# Patient Record
Sex: FEMALE | Race: BLACK OR AFRICAN AMERICAN | NOT HISPANIC OR LATINO | Employment: UNEMPLOYED | RURAL
[De-identification: names, ages, dates, MRNs, and addresses within clinical notes are randomized per-mention and may not be internally consistent; named-entity substitution may affect disease eponyms.]

---

## 2017-02-24 ENCOUNTER — OFFICE VISIT (OUTPATIENT)
Dept: FAMILY MEDICINE CLINIC | Facility: CLINIC | Age: 52
End: 2017-02-24

## 2017-02-24 VITALS
DIASTOLIC BLOOD PRESSURE: 88 MMHG | HEART RATE: 72 BPM | BODY MASS INDEX: 39.23 KG/M2 | TEMPERATURE: 98.4 F | RESPIRATION RATE: 16 BRPM | WEIGHT: 199.8 LBS | SYSTOLIC BLOOD PRESSURE: 148 MMHG | HEIGHT: 60 IN

## 2017-02-24 DIAGNOSIS — E89.0 S/P THYROIDECTOMY: ICD-10-CM

## 2017-02-24 DIAGNOSIS — E03.9 HYPOTHYROIDISM, UNSPECIFIED TYPE: ICD-10-CM

## 2017-02-24 DIAGNOSIS — E21.4 OTHER SPECIFIED DISORDERS OF PARATHYROID GLAND (HCC): Primary | ICD-10-CM

## 2017-02-24 DIAGNOSIS — Z13.6 ENCOUNTER FOR SCREENING FOR CARDIOVASCULAR DISORDERS: ICD-10-CM

## 2017-02-24 DIAGNOSIS — Z13.1 ENCOUNTER FOR SCREENING FOR DIABETES MELLITUS: ICD-10-CM

## 2017-02-24 PROCEDURE — 99203 OFFICE O/P NEW LOW 30 MIN: CPT | Performed by: FAMILY MEDICINE

## 2017-02-24 RX ORDER — ONDANSETRON HYDROCHLORIDE 8 MG/1
8 TABLET, FILM COATED ORAL EVERY 8 HOURS PRN
Qty: 90 TABLET | Refills: 3 | Status: SHIPPED | OUTPATIENT
Start: 2017-02-24 | End: 2017-02-24 | Stop reason: SDUPTHER

## 2017-02-24 RX ORDER — CALCIUM ACETATE 667 MG/1
2668 CAPSULE ORAL 3 TIMES DAILY
Qty: 180 CAPSULE | Refills: 11 | Status: SHIPPED | OUTPATIENT
Start: 2017-02-24 | End: 2017-02-24 | Stop reason: SDUPTHER

## 2017-02-24 RX ORDER — CALCIUM ACETATE 667 MG/1
2668 CAPSULE ORAL 3 TIMES DAILY
Qty: 180 CAPSULE | Refills: 11 | Status: SHIPPED | OUTPATIENT
Start: 2017-02-24 | End: 2018-03-29 | Stop reason: SDUPTHER

## 2017-02-24 RX ORDER — PNV NO.95/FERROUS FUM/FOLIC AC 28MG-0.8MG
1 TABLET ORAL 3 TIMES DAILY
Qty: 90 EACH | Refills: 11 | Status: SHIPPED | OUTPATIENT
Start: 2017-02-24 | End: 2017-06-22 | Stop reason: SDUPTHER

## 2017-02-24 RX ORDER — METOPROLOL SUCCINATE 50 MG/1
50 TABLET, EXTENDED RELEASE ORAL DAILY
COMMUNITY
End: 2017-02-24 | Stop reason: SDUPTHER

## 2017-02-24 RX ORDER — PNV NO.95/FERROUS FUM/FOLIC AC 28MG-0.8MG
1 TABLET ORAL 3 TIMES DAILY
Qty: 90 EACH | Refills: 11 | Status: SHIPPED | OUTPATIENT
Start: 2017-02-24 | End: 2017-02-24 | Stop reason: SDUPTHER

## 2017-02-24 RX ORDER — LISINOPRIL 40 MG/1
40 TABLET ORAL DAILY
Qty: 30 TABLET | Refills: 11 | Status: SHIPPED | OUTPATIENT
Start: 2017-02-24 | End: 2018-02-23 | Stop reason: SDUPTHER

## 2017-02-24 RX ORDER — CLONIDINE HYDROCHLORIDE 0.2 MG/1
0.2 TABLET ORAL 2 TIMES DAILY
Qty: 60 TABLET | Refills: 5 | Status: SHIPPED | OUTPATIENT
Start: 2017-02-24 | End: 2017-11-30 | Stop reason: SDUPTHER

## 2017-02-24 RX ORDER — POTASSIUM CHLORIDE 1500 MG/1
20 TABLET, FILM COATED, EXTENDED RELEASE ORAL DAILY
Qty: 30 TABLET | Refills: 11 | Status: SHIPPED | OUTPATIENT
Start: 2017-02-24 | End: 2019-05-03 | Stop reason: SDUPTHER

## 2017-02-24 RX ORDER — CLONIDINE HYDROCHLORIDE 0.2 MG/1
0.2 TABLET ORAL 2 TIMES DAILY
Qty: 60 TABLET | Refills: 5 | Status: SHIPPED | OUTPATIENT
Start: 2017-02-24 | End: 2017-02-24 | Stop reason: SDUPTHER

## 2017-02-24 RX ORDER — LEVOTHYROXINE SODIUM 0.12 MG/1
125 TABLET ORAL DAILY
COMMUNITY
End: 2017-02-24

## 2017-02-24 RX ORDER — LISINOPRIL 40 MG/1
40 TABLET ORAL DAILY
Qty: 30 TABLET | Refills: 11 | Status: SHIPPED | OUTPATIENT
Start: 2017-02-24 | End: 2017-02-24 | Stop reason: SDUPTHER

## 2017-02-24 RX ORDER — ONDANSETRON HYDROCHLORIDE 8 MG/1
8 TABLET, FILM COATED ORAL EVERY 8 HOURS PRN
Qty: 90 TABLET | Refills: 3 | Status: SHIPPED | OUTPATIENT
Start: 2017-02-24 | End: 2017-05-05 | Stop reason: SDUPTHER

## 2017-02-24 RX ORDER — METOPROLOL SUCCINATE 50 MG/1
50 TABLET, EXTENDED RELEASE ORAL DAILY
Qty: 30 TABLET | Refills: 11 | Status: SHIPPED | OUTPATIENT
Start: 2017-02-24 | End: 2017-02-24 | Stop reason: SDUPTHER

## 2017-02-24 RX ORDER — METOPROLOL SUCCINATE 50 MG/1
50 TABLET, EXTENDED RELEASE ORAL DAILY
Qty: 30 TABLET | Refills: 11 | Status: SHIPPED | OUTPATIENT
Start: 2017-02-24 | End: 2018-03-08 | Stop reason: SDUPTHER

## 2017-02-24 RX ORDER — POTASSIUM CHLORIDE 1500 MG/1
20 TABLET, FILM COATED, EXTENDED RELEASE ORAL DAILY
Qty: 30 TABLET | Refills: 11 | Status: SHIPPED | OUTPATIENT
Start: 2017-02-24 | End: 2017-02-24 | Stop reason: SDUPTHER

## 2017-02-24 NOTE — PROGRESS NOTES
Subjective   Joan Blackwell is a 51 y.o. female.     History of Present Illness     Problem List  1. Essential Hypertension -   2. Asthma -    3. Hypocalcemia  4. Hypothyroidism sp thyroidectomy.   5. 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 H/O Hysterectomy     Patient is 52 yo AAF who I am seeing for first time. Is here to establish.  Pt has not seen PCP in years. Has been going to various health clinics for medication refills.      Pt has history of hypertension and is currently taking clonidine 0.1 mg PO BID along with lisinopril 40 mg PO q daily.  Also is taking Metoprolol XL 50 mg PO q daily. Does not check blood pressure often but usually it runs  >140/90. Denies any chest pain, headaches or dizziness    Pt states she had a thyroidectomy about 10 years ago performed by Dr. Arredondo.  Pt does not know why thyroid was removed.  As a complication pt had some of parathyroid hormones damaged and resected.  Pt has had chronic hypocalcemia and is currently taking calcium acetate 667 mg capsule TID along with potassium chloride ER 20 MEQ PO q daily also on Magnesium supplement in addition to iron supplement.    Pt is needing refills on all her medications today    Pt also has history of asthma and is seeing Dr. Hloguin (Pulmonologist) for this.  Is currently on Advair inhaler.     Was also on thyroid supplement Synthroid before 125 mcg PO q daily but pt states that dosage was too much for her. Pt has not had thyroid checked recently    Pt states she had mammogram done last year.      Patient also has been complaining of some nausea and vomiting as of late. Is requesting today to be on some medication to help with this        The following portions of the patient's history were reviewed and updated as appropriate: allergies, current medications, past family history, past medical history, past social history, past surgical history and problem list.    Review of Systems   Constitutional: Negative.    HENT:  "Negative.    Eyes: Negative.    Respiratory: Negative.    Cardiovascular: Negative.    Gastrointestinal: Positive for nausea and vomiting.   Endocrine: Negative.    Genitourinary: Negative.    Musculoskeletal: Negative.    Skin: Negative.    Allergic/Immunologic: Negative.    Neurological: Negative.    Hematological: Negative.    Psychiatric/Behavioral: Negative.        Objective    Visit Vitals   • /88 (BP Location: Right arm, Patient Position: Sitting, Cuff Size: Adult)   • Pulse 72   • Temp 98.4 °F (36.9 °C) (Oral)   • Resp 16   • Ht 60\" (152.4 cm)   • Wt 199 lb 12.8 oz (90.6 kg)   • BMI 39.02 kg/m2     Abstract on 09/19/2016   Component Date Value Ref Range Status   •  Mammogram 11/18/2015 COMPLETE   Final   •  Pap smear 11/23/2009 COMPLETE   Final       Physical Exam   Constitutional: She is oriented to person, place, and time. She appears well-developed and well-nourished. No distress.   HENT:   Head: Normocephalic and atraumatic.   Right Ear: External ear normal.   Left Ear: External ear normal.   Eyes: Conjunctivae and EOM are normal. Pupils are equal, round, and reactive to light. Right eye exhibits no discharge. Left eye exhibits no discharge. No scleral icterus.   Neck: Normal range of motion. Neck supple. No JVD present. No tracheal deviation present. No thyromegaly present.   Incision and scar on neck from prior thyroidectomy   Cardiovascular: Normal rate, regular rhythm and normal heart sounds.    Pulmonary/Chest: Effort normal and breath sounds normal. No stridor.   Abdominal: Soft. Bowel sounds are normal. She exhibits no distension and no mass. There is no tenderness. There is no rebound and no guarding. No hernia.   Musculoskeletal: She exhibits no edema, tenderness or deformity.   Lymphadenopathy:     She has no cervical adenopathy.   Neurological: She is alert and oriented to person, place, and time. She has normal reflexes. No cranial nerve deficit.   Skin: Skin is warm and dry. No " rash noted. She is not diaphoretic. No erythema. No pallor.   Psychiatric: She has a normal mood and affect. Her behavior is normal. Judgment and thought content normal.   Nursing note and vitals reviewed.      Assessment/Plan   Problems Addressed this Visit     None      Visit Diagnoses     Other specified disorders of parathyroid gland    -  Primary    Relevant Orders    PTH, Intact & Calcium    Encounter for screening for diabetes mellitus        Relevant Orders    CBC Auto Differential    Comprehensive Metabolic Panel    Hemoglobin A1c    Hepatitis Panel, Acute    Lipid Panel    TSH    T4, Free    T3, Free    Vitamin D 25 Hydroxy    Iron Profile    Ferritin    Vitamin B12    Magnesium    Encounter for screening for cardiovascular disorders        Hypothyroidism, unspecified type        S/P thyroidectomy            -will get basic labwork today, checking calcium levels, thyroid levels and parathyroid levels  - Based on thyroid levels will need to start on synthroid medication but on lower dosage.    - will also get hga1c for diabetes screenign and lipid panel for cardiovascular screening  - Also check iron, b12, magnesium level.    - recheck in 1 month  -  Discuss colonoscopy and vaccination on next visit

## 2017-02-27 ENCOUNTER — TELEPHONE (OUTPATIENT)
Dept: FAMILY MEDICINE CLINIC | Facility: CLINIC | Age: 52
End: 2017-02-27

## 2017-02-27 NOTE — TELEPHONE ENCOUNTER
Pharmacy called to verified Rx for magnesium.  Verified with Dr. Arnold, magnesium 133mg TID.  Notified pharmacy by phone.

## 2017-04-24 LAB
25(OH)D3 SERPL-MCNC: 15.6 NG/ML (ref 30–100)
ALBUMIN SERPL-MCNC: 4.2 G/DL (ref 3.4–4.8)
ALBUMIN/GLOB SERPL: 1.4 G/DL (ref 1.1–1.8)
ALP SERPL-CCNC: 72 U/L (ref 38–126)
ALT SERPL W P-5'-P-CCNC: 37 U/L (ref 9–52)
ANION GAP SERPL CALCULATED.3IONS-SCNC: 13 MMOL/L (ref 5–15)
ARTICHOKE IGE QN: 84 MG/DL (ref 1–129)
AST SERPL-CCNC: 26 U/L (ref 14–36)
BASOPHILS # BLD AUTO: 0.02 10*3/MM3 (ref 0–0.2)
BASOPHILS NFR BLD AUTO: 0.4 % (ref 0–2)
BILIRUB SERPL-MCNC: 0.4 MG/DL (ref 0.2–1.3)
BUN BLD-MCNC: 11 MG/DL (ref 7–21)
BUN/CREAT SERPL: 17.7 (ref 7–25)
CALCIUM SPEC-SCNC: 7.7 MG/DL (ref 8.4–10.2)
CHLORIDE SERPL-SCNC: 99 MMOL/L (ref 95–110)
CHOLEST SERPL-MCNC: 159 MG/DL (ref 0–199)
CO2 SERPL-SCNC: 26 MMOL/L (ref 22–31)
CREAT BLD-MCNC: 0.62 MG/DL (ref 0.5–1)
DEPRECATED RDW RBC AUTO: 47.2 FL (ref 36.4–46.3)
EOSINOPHIL # BLD AUTO: 0.13 10*3/MM3 (ref 0–0.7)
EOSINOPHIL NFR BLD AUTO: 2.7 % (ref 0–7)
ERYTHROCYTE [DISTWIDTH] IN BLOOD BY AUTOMATED COUNT: 15.5 % (ref 11.5–14.5)
FERRITIN SERPL-MCNC: 33.2 NG/ML (ref 11.1–264)
GFR SERPL CREATININE-BSD FRML MDRD: 123 ML/MIN/1.73 (ref 51–120)
GLOBULIN UR ELPH-MCNC: 3.1 GM/DL (ref 2.3–3.5)
GLUCOSE BLD-MCNC: 142 MG/DL (ref 60–100)
HBA1C MFR BLD: 8 % (ref 4–5.6)
HCT VFR BLD AUTO: 33.9 % (ref 35–45)
HDLC SERPL-MCNC: 48 MG/DL (ref 60–200)
HGB BLD-MCNC: 11.6 G/DL (ref 12–15.5)
IMM GRANULOCYTES # BLD: 0.01 10*3/MM3 (ref 0–0.02)
IMM GRANULOCYTES NFR BLD: 0.2 % (ref 0–0.5)
IRON 24H UR-MRATE: 34 MCG/DL (ref 37–170)
IRON SATN MFR SERPL: 11 % (ref 15–50)
LDLC/HDLC SERPL: 1.94 {RATIO} (ref 0–3.22)
LYMPHOCYTES # BLD AUTO: 2 10*3/MM3 (ref 0.6–4.2)
LYMPHOCYTES NFR BLD AUTO: 42 % (ref 10–50)
MAGNESIUM SERPL-MCNC: 1.6 MG/DL (ref 1.6–2.3)
MCH RBC QN AUTO: 28.2 PG (ref 26.5–34)
MCHC RBC AUTO-ENTMCNC: 34.2 G/DL (ref 31.4–36)
MCV RBC AUTO: 82.5 FL (ref 80–98)
MONOCYTES # BLD AUTO: 0.33 10*3/MM3 (ref 0–0.9)
MONOCYTES NFR BLD AUTO: 6.9 % (ref 0–12)
NEUTROPHILS # BLD AUTO: 2.27 10*3/MM3 (ref 2–8.6)
NEUTROPHILS NFR BLD AUTO: 47.8 % (ref 37–80)
PLATELET # BLD AUTO: 297 10*3/MM3 (ref 150–450)
PMV BLD AUTO: 9.4 FL (ref 8–12)
POTASSIUM BLD-SCNC: 4 MMOL/L (ref 3.5–5.1)
PROT SERPL-MCNC: 7.3 G/DL (ref 6.3–8.6)
RBC # BLD AUTO: 4.11 10*6/MM3 (ref 3.77–5.16)
SODIUM BLD-SCNC: 138 MMOL/L (ref 137–145)
T4 FREE SERPL-MCNC: 0.69 NG/DL (ref 0.78–2.19)
TIBC SERPL-MCNC: 310 MCG/DL (ref 265–497)
TRIGL SERPL-MCNC: 89 MG/DL (ref 20–199)
TSH SERPL DL<=0.05 MIU/L-ACNC: 9.03 MIU/ML (ref 0.46–4.68)
VIT B12 BLD-MCNC: 325 PG/ML (ref 239–931)
WBC NRBC COR # BLD: 4.76 10*3/MM3 (ref 3.2–9.8)

## 2017-04-24 PROCEDURE — 84481 FREE ASSAY (FT-3): CPT | Performed by: FAMILY MEDICINE

## 2017-04-24 PROCEDURE — 80061 LIPID PANEL: CPT | Performed by: FAMILY MEDICINE

## 2017-04-24 PROCEDURE — 84439 ASSAY OF FREE THYROXINE: CPT | Performed by: FAMILY MEDICINE

## 2017-04-24 PROCEDURE — 84443 ASSAY THYROID STIM HORMONE: CPT | Performed by: FAMILY MEDICINE

## 2017-04-24 PROCEDURE — 83540 ASSAY OF IRON: CPT | Performed by: FAMILY MEDICINE

## 2017-04-24 PROCEDURE — 83970 ASSAY OF PARATHORMONE: CPT | Performed by: FAMILY MEDICINE

## 2017-04-24 PROCEDURE — 83550 IRON BINDING TEST: CPT | Performed by: FAMILY MEDICINE

## 2017-04-24 PROCEDURE — 80053 COMPREHEN METABOLIC PANEL: CPT | Performed by: FAMILY MEDICINE

## 2017-04-24 PROCEDURE — 82607 VITAMIN B-12: CPT | Performed by: FAMILY MEDICINE

## 2017-04-24 PROCEDURE — 82728 ASSAY OF FERRITIN: CPT | Performed by: FAMILY MEDICINE

## 2017-04-24 PROCEDURE — 83735 ASSAY OF MAGNESIUM: CPT | Performed by: FAMILY MEDICINE

## 2017-04-24 PROCEDURE — 85025 COMPLETE CBC W/AUTO DIFF WBC: CPT | Performed by: FAMILY MEDICINE

## 2017-04-24 PROCEDURE — 82306 VITAMIN D 25 HYDROXY: CPT | Performed by: FAMILY MEDICINE

## 2017-04-24 PROCEDURE — 83036 HEMOGLOBIN GLYCOSYLATED A1C: CPT | Performed by: FAMILY MEDICINE

## 2017-04-24 PROCEDURE — 80074 ACUTE HEPATITIS PANEL: CPT | Performed by: FAMILY MEDICINE

## 2017-04-25 LAB
CALCIUM SPEC-SCNC: 7.8 MG/DL (ref 8.4–10.2)
HAV IGM SERPL QL IA: NEGATIVE
HBV CORE IGM SERPL QL IA: NEGATIVE
HBV SURFACE AG SERPL QL IA: NEGATIVE
HCV AB SER DONR QL: NEGATIVE
PTH-INTACT SERPL-MCNC: 10.8 PG/ML (ref 10–65)
T3FREE SERPL-MCNC: 2.6 PG/ML (ref 2–4.4)

## 2017-05-03 ENCOUNTER — OFFICE VISIT (OUTPATIENT)
Dept: FAMILY MEDICINE CLINIC | Facility: CLINIC | Age: 52
End: 2017-05-03

## 2017-05-03 VITALS
TEMPERATURE: 98.6 F | DIASTOLIC BLOOD PRESSURE: 90 MMHG | RESPIRATION RATE: 16 BRPM | HEART RATE: 60 BPM | BODY MASS INDEX: 41.11 KG/M2 | WEIGHT: 209.4 LBS | SYSTOLIC BLOOD PRESSURE: 142 MMHG | HEIGHT: 60 IN

## 2017-05-03 DIAGNOSIS — E89.0 S/P THYROIDECTOMY: ICD-10-CM

## 2017-05-03 DIAGNOSIS — E83.51 HYPOCALCEMIA: ICD-10-CM

## 2017-05-03 DIAGNOSIS — E78.5 HYPERLIPIDEMIA, UNSPECIFIED HYPERLIPIDEMIA TYPE: ICD-10-CM

## 2017-05-03 DIAGNOSIS — E03.9 HYPOTHYROIDISM, UNSPECIFIED TYPE: ICD-10-CM

## 2017-05-03 DIAGNOSIS — D50.9 IRON DEFICIENCY ANEMIA, UNSPECIFIED IRON DEFICIENCY ANEMIA TYPE: ICD-10-CM

## 2017-05-03 DIAGNOSIS — IMO0002 UNCONTROLLED TYPE 2 DIABETES MELLITUS WITH COMPLICATION, WITHOUT LONG-TERM CURRENT USE OF INSULIN: Primary | ICD-10-CM

## 2017-05-03 PROCEDURE — 99214 OFFICE O/P EST MOD 30 MIN: CPT | Performed by: FAMILY MEDICINE

## 2017-05-03 RX ORDER — SIMVASTATIN 20 MG
20 TABLET ORAL NIGHTLY
Qty: 30 TABLET | Refills: 11 | Status: SHIPPED | OUTPATIENT
Start: 2017-05-03 | End: 2018-06-01 | Stop reason: SDUPTHER

## 2017-05-03 RX ORDER — LEVOTHYROXINE SODIUM 137 UG/1
137 TABLET ORAL DAILY
Qty: 30 TABLET | Refills: 11 | Status: SHIPPED | OUTPATIENT
Start: 2017-05-03 | End: 2018-03-13

## 2017-05-03 RX ORDER — LANOLIN ALCOHOL/MO/W.PET/CERES
325 CREAM (GRAM) TOPICAL
Qty: 90 TABLET | Refills: 11 | Status: SHIPPED | OUTPATIENT
Start: 2017-05-03 | End: 2018-06-01 | Stop reason: SDUPTHER

## 2017-05-05 RX ORDER — OMEPRAZOLE 20 MG/1
20 CAPSULE, DELAYED RELEASE ORAL DAILY
Qty: 30 CAPSULE | Refills: 11 | Status: SHIPPED | OUTPATIENT
Start: 2017-05-05 | End: 2018-03-08

## 2017-05-05 RX ORDER — ONDANSETRON HYDROCHLORIDE 8 MG/1
8 TABLET, FILM COATED ORAL EVERY 8 HOURS PRN
Qty: 90 TABLET | Refills: 3 | Status: SHIPPED | OUTPATIENT
Start: 2017-05-05 | End: 2019-09-18 | Stop reason: SDUPTHER

## 2017-06-22 ENCOUNTER — OFFICE VISIT (OUTPATIENT)
Dept: FAMILY MEDICINE CLINIC | Facility: CLINIC | Age: 52
End: 2017-06-22

## 2017-06-22 VITALS
HEIGHT: 60 IN | WEIGHT: 196.8 LBS | SYSTOLIC BLOOD PRESSURE: 151 MMHG | HEART RATE: 70 BPM | TEMPERATURE: 97 F | OXYGEN SATURATION: 97 % | BODY MASS INDEX: 38.64 KG/M2 | DIASTOLIC BLOOD PRESSURE: 84 MMHG

## 2017-06-22 DIAGNOSIS — N76.0 VAGINOSIS: Primary | ICD-10-CM

## 2017-06-22 LAB
CANDIDA ALBICANS: POSITIVE
GARDNERELLA VAGINALIS: NEGATIVE
TRICHOMONAS VAGINALIS PCR: NEGATIVE

## 2017-06-22 PROCEDURE — 87510 GARDNER VAG DNA DIR PROBE: CPT | Performed by: NURSE PRACTITIONER

## 2017-06-22 PROCEDURE — 99213 OFFICE O/P EST LOW 20 MIN: CPT | Performed by: NURSE PRACTITIONER

## 2017-06-22 PROCEDURE — 87660 TRICHOMONAS VAGIN DIR PROBE: CPT | Performed by: NURSE PRACTITIONER

## 2017-06-22 PROCEDURE — 87480 CANDIDA DNA DIR PROBE: CPT | Performed by: NURSE PRACTITIONER

## 2017-06-22 RX ORDER — FLUCONAZOLE 150 MG/1
TABLET ORAL
Qty: 2 TABLET | Refills: 5 | Status: SHIPPED | OUTPATIENT
Start: 2017-06-22 | End: 2018-02-19

## 2017-06-22 RX ORDER — CLOTRIMAZOLE 1 %
CREAM WITH APPLICATOR VAGINAL NIGHTLY
Qty: 45 G | Refills: 0 | Status: SHIPPED | OUTPATIENT
Start: 2017-06-22 | End: 2018-02-19

## 2017-06-22 NOTE — PROGRESS NOTES
Subjective   Joan Blackwell is a 51 y.o. female.     HPI Comments: Here with itchy vaginal discharge, long hx of vag yeast and usually responds well to diflucan.  Not concerned with STD    Vaginal Discharge   The patient's primary symptoms include genital itching, a genital rash and vaginal discharge. The patient's pertinent negatives include no genital lesions, genital odor, pelvic pain or vaginal bleeding. This is a new problem. The current episode started in the past 7 days. The problem occurs constantly. The problem has been unchanged. The patient is experiencing no pain. Pertinent negatives include no abdominal pain, dysuria, nausea, rash or urgency. The vaginal discharge was white. There has been no bleeding. The symptoms are aggravated by tactile pressure and activity. She has tried antifungals for the symptoms. Her sexual activity is non-contributory to the current illness. She is postmenopausal (Post hysterectomy). Her past medical history is significant for vaginosis.        The following portions of the patient's history were reviewed and updated as appropriate: allergies, current medications, past family history, past medical history, past social history, past surgical history and problem list.    Review of Systems   Constitutional: Positive for activity change.   Gastrointestinal: Negative for abdominal pain and nausea.   Genitourinary: Positive for vaginal discharge. Negative for difficulty urinating, dysuria, pelvic pain, urgency, vaginal bleeding and vaginal pain.   Musculoskeletal: Negative for gait problem.   Skin: Negative for rash.       Objective   Physical Exam   Constitutional: She is oriented to person, place, and time. She appears well-developed and well-nourished.   HENT:   Head: Normocephalic and atraumatic.   Eyes: Conjunctivae are normal.   Neck: Normal range of motion. Neck supple.   Cardiovascular: Normal rate.    Pulmonary/Chest: Effort normal and breath sounds normal.   Abdominal:  Soft. She exhibits no distension. There is no tenderness.   Genitourinary: Vaginal discharge found.   Genitourinary Comments: Stated vaginal itching a wk ago not concerned re: STD.  Post hysterectomy   Musculoskeletal: Normal range of motion.   Neurological: She is alert and oriented to person, place, and time.   Skin: Skin is warm and dry.   Psychiatric: She has a normal mood and affect.   Nursing note and vitals reviewed.      Assessment/Plan   Joan was seen today for vaginal discharge.    Diagnoses and all orders for this visit:    Vaginosis  -     fluconazole (DIFLUCAN) 150 MG tablet; Take one tab now repeat in one week  -     Gardnerella vaginalis, Trichomonas vaginalis, Candida albicans, PCR  -     clotrimazole (GYNE-LOTRIMIN) 1 % vaginal cream; Insert  into the vagina Every Night.

## 2017-06-26 DIAGNOSIS — R23.8 RASH, VESICULAR: Primary | ICD-10-CM

## 2017-06-26 RX ORDER — ACYCLOVIR 50 MG/G
OINTMENT TOPICAL 3 TIMES DAILY PRN
Qty: 30 G | Refills: 0 | Status: SHIPPED | OUTPATIENT
Start: 2017-06-26 | End: 2018-03-08

## 2017-11-30 RX ORDER — CLONIDINE HYDROCHLORIDE 0.2 MG/1
TABLET ORAL
Qty: 60 TABLET | Refills: 0 | Status: SHIPPED | OUTPATIENT
Start: 2017-11-30 | End: 2018-03-08

## 2018-02-19 ENCOUNTER — OFFICE VISIT (OUTPATIENT)
Dept: FAMILY MEDICINE CLINIC | Facility: CLINIC | Age: 53
End: 2018-02-19

## 2018-02-19 VITALS
HEART RATE: 118 BPM | HEIGHT: 60 IN | BODY MASS INDEX: 37.28 KG/M2 | WEIGHT: 189.9 LBS | RESPIRATION RATE: 16 BRPM | DIASTOLIC BLOOD PRESSURE: 100 MMHG | TEMPERATURE: 99.5 F | SYSTOLIC BLOOD PRESSURE: 210 MMHG

## 2018-02-19 DIAGNOSIS — I10 UNCONTROLLED HYPERTENSION: ICD-10-CM

## 2018-02-19 DIAGNOSIS — I16.0 HYPERTENSIVE URGENCY: ICD-10-CM

## 2018-02-19 DIAGNOSIS — IMO0002 UNCONTROLLED TYPE 2 DIABETES MELLITUS WITH COMPLICATION, WITHOUT LONG-TERM CURRENT USE OF INSULIN: Primary | ICD-10-CM

## 2018-02-19 DIAGNOSIS — R51.9 NONINTRACTABLE HEADACHE, UNSPECIFIED CHRONICITY PATTERN, UNSPECIFIED HEADACHE TYPE: ICD-10-CM

## 2018-02-19 PROBLEM — E66.9 OBESITY: Status: ACTIVE | Noted: 2018-02-19

## 2018-02-19 PROCEDURE — 99213 OFFICE O/P EST LOW 20 MIN: CPT | Performed by: FAMILY MEDICINE

## 2018-02-19 NOTE — PROGRESS NOTES
Subjective   Joan Blackwell is a 52 y.o. female.       Problem List  1. Essential Hypertension -   2. Asthma   3. Hypocalcemia  4. Hypothyroidism sp thyroidectomy.   5. 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 H/O Hysterectomy   7. DM type 2  8. Hyperlipidemia ASCVD risk high   9. Iron deficiency anemia  10. Constipation  11. Obesity BMI >30     5/3/17 Pt is 52 yo AAF with the above medical issues. Is here to go over labwork. TSH still elevated. Is taking Synthroid 125 mcg daily.  Pt has hypothyroidism sp thyroidectomy.   Pt also has hypocalcemia and is taking calcium supplements. PTH was normal.  Regarding hga1c it was 8.0 and pt is newly diagnosed diabetic. She is tearful today when hearing this. Currently not on statin medication as well.  Has not had diabetic eye examination.  Is willing to go to diabetic education classes  Regarding rest of labwork. Pt has iron deficiency anemia and is not on iron pill currently.      2/19/18 - pt is here for followup. She is due for followup on DM type 2 iron deficiency anemia and hypothyroidism sp thyroidectomy. For hyperlipidemia she is on zucor 20 mg PO qhs. Also takes synthroid 137 mcg pO q daily for hypothyroidism. She is alos on metformin 500 mg PO BID for DM type 2.  Her last hga1c was 8.0. She missed her last appt on 6/2/17.  For iron deficiency anemia she is on ferrous sulfate piill TID. She is also due for mammogram, colonoscopy screening and diabetic eye examination. PT today mainly has a headache that started yesterday. Today her BP is elevated today and >180/100. She has been to ER twice at [facility] already and was discharged home. Do not have ER records here today . She was given BP medication and her BP went down. PT states she was not admitted and was discharged from [facility] the same day Pt also has been experiencing dry heaving and wanting to vomit.  She states she also has spasms at the back of her neck. She is also experiencing wheezing . She states  lorena is compliant in taking her BP medication including clonidine 0.2 mg PO BID, lisinopril 40 mg PO q daily and toprol XL 50 mg PO q daily     Headache    This is a new problem. The current episode started yesterday. The problem has been rapidly worsening. The pain is located in the occipital region. The pain quality is not similar to prior headaches. The quality of the pain is described as aching, sharp and pulsating. Associated symptoms include dizziness, eye redness, eye watering, nausea, neck pain, phonophobia, photophobia, rhinorrhea and vomiting. Pertinent negatives include no abnormal behavior, back pain, blurred vision, drainage, ear pain, eye pain, facial sweating, hearing loss, insomnia, loss of balance, muscle aches, scalp tenderness, seizures, tingling or weight loss. Her past medical history is significant for hypertension and obesity. There is no history of cancer, immunosuppression, migraine headaches, migraines in the family, pseudotumor cerebri, recent head traumas, sinus disease or TMJ.   Diabetes   She presents for her initial diabetic visit. She has type 2 diabetes mellitus. Her disease course has been stable. Hypoglycemia symptoms include dizziness and headaches. Pertinent negatives for hypoglycemia include no confusion, hunger, mood changes, nervousness/anxiousness, pallor, seizures, sleepiness, speech difficulty, sweats or tremors. Pertinent negatives for diabetes include no blurred vision, no chest pain, no foot paresthesias, no foot ulcerations, no polydipsia, no polyphagia, no polyuria and no weight loss. Pertinent negatives for hypoglycemia complications include no blackouts, no hospitalization, no nocturnal hypoglycemia, no required assistance and no required glucagon injection. Pertinent negatives for diabetic complications include no autonomic neuropathy, CVA, heart disease, impotence, nephropathy, peripheral neuropathy, PVD or retinopathy. Risk factors for coronary artery disease  include diabetes mellitus, sedentary lifestyle and obesity. When asked about current treatments, none were reported. She has not had a previous visit with a dietitian. She never participates in exercise. An ACE inhibitor/angiotensin II receptor blocker is being taken. She does not see a podiatrist.Eye exam is not current.   Hypertension   The problem has been gradually worsening since onset. Associated symptoms include headaches, neck pain and shortness of breath. Pertinent negatives include no anxiety, blurred vision, chest pain, malaise/fatigue, orthopnea, palpitations, peripheral edema, PND or sweats. There are no associated agents to hypertension. Risk factors for coronary artery disease include diabetes mellitus and dyslipidemia. Past treatments include beta blockers. The current treatment provides no improvement. There are no compliance problems.  There is no history of angina, kidney disease, CAD/MI, CVA, heart failure, left ventricular hypertrophy, PVD, retinopathy or a thyroid problem. There is no history of chronic renal disease, coarctation of the aorta, hyperaldosteronism, hypercortisolism, hyperparathyroidism, a hypertension causing med, pheochromocytoma or sleep apnea.             The following portions of the patient's history were reviewed and updated as appropriate: allergies, current medications, past family history, past medical history, past social history, past surgical history and problem list.    Review of Systems   Constitutional: Positive for activity change. Negative for malaise/fatigue and weight loss.   HENT: Positive for rhinorrhea. Negative for ear pain and hearing loss.    Eyes: Positive for photophobia and redness. Negative for blurred vision and pain.   Respiratory: Positive for shortness of breath.    Cardiovascular: Negative for chest pain, palpitations, orthopnea and PND.   Gastrointestinal: Positive for constipation, nausea and vomiting.   Endocrine: Negative.  Negative for  "polydipsia, polyphagia and polyuria.   Genitourinary: Negative.  Negative for impotence.   Musculoskeletal: Positive for neck pain and neck stiffness. Negative for back pain.   Skin: Negative.  Negative for pallor.   Allergic/Immunologic: Negative.    Neurological: Positive for dizziness, light-headedness and headaches. Negative for tingling, tremors, seizures, facial asymmetry, speech difficulty and loss of balance.   Hematological: Negative.    Psychiatric/Behavioral: Negative.  Negative for confusion. The patient is not nervous/anxious and does not have insomnia.        Objective    BP (!) 210/100  Pulse 118  Temp 99.5 °F (37.5 °C)  Resp 16  Ht 152.4 cm (60\")  Wt 86.1 kg (189 lb 14.4 oz)  BMI 37.09 kg/m2        Chemistry        Component Value Date/Time     04/24/2017 0828    K 4.0 04/24/2017 0828    CL 99 04/24/2017 0828    CO2 26.0 04/24/2017 0828    BUN 11 04/24/2017 0828    CREATININE 0.62 04/24/2017 0828        Component Value Date/Time    CALCIUM 7.7 (L) 04/24/2017 0828    CALCIUM 7.8 (L) 04/24/2017 0828    ALKPHOS 72 04/24/2017 0828    AST 26 04/24/2017 0828    ALT 37 04/24/2017 0828    BILITOT 0.4 04/24/2017 0828        Lab Results   Component Value Date    WBC 4.76 04/24/2017    HGB 11.6 (L) 04/24/2017    HCT 33.9 (L) 04/24/2017    MCV 82.5 04/24/2017     04/24/2017     Lab Results   Component Value Date    CHOL 159 04/24/2017     Lab Results   Component Value Date    TRIG 89 04/24/2017    TRIG 98 10/12/2015     Lab Results   Component Value Date    HDL 48 (L) 04/24/2017    HDL 51 (L) 10/12/2015     No components found for: LDLCALC  Lab Results   Component Value Date    LDL 84 04/24/2017    LDL 74 10/12/2015     No results found for: HDLLDLRATIO  No components found for: CHOLHDL  Lab Results   Component Value Date    HGBA1C 8.00 (H) 04/24/2017     Lab Results   Component Value Date    TSH 9.030 (H) 04/24/2017     Physical Exam   Constitutional: She is oriented to person, place, and " time. She appears well-developed and well-nourished. She appears distressed.   Pt appears to be  Dry heaving    HENT:   Head: Normocephalic and atraumatic.   Right Ear: External ear normal.   Left Ear: External ear normal.   Eyes: Conjunctivae and EOM are normal. Right eye exhibits no discharge. Left eye exhibits no discharge. No scleral icterus.   Dilated pupils on reaction to light bilaterally    Neck: Normal range of motion. Neck supple. No JVD present. No tracheal deviation present. No thyromegaly present.   Cardiovascular: Normal rate, regular rhythm and normal heart sounds.    Pulmonary/Chest: No stridor. She is in respiratory distress. She has wheezes.   Abdominal: Soft. Bowel sounds are normal. She exhibits no distension and no mass. There is no tenderness. There is no rebound and no guarding. No hernia.   Musculoskeletal: Normal range of motion. She exhibits no edema, tenderness or deformity.   Lymphadenopathy:     She has no cervical adenopathy.   Neurological: She is alert and oriented to person, place, and time. She has normal reflexes. No cranial nerve deficit. Coordination normal.   Skin: Skin is warm. No rash noted. She is diaphoretic. No erythema. No pallor.   Psychiatric: She has a normal mood and affect. Her behavior is normal. Judgment and thought content normal.   Nursing note and vitals reviewed.      Assessment/Plan   Problems Addressed this Visit        Cardiovascular and Mediastinum    Uncontrolled hypertension    Hypertensive urgency       Endocrine    Uncontrolled type 2 diabetes mellitus with complication, without long-term current use of insulin - Primary       Nervous and Auditory    Nonintractable headache      - had discussion with pt about the need to go to ER at [facility] since pt has uncontrolled hypertension and red flags with neck pain, shortness of breath, dizziness, param heaving.  Highly suspicious for hypertensive urgency/hypertensive emergency  BP today >180/100.  She will likely  need close careful  inpatient monitoring of Blood pressure.  Pt advised to go to ER and she chose to go back to [facility]  - Spoke and called [facility] ER and spoke to Dr. Aragon (ER PHysician) about pt's condition he agreed to see patient. Pt and  advised to go to [facility] ER for further evaluation and likely admission to hospital. Appreciate Dr. Aragon for accepting pt   - Pt signed Against Medical Advice form and declined ambulance.  aware of pt's condition and will drive pt to ER  - recheck again in office once pt imore stabilized.   - will hold getting labwork for now

## 2018-02-22 ENCOUNTER — HOSPITAL ENCOUNTER (EMERGENCY)
Facility: HOSPITAL | Age: 53
Discharge: HOME | End: 2018-02-22
Attending: EMERGENCY MEDICINE | Admitting: EMERGENCY MEDICINE

## 2018-02-22 VITALS
RESPIRATION RATE: 18 BRPM | WEIGHT: 190 LBS | DIASTOLIC BLOOD PRESSURE: 75 MMHG | HEIGHT: 60 IN | BODY MASS INDEX: 37.3 KG/M2 | SYSTOLIC BLOOD PRESSURE: 122 MMHG | OXYGEN SATURATION: 97 % | HEART RATE: 74 BPM | TEMPERATURE: 98.4 F

## 2018-02-22 DIAGNOSIS — M62.838 NECK MUSCLE SPASM: Primary | ICD-10-CM

## 2018-02-22 PROCEDURE — 99283 EMERGENCY DEPT VISIT LOW MDM: CPT

## 2018-02-22 PROCEDURE — 96372 THER/PROPH/DIAG INJ SC/IM: CPT

## 2018-02-22 PROCEDURE — 25010000002 MORPHINE PER 10 MG: Performed by: EMERGENCY MEDICINE

## 2018-02-22 PROCEDURE — 25010000002 ORPHENADRINE CITRATE PER 60 MG: Performed by: EMERGENCY MEDICINE

## 2018-02-22 RX ORDER — HYDROCODONE BITARTRATE AND ACETAMINOPHEN 5; 325 MG/1; MG/1
1 TABLET ORAL EVERY 6 HOURS PRN
Qty: 10 TABLET | Refills: 0 | Status: SHIPPED | OUTPATIENT
Start: 2018-02-22 | End: 2018-03-08

## 2018-02-22 RX ORDER — ORPHENADRINE CITRATE 30 MG/ML
60 INJECTION INTRAMUSCULAR; INTRAVENOUS ONCE
Status: COMPLETED | OUTPATIENT
Start: 2018-02-22 | End: 2018-02-22

## 2018-02-22 RX ORDER — CYCLOBENZAPRINE HCL 10 MG
10 TABLET ORAL 3 TIMES DAILY PRN
Qty: 30 TABLET | Refills: 0 | Status: SHIPPED | OUTPATIENT
Start: 2018-02-22 | End: 2018-03-08

## 2018-02-22 RX ADMIN — MORPHINE SULFATE 8 MG: 4 INJECTION, SOLUTION INTRAMUSCULAR; INTRAVENOUS at 10:00

## 2018-02-22 RX ADMIN — ORPHENADRINE CITRATE 60 MG: 30 INJECTION INTRAMUSCULAR; INTRAVENOUS at 10:00

## 2018-02-22 NOTE — ED TRIAGE NOTES
Patient states she fell 11 days ago and hit her head on concrete.  Patient presents to ED with c/o neck spasms.

## 2018-02-22 NOTE — DISCHARGE INSTRUCTIONS
Follow-up with your primary care physician for reevaluation.  Take pain medication and muscle relaxants as needed.  Return to ER for worsening.      Muscle Cramps and Spasms  Muscle cramps and spasms occur when a muscle or muscles tighten and you have no control over this tightening (involuntary muscle contraction). They are a common problem and can develop in any muscle. The most common place is in the calf muscles of the leg. Muscle cramps and muscle spasms are both involuntary muscle contractions, but there are some differences between the two:  · Muscle cramps are painful. They come and go and may last a few seconds to 15 minutes. Muscle cramps are often more forceful and last longer than muscle spasms.  · Muscle spasms may or may not be painful. They may also last just a few seconds or much longer.  Certain medical conditions, such as diabetes or Parkinson disease, can make it more likely to develop cramps or spasms. However, cramps or spasms are usually not caused by a serious underlying problem. Common causes include:  · Overexertion.  · Overuse from repetitive motions, or doing the same thing over and over.  · Remaining in a certain position for a long period of time.  · Improper preparation, form, or technique while playing a sport or doing an activity.  · Dehydration.  · Injury.  · Side effects of some medicines.  · Abnormally low levels of the salts and ions in your blood (electrolytes), especially potassium and calcium. This could happen if you are taking water pills (diuretics) or if you are pregnant.  In many cases, the cause of muscle cramps or spasms is unknown.  Follow these instructions at home:  · Stay well hydrated. Drink enough fluid to keep your urine clear or pale yellow.  · Try massaging, stretching, and relaxing the affected muscle.  · If directed, apply heat to tight or tense muscles as often as told by your health care provider. Use the heat source that your health care provider  recommends, such as a moist heat pack or a heating pad.  ¨ Place a towel between your skin and the heat source.  ¨ Leave the heat on for 20-30 minutes.  ¨ Remove the heat if your skin turns bright red. This is especially important if you are unable to feel pain, heat, or cold. You may have a greater risk of getting burned.  · If directed, put ice on the affected area. This may help if you are sore or have pain after a cramp or spasm.  ¨ Put ice in a plastic bag.  ¨ Place a towel between your skin and the bag.  ¨ Leave the ice on for 20 minutes, 2-3 times a day.  · Take over-the-counter and prescription medicines only as told by your health care provider.  · Pay attention to any changes in your symptoms.  Contact a health care provider if:  · Your cramps or spasms get more severe or happen more often.  · Your cramps or spasms do not improve over time.  This information is not intended to replace advice given to you by your health care provider. Make sure you discuss any questions you have with your health care provider.  Document Released: 06/09/2003 Document Revised: 01/18/2017 Document Reviewed: 09/20/2016  [employer] Interactive Patient Education © 2017 [employer]

## 2018-02-22 NOTE — ED PROVIDER NOTES
Subjective   HPI Comments: 52 years old female with history of hypertension, diabetes mellitus, GERD, carpal tunnel right presented in the ER for evaluation of neck pain and headache.  Patient reports she slipped on ice almost 10 days ago and fell on concrete.  She did hit her head.  She was evaluated at [facility] with negative CT head and neck for any acute finding.  CT neck showed muscle spasms.  Patient reports moderate to severe intensity sharp pain with movements of neck.  Pain is more on the right neck muscles with some palpable knots.  It is radiating in the right upper extremity.  She has been taking Tylenol with no significant relief.  She denies any new numbness and tingling in the right upper extremity except for what she has from carpal tunnel for which she would be going for surgery at [facility] as per patient.  She denies any chest pain palpitations or shortness of breath.  No visual symptoms.  No numbness tingling weakness anywhere else.      History provided by:  Patient      Review of Systems   Constitutional: Negative for chills and fever.   HENT: Negative for congestion, postnasal drip, sinus pain and trouble swallowing.    Eyes: Negative for photophobia, redness and visual disturbance.   Respiratory: Negative for chest tightness and shortness of breath.    Gastrointestinal: Negative for abdominal distention, abdominal pain, nausea and vomiting.   Genitourinary: Negative for dysuria.   Musculoskeletal: Positive for neck pain. Negative for back pain.   Skin: Negative for pallor.   Neurological: Positive for headaches. Negative for dizziness, tremors, weakness and numbness.       Past Medical History:   Diagnosis Date   • Abdominal pain     suspect Kidney Stone      • Acquired hypothyroidism    • Acute bronchitis    • Acute maxillary sinusitis    • Acute pharyngitis    • Asthma    • Axillary lymphadenopathy    • Blood in urine    • Bronchitis     with an asthmatic reaction      •  Cough    • Cyst of Bartholin's gland duct     History of    • Degenerative joint disease involving multiple joints    • Encounter for gynecological examination with abnormal finding    • Essential (primary) hypertension    • Essential hypertension    • GERD (gastroesophageal reflux disease)    • Goiter    • Hemorrhoids    • Hordeolum     right lower eyelid      • Low back pain    • Lung nodule, solitary    • Polyp of vagina     possible      • Postsurgical hypoparathyroidism    • Shortness of breath    • Tobacco dependence syndrome     Past history   • Urinary tract infectious disease    • Vulvovaginitis        Allergies   Allergen Reactions   • Naproxen Nausea And Vomiting and Swelling   • Other      Blueberries       Past Surgical History:   Procedure Laterality Date   •  SECTION  01/14/1993    x 3, 84, 82,   • EXPLORATORY LAPAROTOMY Left 1998    Left cystectomy. Partial resection of vthe left ovary. Left ovary- oversewn   • INJECTION OF MEDICATION  2014    Depo Medrol (Methylprednisone) (1)    • INJECTION OF MEDICATION  2015    Kenalog (1)     • LAPAROSCOPIC HYSTERECTOMY  1995    Surgical, lysis of adhesions.Laparoscopic Assisted vaginal Hysterectomy (Tyler/ Doderlein Technique) marsupialization of right Bartholin's Gland Cyst   • LASER ABLATION OF THE CERVIX  1985    Laser vaporization, cervical cone   • OVARIAN CYST REMOVAL     • PAP SMEAR     • THYROIDECTOMY  2006    with partial parathyroidectomy       Family History   Problem Relation Age of Onset   • Diabetes Mother    • Hypertension Mother    • Diabetes Father    • Arthritis Father        Social History     Social History   • Marital status:      Spouse name: N/A   • Number of children: N/A   • Years of education: N/A     Social History Main Topics   • Smoking status: Former Smoker   • Smokeless tobacco: Never Used   • Alcohol use No   • Drug use: Defer   • Sexual activity: Defer      Other Topics Concern   • None     Social History Narrative           Objective   Physical Exam   Constitutional: She is oriented to person, place, and time. She appears well-developed and well-nourished.   HENT:   Head: Normocephalic and atraumatic.   Nose: Nose normal.   Mouth/Throat: Oropharynx is clear and moist.   Eyes: Conjunctivae and EOM are normal. Pupils are equal, round, and reactive to light.   Neck: Neck supple. Muscular tenderness present. No spinous process tenderness present. No tracheal deviation present. No thyromegaly present.       Right trapezius muscle spasm and tenderness.  Midline tenderness.   Cardiovascular: Normal rate, regular rhythm and normal heart sounds.    Pulmonary/Chest: Effort normal and breath sounds normal.   Musculoskeletal: She exhibits tenderness.   Neurological: She is alert and oriented to person, place, and time. She has normal reflexes. She displays no atrophy, no tremor and normal reflexes. No cranial nerve deficit or sensory deficit. She exhibits normal muscle tone. Coordination normal.   Skin: Skin is warm and dry.   Psychiatric: She has a normal mood and affect.   Nursing note and vitals reviewed.      Procedures         ED Course  ED Course   Comment By Time   Ekasper Request # : [id number] Bill Anderson MD 02/22 6208        I have reviewed CAT scan results of her head and neck done at [facility] on February 10 which did not show any acute finding .  Her symptoms/findings are more of muscle spasm.  She is given pain medication and muscle relaxants.  On reevaluation she is feeling much better.  I will place her on pain medication and muscle relaxant and have her follow-up with her primary care physician.  I do not think she needs repeat  images again.  She has no focal neuro findings at all suggesting need for images.  At this point I do not think she needs any other workup and is being discharged.          MDM    Final diagnoses:   Neck muscle spasm             Bill Anderson MD  02/22/18 1024       Bill Anderson MD  02/22/18 102

## 2018-02-23 RX ORDER — LISINOPRIL 40 MG/1
TABLET ORAL
Qty: 30 TABLET | Refills: 10 | Status: SHIPPED | OUTPATIENT
Start: 2018-02-23 | End: 2018-03-08 | Stop reason: SDUPTHER

## 2018-03-08 ENCOUNTER — OFFICE VISIT (OUTPATIENT)
Dept: FAMILY MEDICINE CLINIC | Facility: CLINIC | Age: 53
End: 2018-03-08

## 2018-03-08 VITALS
WEIGHT: 191 LBS | RESPIRATION RATE: 16 BRPM | TEMPERATURE: 98.6 F | SYSTOLIC BLOOD PRESSURE: 148 MMHG | BODY MASS INDEX: 37.5 KG/M2 | DIASTOLIC BLOOD PRESSURE: 96 MMHG | HEIGHT: 60 IN | HEART RATE: 100 BPM

## 2018-03-08 DIAGNOSIS — E89.0 S/P THYROIDECTOMY: ICD-10-CM

## 2018-03-08 DIAGNOSIS — Z12.39 ENCOUNTER FOR SCREENING FOR MALIGNANT NEOPLASM OF BREAST: ICD-10-CM

## 2018-03-08 DIAGNOSIS — I10 UNCONTROLLED HYPERTENSION: ICD-10-CM

## 2018-03-08 DIAGNOSIS — D50.9 IRON DEFICIENCY ANEMIA, UNSPECIFIED IRON DEFICIENCY ANEMIA TYPE: ICD-10-CM

## 2018-03-08 DIAGNOSIS — E78.5 HYPERLIPIDEMIA, UNSPECIFIED HYPERLIPIDEMIA TYPE: ICD-10-CM

## 2018-03-08 DIAGNOSIS — E66.9 OBESITY, UNSPECIFIED CLASSIFICATION, UNSPECIFIED OBESITY TYPE, UNSPECIFIED WHETHER SERIOUS COMORBIDITY PRESENT: ICD-10-CM

## 2018-03-08 DIAGNOSIS — I10 ESSENTIAL HYPERTENSION: ICD-10-CM

## 2018-03-08 DIAGNOSIS — E83.51 HYPOCALCEMIA: ICD-10-CM

## 2018-03-08 DIAGNOSIS — E87.6 HYPOKALEMIA: ICD-10-CM

## 2018-03-08 DIAGNOSIS — G56.01 CARPAL TUNNEL SYNDROME OF RIGHT WRIST: ICD-10-CM

## 2018-03-08 DIAGNOSIS — Z00.00 GENERAL MEDICAL EXAMINATION: ICD-10-CM

## 2018-03-08 DIAGNOSIS — E11.8 CONTROLLED TYPE 2 DIABETES MELLITUS WITH COMPLICATION, WITHOUT LONG-TERM CURRENT USE OF INSULIN (HCC): Primary | ICD-10-CM

## 2018-03-08 DIAGNOSIS — M25.531 RIGHT WRIST PAIN: ICD-10-CM

## 2018-03-08 DIAGNOSIS — Z12.11 ENCOUNTER FOR SCREENING FOR MALIGNANT NEOPLASM OF COLON: ICD-10-CM

## 2018-03-08 DIAGNOSIS — E03.9 HYPOTHYROIDISM, UNSPECIFIED TYPE: ICD-10-CM

## 2018-03-08 DIAGNOSIS — E55.9 VITAMIN D DEFICIENCY: ICD-10-CM

## 2018-03-08 PROCEDURE — 99214 OFFICE O/P EST MOD 30 MIN: CPT | Performed by: FAMILY MEDICINE

## 2018-03-08 RX ORDER — LISINOPRIL 40 MG/1
40 TABLET ORAL DAILY
Qty: 30 TABLET | Refills: 3 | Status: SHIPPED | OUTPATIENT
Start: 2018-03-08 | End: 2018-06-22 | Stop reason: SDUPTHER

## 2018-03-08 RX ORDER — CLONIDINE HYDROCHLORIDE 0.3 MG/1
0.3 TABLET ORAL EVERY 12 HOURS SCHEDULED
Qty: 60 TABLET | Refills: 3 | Status: SHIPPED | OUTPATIENT
Start: 2018-03-08 | End: 2018-03-29 | Stop reason: SDUPTHER

## 2018-03-08 RX ORDER — METOPROLOL SUCCINATE 50 MG/1
50 TABLET, EXTENDED RELEASE ORAL DAILY
Qty: 30 TABLET | Refills: 3 | Status: SHIPPED | OUTPATIENT
Start: 2018-03-08 | End: 2018-09-05 | Stop reason: SDUPTHER

## 2018-03-08 NOTE — PATIENT INSTRUCTIONS
"STart taking clonidine 0.3 mg by mouth twice a day    Continue on Toprol XL 50 mg daily    Continue on lisionpril  40 mg daily    Please check blood pressures and bring on next visit    Office will call for appt with Orthopedic Surgery and colonscopy screening.      Hospital will call for mammogram screening    Recheck in 2 weeks   DASH Eating Plan  DASH stands for \"Dietary Approaches to Stop Hypertension.\" The DASH eating plan is a healthy eating plan that has been shown to reduce high blood pressure (hypertension). It may also reduce your risk for type 2 diabetes, heart disease, and stroke. The DASH eating plan may also help with weight loss.  What are tips for following this plan?  General guidelines   · Avoid eating more than 2,300 mg (milligrams) of salt (sodium) a day. If you have hypertension, you may need to reduce your sodium intake to 1,500 mg a day.  · Limit alcohol intake to no more than 1 drink a day for nonpregnant women and 2 drinks a day for men. One drink equals 12 oz of beer, 5 oz of wine, or 1½ oz of hard liquor.  · Work with your health care provider to maintain a healthy body weight or to lose weight. Ask what an ideal weight is for you.  · Get at least 30 minutes of exercise that causes your heart to beat faster (aerobic exercise) most days of the week. Activities may include walking, swimming, or biking.  · Work with your health care provider or diet and nutrition specialist (dietitian) to adjust your eating plan to your individual calorie needs.  Reading food labels   · Check food labels for the amount of sodium per serving. Choose foods with less than 5 percent of the Daily Value of sodium. Generally, foods with less than 300 mg of sodium per serving fit into this eating plan.  · To find whole grains, look for the word \"whole\" as the first word in the ingredient list.  Shopping   · Buy products labeled as \"low-sodium\" or \"no salt added.\"  · Buy fresh foods. Avoid canned foods and premade or " frozen meals.  Cooking   · Avoid adding salt when cooking. Use salt-free seasonings or herbs instead of table salt or sea salt. Check with your health care provider or pharmacist before using salt substitutes.  · Do not ayala foods. Cook foods using healthy methods such as baking, boiling, grilling, and broiling instead.  · Cook with heart-healthy oils, such as olive, canola, soybean, or sunflower oil.  Meal planning     · Eat a balanced diet that includes:  ¨ 5 or more servings of fruits and vegetables each day. At each meal, try to fill half of your plate with fruits and vegetables.  ¨ Up to 6-8 servings of whole grains each day.  ¨ Less than 6 oz of lean meat, poultry, or fish each day. A 3-oz serving of meat is about the same size as a deck of cards. One egg equals 1 oz.  ¨ 2 servings of low-fat dairy each day.  ¨ A serving of nuts, seeds, or beans 5 times each week.  ¨ Heart-healthy fats. Healthy fats called Omega-3 fatty acids are found in foods such as flaxseeds and coldwater fish, like sardines, salmon, and mackerel.  · Limit how much you eat of the following:  ¨ Canned or prepackaged foods.  ¨ Food that is high in trans fat, such as fried foods.  ¨ Food that is high in saturated fat, such as fatty meat.  ¨ Sweets, desserts, sugary drinks, and other foods with added sugar.  ¨ Full-fat dairy products.  · Do not salt foods before eating.  · Try to eat at least 2 vegetarian meals each week.  · Eat more home-cooked food and less restaurant, buffet, and fast food.  · When eating at a restaurant, ask that your food be prepared with less salt or no salt, if possible.  What foods are recommended?  The items listed may not be a complete list. Talk with your dietitian about what dietary choices are best for you.  Grains   Whole-grain or whole-wheat bread. Whole-grain or whole-wheat pasta. Brown rice. Oatmeal. Quinoa. Bulgur. Whole-grain and low-sodium cereals. Dina bread. Low-fat, low-sodium crackers. Whole-wheat flour  tortillas.  Vegetables   Fresh or frozen vegetables (raw, steamed, roasted, or grilled). Low-sodium or reduced-sodium tomato and vegetable juice. Low-sodium or reduced-sodium tomato sauce and tomato paste. Low-sodium or reduced-sodium canned vegetables.  Fruits   All fresh, dried, or frozen fruit. Canned fruit in natural juice (without added sugar).  Meat and other protein foods   Skinless chicken or turkey. Ground chicken or turkey. Pork with fat trimmed off. Fish and seafood. Egg whites. Dried beans, peas, or lentils. Unsalted nuts, nut butters, and seeds. Unsalted canned beans. Lean cuts of beef with fat trimmed off. Low-sodium, lean deli meat.  Dairy   Low-fat (1%) or fat-free (skim) milk. Fat-free, low-fat, or reduced-fat cheeses. Nonfat, low-sodium ricotta or cottage cheese. Low-fat or nonfat yogurt. Low-fat, low-sodium cheese.  Fats and oils   Soft margarine without trans fats. Vegetable oil. Low-fat, reduced-fat, or light mayonnaise and salad dressings (reduced-sodium). Canola, safflower, olive, soybean, and sunflower oils. Avocado.  Seasoning and other foods   Herbs. Spices. Seasoning mixes without salt. Unsalted popcorn and pretzels. Fat-free sweets.  What foods are not recommended?  The items listed may not be a complete list. Talk with your dietitian about what dietary choices are best for you.  Grains   Baked goods made with fat, such as croissants, muffins, or some breads. Dry pasta or rice meal packs.  Vegetables   Creamed or fried vegetables. Vegetables in a cheese sauce. Regular canned vegetables (not low-sodium or reduced-sodium). Regular canned tomato sauce and paste (not low-sodium or reduced-sodium). Regular tomato and vegetable juice (not low-sodium or reduced-sodium). Pickles. Olives.  Fruits   Canned fruit in a light or heavy syrup. Fried fruit. Fruit in cream or butter sauce.  Meat and other protein foods   Fatty cuts of meat. Ribs. Fried meat. Hughes. Sausage. Bologna and other processed  lunch meats. Salami. Fatback. Hotdogs. Bratwurst. Salted nuts and seeds. Canned beans with added salt. Canned or smoked fish. Whole eggs or egg yolks. Chicken or turkey with skin.  Dairy   Whole or 2% milk, cream, and half-and-half. Whole or full-fat cream cheese. Whole-fat or sweetened yogurt. Full-fat cheese. Nondairy creamers. Whipped toppings. Processed cheese and cheese spreads.  Fats and oils   Butter. Stick margarine. Lard. Shortening. Ghee. Hughes fat. Tropical oils, such as coconut, palm kernel, or palm oil.  Seasoning and other foods   Salted popcorn and pretzels. Onion salt, garlic salt, seasoned salt, table salt, and sea salt. Worcestershire sauce. Tartar sauce. Barbecue sauce. Teriyaki sauce. Soy sauce, including reduced-sodium. Steak sauce. Canned and packaged gravies. Fish sauce. Oyster sauce. Cocktail sauce. Horseradish that you find on the shelf. Ketchup. Mustard. Meat flavorings and tenderizers. Bouillon cubes. Hot sauce and Tabasco sauce. Premade or packaged marinades. Premade or packaged taco seasonings. Relishes. Regular salad dressings.  Where to find more information:  · National Heart, Lung, and Blood [place]: www.nhlbi.nih.gov  · American Heart Association: www.heart.org  Summary  · The DASH eating plan is a healthy eating plan that has been shown to reduce high blood pressure (hypertension). It may also reduce your risk for type 2 diabetes, heart disease, and stroke.  · With the DASH eating plan, you should limit salt (sodium) intake to 2,300 mg a day. If you have hypertension, you may need to reduce your sodium intake to 1,500 mg a day.  · When on the DASH eating plan, aim to eat more fresh fruits and vegetables, whole grains, lean proteins, low-fat dairy, and heart-healthy fats.  · Work with your health care provider or diet and nutrition specialist (dietitian) to adjust your eating plan to your individual calorie needs.  This information is not intended to replace advice given to you by  your health care provider. Make sure you discuss any questions you have with your health care provider.  Document Released: 12/06/2012 Document Revised: 12/11/2017 Document Reviewed: 12/11/2017  [employer] Interactive Patient Education © 2017 [employer]    Calorie Counting for Weight Loss  Calories are units of energy. Your body needs a certain amount of calories from food to keep you going throughout the day. When you eat more calories than your body needs, your body stores the extra calories as fat. When you eat fewer calories than your body needs, your body burns fat to get the energy it needs.  Calorie counting means keeping track of how many calories you eat and drink each day. Calorie counting can be helpful if you need to lose weight. If you make sure to eat fewer calories than your body needs, you should lose weight. Ask your health care provider what a healthy weight is for you.  For calorie counting to work, you will need to eat the right number of calories in a day in order to lose a healthy amount of weight per week. A dietitian can help you determine how many calories you need in a day and will give you suggestions on how to reach your calorie goal.  · A healthy amount of weight to lose per week is usually 1-2 lb (0.5-0.9 kg). This usually means that your daily calorie intake should be reduced by 500-750 calories.  · Eating 1,200 - 1,500 calories per day can help most women lose weight.  · Eating 1,500 - 1,800 calories per day can help most men lose weight.  What is my plan?  My goal is to have __________ calories per day.  If I have this many calories per day, I should lose around __________ pounds per week.  What do I need to know about calorie counting?  In order to meet your daily calorie goal, you will need to:  · Find out how many calories are in each food you would like to eat. Try to do this before you eat.  · Decide how much of the food you plan to eat.  · Write down what you ate and how many  calories it had. Doing this is called keeping a food log.  To successfully lose weight, it is important to balance calorie counting with a healthy lifestyle that includes regular activity. Aim for 150 minutes of moderate exercise (such as walking) or 75 minutes of vigorous exercise (such as running) each week.  Where do I find calorie information?     The number of calories in a food can be found on a Nutrition Facts label. If a food does not have a Nutrition Facts label, try to look up the calories online or ask your dietitian for help.  Remember that calories are listed per serving. If you choose to have more than one serving of a food, you will have to multiply the calories per serving by the amount of servings you plan to eat. For example, the label on a package of bread might say that a serving size is 1 slice and that there are 90 calories in a serving. If you eat 1 slice, you will have eaten 90 calories. If you eat 2 slices, you will have eaten 180 calories.  How do I keep a food log?  Immediately after each meal, record the following information in your food log:  · What you ate. Don't forget to include toppings, sauces, and other extras on the food.  · How much you ate. This can be measured in cups, ounces, or number of items.  · How many calories each food and drink had.  · The total number of calories in the meal.  Keep your food log near you, such as in a small notebook in your pocket, or use a mobile ted or website. Some programs will calculate calories for you and show you how many calories you have left for the day to meet your goal.  What are some calorie counting tips?  · Use your calories on foods and drinks that will fill you up and not leave you hungry:  ¨ Some examples of foods that fill you up are nuts and nut butters, vegetables, lean proteins, and high-fiber foods like whole grains. High-fiber foods are foods with more than 5 g fiber per serving.  ¨ Drinks such as sodas, specialty coffee  "drinks, alcohol, and juices have a lot of calories, yet do not fill you up.  · Eat nutritious foods and avoid empty calories. Empty calories are calories you get from foods or beverages that do not have many vitamins or protein, such as candy, sweets, and soda. It is better to have a nutritious high-calorie food (such as an avocado) than a food with few nutrients (such as a bag of chips).  · Know how many calories are in the foods you eat most often. This will help you calculate calorie counts faster.  · Pay attention to calories in drinks. Low-calorie drinks include water and unsweetened drinks.  · Pay attention to nutrition labels for \"low fat\" or \"fat free\" foods. These foods sometimes have the same amount of calories or more calories than the full fat versions. They also often have added sugar, starch, or salt, to make up for flavor that was removed with the fat.  · Find a way of tracking calories that works for you. Get creative. Try different apps or programs if writing down calories does not work for you.  What are some portion control tips?  · Know how many calories are in a serving. This will help you know how many servings of a certain food you can have.  · Use a measuring cup to measure serving sizes. You could also try weighing out portions on a kitchen scale. With time, you will be able to estimate serving sizes for some foods.  · Take some time to put servings of different foods on your favorite plates, bowls, and cups so you know what a serving looks like.  · Try not to eat straight from a bag or box. Doing this can lead to overeating. Put the amount you would like to eat in a cup or on a plate to make sure you are eating the right portion.  · Use smaller plates, glasses, and bowls to prevent overeating.  · Try not to multitask (for example, watch TV or use your computer) while eating. If it is time to eat, sit down at a table and enjoy your food. This will help you to know when you are full. It will " also help you to be aware of what you are eating and how much you are eating.  What are tips for following this plan?  Reading food labels   · Check the calorie count compared to the serving size. The serving size may be smaller than what you are used to eating.  · Check the source of the calories. Make sure the food you are eating is high in vitamins and protein and low in saturated and trans fats.  Shopping   · Read nutrition labels while you shop. This will help you make healthy decisions before you decide to purchase your food.  · Make a grocery list and stick to it.  Cooking   · Try to cook your favorite foods in a healthier way. For example, try baking instead of frying.  · Use low-fat dairy products.  Meal planning   · Use more fruits and vegetables. Half of your plate should be fruits and vegetables.  · Include lean proteins like poultry and fish.  How do I count calories when eating out?  · Ask for smaller portion sizes.  · Consider sharing an entree and sides instead of getting your own entree.  · If you get your own entree, eat only half. Ask for a box at the beginning of your meal and put the rest of your entree in it so you are not tempted to eat it.  · If calories are listed on the menu, choose the lower calorie options.  · Choose dishes that include vegetables, fruits, whole grains, low-fat dairy products, and lean protein.  · Choose items that are boiled, broiled, grilled, or steamed. Stay away from items that are buttered, battered, fried, or served with cream sauce. Items labeled “crispy” are usually fried, unless stated otherwise.  · Choose water, low-fat milk, unsweetened iced tea, or other drinks without added sugar. If you want an alcoholic beverage, choose a lower calorie option such as a glass of wine or light beer.  · Ask for dressings, sauces, and syrups on the side. These are usually high in calories, so you should limit the amount you eat.  · If you want a salad, choose a garden salad and  ask for grilled meats. Avoid extra toppings like salguero, cheese, or fried items. Ask for the dressing on the side, or ask for olive oil and vinegar or lemon to use as dressing.  · Estimate how many servings of a food you are given. For example, a serving of cooked rice is ½ cup or about the size of half a baseball. Knowing serving sizes will help you be aware of how much food you are eating at restaurants. The list below tells you how big or small some common portion sizes are based on everyday objects:  ¨ 1 oz--4 stacked dice.  ¨ 3 oz--1 deck of cards.  ¨ 1 tsp--1 die.  ¨ 1 Tbsp--½ a ping-pong ball.  ¨ 2 Tbsp--1 ping-pong ball.  ¨ ½ cup--½ baseball.  ¨ 1 cup--1 baseball.  Summary  · Calorie counting means keeping track of how many calories you eat and drink each day. If you eat fewer calories than your body needs, you should lose weight.  · A healthy amount of weight to lose per week is usually 1-2 lb (0.5-0.9 kg). This usually means reducing your daily calorie intake by 500-750 calories.  · The number of calories in a food can be found on a Nutrition Facts label. If a food does not have a Nutrition Facts label, try to look up the calories online or ask your dietitian for help.  · Use your calories on foods and drinks that will fill you up, and not on foods and drinks that will leave you hungry.  · Use smaller plates, glasses, and bowls to prevent overeating.  This information is not intended to replace advice given to you by your health care provider. Make sure you discuss any questions you have with your health care provider.  Document Released: 12/18/2006 Document Revised: 11/17/2017 Document Reviewed: 11/17/2017  [employer] Interactive Patient Education © 2017 [employer]    Exercising to Lose Weight  Exercising can help you to lose weight. In order to lose weight through exercise, you need to do vigorous-intensity exercise. You can tell that you are exercising with vigorous intensity if you are breathing very  hard and fast and cannot hold a conversation while exercising.  Moderate-intensity exercise helps to maintain your current weight. You can tell that you are exercising at a moderate level if you have a higher heart rate and faster breathing, but you are still able to hold a conversation.  How often should I exercise?  Choose an activity that you enjoy and set realistic goals. Your health care provider can help you to make an activity plan that works for you. Exercise regularly as directed by your health care provider. This may include:  · Doing resistance training twice each week, such as:  ¨ Push-ups.  ¨ Sit-ups.  ¨ Lifting weights.  ¨ Using resistance bands.  · Doing a given intensity of exercise for a given amount of time. Choose from these options:  ¨ 150 minutes of moderate-intensity exercise every week.  ¨ 75 minutes of vigorous-intensity exercise every week.  ¨ A mix of moderate-intensity and vigorous-intensity exercise every week.  Children, pregnant women, people who are out of shape, people who are overweight, and older adults may need to consult a health care provider for individual recommendations. If you have any sort of medical condition, be sure to consult your health care provider before starting a new exercise program.  What are some activities that can help me to lose weight?  · Walking at a rate of at least 4.5 miles an hour.  · Jogging or running at a rate of 5 miles per hour.  · Biking at a rate of at least 10 miles per hour.  · Lap swimming.  · Roller-skating or in-line skating.  · Cross-country skiing.  · Vigorous competitive sports, such as football, basketball, and soccer.  · Jumping rope.  · Aerobic dancing.  How can I be more active in my day-to-day activities?  · Use the stairs instead of the elevator.  · Take a walk during your lunch break.  · If you drive, park your car farther away from work or school.  · If you take public transportation, get off one stop early and walk the rest of  the way.  · Make all of your phone calls while standing up and walking around.  · Get up, stretch, and walk around every 30 minutes throughout the day.  What guidelines should I follow while exercising?  · Do not exercise so much that you hurt yourself, feel dizzy, or get very short of breath.  · Consult your health care provider prior to starting a new exercise program.  · Wear comfortable clothes and shoes with good support.  · Drink plenty of water while you exercise to prevent dehydration or heat stroke. Body water is lost during exercise and must be replaced.  · Work out until you breathe faster and your heart beats faster.  This information is not intended to replace advice given to you by your health care provider. Make sure you discuss any questions you have with your health care provider.  Document Released: 01/20/2012 Document Revised: 05/25/2017 Document Reviewed: 05/21/2015  [employer] Interactive Patient Education © 2017 [employer]

## 2018-03-08 NOTE — PROGRESS NOTES
Subjective   Joan Blackwell is a 52 y.o. female.       Problem List  1. Essential Hypertension   2. Asthma   3. Hypocalcemia  4. Hypothyroidism sp thyroidectomy.   5. 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 H/O Hysterectomy   7. DM type 2  8. Hyperlipidemia ASCVD risk high   9. Iron deficiency anemia  10. Constipation  11. Obesity BMI >30   12 . Right carpal tunnel syndrome   13  Depression   14. Former Tobacco user       5/3/17 Pt is 50 yo AAF with the above medical issues. Is here to go over labwork. TSH still elevated. Is taking Synthroid 125 mcg daily.  Pt has hypothyroidism sp thyroidectomy.   Pt also has hypocalcemia and is taking calcium supplements. PTH was normal.  Regarding hga1c it was 8.0 and pt is newly diagnosed diabetic. She is tearful today when hearing this. Currently not on statin medication as well.  Has not had diabetic eye examination.  Is willing to go to diabetic education classes  Regarding rest of labwork. Pt has iron deficiency anemia and is not on iron pill currently.      2/19/18 - pt is here for followup. She is due for followup on DM type 2 iron deficiency anemia and hypothyroidism sp thyroidectomy. For hyperlipidemia she is on zucor 20 mg PO qhs. Also takes synthroid 137 mcg pO q daily for hypothyroidism. She is alos on metformin 500 mg PO BID for DM type 2.  Her last hga1c was 8.0. She missed her last appt on 6/2/17.  For iron deficiency anemia she is on ferrous sulfate piill TID. She is also due for mammogram, colonoscopy screening and diabetic eye examination. PT today mainly has a headache that started yesterday. Today her BP is elevated today and >180/100. She has been to ER twice at [facility] already and was discharged home. Do not have ER records here today . She was given BP medication and her BP went down. PT states she was not admitted and was discharged from [facility] the same day Pt also has been experiencing dry heaving and wanting to vomit.  She states she  also has spasms at the back of her neck. She is also experiencing wheezing . She states [facility] is compliant in taking her BP medication including clonidine 0.2 mg PO BID, lisinopril 40 mg PO q daily and toprol XL 50 mg PO q daily     3/8/18 - pt is here for recheck. On last visit she was advised to go to [facility]  On 2/19/18 due to her uncontrolled hypertension and the concern of possible hypertensive urgency/emergency.  She was discharged in stable condition She then presented to convenient care at [facility] on 2/21/18 for neck spasms and pt was offered flexeril but declined.  She left AMA.  She presented  to [facility] ER on 2/22/18 for neck pain and spasms.  She had a CAT scan done at [facility] on February 10th and there were no acute findings. She was given pain medication (Norco 5/325 mg PO PRN)  and muscle relaxants.  Se is due for new labwork today. Pt states she is doing better today .BP is a little elevated but improved from last visit.    Her main issues today is her right arm/wrist. She has pain in right wrist.  She was a former  and she overused her hand. Her last EMG was done in [place] with Neurologist in January 2016 that was positive for carpal tunnel syndrome. She has tried PT/OT with no relief.  She also tried steroids and pain medication. She states pain is getting worse and would like to proceed with surgery. She would like to see Orthopedic Surgeon in [place]. She is also due for mammogram screening, labwork and also colonoscopy screening. Her last colonoscopy was >10 years ago in [place].  She did have history of GERD. She has stopped taking omeprazole. She continues to take metformi mg pO BID for her diabetes and simvastatin 20 mg at bedtime    For BP pt currently takes clonidine 0.2 mg PO BID along with toprol XL 50 mg daily and lisionpril 40 mg PO q daily.   Denies any chest pain headaches or dizziness louenlty     Pt also has depression and is going through a lot of stress  right now. She has a grandson in [place] who was diagnosed with cancer.  She has been stressed out. She sees counseling next month.  She denies any suicidal ideations.      Headache    This is a new problem. The current episode started yesterday. The problem has been rapidly worsening. The pain is located in the occipital region. The pain quality is not similar to prior headaches. The quality of the pain is described as aching, sharp and pulsating. Associated symptoms include dizziness, eye redness, eye watering, nausea, neck pain, numbness, phonophobia, photophobia, rhinorrhea, tingling and vomiting. Pertinent negatives include no abnormal behavior, back pain, blurred vision, drainage, ear pain, eye pain, facial sweating, fever, hearing loss, insomnia, loss of balance, muscle aches, scalp tenderness, seizures, visual change, weakness or weight loss. Her past medical history is significant for hypertension and obesity. There is no history of cancer, immunosuppression, migraine headaches, migraines in the family, pseudotumor cerebri, recent head traumas, sinus disease or TMJ.   Diabetes   She presents for her initial diabetic visit. She has type 2 diabetes mellitus. Her disease course has been stable. Hypoglycemia symptoms include dizziness and headaches. Pertinent negatives for hypoglycemia include no confusion, hunger, mood changes, nervousness/anxiousness, pallor, seizures, sleepiness, speech difficulty, sweats or tremors. Pertinent negatives for diabetes include no blurred vision, no chest pain, no foot paresthesias, no foot ulcerations, no polydipsia, no polyphagia, no polyuria, no visual change, no weakness and no weight loss. Pertinent negatives for hypoglycemia complications include no blackouts, no hospitalization, no nocturnal hypoglycemia, no required assistance and no required glucagon injection. Pertinent negatives for diabetic complications include no autonomic neuropathy, CVA, heart disease,  impotence, nephropathy, peripheral neuropathy, PVD or retinopathy. Risk factors for coronary artery disease include diabetes mellitus, sedentary lifestyle and obesity. When asked about current treatments, none were reported. She has not had a previous visit with a dietitian. She never participates in exercise. An ACE inhibitor/angiotensin II receptor blocker is being taken. She does not see a podiatrist.Eye exam is not current.   Hypertension   This is a chronic problem. The current episode started more than 1 year ago. The problem has been gradually improving since onset. The problem is uncontrolled. Associated symptoms include headaches, neck pain and shortness of breath. Pertinent negatives include no anxiety, blurred vision, chest pain, malaise/fatigue, orthopnea, palpitations, peripheral edema, PND or sweats. There are no associated agents to hypertension. Risk factors for coronary artery disease include diabetes mellitus and dyslipidemia. Past treatments include beta blockers. The current treatment provides no improvement. There are no compliance problems.  There is no history of angina, kidney disease, CAD/MI, CVA, heart failure, left ventricular hypertrophy, PVD, retinopathy or a thyroid problem. There is no history of chronic renal disease, coarctation of the aorta, hyperaldosteronism, hypercortisolism, hyperparathyroidism, a hypertension causing med, pheochromocytoma or sleep apnea.   Neck Pain    This is a chronic problem. The current episode started more than 1 month ago. The problem occurs daily. The problem has been resolved. The pain is associated with nothing. The pain is present in the left side and midline. The quality of the pain is described as aching. The pain is at a severity of 3/10. The pain is mild. The symptoms are aggravated by bending. Associated symptoms include headaches, numbness, photophobia and tingling. Pertinent negatives include no chest pain, fever, leg pain, pain with  swallowing, paresis, syncope, trouble swallowing, visual change, weakness or weight loss. She has tried muscle relaxants and oral narcotics for the symptoms.   Upper Extremity Issue   Associated symptoms include arthralgias, headaches, myalgias, nausea, neck pain, numbness and vomiting. Pertinent negatives include no chest pain, fever, visual change or weakness.   Arm Pain    The incident occurred more than 1 week ago. The incident occurred at work. The injury mechanism is unknown. The pain is present in the right elbow, right fingers, right forearm, right hand and right wrist. The quality of the pain is described as cramping and aching. The pain radiates to the right arm. The pain is at a severity of 8/10. The pain is severe. The pain has been fluctuating since the incident. Associated symptoms include numbness and tingling. Pertinent negatives include no chest pain. The symptoms are aggravated by movement, a foreign body and palpation. She has tried NSAIDs and rest (oral narcotics) for the symptoms. The treatment provided no relief.   Wrist Pain    The pain is present in the right arm, right elbow, right wrist, right hand and right fingers. There has been no history of extremity trauma. The problem occurs constantly. The problem has been gradually worsening. The quality of the pain is described as aching. The pain is severe. Associated symptoms include an inability to bear weight, joint swelling, a limited range of motion, numbness, stiffness and tingling. Pertinent negatives include no fever. The symptoms are aggravated by activity and lying down. The treatment provided no relief. Family history does not include gout or rheumatoid arthritis. Her past medical history is significant for diabetes. There is no history of gout, osteoarthritis or rheumatoid arthritis.             The following portions of the patient's history were reviewed and updated as appropriate: allergies, current medications, past family  "history, past medical history, past social history, past surgical history and problem list.    Review of Systems   Constitutional: Positive for activity change. Negative for fever, malaise/fatigue and weight loss.   HENT: Positive for rhinorrhea. Negative for ear pain, hearing loss and trouble swallowing.    Eyes: Positive for photophobia and redness. Negative for blurred vision and pain.   Respiratory: Positive for shortness of breath.    Cardiovascular: Negative for chest pain, palpitations, orthopnea, syncope and PND.   Gastrointestinal: Positive for constipation, nausea and vomiting.   Endocrine: Negative.  Negative for polydipsia, polyphagia and polyuria.   Genitourinary: Negative.  Negative for impotence.   Musculoskeletal: Positive for arthralgias, myalgias, neck pain, neck stiffness and stiffness. Negative for back pain and gout.   Skin: Negative.  Negative for pallor.   Allergic/Immunologic: Negative.    Neurological: Positive for dizziness, tingling, light-headedness, numbness and headaches. Negative for tremors, seizures, facial asymmetry, speech difficulty, weakness and loss of balance.   Hematological: Negative.    Psychiatric/Behavioral: Negative.  Negative for confusion. The patient is not nervous/anxious and does not have insomnia.        Objective    /96  Pulse 100  Temp 98.6 °F (37 °C)  Resp 16  Ht 152.4 cm (60\")  Wt 86.6 kg (191 lb)  BMI 37.3 kg/m2          Chemistry        Component Value Date/Time     04/24/2017 0828    K 4.0 04/24/2017 0828    CL 99 04/24/2017 0828    CO2 26.0 04/24/2017 0828    BUN 11 04/24/2017 0828    CREATININE 0.62 04/24/2017 0828        Component Value Date/Time    CALCIUM 7.7 (L) 04/24/2017 0828    CALCIUM 7.8 (L) 04/24/2017 0828    ALKPHOS 72 04/24/2017 0828    AST 26 04/24/2017 0828    ALT 37 04/24/2017 0828    BILITOT 0.4 04/24/2017 0828        Lab Results   Component Value Date    WBC 4.76 04/24/2017    HGB 11.6 (L) 04/24/2017    HCT 33.9 (L) " 04/24/2017    MCV 82.5 04/24/2017     04/24/2017     Lab Results   Component Value Date    CHOL 159 04/24/2017     Lab Results   Component Value Date    TRIG 89 04/24/2017    TRIG 98 10/12/2015     Lab Results   Component Value Date    HDL 48 (L) 04/24/2017    HDL 51 (L) 10/12/2015     No components found for: LDLCALC  Lab Results   Component Value Date    LDL 84 04/24/2017    LDL 74 10/12/2015     No results found for: HDLLDLRATIO  No components found for: CHOLHDL  Lab Results   Component Value Date    HGBA1C 8.00 (H) 04/24/2017     Lab Results   Component Value Date    TSH 9.030 (H) 04/24/2017     Physical Exam   Constitutional: She is oriented to person, place, and time. She appears well-developed and well-nourished. She appears distressed.   Pt appears to be  Dry heaving    HENT:   Head: Normocephalic and atraumatic.   Right Ear: External ear normal.   Left Ear: External ear normal.   Eyes: Conjunctivae and EOM are normal. Right eye exhibits no discharge. Left eye exhibits no discharge. No scleral icterus.   Dilated pupils on reaction to light bilaterally    Neck: Normal range of motion. Neck supple. No JVD present. No tracheal deviation present. No thyromegaly present.   Cardiovascular: Normal rate, regular rhythm and normal heart sounds.    Pulmonary/Chest: No stridor. She is in respiratory distress. She has wheezes.   Abdominal: Soft. Bowel sounds are normal. She exhibits no distension and no mass. There is no tenderness. There is no rebound and no guarding. No hernia.   Musculoskeletal: She exhibits no edema or deformity.        Cervical back: She exhibits decreased range of motion, tenderness, bony tenderness, pain and spasm.        Hands:  Lymphadenopathy:     She has no cervical adenopathy.   Neurological: She is alert and oriented to person, place, and time. She has normal reflexes. No cranial nerve deficit. Coordination normal.   Skin: Skin is warm. No rash noted. She is diaphoretic. No erythema.  No pallor.   Psychiatric: She has a normal mood and affect. Her behavior is normal. Judgment and thought content normal.   Nursing note and vitals reviewed.      Assessment/Plan   Problems Addressed this Visit        Cardiovascular and Mediastinum    Hyperlipidemia    Uncontrolled hypertension    Relevant Medications    CloNIDine (CATAPRES) 0.3 MG tablet    lisinopril (PRINIVIL,ZESTRIL) 40 MG tablet    metoprolol succinate XL (TOPROL-XL) 50 MG 24 hr tablet    Essential hypertension    Relevant Medications    CloNIDine (CATAPRES) 0.3 MG tablet    lisinopril (PRINIVIL,ZESTRIL) 40 MG tablet    metoprolol succinate XL (TOPROL-XL) 50 MG 24 hr tablet       Digestive    Obesity       Endocrine    Hypothyroidism    Relevant Medications    metoprolol succinate XL (TOPROL-XL) 50 MG 24 hr tablet    Controlled type 2 diabetes mellitus with complication, without long-term current use of insulin    Relevant Medications    metFORMIN (GLUCOPHAGE) 500 MG tablet    Other Relevant Orders    CBC Auto Differential    Comprehensive Metabolic Panel    Hemoglobin A1c    Lipid Panel    TSH    T4, Free    T3, Free    Vitamin D 25 Hydroxy    Microalbumin / Creatinine Urine Ratio - Urine, Clean Catch    Ambulatory Referral to Gastroenterology    Mammo Screening Bilateral With CAD       Nervous and Auditory    Carpal tunnel syndrome of right wrist    Relevant Orders    Ambulatory Referral to Orthopedic Surgery    Ambulatory Referral to Gastroenterology    Mammo Screening Bilateral With CAD       Hematopoietic and Hemostatic    Iron deficiency anemia       Other    Hypocalcemia    S/P thyroidectomy    Right wrist pain    Encounter for screening for malignant neoplasm of breast    Relevant Orders    Mammo Screening Bilateral With CAD    Encounter for screening for malignant neoplasm of colon    Relevant Orders    Ambulatory Referral to Gastroenterology    Mammo Screening Bilateral With CAD    Hypokalemia    General medical examination - Primary       - will get labwork today including cbc, cmp.    - DM type 2-  Will continue on metformin 500 mg pO BID.  Will check vitamin B12, along with microalbumin/creatinine ratio and hga1c  - for hypertension - currently uncontrolled. Will go up on clonidine from 0.2 to 0.3 mg PO BID.  Continue on Toprol XL 50 mg daily and lisionpril 40 mg daily . Advised pt to bring BP log on next visit. Refilled BP medication today   - Obesity BMI >30 . - recommended weight loss information and DASH diet  - hyperlipidemia - recheck lipid panel. Continue on simvastatin.   - hypothyroidism/spthyroidectomy - recheck thyroid studies - will continue with synthroid and adjust medication if needed  - Vitamin D deficiency -recheck Vitamin D levels  -hypokalemia - recheck potassium  - depression - pt on wellbutrin. She is seeing counseling with Nathaly Penaloza.   - iron deficiency anemia - recheck iron level and ferritin. Continue with iron pill  - [facility]  - will refer to Dr. Mckinney (Orthopedic Surgery) for right carpal tunnel syndrome. Consultation appreciated  - Will refer to CAMILA Hedrick/DR. Abbott for colonoscopy screening - consultation appreciated  - will refer to DR. Vega for diabetic eye examination consultation appreciated  - pt declined influenza vaccination   - discuss other immunizations on next visit  - recheck in 2 weeks

## 2018-03-12 LAB
25(OH)D3 SERPL-MCNC: <12.8 NG/ML (ref 30–100)
ALBUMIN SERPL-MCNC: 3.5 G/DL (ref 3.4–4.8)
ALBUMIN UR-MCNC: 2.4 MG/L
ALBUMIN/GLOB SERPL: 1.3 G/DL (ref 1.1–1.8)
ALP SERPL-CCNC: 67 U/L (ref 38–126)
ALT SERPL W P-5'-P-CCNC: 22 U/L (ref 9–52)
ANION GAP SERPL CALCULATED.3IONS-SCNC: 15 MMOL/L (ref 5–15)
ARTICHOKE IGE QN: 46 MG/DL (ref 1–129)
AST SERPL-CCNC: 17 U/L (ref 14–36)
BASOPHILS # BLD AUTO: 0.04 10*3/MM3 (ref 0–0.2)
BASOPHILS NFR BLD AUTO: 0.7 % (ref 0–2)
BILIRUB SERPL-MCNC: <0.1 MG/DL (ref 0.2–1.3)
BUN BLD-MCNC: 12 MG/DL (ref 7–21)
BUN/CREAT SERPL: 18.8 (ref 7–25)
CALCIUM SPEC-SCNC: 7.7 MG/DL (ref 8.4–10.2)
CHLORIDE SERPL-SCNC: 104 MMOL/L (ref 95–110)
CHOLEST SERPL-MCNC: 109 MG/DL (ref 0–199)
CO2 SERPL-SCNC: 19 MMOL/L (ref 22–31)
CREAT BLD-MCNC: 0.64 MG/DL (ref 0.5–1)
CREAT UR-MCNC: 104.5 MG/DL
DEPRECATED RDW RBC AUTO: 44.2 FL (ref 36.4–46.3)
EOSINOPHIL # BLD AUTO: 0.18 10*3/MM3 (ref 0–0.7)
EOSINOPHIL NFR BLD AUTO: 3 % (ref 0–7)
ERYTHROCYTE [DISTWIDTH] IN BLOOD BY AUTOMATED COUNT: 14.1 % (ref 11.5–14.5)
FERRITIN SERPL-MCNC: 24.8 NG/ML (ref 11.1–264)
GFR SERPL CREATININE-BSD FRML MDRD: 118 ML/MIN/1.73 (ref 51–120)
GLOBULIN UR ELPH-MCNC: 2.8 GM/DL (ref 2.3–3.5)
GLUCOSE BLD-MCNC: 127 MG/DL (ref 60–100)
HBA1C MFR BLD: 7 % (ref 4–5.6)
HCT VFR BLD AUTO: 34.3 % (ref 35–45)
HDLC SERPL-MCNC: 40 MG/DL (ref 60–200)
HGB BLD-MCNC: 11.3 G/DL (ref 12–15.5)
IMM GRANULOCYTES # BLD: 0.01 10*3/MM3 (ref 0–0.02)
IMM GRANULOCYTES NFR BLD: 0.2 % (ref 0–0.5)
IRON 24H UR-MRATE: 37 MCG/DL (ref 37–170)
IRON SATN MFR SERPL: 12 % (ref 15–50)
LDLC/HDLC SERPL: 1.07 {RATIO} (ref 0–3.22)
LYMPHOCYTES # BLD AUTO: 2.15 10*3/MM3 (ref 0.6–4.2)
LYMPHOCYTES NFR BLD AUTO: 36.3 % (ref 10–50)
MCH RBC QN AUTO: 28.1 PG (ref 26.5–34)
MCHC RBC AUTO-ENTMCNC: 32.9 G/DL (ref 31.4–36)
MCV RBC AUTO: 85.3 FL (ref 80–98)
MICROALBUMIN/CREAT UR: 23 MG/G (ref 0–30)
MONOCYTES # BLD AUTO: 0.27 10*3/MM3 (ref 0–0.9)
MONOCYTES NFR BLD AUTO: 4.6 % (ref 0–12)
NEUTROPHILS # BLD AUTO: 3.28 10*3/MM3 (ref 2–8.6)
NEUTROPHILS NFR BLD AUTO: 55.2 % (ref 37–80)
PLATELET # BLD AUTO: 316 10*3/MM3 (ref 150–450)
PMV BLD AUTO: 9.6 FL (ref 8–12)
POTASSIUM BLD-SCNC: 4 MMOL/L (ref 3.5–5.1)
PROT SERPL-MCNC: 6.3 G/DL (ref 6.3–8.6)
RBC # BLD AUTO: 4.02 10*6/MM3 (ref 3.77–5.16)
SODIUM BLD-SCNC: 138 MMOL/L (ref 137–145)
T4 FREE SERPL-MCNC: 0.94 NG/DL (ref 0.78–2.19)
TIBC SERPL-MCNC: 311 MCG/DL (ref 265–497)
TRIGL SERPL-MCNC: 132 MG/DL (ref 20–199)
TSH SERPL DL<=0.05 MIU/L-ACNC: 9.15 MIU/ML (ref 0.46–4.68)
VIT B12 BLD-MCNC: 269 PG/ML (ref 239–931)
WBC NRBC COR # BLD: 5.93 10*3/MM3 (ref 3.2–9.8)

## 2018-03-12 PROCEDURE — 80061 LIPID PANEL: CPT | Performed by: FAMILY MEDICINE

## 2018-03-12 PROCEDURE — 82728 ASSAY OF FERRITIN: CPT | Performed by: FAMILY MEDICINE

## 2018-03-12 PROCEDURE — 85025 COMPLETE CBC W/AUTO DIFF WBC: CPT | Performed by: FAMILY MEDICINE

## 2018-03-12 PROCEDURE — 83540 ASSAY OF IRON: CPT | Performed by: FAMILY MEDICINE

## 2018-03-12 PROCEDURE — 80053 COMPREHEN METABOLIC PANEL: CPT | Performed by: FAMILY MEDICINE

## 2018-03-12 PROCEDURE — 36415 COLL VENOUS BLD VENIPUNCTURE: CPT | Performed by: FAMILY MEDICINE

## 2018-03-12 PROCEDURE — 82043 UR ALBUMIN QUANTITATIVE: CPT | Performed by: FAMILY MEDICINE

## 2018-03-12 PROCEDURE — 83036 HEMOGLOBIN GLYCOSYLATED A1C: CPT | Performed by: FAMILY MEDICINE

## 2018-03-12 PROCEDURE — 82306 VITAMIN D 25 HYDROXY: CPT | Performed by: FAMILY MEDICINE

## 2018-03-12 PROCEDURE — 84481 FREE ASSAY (FT-3): CPT | Performed by: FAMILY MEDICINE

## 2018-03-12 PROCEDURE — 82570 ASSAY OF URINE CREATININE: CPT | Performed by: FAMILY MEDICINE

## 2018-03-12 PROCEDURE — 83550 IRON BINDING TEST: CPT | Performed by: FAMILY MEDICINE

## 2018-03-12 PROCEDURE — 84443 ASSAY THYROID STIM HORMONE: CPT | Performed by: FAMILY MEDICINE

## 2018-03-12 PROCEDURE — 84439 ASSAY OF FREE THYROXINE: CPT | Performed by: FAMILY MEDICINE

## 2018-03-12 PROCEDURE — 82607 VITAMIN B-12: CPT | Performed by: FAMILY MEDICINE

## 2018-03-13 ENCOUNTER — TELEPHONE (OUTPATIENT)
Dept: FAMILY MEDICINE CLINIC | Facility: CLINIC | Age: 53
End: 2018-03-13

## 2018-03-13 DIAGNOSIS — Z02.83 ENCOUNTER FOR DRUG SCREENING: Primary | ICD-10-CM

## 2018-03-13 LAB — T3FREE SERPL-MCNC: 2.4 PG/ML (ref 2–4.4)

## 2018-03-13 RX ORDER — ERGOCALCIFEROL 1.25 MG/1
50000 CAPSULE ORAL
Qty: 4 CAPSULE | Refills: 3 | Status: SHIPPED | OUTPATIENT
Start: 2018-03-13 | End: 2019-09-18 | Stop reason: SDUPTHER

## 2018-03-13 RX ORDER — GABAPENTIN 800 MG/1
800 TABLET ORAL 3 TIMES DAILY
Qty: 90 TABLET | Refills: 2 | Status: SHIPPED | OUTPATIENT
Start: 2018-03-13 | End: 2019-09-18

## 2018-03-13 RX ORDER — LEVOTHYROXINE SODIUM 0.15 MG/1
150 TABLET ORAL DAILY
Qty: 30 TABLET | Refills: 3 | Status: SHIPPED | OUTPATIENT
Start: 2018-03-13 | End: 2018-07-31 | Stop reason: SDUPTHER

## 2018-03-13 NOTE — TELEPHONE ENCOUNTER
----- Message from Brad Arnold MD sent at 3/13/2018 10:12 AM CDT -----  Regarding: labs and medication  I called pt    I went over lab results and Vitamin D and calcium low. I stopped Vitamin D 1000 units daily and started on Vitamin D 50,000 units once a week. Also added Calcium citrate 950 mg daily for low calcium.  Pt also has elevated TSH and thyroid not at goal and increased synthroid from 137 to 150 mcg PO q daily. I sent in new prescription    Regarding pain in arm, I explained low calcium can cause muscle aches and recommend neurontin 800 mg PO TID instead of Norco for now.  I also sent in calcium supplement. Please print ADDIE.  Pt is advised to get urine drug screen and  neurontin in clinic along with BP machine prescription. Thanks.  ----- Message -----  From: Marily Coates MA  Sent: 3/13/2018   8:31 AM  To: Brad Arnold MD    PT CALLED STATEING  SHE DID HER URIN TEST YESTERDAY AND WOULD LIKE NORCO NOT GABAPENTIN FOR HER ARM PAIN

## 2018-03-16 DIAGNOSIS — M25.531 RIGHT WRIST PAIN: Primary | ICD-10-CM

## 2018-03-27 ENCOUNTER — OFFICE VISIT (OUTPATIENT)
Dept: FAMILY MEDICINE CLINIC | Facility: CLINIC | Age: 53
End: 2018-03-27

## 2018-03-27 VITALS
DIASTOLIC BLOOD PRESSURE: 74 MMHG | HEIGHT: 60 IN | BODY MASS INDEX: 38.83 KG/M2 | WEIGHT: 197.8 LBS | TEMPERATURE: 98.6 F | RESPIRATION RATE: 16 BRPM | HEART RATE: 75 BPM | SYSTOLIC BLOOD PRESSURE: 136 MMHG

## 2018-03-27 DIAGNOSIS — E03.9 HYPOTHYROIDISM, UNSPECIFIED TYPE: ICD-10-CM

## 2018-03-27 DIAGNOSIS — I10 UNCONTROLLED HYPERTENSION: ICD-10-CM

## 2018-03-27 DIAGNOSIS — M25.531 RIGHT WRIST PAIN: ICD-10-CM

## 2018-03-27 DIAGNOSIS — E83.51 HYPOCALCEMIA: ICD-10-CM

## 2018-03-27 DIAGNOSIS — E55.9 VITAMIN D DEFICIENCY: ICD-10-CM

## 2018-03-27 DIAGNOSIS — I10 ESSENTIAL HYPERTENSION: ICD-10-CM

## 2018-03-27 DIAGNOSIS — E89.0 S/P THYROIDECTOMY: ICD-10-CM

## 2018-03-27 DIAGNOSIS — E66.9 OBESITY, UNSPECIFIED CLASSIFICATION, UNSPECIFIED OBESITY TYPE, UNSPECIFIED WHETHER SERIOUS COMORBIDITY PRESENT: ICD-10-CM

## 2018-03-27 DIAGNOSIS — Z23 ENCOUNTER FOR IMMUNIZATION: ICD-10-CM

## 2018-03-27 DIAGNOSIS — E11.8 CONTROLLED TYPE 2 DIABETES MELLITUS WITH COMPLICATION, WITHOUT LONG-TERM CURRENT USE OF INSULIN (HCC): ICD-10-CM

## 2018-03-27 DIAGNOSIS — Z12.4 ENCOUNTER FOR PAPANICOLAOU SMEAR OF CERVIX: Primary | ICD-10-CM

## 2018-03-27 DIAGNOSIS — E78.5 HYPERLIPIDEMIA, UNSPECIFIED HYPERLIPIDEMIA TYPE: ICD-10-CM

## 2018-03-27 LAB
AMPHET+METHAMPHET UR QL: NEGATIVE
BARBITURATES UR QL SCN: NEGATIVE
BENZODIAZ UR QL SCN: NEGATIVE
CANNABINOIDS SERPL QL: NEGATIVE
COCAINE UR QL: NEGATIVE
METHADONE UR QL SCN: NEGATIVE
OPIATES UR QL: NEGATIVE
OXYCODONE UR QL SCN: NEGATIVE

## 2018-03-27 PROCEDURE — 80307 DRUG TEST PRSMV CHEM ANLYZR: CPT | Performed by: FAMILY MEDICINE

## 2018-03-27 PROCEDURE — 99214 OFFICE O/P EST MOD 30 MIN: CPT | Performed by: FAMILY MEDICINE

## 2018-03-27 PROCEDURE — 90471 IMMUNIZATION ADMIN: CPT | Performed by: FAMILY MEDICINE

## 2018-03-27 PROCEDURE — 90715 TDAP VACCINE 7 YRS/> IM: CPT | Performed by: FAMILY MEDICINE

## 2018-03-27 NOTE — PROGRESS NOTES
Subjective   Joan Blackwell is a 52 y.o. female.       Problem List  1. Essential Hypertension   2. Asthma   3. Hypocalcemia  4. Hypothyroidism sp thyroidectomy.   5. 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 H/O Hysterectomy   7. DM type 2  8. Hyperlipidemia ASCVD risk high   9. Iron deficiency anemia  10. Constipation  11. Obesity BMI >30   12 . Right carpal tunnel syndrome   13  Depression   14. Former Tobacco user   15. Hypocalemia/Vitamin D deficiency       5/3/17 Pt is 50 yo AAF with the above medical issues. Is here to go over labwork. TSH still elevated. Is taking Synthroid 125 mcg daily.  Pt has hypothyroidism sp thyroidectomy.   Pt also has hypocalcemia and is taking calcium supplements. PTH was normal.  Regarding hga1c it was 8.0 and pt is newly diagnosed diabetic. She is tearful today when hearing this. Currently not on statin medication as well.  Has not had diabetic eye examination.  Is willing to go to diabetic education classes  Regarding rest of labwork. Pt has iron deficiency anemia and is not on iron pill currently.      2/19/18 - pt is here for followup. She is due for followup on DM type 2 iron deficiency anemia and hypothyroidism sp thyroidectomy. For hyperlipidemia she is on zucor 20 mg PO qhs. Also takes synthroid 137 mcg pO q daily for hypothyroidism. She is alos on metformin 500 mg PO BID for DM type 2.  Her last hga1c was 8.0. She missed her last appt on 6/2/17.  For iron deficiency anemia she is on ferrous sulfate piill TID. She is also due for mammogram, colonoscopy screening and diabetic eye examination. PT today mainly has a headache that started yesterday. Today her BP is elevated today and >180/100. She has been to ER twice at [facility] already and was discharged home. Do not have ER records here today . She was given BP medication and her BP went down. PT states she was not admitted and was discharged from [facility] the same day Pt also has been experiencing dry heaving  and wanting to vomit.  She states she also has spasms at the back of her neck. She is also experiencing wheezing . She states [facility] is compliant in taking her BP medication including clonidine 0.2 mg PO BID, lisinopril 40 mg PO q daily and toprol XL 50 mg PO q daily     3/8/18 - pt is here for recheck. On last visit she was advised to go to [facility] ER  On 2/19/18 due to her uncontrolled hypertension and the concern of possible hypertensive urgency/emergency.  She was discharged in stable condition She then presented to convenient care at [facility] on 2/21/18 for neck spasms and pt was offered flexeril but declined.  She left AMA.  She presented  to [facility] ER on 2/22/18 for neck pain and spasms.  She had a CAT scan done at [facility] on February 10th and there were no acute findings. She was given pain medication (Norco 5/325 mg PO PRN)  and muscle relaxants.  Se is due for new labwork today. Pt states she is doing better today .BP is a little elevated but improved from last visit.    Her main issues today is her right arm/wrist. She has pain in right wrist.  She was a former  and she overused her hand. Her last EMG was done in [place] with Neurologist in January 2016 that was positive for carpal tunnel syndrome. She has tried PT/OT with no relief.  She also tried steroids and pain medication. She states pain is getting worse and would like to proceed with surgery. She would like to see Orthopedic Surgeon in [place]. She is also due for mammogram screening, labwork and also colonoscopy screening. Her last colonoscopy was >10 years ago in [place].  She did have history of GERD. She has stopped taking omeprazole. She continues to take metformi mg pO BID for her diabetes and simvastatin 20 mg at bedtime    For BP pt currently takes clonidine 0.2 mg PO BID along with toprol XL 50 mg daily and lisionpril 40 mg PO q daily.   Denies any chest pain headaches or dizziness louenlty     Pt also has depression and  is going through a lot of stress right now. She has a grandson in [place] who was diagnosed with cancer.  She has been stressed out. She sees counseling next month.  She denies any suicidal ideations.      3/2718 pt is here for recheck on blood pressure. On last visit pt's clonidine was increased from 0.2 to 0.3 mg PO BID. Pt also has been referred to Dr. Vega for diabetic eye examination along with Gastroenterology for colonoscopy screening and mammogram screening. Pt was has labwork that showedl ow Vitami nD and was started on Calcium supplement along with Vitamin D once a week instead of daily. Also thyroid medication was increased from 137 to 150 mcg PO q daily . Lipid panel showed low HDL and LDL was at goal. She continues taking simvastatin for cholesterol. Hga1c was at 7.0. BP at better goal today     Headache    This is a new problem. The current episode started yesterday. The problem has been rapidly worsening. The pain is located in the occipital region. The pain quality is not similar to prior headaches. The quality of the pain is described as aching, sharp and pulsating. Associated symptoms include dizziness, eye redness, eye watering, nausea, neck pain, numbness, phonophobia, photophobia, rhinorrhea, tingling and vomiting. Pertinent negatives include no abdominal pain, abnormal behavior, anorexia, back pain, blurred vision, coughing, drainage, ear pain, eye pain, facial sweating, fever, hearing loss, insomnia, loss of balance, muscle aches, scalp tenderness, seizures, sore throat, swollen glands, visual change, weakness or weight loss. Her past medical history is significant for hypertension and obesity. There is no history of cancer, immunosuppression, migraine headaches, migraines in the family, pseudotumor cerebri, recent head traumas, sinus disease or TMJ.   Diabetes   She presents for her initial diabetic visit. She has type 2 diabetes mellitus. Her disease course has been stable. Hypoglycemia  symptoms include dizziness and headaches. Pertinent negatives for hypoglycemia include no confusion, hunger, mood changes, nervousness/anxiousness, pallor, seizures, sleepiness, speech difficulty, sweats or tremors. Pertinent negatives for diabetes include no blurred vision, no chest pain, no fatigue, no foot paresthesias, no foot ulcerations, no polydipsia, no polyphagia, no polyuria, no visual change, no weakness and no weight loss. Pertinent negatives for hypoglycemia complications include no blackouts, no hospitalization, no nocturnal hypoglycemia, no required assistance and no required glucagon injection. Pertinent negatives for diabetic complications include no autonomic neuropathy, CVA, heart disease, impotence, nephropathy, peripheral neuropathy, PVD or retinopathy. Risk factors for coronary artery disease include diabetes mellitus, sedentary lifestyle and obesity. Current diabetic treatment includes oral agent (monotherapy). Her weight is stable. She is following a generally healthy diet. She has not had a previous visit with a dietitian. She never participates in exercise. An ACE inhibitor/angiotensin II receptor blocker is being taken. She does not see a podiatrist.Eye exam is not current.   Hypertension   This is a chronic problem. The current episode started more than 1 year ago. The problem has been gradually improving since onset. The problem is uncontrolled. Associated symptoms include headaches, neck pain and shortness of breath. Pertinent negatives include no anxiety, blurred vision, chest pain, malaise/fatigue, orthopnea, palpitations, peripheral edema, PND or sweats. There are no associated agents to hypertension. Risk factors for coronary artery disease include diabetes mellitus and dyslipidemia. Current antihypertension treatment includes beta blockers. The current treatment provides no improvement. There are no compliance problems.  There is no history of angina, kidney disease, CAD/MI, CVA,  heart failure, left ventricular hypertrophy, PVD, retinopathy or a thyroid problem. There is no history of chronic renal disease, coarctation of the aorta, hyperaldosteronism, hypercortisolism, hyperparathyroidism, a hypertension causing med, pheochromocytoma or sleep apnea.   Neck Pain    This is a chronic problem. The current episode started more than 1 month ago. The problem occurs daily. The problem has been resolved. The pain is associated with nothing. The pain is present in the left side and midline. The quality of the pain is described as aching. The pain is at a severity of 3/10. The pain is mild. The symptoms are aggravated by bending. Associated symptoms include headaches, numbness, photophobia and tingling. Pertinent negatives include no chest pain, fever, leg pain, pain with swallowing, paresis, syncope, trouble swallowing, visual change, weakness or weight loss. She has tried muscle relaxants and oral narcotics for the symptoms.   Upper Extremity Issue   Associated symptoms include arthralgias, headaches, myalgias, nausea, neck pain, numbness and vomiting. Pertinent negatives include no abdominal pain, anorexia, change in bowel habit, chest pain, chills, congestion, coughing, diaphoresis, fatigue, fever, joint swelling, rash, sore throat, swollen glands, urinary symptoms, vertigo, visual change or weakness.   Arm Pain    The incident occurred more than 1 week ago. The incident occurred at work. The injury mechanism is unknown. The pain is present in the right elbow, right fingers, right forearm, right hand and right wrist. The quality of the pain is described as cramping and aching. The pain radiates to the right arm. The pain is at a severity of 8/10. The pain is severe. The pain has been fluctuating since the incident. Associated symptoms include numbness and tingling. Pertinent negatives include no chest pain. The symptoms are aggravated by movement, a foreign body and palpation. She has tried  NSAIDs and rest (oral narcotics) for the symptoms. The treatment provided no relief.   Wrist Pain    The pain is present in the right arm, right elbow, right wrist, right hand and right fingers. There has been no history of extremity trauma. The problem occurs constantly. The problem has been gradually worsening. The quality of the pain is described as aching. The pain is severe. Associated symptoms include an inability to bear weight, joint swelling, a limited range of motion, numbness, stiffness and tingling. Pertinent negatives include no fever. The symptoms are aggravated by activity and lying down. The treatment provided no relief. Family history does not include gout or rheumatoid arthritis. Her past medical history is significant for diabetes. There is no history of gout, osteoarthritis or rheumatoid arthritis.   Hypothyroidism   This is a chronic problem. The current episode started more than 1 year ago. The problem occurs daily. The problem has been unchanged. Associated symptoms include arthralgias, headaches, myalgias, nausea, neck pain, numbness and vomiting. Pertinent negatives include no abdominal pain, anorexia, change in bowel habit, chest pain, chills, congestion, coughing, diaphoresis, fatigue, fever, joint swelling, rash, sore throat, swollen glands, urinary symptoms, vertigo, visual change or weakness. Nothing aggravates the symptoms. Treatments tried: synthroid. The treatment provided mild relief.             The following portions of the patient's history were reviewed and updated as appropriate: allergies, current medications, past family history, past medical history, past social history, past surgical history and problem list.    Review of Systems   Constitutional: Positive for activity change. Negative for chills, diaphoresis, fatigue, fever, malaise/fatigue and weight loss.   HENT: Positive for rhinorrhea. Negative for congestion, ear pain, hearing loss, sore throat and trouble swallowing.  "   Eyes: Positive for photophobia and redness. Negative for blurred vision and pain.   Respiratory: Positive for shortness of breath. Negative for cough.    Cardiovascular: Negative for chest pain, palpitations, orthopnea, syncope and PND.   Gastrointestinal: Positive for constipation, nausea and vomiting. Negative for abdominal pain, anorexia and change in bowel habit.   Endocrine: Negative.  Negative for polydipsia, polyphagia and polyuria.   Genitourinary: Negative.  Negative for impotence.   Musculoskeletal: Positive for arthralgias, myalgias, neck pain, neck stiffness and stiffness. Negative for back pain, gout and joint swelling.   Skin: Negative.  Negative for pallor and rash.   Allergic/Immunologic: Negative.    Neurological: Positive for dizziness, tingling, light-headedness, numbness and headaches. Negative for vertigo, tremors, seizures, facial asymmetry, speech difficulty, weakness and loss of balance.   Hematological: Negative.    Psychiatric/Behavioral: Negative.  Negative for confusion. The patient is not nervous/anxious and does not have insomnia.        Objective    /74   Pulse 75   Temp 98.6 °F (37 °C)   Resp 16   Ht 152.4 cm (60\")   Wt 89.7 kg (197 lb 12.8 oz)   BMI 38.63 kg/m²     There were no vitals taken for this visit.          Chemistry        Component Value Date/Time     03/12/2018 0940    K 4.0 03/12/2018 0940     03/12/2018 0940    CO2 19.0 (L) 03/12/2018 0940    BUN 12 03/12/2018 0940    CREATININE 0.64 03/12/2018 0940        Component Value Date/Time    CALCIUM 7.7 (L) 03/12/2018 0940    ALKPHOS 67 03/12/2018 0940    AST 17 03/12/2018 0940    ALT 22 03/12/2018 0940    BILITOT <0.1 (L) 03/12/2018 0940        Lab Results   Component Value Date    WBC 5.93 03/12/2018    HGB 11.3 (L) 03/12/2018    HCT 34.3 (L) 03/12/2018    MCV 85.3 03/12/2018     03/12/2018     Lab Results   Component Value Date    CHOL 109 03/12/2018    CHOL 159 04/24/2017     Lab Results "   Component Value Date    TRIG 132 03/12/2018    TRIG 89 04/24/2017    TRIG 98 10/12/2015     Lab Results   Component Value Date    HDL 40 (L) 03/12/2018    HDL 48 (L) 04/24/2017    HDL 51 (L) 10/12/2015     No components found for: LDLCALC  Lab Results   Component Value Date    LDL 46 03/12/2018    LDL 84 04/24/2017    LDL 74 10/12/2015     No results found for: HDLLDLRATIO  No components found for: CHOLHDL  Lab Results   Component Value Date    HGBA1C 7.0 (H) 03/12/2018     Lab Results   Component Value Date    TSH 9.150 (H) 03/12/2018     Physical Exam   Constitutional: She is oriented to person, place, and time. She appears well-developed and well-nourished. She appears distressed.   Pt appears to be  Dry heaving    HENT:   Head: Normocephalic and atraumatic.   Right Ear: External ear normal.   Left Ear: External ear normal.   Eyes: Conjunctivae and EOM are normal. Right eye exhibits no discharge. Left eye exhibits no discharge. No scleral icterus.   Dilated pupils on reaction to light bilaterally    Neck: Normal range of motion. Neck supple. No JVD present. No tracheal deviation present. No thyromegaly present.   Cardiovascular: Normal rate, regular rhythm and normal heart sounds.    Pulmonary/Chest: No stridor. She is in respiratory distress. She has wheezes.   Abdominal: Soft. Bowel sounds are normal. She exhibits no distension and no mass. There is no tenderness. There is no rebound and no guarding. No hernia.   Musculoskeletal: She exhibits no edema or deformity.        Cervical back: She exhibits decreased range of motion, tenderness, bony tenderness, pain and spasm.        Hands:  Lymphadenopathy:     She has no cervical adenopathy.   Neurological: She is alert and oriented to person, place, and time. She has normal reflexes. No cranial nerve deficit. Coordination normal.   Skin: Skin is warm. No rash noted. She is diaphoretic. No erythema. No pallor.   Psychiatric: She has a normal mood and affect. Her  behavior is normal. Judgment and thought content normal.   Nursing note and vitals reviewed.      Assessment/Plan   Problems Addressed this Visit        Cardiovascular and Mediastinum    Hyperlipidemia    Uncontrolled hypertension - Primary    Essential hypertension       Digestive    Obesity    Vitamin D deficiency       Endocrine    Hypothyroidism    Controlled type 2 diabetes mellitus with complication, without long-term current use of insulin       Other    S/P thyroidectomy    Right wrist pain      Other Visit Diagnoses    None.     - will get labwork today including cbc, cmp.    - DM type 2-  Will continue on metformin 500  But will increase to 1000 mg pO BID. Last hga1c at 7.0   - for hypertension - currently uncontrolled. On last visit clonidine increased  from 0.2 to 0.3 mg PO BID.  Continue on Toprol XL 50 mg daily and lisionpril 40 mg daily . Advised pt to bring BP log on next visit. Refilled BP medication today . BP at goal on visit.   - Obesity BMI >30 . - recommended weight loss information and DASH diet  - hyperlipidemia - HDL low. REcommended high healthy fat diet Continue on simvastatin.   - hypothyroidism/spthyroidectomy -- increased synthroid from 125 to 150 mcg PO q daily.  Recheck thyroid studies in 6 weeks  - Vitamin D deficiency -rincrease Vitamin D from 1000 once a day to 50,000 once a week   -hypocalcemia - calcium supplements OTC.    -hypokalemia - potassium levels at goal  - depression - pt on wellbutrin. She is seeing counseling with Nathaly Penaloza.   - iron deficiency anemia - recheck iron level and ferritin. Continue with iron pill. Last iron levels normal  - [facility] screening [facility]  - will refer to Dr. Mckinney (Orthopedic Surgery) for right carpal tunnel syndrome. Consultation appreciated. Pt missed last appt and will need x-ray before seeing Orthopedics. For right wrist pain pt will  neurontin 800 mg PO TID.  Will get UDS today. ADDIE printed and refer #[id number].    - PT  referred  to CAMILA Hedrick/DR. Abbott for colonoscopy screening - consultation appreciated  - will refer to DR. Vega for diabetic eye examination consultation appreciated  - pt declined influenza vaccination   -tdap and pneumonia 23 vaccination today.  - referral to [facility] Puri for pap smear. Consultation appreciated    - discuss other immunizations on next visit  - recheck in 2 weeks          Review of Systems   Constitutional: Positive for activity change. Negative for chills, diaphoresis, fatigue, fever, malaise/fatigue and unexpected weight loss.   HENT: Positive for rhinorrhea. Negative for congestion, ear pain, hearing loss, sore throat and trouble swallowing.    Eyes: Positive for photophobia and redness. Negative for blurred vision and pain.   Respiratory: Positive for shortness of breath. Negative for cough.    Cardiovascular: Negative for chest pain, palpitations, orthopnea, syncope and PND.   Gastrointestinal: Positive for constipation, nausea and vomiting. Negative for abdominal pain, anorexia and change in bowel habit.   Endocrine: Negative.  Negative for polydipsia, polyphagia and polyuria.   Genitourinary: Negative.  Negative for impotence.   Musculoskeletal: Positive for arthralgias, myalgias, neck pain, neck stiffness and stiffness. Negative for back pain, gout and joint swelling.   Skin: Negative.  Negative for pallor and rash.   Allergic/Immunologic: Negative.    Neurological: Positive for dizziness, tingling, light-headedness, numbness and headaches. Negative for vertigo, tremors, seizures, facial asymmetry, speech difficulty, weakness, loss of balance and confusion.   Hematological: Negative.    Psychiatric/Behavioral: Negative.  The patient is not nervous/anxious and does not have insomnia.        Physical Exam   Constitutional: She is oriented to person, place, and time. She appears well-developed and well-nourished. She appears distressed.   Pt appears to be  Dry heaving    HENT:    Head: Normocephalic and atraumatic.   Right Ear: External ear normal.   Left Ear: External ear normal.   Eyes: Conjunctivae and EOM are normal. Right eye exhibits no discharge. Left eye exhibits no discharge. No scleral icterus.   Dilated pupils on reaction to light bilaterally    Neck: Normal range of motion. Neck supple. No JVD present. No tracheal deviation present. No thyromegaly present.   Cardiovascular: Normal rate, regular rhythm and normal heart sounds.    Pulmonary/Chest: No stridor. She is in respiratory distress. She has wheezes.   Abdominal: Soft. Bowel sounds are normal. She exhibits no distension and no mass. There is no tenderness. There is no rebound and no guarding. No hernia.   Musculoskeletal: She exhibits no edema or deformity.        Cervical back: She exhibits decreased range of motion, tenderness, bony tenderness, pain and spasm.        Hands:  Lymphadenopathy:     She has no cervical adenopathy.   Neurological: She is alert and oriented to person, place, and time. She has normal reflexes. No cranial nerve deficit. Coordination normal.   Skin: Skin is warm. No rash noted. She is diaphoretic. No erythema. No pallor.   Psychiatric: She has a normal mood and affect. Her behavior is normal. Judgment and thought content normal.   Nursing note and vitals reviewed.

## 2018-03-27 NOTE — PATIENT INSTRUCTIONS
Call Dr. Vega for eye examination    Call Dr. Stacy office to reschedule regarding wrist    Get x-ray today of wrist and urine drug screen

## 2018-03-28 ENCOUNTER — TELEPHONE (OUTPATIENT)
Dept: FAMILY MEDICINE CLINIC | Facility: CLINIC | Age: 53
End: 2018-03-28

## 2018-03-28 RX ORDER — HYDROCHLOROTHIAZIDE 12.5 MG/1
12.5 TABLET ORAL DAILY
Qty: 30 TABLET | Refills: 3 | Status: SHIPPED | OUTPATIENT
Start: 2018-03-28 | End: 2018-07-31 | Stop reason: SDUPTHER

## 2018-03-28 NOTE — TELEPHONE ENCOUNTER
----- Message from Brad Arnold MD sent at 3/28/2018 12:00 PM CDT -----  Regarding: FW: HCTZ  Call pt. Sent medication thanks   ----- Message -----  From: Marily Coates MA  Sent: 3/28/2018   9:25 AM  To: Brad Arnold MD  Subject: FW: HCTZ                                         25mg from er visit  ----- Message -----  From: Brad Arnold MD  Sent: 3/28/2018   8:25 AM  To: Marily Coates MA  Subject: HCTZ                                             I did not see this on her med list. Is she still taking this and what dosage? Call pt. Thanks   ----- Message -----  From: Marily Coates MA  Sent: 3/28/2018   8:12 AM  To: Brad Arnold MD    PT NEEDS REFILLS ON HER HCTZ      CALLED  PT

## 2018-03-29 RX ORDER — CALCIUM ACETATE 667 MG/1
CAPSULE ORAL
Qty: 360 CAPSULE | Refills: 10 | OUTPATIENT
Start: 2018-03-29

## 2018-03-29 RX ORDER — CALCIUM ACETATE 667 MG/1
2668 CAPSULE ORAL 3 TIMES DAILY
Qty: 180 CAPSULE | Refills: 3 | Status: SHIPPED | OUTPATIENT
Start: 2018-03-29 | End: 2019-02-27 | Stop reason: SDUPTHER

## 2018-03-29 RX ORDER — CLONIDINE HYDROCHLORIDE 0.2 MG/1
TABLET ORAL
Qty: 60 TABLET | Refills: 4 | OUTPATIENT
Start: 2018-03-29

## 2018-03-29 RX ORDER — CLONIDINE HYDROCHLORIDE 0.3 MG/1
0.3 TABLET ORAL EVERY 12 HOURS SCHEDULED
Qty: 60 TABLET | Refills: 3 | Status: SHIPPED | OUTPATIENT
Start: 2018-03-29 | End: 2018-10-10 | Stop reason: SDUPTHER

## 2018-06-01 RX ORDER — FERROUS SULFATE 325(65) MG
TABLET ORAL
Qty: 90 TABLET | Refills: 10 | Status: SHIPPED | OUTPATIENT
Start: 2018-06-01 | End: 2019-09-18 | Stop reason: SDUPTHER

## 2018-06-01 RX ORDER — SIMVASTATIN 20 MG
20 TABLET ORAL NIGHTLY
Qty: 30 TABLET | Refills: 10 | Status: SHIPPED | OUTPATIENT
Start: 2018-06-01 | End: 2018-06-22 | Stop reason: SDUPTHER

## 2018-06-22 RX ORDER — SIMVASTATIN 20 MG
20 TABLET ORAL NIGHTLY
Qty: 90 TABLET | Refills: 3 | Status: SHIPPED | OUTPATIENT
Start: 2018-06-22 | End: 2019-07-02 | Stop reason: SDUPTHER

## 2018-06-22 RX ORDER — LISINOPRIL 40 MG/1
40 TABLET ORAL DAILY
Qty: 90 TABLET | Refills: 3 | Status: SHIPPED | OUTPATIENT
Start: 2018-06-22 | End: 2019-09-18 | Stop reason: SDUPTHER

## 2018-07-05 RX ORDER — CLONIDINE HYDROCHLORIDE 0.2 MG/1
TABLET ORAL
Qty: 60 TABLET | Refills: 0 | Status: SHIPPED | OUTPATIENT
Start: 2018-07-05 | End: 2018-09-05 | Stop reason: SDUPTHER

## 2018-07-31 RX ORDER — HYDROCHLOROTHIAZIDE 12.5 MG/1
12.5 TABLET ORAL DAILY
Qty: 30 TABLET | Refills: 3 | Status: SHIPPED | OUTPATIENT
Start: 2018-07-31 | End: 2019-09-18

## 2018-07-31 RX ORDER — LEVOTHYROXINE SODIUM 137 UG/1
TABLET ORAL
Qty: 30 TABLET | Refills: 10 | OUTPATIENT
Start: 2018-07-31

## 2018-07-31 RX ORDER — HYDROCHLOROTHIAZIDE 12.5 MG/1
TABLET ORAL
Qty: 30 TABLET | Refills: 2 | OUTPATIENT
Start: 2018-07-31

## 2018-07-31 RX ORDER — LEVOTHYROXINE SODIUM 0.15 MG/1
150 TABLET ORAL DAILY
Qty: 30 TABLET | Refills: 3 | Status: SHIPPED | OUTPATIENT
Start: 2018-07-31 | End: 2019-05-03 | Stop reason: SDUPTHER

## 2018-09-05 RX ORDER — CLONIDINE HYDROCHLORIDE 0.2 MG/1
TABLET ORAL
Qty: 60 TABLET | Refills: 0 | Status: SHIPPED | OUTPATIENT
Start: 2018-09-05 | End: 2018-10-10 | Stop reason: SDUPTHER

## 2018-09-05 RX ORDER — METOPROLOL SUCCINATE 50 MG/1
TABLET, EXTENDED RELEASE ORAL
Qty: 30 TABLET | Refills: 2 | Status: SHIPPED | OUTPATIENT
Start: 2018-09-05 | End: 2018-10-10 | Stop reason: SDUPTHER

## 2018-10-10 RX ORDER — METOPROLOL SUCCINATE 50 MG/1
50 TABLET, EXTENDED RELEASE ORAL DAILY
Qty: 30 TABLET | Refills: 3 | Status: SHIPPED | OUTPATIENT
Start: 2018-10-10 | End: 2019-05-03 | Stop reason: SDUPTHER

## 2018-10-10 RX ORDER — CLONIDINE HYDROCHLORIDE 0.3 MG/1
0.3 TABLET ORAL EVERY 12 HOURS SCHEDULED
Qty: 60 TABLET | Refills: 3 | Status: SHIPPED | OUTPATIENT
Start: 2018-10-10 | End: 2018-12-27 | Stop reason: ALTCHOICE

## 2018-12-03 RX ORDER — CLONIDINE HYDROCHLORIDE 0.2 MG/1
TABLET ORAL
Qty: 60 TABLET | Refills: 0 | Status: SHIPPED | OUTPATIENT
Start: 2018-12-03 | End: 2018-12-26 | Stop reason: SDUPTHER

## 2018-12-27 RX ORDER — CLONIDINE HYDROCHLORIDE 0.2 MG/1
TABLET ORAL
Qty: 60 TABLET | Refills: 3 | Status: SHIPPED | OUTPATIENT
Start: 2018-12-27 | End: 2019-07-02 | Stop reason: SDUPTHER

## 2019-02-27 RX ORDER — CALCIUM ACETATE 667 MG/1
CAPSULE ORAL
Qty: 180 CAPSULE | Refills: 2 | Status: SHIPPED | OUTPATIENT
Start: 2019-02-27 | End: 2019-09-18 | Stop reason: SDUPTHER

## 2019-04-01 RX ORDER — CLONIDINE HYDROCHLORIDE 0.3 MG/1
TABLET ORAL
Qty: 60 TABLET | Refills: 2 | OUTPATIENT
Start: 2019-04-01

## 2019-04-01 RX ORDER — METOPROLOL SUCCINATE 50 MG/1
TABLET, EXTENDED RELEASE ORAL
Qty: 30 TABLET | Refills: 2 | OUTPATIENT
Start: 2019-04-01

## 2019-04-03 ENCOUNTER — TELEPHONE (OUTPATIENT)
Dept: FAMILY MEDICINE CLINIC | Facility: CLINIC | Age: 54
End: 2019-04-03

## 2019-04-03 NOTE — TELEPHONE ENCOUNTER
Patient called stating that she needed a refill on her medication.  Per note in chart was denied because patient needs to be seen.  Patient insurance is no longer active and she states that once she gets her insurance reinstated she will call back and make an appointment.

## 2019-05-03 RX ORDER — METOPROLOL SUCCINATE 50 MG/1
TABLET, EXTENDED RELEASE ORAL
Qty: 30 TABLET | Refills: 2 | Status: SHIPPED | OUTPATIENT
Start: 2019-05-03 | End: 2019-09-18 | Stop reason: SDUPTHER

## 2019-05-03 RX ORDER — LEVOTHYROXINE SODIUM 0.15 MG/1
TABLET ORAL
Qty: 90 TABLET | Refills: 2 | Status: SHIPPED | OUTPATIENT
Start: 2019-05-03 | End: 2019-09-18 | Stop reason: SDUPTHER

## 2019-05-03 RX ORDER — POTASSIUM CHLORIDE 1500 MG/1
TABLET, FILM COATED, EXTENDED RELEASE ORAL
Qty: 30 TABLET | Refills: 10 | Status: SHIPPED | OUTPATIENT
Start: 2019-05-03 | End: 2019-09-18 | Stop reason: SDUPTHER

## 2019-05-03 RX ORDER — CLONIDINE HYDROCHLORIDE 0.3 MG/1
TABLET ORAL
Qty: 60 TABLET | Refills: 2 | Status: SHIPPED | OUTPATIENT
Start: 2019-05-03 | End: 2019-09-18 | Stop reason: SDUPTHER

## 2019-05-07 RX ORDER — GABAPENTIN 800 MG/1
TABLET ORAL
Qty: 90 TABLET | Refills: 1 | OUTPATIENT
Start: 2019-05-07

## 2019-07-02 RX ORDER — CLONIDINE HYDROCHLORIDE 0.2 MG/1
TABLET ORAL
Qty: 60 TABLET | Refills: 2 | Status: SHIPPED | OUTPATIENT
Start: 2019-07-02 | End: 2019-09-18

## 2019-07-02 RX ORDER — SIMVASTATIN 20 MG
TABLET ORAL
Qty: 90 TABLET | Refills: 2 | Status: SHIPPED | OUTPATIENT
Start: 2019-07-02 | End: 2019-09-18 | Stop reason: SDUPTHER

## 2019-07-30 RX ORDER — METOPROLOL SUCCINATE 50 MG/1
TABLET, EXTENDED RELEASE ORAL
Qty: 90 TABLET | Refills: 1 | OUTPATIENT
Start: 2019-07-30

## 2019-08-26 RX ORDER — CLONIDINE HYDROCHLORIDE 0.3 MG/1
TABLET ORAL
Qty: 180 TABLET | Refills: 1 | OUTPATIENT
Start: 2019-08-26

## 2019-09-16 NOTE — PROGRESS NOTES
Subjective:  Joan Blackwell is a 54 y.o. female who presents for       Patient Active Problem List   Diagnosis   • Hypocalcemia   • Hyperlipidemia   • Iron deficiency anemia   • Hypothyroidism   • S/P thyroidectomy   • Uncontrolled type 2 diabetes mellitus with complication, without long-term current use of insulin (CMS/HCC)   • Vaginosis   • Obesity   • Nonintractable headache   • Uncontrolled hypertension   • Hypertensive urgency   • Right wrist pain   • Encounter for screening for malignant neoplasm of breast   • Carpal tunnel syndrome of right wrist   • Encounter for screening for malignant neoplasm of colon   • Controlled type 2 diabetes mellitus with complication, without long-term current use of insulin (CMS/HCC)   • Hypokalemia   • Essential hypertension   • General medical examination   • Vitamin D deficiency   • Sprain of left ankle   • Left ankle pain   • Grieving           Current Outpatient Medications:   •  BUPROPION HCL PO, Take  by mouth., Disp: , Rfl:   •  calcium acetate (PHOS BINDER,) 667 MG capsule capsule, TAKE FOUR CAPSULES BY MOUTH THREE TIMES A DAY, Disp: 180 capsule, Rfl: 2  •  CloNIDine (CATAPRES) 0.3 MG tablet, Take 1 tablet by mouth Every 12 (Twelve) Hours., Disp: 180 tablet, Rfl: 3  •  ferrous sulfate 325 (65 FE) MG tablet, Take 1 tablet by mouth 3 (Three) Times a Day With Meals., Disp: 270 tablet, Rfl: 3  •  fluticasone-salmeterol (ADVAIR DISKUS) 250-50 MCG/DOSE DISKUS, Inhale 1 puff 2 (Two) Times a Day., Disp: 60 each, Rfl: 11  •  levothyroxine (SYNTHROID, LEVOTHROID) 150 MCG tablet, Take 1 tablet by mouth Daily., Disp: 90 tablet, Rfl: 3  •  lisinopril (PRINIVIL,ZESTRIL) 40 MG tablet, Take 1 tablet by mouth Daily., Disp: 90 tablet, Rfl: 3  •  Magnesium 100 MG capsule, Take 133 mg by mouth 3 (Three) Times a Day., Disp: 90 each, Rfl: 11  •  metFORMIN (GLUCOPHAGE) 1000 MG tablet, Take 1 tablet by mouth 2 (Two) Times a Day With Meals., Disp: 180 tablet, Rfl: 3  •  metoprolol  succinate XL (TOPROL-XL) 50 MG 24 hr tablet, Take 1 tablet by mouth Daily., Disp: 90 tablet, Rfl: 3  •  ondansetron (ZOFRAN) 8 MG tablet, Take 1 tablet by mouth Every 8 (Eight) Hours As Needed for Nausea or Vomiting., Disp: 90 tablet, Rfl: 3  •  potassium chloride ER (K-TAB) 20 MEQ tablet controlled-release ER tablet, Take 1 tablet by mouth Daily., Disp: 90 tablet, Rfl: 3  •  simvastatin (ZOCOR) 20 MG tablet, Take 1 tablet by mouth every night at bedtime., Disp: 90 tablet, Rfl: 3  •  vitamin D (ERGOCALCIFEROL) 93196 units capsule capsule, Take 1 capsule by mouth Every 7 (Seven) Days., Disp: 12 capsule, Rfl: 3    Pt is 53 yo female with CMH of obesity, HLP sp thyroidectomy in  , postoperative hypothyroidism,  Vitamin D deficiency, HTN,  DM type 2, history of hypertensive urgency, iron deficiency anemia, sp hysterectomy, sp ovarian cyst removal, Sp  X 3, carpal tunnel syndrome right wrist. [place],      3/8/18 - pt is here for recheck. On last visit she was advised to go to [facility] ER  On 18 due to her uncontrolled hypertension and the concern of possible hypertensive urgency/emergency.  She was discharged in stable condition She then presented to convenient care at [facility] on 18 for neck spasms and pt was offered flexeril but declined.  She left AMA.  She presented  to [facility] ER on 18 for neck pain and spasms.  She had a CAT scan done at [facility] on  and there were no acute findings. She was given pain medication (Norco 5/325 mg PO PRN)  and muscle relaxants.  Se is due for new labwork today. Pt states she is doing better today .BP is a little elevated but improved from last visit.Her main issues today is her right arm/wrist. She has pain in right wrist.  She was a former  and she overused her hand. Her last EMG was done in [place] with Neurologist in 2016 that was positive for carpal tunnel syndrome. She has tried PT/OT with no relief.  She also tried  steroids and pain medication. She states pain is getting worse and would like to proceed with surgery. She would like to see Orthopedic Surgeon in [place]. She is also due for mammogram screening, labwork and also colonoscopy screening. Her last colonoscopy was >10 years ago in [place].  She did have history of GERD. She has stopped taking omeprazole. She continues to take metformi mg pO BID for her diabetes and simvastatin 20 mg at bedtime. For BP pt currently takes clonidine 0.2 mg PO BID along with toprol XL 50 mg daily and lisionpril 40 mg PO q daily.   Denies any chest pain headaches or dizziness currenlty. Pt also has depression and is going through a lot of stress right now. She has a grandson in [place] who was diagnosed with cancer.  She has been stressed out. She sees counseling next month.  She denies any suicidal ideations.       3/2718 pt is here for recheck on blood pressure. On last visit pt's clonidine was increased from 0.2 to 0.3 mg PO BID. Pt also has been referred to Dr. Vega for diabetic eye examination along with Gastroenterology for colonoscopy screening and mammogram screening. Pt was has labwork that showedl ow Vitami nD and was started on Calcium supplement along with Vitamin D once a week instead of daily. Also thyroid medication was increased from 137 to 150 mcg PO q daily . Lipid panel showed low HDL and LDL was at goal. She continues taking simvastatin for cholesterol. Hga1c was at 7.0. BP at better goal today     9/191/19 pt is here for followup. I have not seen pt since March 2018. She has been going back and forth to [facility]. Her grandson who was 17 passed away in December 2018 duet to cancer she is tearful today she is seeing counseling with [facility] for greieving. She is due for new labwork along with mammogram and colonoscopy. She declined colonoscopy and wants to get cologuard instead    She recently went to  at [facility] for left ankle pain and  swelling. She recently sprained her left ankle after collapsing on her deck. She is not seeing Orthopedics currently. She currently wears an ankle brace and crtuches       Ankle Pain    The incident occurred 5 to 7 days ago. The incident occurred at home. The injury mechanism was a fall. The pain is present in the left ankle. The quality of the pain is described as aching. The pain is at a severity of 4/10. The pain is moderate. The pain has been constant since onset. Associated symptoms include an inability to bear weight, a loss of motion, a loss of sensation, muscle weakness, numbness and tingling. Nothing aggravates the symptoms. She has tried nothing for the symptoms. The treatment provided no relief.   Headache    This is a new problem. The current episode started yesterday. The problem has been rapidly worsening. The pain is located in the occipital region. The pain quality is not similar to prior headaches. The quality of the pain is described as aching, sharp and pulsating. Associated symptoms include dizziness, eye redness, eye watering, nausea, neck pain, numbness, phonophobia, photophobia, rhinorrhea, tingling and vomiting. Pertinent negatives include no abdominal pain, abnormal behavior, anorexia, back pain, blurred vision, coughing, drainage, ear pain, eye pain, facial sweating, fever, hearing loss, insomnia, loss of balance, muscle aches, scalp tenderness, seizures, sore throat, swollen glands, visual change, weakness or weight loss. Her past medical history is significant for hypertension and obesity. There is no history of cancer, immunosuppression, migraine headaches, migraines in the family, pseudotumor cerebri, recent head traumas, sinus disease or TMJ.   Diabetes   She presents for her initial diabetic visit. She has type 2 diabetes mellitus. Her disease course has been stable. Hypoglycemia symptoms include dizziness and headaches. Pertinent negatives for hypoglycemia include no confusion,  hunger, mood changes, nervousness/anxiousness, pallor, seizures, sleepiness, speech difficulty, sweats or tremors. Pertinent negatives for diabetes include no blurred vision, no chest pain, no fatigue, no foot paresthesias, no foot ulcerations, no polydipsia, no polyphagia, no polyuria, no visual change, no weakness and no weight loss. Pertinent negatives for hypoglycemia complications include no blackouts, no hospitalization, no nocturnal hypoglycemia, no required assistance and no required glucagon injection. Pertinent negatives for diabetic complications include no autonomic neuropathy, CVA, heart disease, impotence, nephropathy, peripheral neuropathy, PVD or retinopathy. Risk factors for coronary artery disease include diabetes mellitus, sedentary lifestyle and obesity. Current diabetic treatment includes oral agent (monotherapy). Her weight is stable. She is following a generally healthy diet. She has not had a previous visit with a dietitian. She never participates in exercise. An ACE inhibitor/angiotensin II receptor blocker is being taken. She does not see a podiatrist.Eye exam is not current.   Hypertension   This is a chronic problem. The current episode started more than 1 year ago. The problem has been gradually improving since onset. The problem is uncontrolled. Associated symptoms include headaches, neck pain and shortness of breath. Pertinent negatives include no anxiety, blurred vision, chest pain, malaise/fatigue, orthopnea, palpitations, peripheral edema, PND or sweats. There are no associated agents to hypertension. Risk factors for coronary artery disease include diabetes mellitus and dyslipidemia. Current antihypertension treatment includes beta blockers. The current treatment provides no improvement. There are no compliance problems.  There is no history of angina, kidney disease, CAD/MI, CVA, heart failure, left ventricular hypertrophy, PVD, retinopathy or a thyroid problem. There is no  history of chronic renal disease, coarctation of the aorta, hyperaldosteronism, hypercortisolism, hyperparathyroidism, a hypertension causing med, pheochromocytoma or sleep apnea.   Neck Pain    This is a chronic problem. The current episode started more than 1 month ago. The problem occurs daily. The problem has been resolved. The pain is associated with nothing. The pain is present in the left side and midline. The quality of the pain is described as aching. The pain is at a severity of 3/10. The pain is mild. The symptoms are aggravated by bending. Associated symptoms include headaches, numbness, photophobia and tingling. Pertinent negatives include no chest pain, fever, leg pain, pain with swallowing, paresis, syncope, trouble swallowing, visual change, weakness or weight loss. She has tried muscle relaxants and oral narcotics for the symptoms.   Upper Extremity Issue   Associated symptoms include arthralgias, headaches, myalgias, nausea, neck pain, numbness and vomiting. Pertinent negatives include no abdominal pain, anorexia, change in bowel habit, chest pain, chills, congestion, coughing, diaphoresis, fatigue, fever, joint swelling, rash, sore throat, swollen glands, urinary symptoms, vertigo, visual change or weakness.   Arm Pain    The incident occurred more than 1 week ago. The incident occurred at work. The injury mechanism is unknown. The pain is present in the right elbow, right fingers, right forearm, right hand and right wrist. The quality of the pain is described as cramping and aching. The pain radiates to the right arm. The pain is at a severity of 8/10. The pain is severe. The pain has been fluctuating since the incident. Associated symptoms include numbness and tingling. Pertinent negatives include no chest pain. The symptoms are aggravated by movement, a foreign body and palpation. She has tried NSAIDs and rest (oral narcotics) for the symptoms. The treatment provided no relief.   Wrist Pain     The pain is present in the right arm, right elbow, right wrist, right hand and right fingers. There has been no history of extremity trauma. The problem occurs constantly. The problem has been gradually worsening. The quality of the pain is described as aching. The pain is severe. Associated symptoms include an inability to bear weight, joint swelling, a limited range of motion, numbness, stiffness and tingling. Pertinent negatives include no fever. The symptoms are aggravated by activity and lying down. The treatment provided no relief. Family history does not include gout or rheumatoid arthritis. Her past medical history is significant for diabetes. There is no history of gout, osteoarthritis or rheumatoid arthritis.   Hypothyroidism   This is a chronic problem. The current episode started more than 1 year ago. The problem occurs daily. The problem has been unchanged. Associated symptoms include arthralgias, headaches, myalgias, nausea, neck pain, numbness and vomiting. Pertinent negatives include no abdominal pain, anorexia, change in bowel habit, chest pain, chills, congestion, coughing, diaphoresis, fatigue, fever, joint swelling, rash, sore throat, swollen glands, urinary symptoms, vertigo, visual change or weakness. Nothing aggravates the symptoms. Treatments tried: synthroid. The treatment provided mild relief.     Review of Systems  Review of Systems   Constitutional: Positive for activity change and fatigue. Negative for appetite change, chills, diaphoresis and fever.   HENT: Negative for congestion, postnasal drip, rhinorrhea, sinus pressure, sinus pain, sneezing, sore throat, trouble swallowing and voice change.    Respiratory: Negative for cough, choking, chest tightness, shortness of breath, wheezing and stridor.    Cardiovascular: Negative for chest pain.   Gastrointestinal: Negative for diarrhea, nausea and vomiting.   Musculoskeletal: Positive for arthralgias and back pain.   Neurological:  Positive for tingling, weakness and numbness. Negative for headaches.   Psychiatric/Behavioral:        Grieving        Patient Active Problem List   Diagnosis   • Hypocalcemia   • Hyperlipidemia   • Iron deficiency anemia   • Hypothyroidism   • S/P thyroidectomy   • Uncontrolled type 2 diabetes mellitus with complication, without long-term current use of insulin (CMS/HCC)   • Vaginosis   • Obesity   • Nonintractable headache   • Uncontrolled hypertension   • Hypertensive urgency   • Right wrist pain   • Encounter for screening for malignant neoplasm of breast   • Carpal tunnel syndrome of right wrist   • Encounter for screening for malignant neoplasm of colon   • Controlled type 2 diabetes mellitus with complication, without long-term current use of insulin (CMS/HCC)   • Hypokalemia   • Essential hypertension   • General medical examination   • Vitamin D deficiency   • Sprain of left ankle   • Left ankle pain   • Grieving     Past Surgical History:   Procedure Laterality Date   •  SECTION  01/14/1993    x 3, 84, 82,   • EXPLORATORY LAPAROTOMY Left 1998    Left cystectomy. Partial resection of vthe left ovary. Left ovary- oversewn   • INJECTION OF MEDICATION  2014    Depo Medrol (Methylprednisone) (1)    • INJECTION OF MEDICATION  2015    Kenalog (1)     • LAPAROSCOPIC HYSTERECTOMY  1995    Surgical, lysis of adhesions.Laparoscopic Assisted vaginal Hysterectomy (Tyler/ Doderlein Technique) marsupialization of right Bartholin's Gland Cyst   • LASER ABLATION OF THE CERVIX  1985    Laser vaporization, cervical cone   • OVARIAN CYST REMOVAL     • PAP SMEAR     • THYROIDECTOMY  2006    with partial parathyroidectomy     Social History     Socioeconomic History   • Marital status:      Spouse name: Not on file   • Number of children: Not on file   • Years of education: Not on file   • Highest education level: Not on file   Tobacco Use   • Smoking  status: Former Smoker   • Smokeless tobacco: Never Used   Substance and Sexual Activity   • Alcohol use: No   • Drug use: Defer   • Sexual activity: Defer     Family History   Problem Relation Age of Onset   • Diabetes Mother    • Hypertension Mother    • Diabetes Father    • Arthritis Father      No visits with results within 6 Month(s) from this visit.   Latest known visit with results is:   Orders Only on 03/13/2018   Component Date Value Ref Range Status   • Amphetamine Screen, Urine 03/27/2018 Negative  Negative Final   • Barbiturates Screen, Urine 03/27/2018 Negative  Negative Final   • Benzodiazepine Screen, Urine 03/27/2018 Negative  Negative Final   • Cocaine Screen, Urine 03/27/2018 Negative  Negative Final   • Methadone Screen, Urine 03/27/2018 Negative  Negative Final   • Opiate Screen 03/27/2018 Negative  Negative Final   • Oxycodone Screen, Urine 03/27/2018 Negative  Negative Final   • THC, Screen, Urine 03/27/2018 Negative  Negative Final      Mammo screening bilateral     PROCEDURE- Bilateral screening mammogram with 3-D tomosynthesis with  CAD     REASON FOR EXAM- Screening mammography     FINDINGS- Comparison study dated December 3, 2009.Normal appearing  fibroglandular tissue. No suspicious mass or malignant type  microcalcifications . No skin thickening or retraction.Stable benign  right breast upper axillary intramammary lymph nodes. Stable benign  right breast calcifications..     IMPRESSION- No mammographic evidence of malignancy.     BI-RADS category 2- Benign findings.     Recommendation- Annual mammography      Electronically Signed By- Doug Beth MD On: 2015-11-18 16:13:50    [unfilled]  Immunization History   Administered Date(s) Administered   • Pneumococcal Polysaccharide (PPSV23) 03/27/2018   • Tdap 03/27/2018       The following portions of the patient's history were reviewed and updated as appropriate: allergies, current medications, past family history, past medical history,  "past social history, past surgical history and problem list.        Physical Exam  /78   Pulse 72   Temp 98.4 °F (36.9 °C)   Ht 152.4 cm (60\")   Wt 86.5 kg (190 lb 12.8 oz)   SpO2 96%   BMI 37.26 kg/m²     Physical Exam   Constitutional: She is oriented to person, place, and time. She appears well-developed and well-nourished.   HENT:   Head: Normocephalic and atraumatic.   Right Ear: External ear normal.   Eyes: Conjunctivae and EOM are normal. Pupils are equal, round, and reactive to light.   Neck: Normal range of motion. Neck supple.   Cardiovascular: Normal rate, regular rhythm and normal heart sounds.   No murmur heard.  Pulmonary/Chest: Effort normal and breath sounds normal. No respiratory distress.   Abdominal: Soft. Bowel sounds are normal. She exhibits no distension. There is no tenderness.   Obese abdomen    Musculoskeletal: She exhibits tenderness. She exhibits no edema.        Left ankle: She exhibits decreased range of motion, swelling and deformity.   Neurological: She is alert and oriented to person, place, and time. No cranial nerve deficit.   Skin: Skin is warm. No rash noted. She is not diaphoretic. No erythema. No pallor.   Psychiatric: She has a normal mood and affect. Her behavior is normal.   Pt is crying today. Upset.     Nursing note and vitals reviewed.      Assessment/Plan    Diagnosis Plan   1. Sprain of left ankle, unspecified ligament, sequela  XR Ankle 3+ View Left    Ambulatory Referral to Orthopedic Surgery   2. Controlled type 2 diabetes mellitus with complication, without long-term current use of insulin (CMS/[facility])     3. Essential hypertension     4. Hyperlipidemia, unspecified hyperlipidemia type     5. Hypothyroidism, unspecified type     6. Iron deficiency anemia, unspecified iron deficiency anemia type     7. Obesity, unspecified classification, unspecified obesity type, unspecified whether serious comorbidity present     8. S/P thyroidectomy     9. Vitamin D " deficiency     10. General medical examination  CBC Auto Differential    Comprehensive Metabolic Panel    Hemoglobin A1c    Lipid Panel    TSH    T4, Free    T3, Free    Vitamin D 25 Hydroxy    Vitamin B12    Microalbumin / Creatinine Urine Ratio - Urine, Clean Catch   11. Vitamin D deficiency   Vitamin D 25 Hydroxy   12. Screening mammogram, encounter for  Mammo Screening Bilateral With CAD   13. Special screening for malignant neoplasms, colon  Cologuard - Stool, Per Rectum   14. Encounter for Papanicolaou smear of cervix  Ambulatory Referral to Obstetrics / Gynecology   15. Left ankle pain, unspecified chronicity  XR Ankle 3+ View Left    Ambulatory Referral to Orthopedic Surgery   16. Grieving             --recommend labwork   -recommend colonoscopy - pt declined colonoscopy screening will get cologuard  -recommend mammogram screening - will schedule at imaging center   -refer to TAV[facility] Puri for pap smear   -ronaldo -recommend continued counseling. Pt declines medication   -left ankle pain/sprain left ankle - will get x-ray referral to Orthopedics. Currently pt has brace and crutches  - DM type 2-  Will continue on metformin 500  But will increase to 1000 mg pO BID. Last hga1c at 7.0   - for hypertension - currently uncontrolled. On last visit clonidine increased  from 0.2 to 0.3 mg PO BID.  Continue on Toprol XL 50 mg daily and lisionpril 40 mg daily . Advised pt to bring BP log on next visit. Refilled BP medication today . BP at goal on visit.   - Obesity BMI >30 . - recommended weight loss information and DASH diet  - hyperlipidemia - HDL low. REcommended high healthy fat diet Continue on simvastatin.   - hypothyroidism/spthyroidectomy -- increased synthroid from 125 to 150 mcg PO q daily.  Recheck thyroid studies in 6 weeks  - Vitamin D deficiency -rincrease Vitamin D from 1000 once a day to 50,000 once a week   -hypocalcemia - calcium supplements OTC.    -hypokalemia - potassium levels at goal  -  depression - pt on wellbutrin. She is seeing counseling with Nathaly Penaloza.   - iron deficiency anemia - recheck iron level and ferritin. Continue with iron pill. Last iron levels normal  - mamm[facility] screening at [facility]  - will refer to Dr. Mckinney (Orthopedic Surgery) for right carpal tunnel syndrome. Consultation appreciated. Pt missed last appt and will need x-ray before seeing Orthopedics. For right wrist pain pt will  neurontin 800 mg PO TID.  Will get UDS today. ADDIE printed and refer #[id number].    - PT referred  to CAMILA Hedrick/DR. Abbott for colonoscopy screening - consultation appreciated  - will refer to DR. Vega for diabetic eye examination consultation appreciated  - pt declined influenza vaccination   -tdap and pneumonia 23 vaccination today.  - referral to TAVO Puri for pap smear. Consultation appreciated    - discuss other immunizations on next visit  - recheck in 2 weeks         This document has been electronically signed by Brad Arnold MD on September 18, 2019 10:47 AM

## 2019-09-18 ENCOUNTER — OFFICE VISIT (OUTPATIENT)
Dept: FAMILY MEDICINE CLINIC | Facility: CLINIC | Age: 54
End: 2019-09-18

## 2019-09-18 VITALS
HEIGHT: 60 IN | TEMPERATURE: 98.4 F | DIASTOLIC BLOOD PRESSURE: 78 MMHG | HEART RATE: 72 BPM | OXYGEN SATURATION: 96 % | WEIGHT: 190.8 LBS | SYSTOLIC BLOOD PRESSURE: 100 MMHG | BODY MASS INDEX: 37.46 KG/M2

## 2019-09-18 DIAGNOSIS — F43.21 GRIEVING: ICD-10-CM

## 2019-09-18 DIAGNOSIS — Z12.11 SPECIAL SCREENING FOR MALIGNANT NEOPLASMS, COLON: ICD-10-CM

## 2019-09-18 DIAGNOSIS — E89.0 S/P THYROIDECTOMY: ICD-10-CM

## 2019-09-18 DIAGNOSIS — E03.9 HYPOTHYROIDISM, UNSPECIFIED TYPE: ICD-10-CM

## 2019-09-18 DIAGNOSIS — E66.9 OBESITY, UNSPECIFIED CLASSIFICATION, UNSPECIFIED OBESITY TYPE, UNSPECIFIED WHETHER SERIOUS COMORBIDITY PRESENT: ICD-10-CM

## 2019-09-18 DIAGNOSIS — S93.402S SPRAIN OF LEFT ANKLE, UNSPECIFIED LIGAMENT, SEQUELA: Primary | ICD-10-CM

## 2019-09-18 DIAGNOSIS — E78.5 HYPERLIPIDEMIA, UNSPECIFIED HYPERLIPIDEMIA TYPE: ICD-10-CM

## 2019-09-18 DIAGNOSIS — M25.572 LEFT ANKLE PAIN, UNSPECIFIED CHRONICITY: ICD-10-CM

## 2019-09-18 DIAGNOSIS — E55.9 VITAMIN D DEFICIENCY: ICD-10-CM

## 2019-09-18 DIAGNOSIS — I10 ESSENTIAL HYPERTENSION: ICD-10-CM

## 2019-09-18 DIAGNOSIS — Z12.31 SCREENING MAMMOGRAM, ENCOUNTER FOR: ICD-10-CM

## 2019-09-18 DIAGNOSIS — D50.9 IRON DEFICIENCY ANEMIA, UNSPECIFIED IRON DEFICIENCY ANEMIA TYPE: ICD-10-CM

## 2019-09-18 DIAGNOSIS — Z12.4 ENCOUNTER FOR PAPANICOLAOU SMEAR OF CERVIX: ICD-10-CM

## 2019-09-18 DIAGNOSIS — Z00.00 GENERAL MEDICAL EXAMINATION: ICD-10-CM

## 2019-09-18 DIAGNOSIS — E11.8 CONTROLLED TYPE 2 DIABETES MELLITUS WITH COMPLICATION, WITHOUT LONG-TERM CURRENT USE OF INSULIN (HCC): ICD-10-CM

## 2019-09-18 PROBLEM — S93.402A SPRAIN OF LEFT ANKLE: Status: ACTIVE | Noted: 2019-09-18

## 2019-09-18 PROCEDURE — 99214 OFFICE O/P EST MOD 30 MIN: CPT | Performed by: FAMILY MEDICINE

## 2019-09-18 RX ORDER — ERGOCALCIFEROL 1.25 MG/1
50000 CAPSULE ORAL
Qty: 12 CAPSULE | Refills: 3 | Status: SHIPPED | OUTPATIENT
Start: 2019-09-18 | End: 2019-12-13 | Stop reason: SDUPTHER

## 2019-09-18 RX ORDER — CLONIDINE HYDROCHLORIDE 0.3 MG/1
0.3 TABLET ORAL EVERY 12 HOURS
Qty: 180 TABLET | Refills: 3 | Status: SHIPPED | OUTPATIENT
Start: 2019-09-18 | End: 2019-12-13 | Stop reason: SDUPTHER

## 2019-09-18 RX ORDER — ONDANSETRON HYDROCHLORIDE 8 MG/1
8 TABLET, FILM COATED ORAL EVERY 8 HOURS PRN
Qty: 90 TABLET | Refills: 3 | Status: SHIPPED | OUTPATIENT
Start: 2019-09-18 | End: 2019-12-13 | Stop reason: SDUPTHER

## 2019-09-18 RX ORDER — POTASSIUM CHLORIDE 1500 MG/1
20 TABLET, FILM COATED, EXTENDED RELEASE ORAL DAILY
Qty: 90 TABLET | Refills: 3 | Status: SHIPPED | OUTPATIENT
Start: 2019-09-18 | End: 2019-12-13 | Stop reason: SDUPTHER

## 2019-09-18 RX ORDER — SIMVASTATIN 20 MG
20 TABLET ORAL
Qty: 90 TABLET | Refills: 3 | Status: SHIPPED | OUTPATIENT
Start: 2019-09-18 | End: 2019-11-01

## 2019-09-18 RX ORDER — CALCIUM ACETATE 667 MG/1
667 CAPSULE ORAL 3 TIMES DAILY
Qty: 180 CAPSULE | Refills: 2 | Status: SHIPPED | OUTPATIENT
Start: 2019-09-18 | End: 2019-12-13 | Stop reason: SDUPTHER

## 2019-09-18 RX ORDER — FERROUS SULFATE 325(65) MG
1 TABLET ORAL
Qty: 270 TABLET | Refills: 3 | Status: SHIPPED | OUTPATIENT
Start: 2019-09-18 | End: 2019-12-13 | Stop reason: SDUPTHER

## 2019-09-18 RX ORDER — LISINOPRIL 40 MG/1
40 TABLET ORAL DAILY
Qty: 90 TABLET | Refills: 3 | Status: SHIPPED | OUTPATIENT
Start: 2019-09-18 | End: 2020-01-24 | Stop reason: SDUPTHER

## 2019-09-18 RX ORDER — LEVOTHYROXINE SODIUM 0.15 MG/1
150 TABLET ORAL DAILY
Qty: 90 TABLET | Refills: 3 | Status: SHIPPED | OUTPATIENT
Start: 2019-09-18 | End: 2019-12-13 | Stop reason: SDUPTHER

## 2019-09-18 RX ORDER — METOPROLOL SUCCINATE 50 MG/1
50 TABLET, EXTENDED RELEASE ORAL DAILY
Qty: 90 TABLET | Refills: 3 | Status: SHIPPED | OUTPATIENT
Start: 2019-09-18 | End: 2019-10-02

## 2019-09-23 ENCOUNTER — TELEPHONE (OUTPATIENT)
Dept: FAMILY MEDICINE CLINIC | Facility: CLINIC | Age: 54
End: 2019-09-23

## 2019-09-23 RX ORDER — ESCITALOPRAM OXALATE 10 MG/1
10 TABLET ORAL DAILY
Qty: 30 TABLET | Refills: 3 | Status: SHIPPED | OUTPATIENT
Start: 2019-09-23 | End: 2019-11-01

## 2019-09-23 NOTE — TELEPHONE ENCOUNTER
Call pt    Sent glucose test strips to pharmacy    Also recommend lexapro 10 mg daily which is an SSRI and help with anxiety as well. Will give 30 pills and 3 refills. Needs to take daily in order for it to work.  May increase appetite and may have sexual side effects    Recheck on next visit. Thanks

## 2019-09-23 NOTE — TELEPHONE ENCOUNTER
Pt needs refill on test strips, uses one touch ultra and tests twice daily.    Pt also would like to know if could have something for depression. States discussed at last visit. States having trouble sleeping and is still grieving over her grandson.     Pt uses [facility]

## 2019-09-23 NOTE — TELEPHONE ENCOUNTER
Notified pt script for strips had been sent in and also that  Sent in RX for lexapro. PT scheduled for follow up 10/2/19.

## 2019-09-24 ENCOUNTER — TELEPHONE (OUTPATIENT)
Dept: FAMILY MEDICINE CLINIC | Facility: CLINIC | Age: 54
End: 2019-09-24

## 2019-09-24 ENCOUNTER — LAB (OUTPATIENT)
Dept: LAB | Facility: HOSPITAL | Age: 54
End: 2019-09-24

## 2019-09-24 ENCOUNTER — OFFICE VISIT (OUTPATIENT)
Dept: ORTHOPEDIC SURGERY | Facility: CLINIC | Age: 54
End: 2019-09-24

## 2019-09-24 VITALS — BODY MASS INDEX: 37.3 KG/M2 | WEIGHT: 190 LBS | HEIGHT: 60 IN

## 2019-09-24 DIAGNOSIS — Z00.00 GENERAL MEDICAL EXAMINATION: ICD-10-CM

## 2019-09-24 DIAGNOSIS — E55.9 VITAMIN D DEFICIENCY: ICD-10-CM
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
## 2019-09-24 DIAGNOSIS — M25.572 LEFT ANKLE PAIN, UNSPECIFIED CHRONICITY: Primary | ICD-10-CM

## 2019-09-24 LAB
25(OH)D3 SERPL-MCNC: 22.5 NG/ML (ref 30–100)
ALBUMIN SERPL-MCNC: 4.1 G/DL (ref 3.5–5.2)
ALBUMIN/GLOB SERPL: 1.4 G/DL
ALP SERPL-CCNC: 71 U/L (ref 39–117)
ALT SERPL W P-5'-P-CCNC: 11 U/L (ref 1–33)
ANION GAP SERPL CALCULATED.3IONS-SCNC: 13.3 MMOL/L (ref 5–15)
AST SERPL-CCNC: 16 U/L (ref 1–32)
BASOPHILS # BLD AUTO: 0.03 10*3/MM3 (ref 0–0.2)
BASOPHILS NFR BLD AUTO: 0.6 % (ref 0–1.5)
BILIRUB SERPL-MCNC: 0.4 MG/DL (ref 0.2–1.2)
BUN BLD-MCNC: 15 MG/DL (ref 6–20)
BUN/CREAT SERPL: 23.1 (ref 7–25)
CALCIUM SPEC-SCNC: 7.5 MG/DL (ref 8.6–10.5)
CHLORIDE SERPL-SCNC: 100 MMOL/L (ref 98–107)
CHOLEST SERPL-MCNC: 149 MG/DL (ref 0–200)
CO2 SERPL-SCNC: 26.7 MMOL/L (ref 22–29)
CREAT BLD-MCNC: 0.65 MG/DL (ref 0.57–1)
DEPRECATED RDW RBC AUTO: 45.1 FL (ref 37–54)
EOSINOPHIL # BLD AUTO: 0.13 10*3/MM3 (ref 0–0.4)
EOSINOPHIL NFR BLD AUTO: 2.7 % (ref 0.3–6.2)
ERYTHROCYTE [DISTWIDTH] IN BLOOD BY AUTOMATED COUNT: 14.7 % (ref 12.3–15.4)
GFR SERPL CREATININE-BSD FRML MDRD: 115 ML/MIN/1.73
GLOBULIN UR ELPH-MCNC: 2.9 GM/DL
GLUCOSE BLD-MCNC: 136 MG/DL (ref 65–99)
HBA1C MFR BLD: 7.4 % (ref 4.8–5.6)
HCT VFR BLD AUTO: 36 % (ref 34–46.6)
HDLC SERPL-MCNC: 40 MG/DL (ref 40–60)
HGB BLD-MCNC: 11.7 G/DL (ref 12–15.9)
IMM GRANULOCYTES # BLD AUTO: 0.01 10*3/MM3 (ref 0–0.05)
IMM GRANULOCYTES NFR BLD AUTO: 0.2 % (ref 0–0.5)
LDLC SERPL CALC-MCNC: 84 MG/DL (ref 0–100)
LDLC/HDLC SERPL: 2.11 {RATIO}
LYMPHOCYTES # BLD AUTO: 2.33 10*3/MM3 (ref 0.7–3.1)
LYMPHOCYTES NFR BLD AUTO: 48.4 % (ref 19.6–45.3)
MCH RBC QN AUTO: 27.5 PG (ref 26.6–33)
MCHC RBC AUTO-ENTMCNC: 32.5 G/DL (ref 31.5–35.7)
MCV RBC AUTO: 84.5 FL (ref 79–97)
MONOCYTES # BLD AUTO: 0.26 10*3/MM3 (ref 0.1–0.9)
MONOCYTES NFR BLD AUTO: 5.4 % (ref 5–12)
NEUTROPHILS # BLD AUTO: 2.05 10*3/MM3 (ref 1.7–7)
NEUTROPHILS NFR BLD AUTO: 42.7 % (ref 42.7–76)
NRBC BLD AUTO-RTO: 0 /100 WBC (ref 0–0.2)
PLATELET # BLD AUTO: 346 10*3/MM3 (ref 140–450)
PMV BLD AUTO: 10.3 FL (ref 6–12)
POTASSIUM BLD-SCNC: 3.8 MMOL/L (ref 3.5–5.2)
PROT SERPL-MCNC: 7 G/DL (ref 6–8.5)
RBC # BLD AUTO: 4.26 10*6/MM3 (ref 3.77–5.28)
SODIUM BLD-SCNC: 140 MMOL/L (ref 136–145)
T3FREE SERPL-MCNC: 2.35 PG/ML (ref 2–4.4)
T4 FREE SERPL-MCNC: 1.08 NG/DL (ref 0.93–1.7)
TRIGL SERPL-MCNC: 123 MG/DL (ref 0–150)
TSH SERPL DL<=0.05 MIU/L-ACNC: 4.27 UIU/ML (ref 0.27–4.2)
VIT B12 BLD-MCNC: 295 PG/ML (ref 211–946)
VLDLC SERPL-MCNC: 24.6 MG/DL (ref 5–40)
WBC NRBC COR # BLD: 4.81 10*3/MM3 (ref 3.4–10.8)

## 2019-09-24 PROCEDURE — 82306 VITAMIN D 25 HYDROXY: CPT

## 2019-09-24 PROCEDURE — 85025 COMPLETE CBC W/AUTO DIFF WBC: CPT

## 2019-09-24 PROCEDURE — 99214 OFFICE O/P EST MOD 30 MIN: CPT | Performed by: NURSE PRACTITIONER

## 2019-09-24 PROCEDURE — 82607 VITAMIN B-12: CPT

## 2019-09-24 PROCEDURE — 83036 HEMOGLOBIN GLYCOSYLATED A1C: CPT

## 2019-09-24 PROCEDURE — 84443 ASSAY THYROID STIM HORMONE: CPT

## 2019-09-24 PROCEDURE — 80061 LIPID PANEL: CPT

## 2019-09-24 PROCEDURE — 84481 FREE ASSAY (FT-3): CPT

## 2019-09-24 PROCEDURE — 84439 ASSAY OF FREE THYROXINE: CPT

## 2019-09-24 PROCEDURE — 80053 COMPREHEN METABOLIC PANEL: CPT

## 2019-09-24 NOTE — PROGRESS NOTES
Joan Blackwell is a 54 y.o. female   Primary provider:  Brad Arnold MD       Chief Complaint   Patient presents with   • Left Ankle - Lower Extremity Issue, Ankle Pain       HISTORY OF PRESENT ILLNESS:    Ankle Injury    The incident occurred more than 1 week ago (patient injured left ankle on 9/13/2019 after front porch collapsed and she fell on 9/13/2019). The incident occurred at home. The injury mechanism was a fall. The pain is present in the left ankle. The pain is severe. The pain has been constant since onset. Associated symptoms include numbness. Associated symptoms comments: Redness, bruising, swelling. . She reports no foreign bodies present. The symptoms are aggravated by weight bearing (walking, standing. ). She has tried ice, heat and rest (xrays done on 9/18/2019, ankle brace and crutches. ) for the symptoms.        CONCURRENT MEDICAL HISTORY:    Past Medical History:   Diagnosis Date   • Abdominal pain     suspect Kidney Stone      • Acquired hypothyroidism    • Acute bronchitis    • Acute maxillary sinusitis    • Acute pharyngitis    • Asthma    • Axillary lymphadenopathy    • Blood in urine    • Bronchitis     with an asthmatic reaction      • Cough    • Cyst of Bartholin's gland duct     History of    • Degenerative joint disease involving multiple joints    • Encounter for gynecological examination with abnormal finding    • Essential (primary) hypertension    • Essential hypertension    • GERD (gastroesophageal reflux disease)    • Goiter    • Hemorrhoids    • Hordeolum     right lower eyelid      • Low back pain    • Lung nodule, solitary    • Polyp of vagina     possible      • Postsurgical hypoparathyroidism (CMS/HCC)    • Shortness of breath    • Tobacco dependence syndrome     Past history   • Urinary tract infectious disease    • Vulvovaginitis        Allergies   Allergen Reactions   • Naproxen Nausea And Vomiting and Swelling   • Other      Blueberries         Current  Outpatient Medications:   •  BUPROPION HCL PO, Take  by mouth., Disp: , Rfl:   •  calcium acetate (PHOS BINDER,) 667 MG capsule capsule, Take 1 capsule by mouth 3 (Three) Times a Day., Disp: 180 capsule, Rfl: 2  •  CloNIDine (CATAPRES) 0.3 MG tablet, Take 1 tablet by mouth Every 12 (Twelve) Hours., Disp: 180 tablet, Rfl: 3  •  escitalopram (LEXAPRO) 10 MG tablet, Take 1 tablet by mouth Daily., Disp: 30 tablet, Rfl: 3  •  ferrous sulfate 325 (65 FE) MG tablet, Take 1 tablet by mouth 3 (Three) Times a Day With Meals., Disp: 270 tablet, Rfl: 3  •  fluticasone-salmeterol (ADVAIR DISKUS) 250-50 MCG/DOSE DISKUS, Inhale 1 puff 2 (Two) Times a Day., Disp: 60 each, Rfl: 11  •  glucose blood (ONE TOUCH ULTRA TEST) test strip, Use as instructed check sugar twice a day., Disp: 200 each, Rfl: 3  •  levothyroxine (SYNTHROID, LEVOTHROID) 150 MCG tablet, Take 1 tablet by mouth Daily., Disp: 90 tablet, Rfl: 3  •  lisinopril (PRINIVIL,ZESTRIL) 40 MG tablet, Take 1 tablet by mouth Daily., Disp: 90 tablet, Rfl: 3  •  Magnesium 100 MG capsule, Take 133 mg by mouth 3 (Three) Times a Day., Disp: 90 each, Rfl: 11  •  metFORMIN (GLUCOPHAGE) 1000 MG tablet, Take 1 tablet by mouth 2 (Two) Times a Day With Meals., Disp: 180 tablet, Rfl: 3  •  metoprolol succinate XL (TOPROL-XL) 50 MG 24 hr tablet, Take 1 tablet by mouth Daily., Disp: 90 tablet, Rfl: 3  •  ondansetron (ZOFRAN) 8 MG tablet, Take 1 tablet by mouth Every 8 (Eight) Hours As Needed for Nausea or Vomiting., Disp: 90 tablet, Rfl: 3  •  potassium chloride ER (K-TAB) 20 MEQ tablet controlled-release ER tablet, Take 1 tablet by mouth Daily., Disp: 90 tablet, Rfl: 3  •  simvastatin (ZOCOR) 20 MG tablet, Take 1 tablet by mouth every night at bedtime., Disp: 90 tablet, Rfl: 3  •  vitamin D (ERGOCALCIFEROL) 58287 units capsule capsule, Take 1 capsule by mouth Every 7 (Seven) Days., Disp: 12 capsule, Rfl: 3    Past Surgical History:   Procedure Laterality Date   •  SECTION  1993  "   x 3, 11/19/84, 5/18/82,   • EXPLORATORY LAPAROTOMY Left 01/02/1998    Left cystectomy. Partial resection of vthe left ovary. Left ovary- oversewn   • INJECTION OF MEDICATION  03/06/2014    Depo Medrol (Methylprednisone) (1)    • INJECTION OF MEDICATION  09/27/2015    Kenalog (1)     • LAPAROSCOPIC HYSTERECTOMY  01/30/1995    Surgical, lysis of adhesions.Laparoscopic Assisted vaginal Hysterectomy (Tyler/ Doderlein Technique) marsupialization of right Bartholin's Gland Cyst   • LASER ABLATION OF THE CERVIX  03/25/1985    Laser vaporization, cervical cone   • OVARIAN CYST REMOVAL  1996   • PAP SMEAR  2008   • THYROIDECTOMY  02/28/2006    with partial parathyroidectomy       Family History   Problem Relation Age of Onset   • Diabetes Mother    • Hypertension Mother    • Diabetes Father    • Arthritis Father        Social History     Socioeconomic History   • Marital status:      Spouse name: Not on file   • Number of children: Not on file   • Years of education: Not on file   • Highest education level: Not on file   Tobacco Use   • Smoking status: Former Smoker   • Smokeless tobacco: Never Used   Substance and Sexual Activity   • Alcohol use: No   • Drug use: Defer   • Sexual activity: Defer        Review of Systems   Neurological: Positive for numbness.   Psychiatric/Behavioral: Positive for sleep disturbance. The patient is nervous/anxious.    All other systems reviewed and are negative.      PHYSICAL EXAMINATION:       Ht 152.4 cm (60\")   Wt 86.2 kg (190 lb)   BMI 37.11 kg/m²     Physical Exam   Constitutional: She is oriented to person, place, and time. Vital signs are normal. She appears well-developed and well-nourished. She is cooperative.   HENT:   Head: Normocephalic and atraumatic.   Neck: Trachea normal and phonation normal.   Pulmonary/Chest: Effort normal. No respiratory distress.   Abdominal: Soft. Normal appearance. She exhibits no distension.   Neurological: She is alert and oriented to " person, place, and time. GCS eye subscore is 4. GCS verbal subscore is 5. GCS motor subscore is 6.   Skin: Skin is warm, dry and intact. Capillary refill takes less than 2 seconds.   Psychiatric: She has a normal mood and affect. Her speech is normal and behavior is normal. Judgment and thought content normal. Cognition and memory are normal.   Vitals reviewed.      GAIT:     []  Normal  [x]  Antalgic    Assistive device: []  None  []  Walker     []  Crutches  [x]  Cane     []  Wheelchair  []  Stretcher    Right Ankle Exam   Right ankle exam is normal.      Left Ankle Exam     Tenderness   The patient is experiencing tenderness in the lateral malleolus and medial malleolus (Diffuse throughout the foot).   Swelling: moderate    Range of Motion   Dorsiflexion: abnormal   Plantar flexion: abnormal   Eversion: abnormal   Inversion: abnormal     Muscle Strength   Left ankle normal muscle strength: Strength testing deferred.    Other   Erythema: absent  Scars: absent  Sensation: normal  Pulse: present                  Xr Ankle 3+ View Left    Result Date: 9/18/2019  Narrative: Three view left ankle HISTORY: Left ankle pain. Left ankle sprain. AP, lateral and oblique views obtained. COMPARISON: None FINDINGS: Soft tissue swelling about the ankle. No fracture or dislocation. Moderate-sized calcaneal spurs. Small spur tip of the lateral malleolus. Very small talar beak or spur. No other osseous or articular abnormality.     Impression: CONCLUSION: Soft tissue swelling about the ankle. No fracture or dislocation. Moderate-sized calcaneal spurs. Small spur tip of the lateral malleolus. Very small talar beak or spur. [id number] Electronically signed by:  Ham Reynoso MD  9/18/2019 2:42 PM CDT Workstation: [email or web address]          ASSESSMENT:    Diagnoses and all orders for this visit:    Left ankle pain, unspecified chronicity  -     MRI Ankle Left Without Contrast; Future  -     MRI Foot Left Without Contrast; Future    Fall  through floor, initial encounter          PLAN  Limited range of motion, pain and diffuse tenderness throughout the foot and ankle is consistent with an injury from falling through the floor of her porch approximately 2 weeks ago.  Will recommend an MRI of the ankle and foot to rule out a occult fracture and in the meantime the patient was instructed to continue to modify weightbearing with crutches and/or cane, she was placed in a Ortho boot today in office and I recommended strict rest elevation and follow-up after the MRI.  No Follow-up on file.    Adalid Moreno, APRN

## 2019-09-24 NOTE — TELEPHONE ENCOUNTER
----- Message from Brad Arnold MD sent at 9/18/2019  3:07 PM CDT -----  Call pt    Has soft tissue swelling of left ankle consistent with sprain of left ankle.  Moderate size spurs on bottom of feet.      Continue with Orthopedic Referral Recheck on next visit

## 2019-09-25 PROCEDURE — 82043 UR ALBUMIN QUANTITATIVE: CPT

## 2019-09-25 PROCEDURE — 82570 ASSAY OF URINE CREATININE: CPT

## 2019-09-26 LAB
ALBUMIN UR-MCNC: <1.2 MG/DL
CREAT UR-MCNC: 179.3 MG/DL
MICROALBUMIN/CREAT UR: NORMAL MG/G{CREAT}

## 2019-09-27 ENCOUNTER — TELEPHONE (OUTPATIENT)
Dept: FAMILY MEDICINE CLINIC | Facility: CLINIC | Age: 54
End: 2019-09-27

## 2019-09-30 DIAGNOSIS — Z12.11 SPECIAL SCREENING FOR MALIGNANT NEOPLASMS, COLON: ICD-10-CM

## 2019-10-01 DIAGNOSIS — M25.572 LEFT ANKLE PAIN, UNSPECIFIED CHRONICITY: ICD-10-CM

## 2019-10-02 ENCOUNTER — OFFICE VISIT (OUTPATIENT)
Dept: FAMILY MEDICINE CLINIC | Facility: CLINIC | Age: 54
End: 2019-10-02

## 2019-10-02 VITALS
WEIGHT: 192.5 LBS | OXYGEN SATURATION: 98 % | TEMPERATURE: 97.8 F | SYSTOLIC BLOOD PRESSURE: 148 MMHG | DIASTOLIC BLOOD PRESSURE: 94 MMHG | HEIGHT: 60 IN | RESPIRATION RATE: 18 BRPM | BODY MASS INDEX: 37.79 KG/M2 | HEART RATE: 92 BPM

## 2019-10-02 DIAGNOSIS — Z01.00 ENCOUNTER FOR EYE EXAM: ICD-10-CM

## 2019-10-02 DIAGNOSIS — L98.9 SKIN LESION: Primary | ICD-10-CM

## 2019-10-02 DIAGNOSIS — E03.9 HYPOTHYROIDISM, UNSPECIFIED TYPE: ICD-10-CM

## 2019-10-02 DIAGNOSIS — E55.9 VITAMIN D DEFICIENCY: ICD-10-CM

## 2019-10-02 DIAGNOSIS — E83.51 HYPOCALCEMIA: ICD-10-CM

## 2019-10-02 DIAGNOSIS — F43.21 GRIEVING: ICD-10-CM

## 2019-10-02 DIAGNOSIS — D50.9 IRON DEFICIENCY ANEMIA, UNSPECIFIED IRON DEFICIENCY ANEMIA TYPE: ICD-10-CM

## 2019-10-02 DIAGNOSIS — E66.9 OBESITY, UNSPECIFIED CLASSIFICATION, UNSPECIFIED OBESITY TYPE, UNSPECIFIED WHETHER SERIOUS COMORBIDITY PRESENT: ICD-10-CM

## 2019-10-02 DIAGNOSIS — E89.0 S/P THYROIDECTOMY: ICD-10-CM

## 2019-10-02 DIAGNOSIS — I10 ESSENTIAL HYPERTENSION: ICD-10-CM

## 2019-10-02 DIAGNOSIS — E78.5 HYPERLIPIDEMIA, UNSPECIFIED HYPERLIPIDEMIA TYPE: ICD-10-CM

## 2019-10-02 DIAGNOSIS — IMO0002 UNCONTROLLED TYPE 2 DIABETES MELLITUS WITH COMPLICATION, WITHOUT LONG-TERM CURRENT USE OF INSULIN: ICD-10-CM

## 2019-10-02 PROCEDURE — 99214 OFFICE O/P EST MOD 30 MIN: CPT | Performed by: FAMILY MEDICINE

## 2019-10-02 RX ORDER — PHENOL 1.4 %
600 AEROSOL, SPRAY (ML) MUCOUS MEMBRANE DAILY
Qty: 60 TABLET | Refills: 3 | Status: SHIPPED | OUTPATIENT
Start: 2019-10-02 | End: 2019-12-13 | Stop reason: SDUPTHER

## 2019-10-02 RX ORDER — METOPROLOL SUCCINATE 100 MG/1
100 TABLET, EXTENDED RELEASE ORAL DAILY
Qty: 90 TABLET | Refills: 3 | Status: SHIPPED | OUTPATIENT
Start: 2019-10-02 | End: 2019-12-13 | Stop reason: SDUPTHER

## 2019-10-02 NOTE — PATIENT INSTRUCTIONS
New medications    jardiance 25 mg daily for sugars drink wth a lot of water    toprol XL go up from 50 to 100 mg daily.  Bring blood pressure readings next visit    followup with Orthopedic    Will refer to Dermatology    Recheck in 4-6 weeks Empagliflozin oral tablets  What is this medicine?  EMPAGLIFLOZIN (EM pa gli FLOE zin) helps to treat type 2 diabetes. It helps to control blood sugar. This drug may also reduce the risk of heart attack or stroke if you have type 2 diabetes and risk factors for heart disease. Treatment is combined with diet and exercise.  This medicine may be used for other purposes; ask your health care provider or pharmacist if you have questions.  COMMON BRAND NAME(S): JARDIANCE  What should I tell my health care provider before I take this medicine?  They need to know if you have any of these conditions:  -dehydration  -diabetic ketoacidosis  -diet low in salt  -eating less due to illness, surgery, dieting, or any other reason  -having surgery  -high cholesterol  -high levels of potassium in the blood  -history of pancreatitis or pancreas problems  -history of yeast infection of the penis or vagina  -if you often drink alcohol  -infections in the bladder, kidneys, or urinary tract  -kidney disease  -liver disease  -low blood pressure  -on hemodialysis  -problems urinating  -type 1 diabetes  -uncircumcised male  -an unusual or allergic reaction to empagliflozin, other medicines, foods, dyes, or preservatives  -pregnant or trying to get pregnant  -breast-feeding  How should I use this medicine?  Take this medicine by mouth with a glass of water. Follow the directions on the prescription label. Take it in the morning, with or without food. Take your dose at the same time each day. Do not take more often than directed. Do not stop taking except on your doctor's advice.  Talk to your pediatrician regarding the use of this medicine in children. Special care may be needed.  Overdosage: If you  think you have taken too much of this medicine contact a poison control center or emergency room at once.  NOTE: This medicine is only for you. Do not share this medicine with others.  What if I miss a dose?  If you miss a dose, take it as soon as you can. If it is almost time for your next dose, take only that dose. Do not take double or extra doses.  What may interact with this medicine?  Do not take this medicine with any of the following medications:  -gatifloxacin  This medicine may also interact with the following medications:  -alcohol  -certain medicines for blood pressure, heart disease  -diuretics  This list may not describe all possible interactions. Give your health care provider a list of all the medicines, herbs, non-prescription drugs, or dietary supplements you use. Also tell them if you smoke, drink alcohol, or use illegal drugs. Some items may interact with your medicine.  What should I watch for while using this medicine?  Visit your doctor or health care professional for regular checks on your progress.  This medicine can cause a serious condition in which there is too much acid in the blood. If you develop nausea, vomiting, stomach pain, unusual tiredness, or breathing problems, stop taking this medicine and call your doctor right away. If possible, use a ketone dipstick to check for ketones in your urine.  A test called the HbA1C (A1C) will be monitored. This is a simple blood test. It measures your blood sugar control over the last 2 to 3 months. You will receive this test every 3 to 6 months.  Learn how to check your blood sugar. Learn the symptoms of low and high blood sugar and how to manage them.  Always carry a quick-source of sugar with you in case you have symptoms of low blood sugar. Examples include hard sugar candy or glucose tablets. Make sure others know that you can choke if you eat or drink when you develop serious symptoms of low blood sugar, such as seizures or unconsciousness.  They must get medical help at once.  Tell your doctor or health care professional if you have high blood sugar. You might need to change the dose of your medicine. If you are sick or exercising more than usual, you might need to change the dose of your medicine.  Do not skip meals. Ask your doctor or health care professional if you should avoid alcohol. Many nonprescription cough and cold products contain sugar or alcohol. These can affect blood sugar.  Wear a medical ID bracelet or chain, and carry a card that describes your disease and details of your medicine and dosage times.  What side effects may I notice from receiving this medicine?  Side effects that you should report to your doctor or health care professional as soon as possible:  -allergic reactions like skin rash, itching or hives, swelling of the face, lips, or tongue  -breathing problems  -dizziness  -feeling faint or lightheaded, falls  -muscle weakness  -nausea, vomiting, unusual stomach upset or pain  -penile discharge, itching, or pain in men  -signs and symptoms of a genital infection, such as fever; tenderness, redness, or swelling in the genitals or area from the genitals to the back of the rectum  -signs and symptoms of low blood sugar such as feeling anxious, confusion, dizziness, increased hunger, unusually weak or tired, sweating, shakiness, cold, irritable, headache, blurred vision, fast heartbeat, loss of consciousness  -signs and symptoms of a urinary tract infection, such as fever, chills, a burning feeling when urinating, blood in the urine, back pain  -trouble passing urine or change in the amount of urine, including an urgent need to urinate more often, in larger amounts, or at night  -unusual tiredness  -vaginal discharge, itching, or odor in women  Side effects that usually do not require medical attention (report to your doctor or health care professional if they continue or are bothersome):  -mild increase in  urination  -thirsty  This list may not describe all possible side effects. Call your doctor for medical advice about side effects. You may report side effects to FDA at 2-211-LJY-0030.  Where should I keep my medicine?  Keep out of the reach of children.  Store at room temperature between 20 and 25 degrees C (68 and 77 degrees F). Throw away any unused medicine after the expiration date.  NOTE: This sheet is a summary. It may not cover all possible information. If you have questions about this medicine, talk to your doctor, pharmacist, or health care provider.  © 2019 [employer] (2018-08-30 10:25:34)

## 2019-10-07 ENCOUNTER — TELEPHONE (OUTPATIENT)
Dept: FAMILY MEDICINE CLINIC | Facility: CLINIC | Age: 54
End: 2019-10-07

## 2019-10-07 DIAGNOSIS — R30.9 PAINFUL URINATION: Primary | ICD-10-CM

## 2019-10-07 NOTE — TELEPHONE ENCOUNTER
Please call pt    I highly recommend if pt is having a UTI she come to the clinic and go to lab and get a UA and urine culture.  From there I can prescribe an antibiotic. I will put in the orders for the urine studies. Thanks

## 2019-10-07 NOTE — PROGRESS NOTES
"Joan Blackwell is a 54 y.o. female returns for     Chief Complaint   Patient presents with   • Left Ankle - Follow-up, Pain   • Left Foot - Pain, Follow-up   • Results     mri done on 9/25/2019 @ [facility].       HISTORY OF PRESENT ILLNESS:  54-year-old -American female is in the office today using a cane and modified her weightbearing continued to complain of left foot and ankle pain after she fell through the floor last month.  She is obtained her MRI of the left foot and ankle and is also willing to report some back pain as well.     CONCURRENT MEDICAL HISTORY:    The following portions of the patient's history were reviewed and updated as appropriate: allergies, current medications, past family history, past medical history, past social history, past surgical history and problem list.     ROS  No fevers or chills.  No chest pain or shortness of air.  No GI or  disturbances.    PHYSICAL EXAMINATION:       Ht 152.4 cm (60\")   Wt 87.1 kg (192 lb)   BMI 37.50 kg/m²     Physical Exam   Constitutional: She is oriented to person, place, and time. Vital signs are normal. She appears well-developed and well-nourished. She is cooperative.   HENT:   Head: Normocephalic and atraumatic.   Neck: Trachea normal and phonation normal.   Pulmonary/Chest: Effort normal. No respiratory distress.   Abdominal: Soft. Normal appearance. She exhibits no distension.   Neurological: She is alert and oriented to person, place, and time. GCS eye subscore is 4. GCS verbal subscore is 5. GCS motor subscore is 6.   Skin: Skin is warm, dry and intact.   Psychiatric: She has a normal mood and affect. Her speech is normal and behavior is normal. Judgment and thought content normal. Cognition and memory are normal.   Vitals reviewed.      GAIT:     []  Normal  []  Antalgic    Assistive device: []  None  []  Walker     []  Crutches  [x]  Cane     []  Wheelchair  []  Stretcher    Right Ankle Exam   Right ankle exam is " normal.      Left Ankle Exam     Tenderness   The patient is experiencing tenderness in the lateral malleolus and medial malleolus (Diffuse throughout the foot).   Swelling: moderate    Range of Motion   Dorsiflexion: abnormal   Plantar flexion: abnormal   Eversion: abnormal   Inversion: abnormal     Muscle Strength   Left ankle normal muscle strength: Strength testing deferred.    Other   Erythema: absent  Scars: absent  Sensation: normal  Pulse: present              Xr Ankle 3+ View Left    Result Date: 9/18/2019  Narrative: Three view left ankle HISTORY: Left ankle pain. Left ankle sprain. AP, lateral and oblique views obtained. COMPARISON: None FINDINGS: Soft tissue swelling about the ankle. No fracture or dislocation. Moderate-sized calcaneal spurs. Small spur tip of the lateral malleolus. Very small talar beak or spur. No other osseous or articular abnormality.     Impression: CONCLUSION: Soft tissue swelling about the ankle. No fracture or dislocation. Moderate-sized calcaneal spurs. Small spur tip of the lateral malleolus. Very small talar beak or spur. [id number] Electronically signed by:  Ham Reynoso MD  9/18/2019 2:42 PM CDT Workstation: [email or web address]            ASSESSMENT:    Diagnoses and all orders for this visit:    Left ankle pain, unspecified chronicity  -     Ambulatory Referral to Physical Therapy Evaluate and treat    Fall through floor, subsequent encounter  -     Ambulatory Referral to Physical Therapy Evaluate and treat          PLAN  Reviewed the MRI results with the patient which showed no surgical findings.  Will recommend continued progression range of motion activity as tolerated based on pain, course of physical therapy and follow-up in 6 to 8 weeks for recheck.  The patient may also need to make an appointment in the future for lumbar spine pain if it continues  No Follow-up on file.    Adalid Moreno, TAVO

## 2019-10-07 NOTE — TELEPHONE ENCOUNTER
Patient left voice message stating that she was told if she had symptoms of a UTI you would send in something for her.

## 2019-10-08 ENCOUNTER — OFFICE VISIT (OUTPATIENT)
Dept: ORTHOPEDIC SURGERY | Facility: CLINIC | Age: 54
End: 2019-10-08

## 2019-10-08 VITALS — HEIGHT: 60 IN | WEIGHT: 192 LBS | BODY MASS INDEX: 37.69 KG/M2
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
## 2019-10-08 DIAGNOSIS — M25.572 LEFT ANKLE PAIN, UNSPECIFIED CHRONICITY: Primary | ICD-10-CM

## 2019-10-08 PROCEDURE — 99214 OFFICE O/P EST MOD 30 MIN: CPT | Performed by: NURSE PRACTITIONER

## 2019-10-09 ENCOUNTER — TREATMENT (OUTPATIENT)
Dept: PHYSICAL THERAPY | Facility: CLINIC | Age: 54
End: 2019-10-09

## 2019-10-09 ENCOUNTER — LAB (OUTPATIENT)
Dept: LAB | Facility: HOSPITAL | Age: 54
End: 2019-10-09

## 2019-10-09 DIAGNOSIS — S93.402S SPRAIN OF LEFT ANKLE, UNSPECIFIED LIGAMENT, SEQUELA: ICD-10-CM

## 2019-10-09 DIAGNOSIS — M25.572 LEFT ANKLE PAIN, UNSPECIFIED CHRONICITY: Primary | ICD-10-CM
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
## 2019-10-09 DIAGNOSIS — R30.9 PAINFUL URINATION: ICD-10-CM

## 2019-10-09 LAB
BACTERIA UR QL AUTO: ABNORMAL /HPF
BILIRUB UR QL STRIP: NEGATIVE
CLARITY UR: CLEAR
COLOR UR: YELLOW
GLUCOSE UR STRIP-MCNC: ABNORMAL MG/DL
HGB UR QL STRIP.AUTO: ABNORMAL
HYALINE CASTS UR QL AUTO: ABNORMAL /LPF
KETONES UR QL STRIP: NEGATIVE
LEUKOCYTE ESTERASE UR QL STRIP.AUTO: NEGATIVE
NITRITE UR QL STRIP: NEGATIVE
PH UR STRIP.AUTO: 7.5 [PH] (ref 5–8)
PROT UR QL STRIP: ABNORMAL
RBC # UR: ABNORMAL /HPF
REF LAB TEST METHOD: ABNORMAL
SP GR UR STRIP: 1 (ref 1–1.03)
SQUAMOUS #/AREA URNS HPF: ABNORMAL /HPF
UROBILINOGEN UR QL STRIP: ABNORMAL
WBC UR QL AUTO: ABNORMAL /HPF

## 2019-10-09 PROCEDURE — 81015 MICROSCOPIC EXAM OF URINE: CPT

## 2019-10-09 PROCEDURE — 97110 THERAPEUTIC EXERCISES: CPT | Performed by: PHYSICAL THERAPIST

## 2019-10-09 PROCEDURE — 81003 URINALYSIS AUTO W/O SCOPE: CPT

## 2019-10-09 PROCEDURE — 87086 URINE CULTURE/COLONY COUNT: CPT

## 2019-10-09 PROCEDURE — 97162 PT EVAL MOD COMPLEX 30 MIN: CPT | Performed by: PHYSICAL THERAPIST

## 2019-10-09 NOTE — PROGRESS NOTES
Physical Therapy Initial Evaluation and Plan of Care      Patient: Joan Blackwell   : 1965  Diagnosis/ICD-10 Code:  Left ankle pain, unspecified chronicity [M25.572]  Referring practitioner: TAVO Nunez  Date of Initial Visit: 10/9/2019  Today's Date: 10/9/2019  Patient seen for 1 sessions    Next MD appt: PRN  Recertification: 10/30/2019           Subjective Questionnaire: LEFS:       Subjective Evaluation    History of Present Illness  Date of onset: 2019  Mechanism of injury: Patient reports she fell through her porch through a rotted out board. She reports no previous issues.      Patient Occupation: Unemployed Quality of life: good    Pain  Current pain rating: 10 (No distress noted and defers ambulance when offered.)  At best pain ratin  At worst pain rating: 10  Location: L ankle  Quality: dull ache  Relieving factors: rest and ice  Aggravating factors: ambulation, stairs, standing and squatting  Progression: no change    Social Support  Lives in: one-story house  Lives with: spouse    Diagnostic Tests  X-ray: normal    Treatments  No previous or current treatments  Patient Goals  Patient goals for therapy: decreased pain             Objective       Observations   Left Ankle/Foot   Positive for edema.     Palpation   Left   No palpable tenderness to the lateral gastrocnemius, medial gastrocnemius, posterior tibialis and soleus.   Tenderness of the anterior tibialis.     Tenderness   Left Ankle/Foot   Tenderness in the anterior ankle, dorsum foot, lateral malleolus, medial malleolus and talar dome.     Neurological Testing     Sensation     Ankle/Foot   Left Ankle/Foot   Intact: light touch and hot/cold discrimination    Right Ankle/Foot   Intact: light touch and hot/cold discrimination     Active Range of Motion   Left Ankle/Foot   Dorsiflexion (kf): 0 degrees   Plantar flexion: 54 degrees   Inversion: 24 degrees   Eversion: 8 degrees     Right Ankle/Foot    Dorsiflexion (kf): 4 degrees   Plantar flexion: 62 degrees   Inversion: 36 degrees   Eversion: 22 degrees     Joint Play   Left Ankle/Foot  Joints within functional limits are the fibular head, distal tibiofibular joint, midfoot and forefoot. Hypomobile in the talocrural joint and subtalar joint.     Strength/Myotome Testing     Right Ankle/Foot   Normal strength  Dorsiflexion: 5  Plantar flexion: 5  Inversion: 5  Eversion: 5    Additional Strength Details  No tolerance to MMT in L ankle, moans and groans verbally, but no visual distress and does not resist back.    Tests   Left Ankle/Foot   Negative for anterior drawer, calcaneal squeeze, eversion talar tilt, Homans' sign, metatarsal squeeze, percussion, posterior drawer, valgus tilt and varus tilt.     Right Ankle/Foot   Negative for anterior drawer, eversion talar tilt and posterior drawer.     Swelling   Left Ankle/Foot   Figure 8: 53.2 cm    Right Ankle/Foot   Figure 8: 52.6 cm    Ambulation   Weight-Bearing Status   Weight-Bearing Status (Left): weight-bearing as tolerated   Weight-Bearing Status (Right): weight-bearing as tolerated    Assistive device used: single point cane    Additional Weight-Bearing Status Details  Reports used no assistive device prior to injury.    Ambulation: Level Surfaces   Ambulation with assistive device: independent    Observational Gait   Gait: within functional limits   Decreased walking speed and stride length.          Assessment & Plan     Assessment  Impairments: abnormal gait, abnormal or restricted ROM, activity intolerance, impaired balance, impaired physical strength, lacks appropriate home exercise program, pain with function and weight-bearing intolerance  Assessment details: Patient is s/p injury to the L ankle. Patient reports pain 10/10 but appears in no distress and denies an ambulance when offered. She is self limiting with AROM exercises as compared to AROM measurements. Poor effort was given with HEP exercises  today. Patient also reported using hot foot spa at home and not ice. Patient ws issued written HEP.   Prognosis: fair  Prognosis details: Barriers to Rehab: Include significant or possible arthritic/degenerative changes that have occurred within the joints, The chronicity of this issue, The patient's obesity, The patient's generally deconditioned state, Possible poor/questionable compliance with HEP, Possible poor overall attendance/compliance, Possible monetary/secondary gain.    Safety Issues: None noted.    Functional Limitations: walking, uncomfortable because of pain and standing  Goals  Plan Goals: Short Term Goals:  1) I with HEP and have additions/changes by next re-certification.    2) Patient able to ambulate >= 600' non-antalgicly with no assistive device and no significant gait deviation.    3) AROM B ankle DF >= 10°.    4) AROM L ankle inv >= 30°.    5) AROM L ankle erv >= 20°.    6) Patient able to perform 20 sit to/from stand with 1 UE A, = WB B LEs.    7) patient able to ambulate up/down 3 steps reciprocally with 1 HRA x10, no increase in pain.    Long Term goals  1) AROM for the L ankle all WNL, no increase in pain.    2) B ankle 5/5.    3) Patient able to SLS each LE for 20 seconds EO on level surface.    4) Patient able to SLS each LE for 10 seconds EC on level surface.    5) Patient to have a LEFS score of >= 35/80.    6) Patient able to ambulate with no assistive device >= 900'.    7) I with land final HEP.    8) D/C with a final HEP and free 30 day fitness formula membership.    Plan  Therapy options: will be seen for skilled physical therapy services  Planned modality interventions: cryotherapy and high voltage pulsed current (pain management)  Planned therapy interventions: balance/weight-bearing training, flexibility, functional ROM exercises, gait training, home exercise program, IADL retraining, joint mobilization, manual therapy, neuromuscular re-education, soft tissue mobilization,  strengthening, stretching, therapeutic activities and transfer training  Duration in visits: 20  Treatment plan discussed with: patient  Plan details: Progress overall strength, ROM, gait, balance/proprioception, and return to functional activities.    Other therapeutic activities and/or exercises will be prescribed depending on the patients progress or lack there of.    Visit Diagnoses:    ICD-10-CM ICD-9-CM   1. Left ankle pain, unspecified chronicity M25.572 719.47   2. Sprain of left ankle, unspecified ligament, sequela S93.402S 905.7   3. Fall through floor, subsequent encounter W13.3XXD V58.89     E882       Timed:  Manual Therapy:         mins  35133;  Therapeutic Exercise:    12     mins  20052;     Neuromuscular [place]:        mins  14244;    Therapeutic Activity:          mins  64810;     Gait Training:           mins  15999;     Ultrasound:          mins  98871;    Electrical Stimulation:         mins  52514 ( );    Untimed:  Electrical Stimulation:         mins  85266 ( );  Mechanical Traction:         mins  30780;     Timed Treatment:   12   mins   Total Treatment:     40   mins    PT SIGNATURE: Neelima Hedrick PT DPT, [facility]   DATE TREATMENT INITIATED: 10/9/2019    Initial Certification  Certification Period: 1/7/2020  I certify that the therapy services are furnished while this patient is under my care.  The services outlined above are required by this patient, and will be reviewed every 90 days.     PHYSICIAN: Adalid Moreno, APRN      DATE:     Please sign and return via fax to  .. Thank you, [facility].

## 2019-10-10 ENCOUNTER — TREATMENT (OUTPATIENT)
Dept: PHYSICAL THERAPY | Facility: CLINIC | Age: 54
End: 2019-10-10
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
## 2019-10-10 DIAGNOSIS — M25.572 LEFT ANKLE PAIN, UNSPECIFIED CHRONICITY: Primary | ICD-10-CM

## 2019-10-10 LAB — BACTERIA SPEC AEROBE CULT: NO GROWTH

## 2019-10-10 PROCEDURE — 97110 THERAPEUTIC EXERCISES: CPT | Performed by: PHYSICAL THERAPIST

## 2019-10-10 RX ORDER — SULFAMETHOXAZOLE AND TRIMETHOPRIM 800; 160 MG/1; MG/1
1 TABLET ORAL 2 TIMES DAILY
Qty: 20 TABLET | Refills: 0 | Status: SHIPPED | OUTPATIENT
Start: 2019-10-10 | End: 2019-10-20

## 2019-10-10 RX ORDER — FLUCONAZOLE 150 MG/1
TABLET ORAL
Qty: 2 TABLET | Refills: 0 | Status: SHIPPED | OUTPATIENT
Start: 2019-10-10 | End: 2019-12-13 | Stop reason: SDUPTHER

## 2019-10-10 NOTE — PROGRESS NOTES
Physical Therapy Daily Progress Note      Patient: Joan Blackwell   : 1965  Referring practitioner: TAVO Nunez  Date of Initial Visit: Type: THERAPY  Noted: 10/9/2019  Today's Date: 10/10/2019  Patient seen for 2 sessions    Next MD appt: PRN .  Recertification: 10/30/2019             Subjective Evaluation    History of Present Illness    Subjective comment: Pt reports that her pain remains the same. 10/10Pain  Current pain rating: 10    Treatments  Current treatment: physical therapy         Objective   See Exercise, Manual, and Modality Logs for complete treatment.       Assessment & Plan     Assessment  Assessment details: Pt working toward ROM goals. Pt tolerated new ex.of alphabet for ROm, pt also worked on towel scrunches for strengthening. PTA added to pts hep with pictures and written instructions. Pt very guarded with left ankle. Pt with mild swelling. Pt used cane with small antalgic gait noted.    Goals  Plan Goals: Plan Goals: Short Term Goals:  1) I with HEP and have additions/changes by next re-certification.    2) Patient able to ambulate >= 600' non-antalgicly with no assistive device and no significant gait deviation.    3) AROM B ankle DF >= 10°.    4) AROM L ankle inv >= 30°.    5) AROM L ankle erv >= 20°.    6) Patient able to perform 20 sit to/from stand with 1 UE A, = WB B LEs.    7) patient able to ambulate up/down 3 steps reciprocally with 1 HRA x10, no increase in pain.    Long Term goals  1) AROM for the L ankle all WNL, no increase in pain.    2) B ankle 5/5.    3) Patient able to SLS each LE for 20 seconds EO on level surface.    4) Patient able to SLS each LE for 10 seconds EC on level surface.    5) Patient to have a LEFS score of >= 35/80.    6) Patient able to ambulate with no assistive device >= 900'.    7) I with land final HEP.    8) D/C with a final HEP and free 30 day fitness formula membership.         Visit Diagnoses:    ICD-10-CM ICD-9-CM   1.  Left ankle pain, unspecified chronicity M25.572 719.47   2. Fall through floor, subsequent encounter W13.3XXD V58.89     E882       Progress per Plan of Care and Progress strengthening /stabilization /functional activity           Timed:  Manual Therapy:         mins  98000;  Therapeutic Exercise:   43      mins  15931;     Neuromuscular [place]:        mins  96512;    Therapeutic Activity:          mins  14951;     Gait Training:           mins  54227;     Ultrasound:          mins  27374;    Electrical Stimulation:         mins  41018 ( );    Untimed:  Electrical Stimulation:         mins  25225 ( );  Mechanical Traction:         mins  71800;     Timed Treatment:   43   mins   Total Treatment:    58   mins  Jackie Lincoln PTA  Physical Therapist Assistant

## 2019-10-11 ENCOUNTER — TELEPHONE (OUTPATIENT)
Dept: FAMILY MEDICINE CLINIC | Facility: CLINIC | Age: 54
End: 2019-10-11

## 2019-10-11 NOTE — TELEPHONE ENCOUNTER
----- Message from Brad Arnold MD sent at 10/10/2019 12:59 PM CDT -----  Urine culture negative sent bactrim abx to pharmacy for acute cystitis.    Pt has UTI.  Will send in Bactrim -160 mg every 12 hours for 10 days to pharmacy for a total of 20 pills along with diflucan 150 mg and in 72 hours in event pt develops yeast infection. A  Will update pt once Urine culture sensitivity comes back. Thanks

## 2019-10-23 ENCOUNTER — TREATMENT (OUTPATIENT)
Dept: PHYSICAL THERAPY | Facility: CLINIC | Age: 54
End: 2019-10-23

## 2019-10-23 DIAGNOSIS — M25.572 LEFT ANKLE PAIN, UNSPECIFIED CHRONICITY: Primary | ICD-10-CM

## 2019-10-23 DIAGNOSIS — S93.402S SPRAIN OF LEFT ANKLE, UNSPECIFIED LIGAMENT, SEQUELA: ICD-10-CM
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
## 2019-10-23 PROCEDURE — 97110 THERAPEUTIC EXERCISES: CPT | Performed by: PHYSICAL THERAPIST

## 2019-10-23 NOTE — PROGRESS NOTES
Physical Therapy Daily Progress Note      Patient: Joan Tijerina Walker   : 1965  Referring practitioner: TAVO Nunez  Date of Initial Visit: Type: THERAPY  Noted: 10/9/2019  Today's Date: 10/23/2019  Patient seen for 3 sessions    Next MD appt:PRN .  Recertification: 10/30/2019             Subjective Evaluation    History of Present Illness    Subjective comment:  pt reported that she is doing some of HEP in soaking her foot. Pt denied the use of ice to help decrease pain.Pain  Current pain ratin  Location: left knee and ankle    Treatments  Current treatment: physical therapy         Objective       Ambulation     Ambulation: Level Surfaces   Ambulation with assistive device: independent    Additional Level Surfaces Ambulation Details  Pt I with gait using cane without antalgic gait. Pt ambulated 150 x2 I.  Pt would leave cane in between activities during therapy without difficulty walking or limping noted for 10 feet.     See Exercise, Manual, and Modality Logs for complete treatment.       Assessment & Plan     Assessment  Assessment details: Pt leaving cane in between activities without limping or muscle guarding noted. Pt ambulated into clinic with a smooth gait using cane. Pt still c/o's pain 9/10 but pt laughs throughout treatment. Pt declines ice after treatment. Pt did not c/o during treatment with activities. No swelling noted today. Pt tolerated standing heelraises and toe raises. PTA added above ex to pts HEP. Pt given copies of pictures with written instructions. Pt verbalized and demonstrated understanding of instructions. Pt working toward goals. Pt met short term goal #6 sit to stand goal without UE support.    Goals  Plan Goals: Plan Goals: Plan Goals: Short Term Goals:  1) I with HEP and have additions/changes by next re-certification.(ongoing/progressing)    2) Patient able to ambulate >= 600' non-antalgicly with no assistive device and no significant gait  deviation. (ongoing/progressing)    3) AROM B ankle DF >= 10°.( ongoing/progressing)    4) AROM L ankle inv >= 30°.(ongoing/progressing)    5) AROM L ankle erv >= 20°.(ongoing/progressing)    6) Patient able to perform 20 sit to/from stand with 1 UE A, = WB B LEs.(met) Pt performed task without support.    7) patient able to ambulate up/down 3 steps reciprocally with 1 HRA x10, no increase in pain.    Long Term goals  1) AROM for the L ankle all WNL, no increase in pain.    2) B ankle 5/5.    3) Patient able to SLS each LE for 20 seconds EO on level surface.    4) Patient able to SLS each LE for 10 seconds EC on level surface.    5) Patient to have a LEFS score of >= 35/80.    6) Patient able to ambulate with no assistive device >= 900'.    7) I with land final HEP.    8) D/C with a final HEP and free 30 day fitness formula membership.        Plan  Plan details: Measure and start steps up fwd/lat, alternating. Gait training without cane.        Visit Diagnoses:    ICD-10-CM ICD-9-CM   1. Left ankle pain, unspecified chronicity M25.572 719.47   2. Fall through floor, subsequent encounter W13.3XXD V58.89     E882   3. Sprain of left ankle, unspecified ligament, sequela S93.402S 905.7       Progress per Plan of Care and Progress strengthening /stabilization /functional activity           Timed:  Manual Therapy:         mins  22543;  Therapeutic Exercise:   50      mins  99905;     Neuromuscular [place]:        mins  95604;    Therapeutic Activity:          mins  39823;     Gait Training:           mins  64166;     Ultrasound:          mins  31886;    Electrical Stimulation:         mins  85876 ( );    Untimed:  Electrical Stimulation:         mins  65139 ( );  Mechanical Traction:         mins  89019;     Timed Treatment:  50    mins   Total Treatment:        mins  Jackie Lincoln PTA  Physical Therapist Assistant

## 2019-10-25 ENCOUNTER — TREATMENT (OUTPATIENT)
Dept: PHYSICAL THERAPY | Facility: CLINIC | Age: 54
End: 2019-10-25
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
## 2019-10-25 DIAGNOSIS — S93.402S SPRAIN OF LEFT ANKLE, UNSPECIFIED LIGAMENT, SEQUELA: ICD-10-CM

## 2019-10-25 DIAGNOSIS — M25.572 LEFT ANKLE PAIN, UNSPECIFIED CHRONICITY: Primary | ICD-10-CM

## 2019-10-25 PROCEDURE — 97110 THERAPEUTIC EXERCISES: CPT | Performed by: PHYSICAL THERAPIST

## 2019-10-25 NOTE — PROGRESS NOTES
"Physical Therapy Daily Progress Note    Patient: Joan Blackwell   : 1965  Diagnosis/ICD-10 Code:     Diagnosis Plan   1. Left ankle pain, unspecified chronicity     2. Fall through floor, subsequent encounter     3. Sprain of left ankle, unspecified ligament, sequela       Referring practitioner: TAVO Nunez  Date of Initial Visit: Type: THERAPY  Noted: 10/9/2019  Today's Date: 10/25/2019  Patient seen for 4 sessions      PT Recheck Due: 10-  PT MD Visit: KARYN       Joan Rishi           Subjective Evaluation    History of Present Illness    Subjective comment: pt reports that she has some pain today. pt reports that she is still wearing her boot at times. states that she has a splint that she wears occasionally as well. uses a warm foot spa for her foot. does not use ice at home. reports she has not taken pain medication today secondary to having not eaten yet despite it being after 11:00. Pain  Current pain ratin             Objective   See Exercise, Manual, and Modality Logs for complete treatment.       Assessment & Plan     Assessment  Assessment details: Pt presents wearing slide on shoes. Reporting pain. Patient is instructed that she should not be wearing her boot at this time anymore. Patient is also instructed that she needs to be wearing more supportive shoes vs slide on shoes as they do not provide enough support around her ankle and foot. Patient does not seem very receptive to these instructions as she gives multiple reasoning why she wears the boot as well as the slide on shoes. Patient gives a lot of excuses why she can't complete activities today. Anytime patient is introduced to a new exercise, patient refuses and states that she \"can't do that, maybe next time\" without attempting or giving good effort and laughs as she refuses. Patient has poor compliance with use of ice as she reports that she does not use it at home and that instead she uses a warm foot spa " "because \"it has a lot of gadgets\". Difficult to progress patient today with new therex.     Goals  Plan Goals: Short Term Goals:  1) I with HEP and have additions/changes by next re-certification.(ongoing/progressing)    2) Patient able to ambulate >= 600' non-antalgicly with no assistive device and no significant gait deviation. (ongoing/progressing)    3) AROM B ankle DF >= 10°.( ongoing/progressing)    4) AROM L ankle inv >= 30°.(ongoing/progressing)    5) AROM L ankle erv >= 20°.(ongoing/progressing)    6) Patient able to perform 20 sit to/from stand with 1 UE A, = WB B LEs.(met) Pt performed task without support.    7) patient able to ambulate up/down 3 steps reciprocally with 1 HRA x10, no increase in pain.    Long Term goals  1) AROM for the L ankle all WNL, no increase in pain.    2) B ankle 5/5.    3) Patient able to SLS each LE for 20 seconds EO on level surface.    4) Patient able to SLS each LE for 10 seconds EC on level surface.    5) Patient to have a LEFS score of >= 35/80.    6) Patient able to ambulate with no assistive device >= 900'.    7) I with land final HEP.    8) D/C with a final HEP and free 30 day fitness formula membership.     Plan  Plan details: Recheck next visit.       Progress strengthening /stabilization /functional activity            Timed:  Manual Therapy:         mins  96675;  Therapeutic Exercise:    35     mins  51100;   Aquatic [place] :        mins  01060    Neuromuscular [place]:        mins  55953;    Therapeutic Activity:          mins  63419;     Gait Training:           mins  36921;     Ultrasound:          mins  24452;    Electrical Stimulation:         mins  11055 ( );    Untimed:  Electrical Stimulation:         mins  37065 ( );  Mechanical Traction:         mins  50324;     Timed Treatment:      mins   Total Treatment:        mins  Juliette Olivo PTA  Physical Therapist Assistant  "

## 2019-10-28 ENCOUNTER — TREATMENT (OUTPATIENT)
Dept: PHYSICAL THERAPY | Facility: CLINIC | Age: 54
End: 2019-10-28
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
## 2019-10-28 DIAGNOSIS — S93.402S SPRAIN OF LEFT ANKLE, UNSPECIFIED LIGAMENT, SEQUELA: ICD-10-CM

## 2019-10-28 DIAGNOSIS — M25.572 LEFT ANKLE PAIN, UNSPECIFIED CHRONICITY: Primary | ICD-10-CM

## 2019-10-28 PROCEDURE — 97110 THERAPEUTIC EXERCISES: CPT | Performed by: PHYSICAL THERAPIST

## 2019-10-28 NOTE — PROGRESS NOTES
"Re-Assessment / Re-Certification    Patient: Joan Blackwell   : 1965  Diagnosis/ICD-10 Code:  Left ankle pain, unspecified chronicity [M25.572]  Referring practitioner: TAVO Nunez  Date of Initial Visit: Type: THERAPY  Noted: 10/9/2019  Today's Date: 10/28/2019  Patient seen for 5 sessions          Subjective:   Joan Blackwell reports: \"a little\"  Subjective Questionnaire: LEFS:   Clinical Progress: improved  Home Program Compliance: Yes  Treatment has included: therapeutic exercise, therapeutic activity and gait training    Subjective   Objective       Observations   Left Ankle/Foot   Negative for edema.     Right Ankle/Foot   Negative for edema.     Neurological Testing     Sensation     Ankle/Foot   Left Ankle/Foot   Intact: light touch    Right Ankle/Foot   Intact: light touch     Active Range of Motion   Left Ankle/Foot   Plantar flexion: 56 degrees   Inversion: 26 degrees   Eversion: 16 degrees     Right Ankle/Foot   Dorsiflexion (kf): 2 degrees     Additional Active Range of Motion Details  AROm L ankle DF -6°.    Strength/Myotome Testing     Left Ankle/Foot   Dorsiflexion: 4+  Plantar flexion: 4  Inversion: 4+  Eversion: 4    Right Ankle/Foot   Normal strength    Tests   Left Ankle/Foot   Negative for anterior drawer, calcaneal squeeze, eversion talar tilt, inversion talar tilt and posterior drawer.     Ambulation   Weight-Bearing Status   Weight-Bearing Status (Left): weight-bearing as tolerated   Weight-Bearing Status (Right): weight-bearing as tolerated    Assistive device used: none    Ambulation: Level Surfaces   Ambulation without assistive device: independent    Ambulation: Stairs   Ascend stairs: independent  Pattern: reciprocal  Railings: one rail  Descend stairs: independent  Pattern: reciprocal  Railings: one rail    Observational Gait   Gait: within functional limits   Walking speed, stride length, left stance time, right stance time, left swing time, right swing " time, left step length and right step length within functional limits.   Left foot contact pattern: heel to toe  Right foot contact pattern: heel to toe  Left arm swing: within functional limits  Right arm swing: within functional limits    Quality of Movement During Gait   Trunk  Trunk within functional limits.     Pelvis  Pelvis within functional limits.     Hip  Left hip within functional limits and right hip within functional limits.     Knee  Left knee within functional limits and right knee within functional limits.     Foot Alignment  Left foot within functional limits and right foot within functional limits.      Assessment & Plan     Assessment  Impairments: abnormal gait, abnormal or restricted ROM, activity intolerance, impaired balance, impaired physical strength, lacks appropriate home exercise program, pain with function and weight-bearing intolerance  Assessment details: Self limiting with AROM as compared to function and observation. No difficulty with ambulation today. Patient had to leave early today for a .  Prognosis: fair  Prognosis details: Barriers to Rehab: Include significant or possible arthritic/degenerative changes that have occurred within the joints, The chronicity of this issue, The patient's obesity, The patient's generally deconditioned state, Possible poor/questionable compliance with HEP, Possible poor overall attendance/compliance, Possible monetary/secondary gain.    Safety Issues: None noted.    Functional Limitations: walking, uncomfortable because of pain and standing  Goals  Plan Goals: Short Term Goals:  1) I with HEP and have additions/changes by next re-certification.(ongoing/progressing/not met)    2) Patient able to ambulate >= 600' non-antalgicly with no assistive device and no significant gait deviation. (met)    3) AROM B ankle DF >= 10°.( ongoing/progressing/not met)    4) AROM L ankle inv >= 30°.(ongoing/progressing/not met)    5) AROM L ankle erv >=  20°.(ongoing/progressing/not met)    6) Patient able to perform 20 sit to/from stand with 1 UE A, = WB B LEs.(met) Pt performed task without support.    7) patient able to ambulate up/down 3 steps reciprocally with 1 HRA x10, no increase in pain. (met)    Long Term goals  1) AROM for the L ankle all WNL, no increase in pain. (not met)    2) B ankle 5/5. (not met)    3) Patient able to SLS each LE for 20 seconds EO on level surface. (ongoing)    4) Patient able to SLS each LE for 10 seconds EC on level surface. (ongoing)    5) Patient to have a LEFS score of >= 35/80. (ongoing)    6) Patient able to ambulate with no assistive device >= 900'. (met)    7) I with land final HEP. (ongoing)    8) D/C with a final HEP and free 30 day fitness formula membership. (ongoing)    Plan  Therapy options: will be seen for skilled physical therapy services  Planned modality interventions: cryotherapy and high voltage pulsed current (pain management)  Planned therapy interventions: balance/weight-bearing training, flexibility, functional ROM exercises, gait training, home exercise program, IADL retraining, joint mobilization, manual therapy, neuromuscular re-education, soft tissue mobilization, strengthening, stretching, therapeutic activities and transfer training  Duration in visits: 20  Treatment plan discussed with: patient  Plan details: Progress overall ROM, strength, balance. Once all goals are met, may D/C to an I program.    Other therapeutic activities and/or exercises will be prescribed depending on the patients progress or lack there of.    Visit Diagnoses:    ICD-10-CM ICD-9-CM   1. Left ankle pain, unspecified chronicity M25.572 719.47   2. Fall through floor, subsequent encounter W13.3XXD V58.89     E882   3. Sprain of left ankle, unspecified ligament, sequela S93.402S 905.7       Progress toward previous goals: Partially Met          Recommendations: Continue as planned    Prognosis to achieve goals: fair    PT  Signature: Neelima Hedrick, PT DPT, CSCS      Based upon review of the patient's progress and continued therapy plan, it is my medical opinion that Joan Blackwell should continue physical therapy treatment at [facility]  [address] [place] [address] [address] [place] [address] [phone].    Signature: __________________________________  Adalid Moreno APRN    Timed:  Manual Therapy:         mins  11061;  Therapeutic Exercise:    25     mins  16624;     Neuromuscular [place]:        mins  74949;    Therapeutic Activity:          mins  64575;     Gait Training:           mins  14107;     Ultrasound:          mins  50725;    Electrical Stimulation:         mins  26926 ( );    Untimed:  Electrical Stimulation:         mins  44356 ( );  Mechanical Traction:         mins  72358;     Timed Treatment:   25   mins   Total Treatment:     25   mins

## 2019-10-30 ENCOUNTER — TREATMENT (OUTPATIENT)
Dept: PHYSICAL THERAPY | Facility: CLINIC | Age: 54
End: 2019-10-30

## 2019-10-30 DIAGNOSIS — M25.572 LEFT ANKLE PAIN, UNSPECIFIED CHRONICITY: Primary | ICD-10-CM
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
## 2019-10-30 DIAGNOSIS — S93.402S SPRAIN OF LEFT ANKLE, UNSPECIFIED LIGAMENT, SEQUELA: ICD-10-CM

## 2019-10-30 PROCEDURE — 97110 THERAPEUTIC EXERCISES: CPT | Performed by: PHYSICAL THERAPIST

## 2019-11-01 ENCOUNTER — OFFICE VISIT (OUTPATIENT)
Dept: FAMILY MEDICINE CLINIC | Facility: CLINIC | Age: 54
End: 2019-11-01

## 2019-11-01 VITALS
BODY MASS INDEX: 37.22 KG/M2 | DIASTOLIC BLOOD PRESSURE: 90 MMHG | HEART RATE: 72 BPM | HEIGHT: 60 IN | OXYGEN SATURATION: 98 % | WEIGHT: 189.6 LBS | TEMPERATURE: 98.6 F | SYSTOLIC BLOOD PRESSURE: 142 MMHG

## 2019-11-01 DIAGNOSIS — N30.01 ACUTE CYSTITIS WITH HEMATURIA: ICD-10-CM

## 2019-11-01 DIAGNOSIS — R51.9 NONINTRACTABLE HEADACHE, UNSPECIFIED CHRONICITY PATTERN, UNSPECIFIED HEADACHE TYPE: ICD-10-CM

## 2019-11-01 DIAGNOSIS — F33.2 SEVERE EPISODE OF RECURRENT MAJOR DEPRESSIVE DISORDER, WITHOUT PSYCHOTIC FEATURES (HCC): Primary | ICD-10-CM

## 2019-11-01 DIAGNOSIS — I10 ESSENTIAL HYPERTENSION: ICD-10-CM

## 2019-11-01 DIAGNOSIS — IMO0002 UNCONTROLLED TYPE 2 DIABETES MELLITUS WITH COMPLICATION, WITHOUT LONG-TERM CURRENT USE OF INSULIN: ICD-10-CM

## 2019-11-01 DIAGNOSIS — D50.9 IRON DEFICIENCY ANEMIA, UNSPECIFIED IRON DEFICIENCY ANEMIA TYPE: ICD-10-CM

## 2019-11-01 DIAGNOSIS — F43.21 COMPLICATED GRIEVING: ICD-10-CM

## 2019-11-01 DIAGNOSIS — E55.9 VITAMIN D DEFICIENCY: ICD-10-CM

## 2019-11-01 DIAGNOSIS — E78.5 HYPERLIPIDEMIA, UNSPECIFIED HYPERLIPIDEMIA TYPE: ICD-10-CM

## 2019-11-01 DIAGNOSIS — E89.0 S/P THYROIDECTOMY: ICD-10-CM

## 2019-11-01 PROCEDURE — 99214 OFFICE O/P EST MOD 30 MIN: CPT | Performed by: FAMILY MEDICINE

## 2019-11-01 RX ORDER — ESCITALOPRAM OXALATE 20 MG/1
20 TABLET ORAL DAILY
Qty: 30 TABLET | Refills: 3 | Status: SHIPPED | OUTPATIENT
Start: 2019-11-01 | End: 2019-12-13 | Stop reason: SDUPTHER

## 2019-11-01 RX ORDER — AMLODIPINE BESYLATE 5 MG/1
5 TABLET ORAL DAILY
Qty: 30 TABLET | Refills: 3 | Status: SHIPPED | OUTPATIENT
Start: 2019-11-01 | End: 2019-12-13 | Stop reason: SDUPTHER

## 2019-11-01 RX ORDER — ATORVASTATIN CALCIUM 20 MG/1
20 TABLET, FILM COATED ORAL DAILY
Qty: 30 TABLET | Refills: 3 | Status: SHIPPED | OUTPATIENT
Start: 2019-11-01 | End: 2019-12-13 | Stop reason: SDUPTHER

## 2019-11-01 RX ORDER — HYDROCODONE BITARTRATE AND ACETAMINOPHEN 5; 325 MG/1; MG/1
1 TABLET ORAL EVERY 12 HOURS
Qty: 60 TABLET | Refills: 0
Start: 2019-11-01 | End: 2020-03-02 | Stop reason: DRUGHIGH

## 2019-11-01 NOTE — PATIENT INSTRUCTIONS
Stop simvastatin    Start on lipitor 20 mg at bedtime for cholesterol    Start on norvasc or amlodinpine 5 mg devi for blood presssure    Bring blood pressure readings next visit    Goal 120/80    Will refer to counseling    Go up on lexapro from 10 to 20 mg daily.      Amlodipine tablets  What is this medicine?  AMLODIPINE (am MATTHEW di peandra) is a calcium-channel blocker. It affects the amount of calcium found in your heart and muscle cells. This relaxes your blood vessels, which can reduce the amount of work the heart has to do. This medicine is used to lower high blood pressure. It is also used to prevent chest pain.  This medicine may be used for other purposes; ask your health care provider or pharmacist if you have questions.  COMMON BRAND NAME(S): Norvasc  What should I tell my health care provider before I take this medicine?  They need to know if you have any of these conditions:  -heart disease  -liver disease  -an unusual or allergic reaction to amlodipine, other medicines, foods, dyes, or preservatives  -pregnant or trying to get pregnant  -breast-feeding  How should I use this medicine?  Take this medicine by mouth with a glass of water. Follow the directions on the prescription label. You can take it with or without food. If it upsets your stomach, take it with food. Take your medicine at regular intervals. Do not take it more often than directed. Do not stop taking except on your doctor's advice.  Talk to your pediatrician regarding the use of this medicine in children. While this drug may be prescribed for children as young as 6 years for selected conditions, precautions do apply.  Patients over 65 years of age may have a stronger reaction and need a smaller dose.  Overdosage: If you think you have taken too much of this medicine contact a poison control center or emergency room at once.  NOTE: This medicine is only for you. Do not share this medicine with others.  What if I miss a dose?  If you miss a  dose, take it as soon as you can. If it is almost time for your next dose, take only that dose. Do not take double or extra doses.  What may interact with this medicine?  Do not take this medicine with any of the following medications:  -tranylcypromine  This medicine may also interact with the following medications:  -clarithromycin  -cyclosporine  -diltiazem  -itraconazole  -simvastatin  -tacrolimus  This list may not describe all possible interactions. Give your health care provider a list of all the medicines, herbs, non-prescription drugs, or dietary supplements you use. Also tell them if you smoke, drink alcohol, or use illegal drugs. Some items may interact with your medicine.  What should I watch for while using this medicine?  Visit your healthcare professional for regular checks on your progress. Check your blood pressure as directed. Ask your healthcare professional what your blood pressure should be and when you should contact him or her.  Do not treat yourself for coughs, colds, or pain while you are using this medicine without asking your healthcare professional for advice. Some medicines may increase your blood pressure.  You may get dizzy. Do not drive, use machinery, or do anything that needs mental alertness until you know how this medicine affects you. Do not stand or sit up quickly, especially if you are an older patient. This reduces the risk of dizzy or fainting spells. Avoid alcoholic drinks; they can make you dizzier.  What side effects may I notice from receiving this medicine?  Side effects that you should report to your doctor or health care professional as soon as possible:  -allergic reactions like skin rash, itching or hives; swelling of the face, lips, or tongue  -fast, irregular heartbeat  -signs and symptoms of low blood pressure like dizziness; feeling faint or lightheaded, falls; unusually weak or tired  -swelling of ankles, feet, hands  Side effects that usually do not require  medical attention (report these to your doctor or health care professional if they continue or are bothersome):  -dry mouth  -facial flushing  -headache  -stomach pain  -tiredness  This list may not describe all possible side effects. Call your doctor for medical advice about side effects. You may report side effects to FDA at 0-086-FDA-7812.  Where should I keep my medicine?  Keep out of the reach of children.  Store at room temperature between 59 and 86 degrees F (15 and 30 degrees C).  Throw away any unused medicine after the expiration date.  NOTE: This sheet is a summary. It may not cover all possible information. If you have questions about this medicine, talk to your doctor, pharmacist, or health care provider.  © 2019 [employer]/Gold Standard (2019-07-12 15:07:10)  Atorvastatin tablets  What is this medicine?  ATORVASTATIN (a TORE va sta tin) is known as a HMG-CoA reductase inhibitor or 'statin'. It lowers the level of cholesterol and triglycerides in the blood. This drug may also reduce the risk of heart attack, stroke, or other health problems in patients with risk factors for heart disease. Diet and lifestyle changes are often used with this drug.  This medicine may be used for other purposes; ask your health care provider or pharmacist if you have questions.  COMMON BRAND NAME(S): Lipitor  What should I tell my health care provider before I take this medicine?  They need to know if you have any of these conditions:  -diabetes  -if you often drink alcohol  -history of stroke  -kidney disease  -liver disease  -muscle aches or weakness  -thyroid disease  -an unusual or allergic reaction to atorvastatin, other medicines, foods, dyes, or preservatives  -pregnant or trying to get pregnant  -breast-feeding  How should I use this medicine?  Take this medicine by mouth with a glass of water. Follow the directions on the prescription label. You can take it with or without food. If it upsets your stomach, take it  with food. Do not take with grapefruit juice. Take your medicine at regular intervals. Do not take it more often than directed. Do not stop taking except on your doctor's advice.  Talk to your pediatrician regarding the use of this medicine in children. While this drug may be prescribed for children as young as 10 for selected conditions, precautions do apply.  Overdosage: If you think you have taken too much of this medicine contact a poison control center or emergency room at once.  NOTE: This medicine is only for you. Do not share this medicine with others.  What if I miss a dose?  If you miss a dose, take it as soon as you can. If your next dose is to be taken in less than 12 hours, then do not take the missed dose. Take the next dose at your regular time. Do not take double or extra doses.  What may interact with this medicine?  Do not take this medicine with any of the following medications:  -dasabuvir; ombitasvir; paritaprevir; ritonavir  -ombitasvir; paritaprevir; ritonavir  -posaconazole  -red yeast rice  This medicine may also interact with the following medications:  -alcohol  -birth control pills  -certain antibiotics like erythromycin and clarithromycin  -certain antivirals for HIV or hepatitis  -certain medicines for cholesterol like fenofibrate, gemfibrozil, and niacin  -certain medicines for fungal infections like ketoconazole and itraconazole  -colchicine  -cyclosporine  -digoxin  -grapefruit juice  -rifampin  This list may not describe all possible interactions. Give your health care provider a list of all the medicines, herbs, non-prescription drugs, or dietary supplements you use. Also tell them if you smoke, drink alcohol, or use illegal drugs. Some items may interact with your medicine.  What should I watch for while using this medicine?  Visit your doctor or health care professional for regular check-ups. You may need regular tests to make sure your liver is working properly.  Your health  care professional may tell you to stop taking this medicine if you develop muscle problems. If your muscle problems do not go away after stopping this medicine, contact your health care professional.  Do not become pregnant while taking this medicine. Women should inform their health care professional if they wish to become pregnant or think they might be pregnant. There is a potential for serious side effects to an unborn child. Talk to your health care professional or pharmacist for more information. Do not breast-feed an infant while taking this medicine.  This medicine may affect blood sugar levels. If you have diabetes, check with your doctor or health care professional before you change your diet or the dose of your diabetic medicine.  If you are going to need surgery or other procedure, tell your doctor that you are using this medicine.  This drug is only part of a total heart-health program. Your doctor or a dietician can suggest a low-cholesterol and low-fat diet to help. Avoid alcohol and smoking, and keep a proper exercise schedule.  This medicine may cause a decrease in Co-Enzyme Q-10. You should make sure that you get enough Co-Enzyme Q-10 while you are taking this medicine. Discuss the foods you eat and the vitamins you take with your health care professional.  What side effects may I notice from receiving this medicine?  Side effects that you should report to your doctor or health care professional as soon as possible:  -allergic reactions like skin rash, itching or hives, swelling of the face, lips, or tongue  -fever  -joint pain  -loss of memory  -redness, blistering, peeling or loosening of the skin, including inside the mouth  -signs and symptoms of liver injury like dark yellow or brown urine; general ill feeling or flu-like symptoms; light-belly pain; unusually weak or tired; yellowing of the eyes or skin  -signs and symptoms of muscle injury like dark urine; trouble passing urine or change in  the amount of urine; unusually weak or tired; muscle pain or side or back pain  Side effects that usually do not require medical attention (report to your doctor or health care professional if they continue or are bothersome):  -diarrhea  -nausea  -stomach pain  -trouble sleeping  -upset stomach  This list may not describe all possible side effects. Call your doctor for medical advice about side effects. You may report side effects to FDA at 0-755-QWR-2387.  Where should I keep my medicine?  Keep out of the reach of children.  Store between 20 and 25 degrees C (68 and 77 degrees F). Throw away any unused medicine after the expiration date.  NOTE: This sheet is a summary. It may not cover all possible information. If you have questions about this medicine, talk to your doctor, pharmacist, or health care provider.  © 2019 [employer]/Gold Standard (2019-04-19 11:36:48)

## 2019-11-04 ENCOUNTER — TREATMENT (OUTPATIENT)
Dept: PHYSICAL THERAPY | Facility: CLINIC | Age: 54
End: 2019-11-04

## 2019-11-04 DIAGNOSIS — S93.402S SPRAIN OF LEFT ANKLE, UNSPECIFIED LIGAMENT, SEQUELA: ICD-10-CM
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
## 2019-11-04 DIAGNOSIS — M25.572 LEFT ANKLE PAIN, UNSPECIFIED CHRONICITY: Primary | ICD-10-CM

## 2019-11-04 PROCEDURE — 97110 THERAPEUTIC EXERCISES: CPT | Performed by: PHYSICAL THERAPIST

## 2019-11-04 NOTE — PROGRESS NOTES
"Physical Therapy Daily Progress Note    Patient: Joan Blackwell   : 1965  Diagnosis/ICD-10 Code:     Diagnosis Plan   1. Left ankle pain, unspecified chronicity     2. Fall through floor, subsequent encounter     3. Sprain of left ankle, unspecified ligament, sequela       Referring practitioner: TAVO Nunez  Date of Initial Visit: Type: THERAPY  Noted: 10/9/2019  Today's Date: 2019  Patient seen for 7 sessions      PT Recheck Due: 2019  PT MD Visit: PRN       Joan Blackwell reports: \"partial\"        Subjective Evaluation    History of Present Illness    Subjective comment: states that she is hurting today. pain all the way around the top of the ankle. still swollen. still have trouble walking, cooking, activities with grandkids, housework. states that she had a death in her family and needs to leave by 10:00Pain  Current pain ratin             Objective   See Exercise, Manual, and Modality Logs for complete treatment.       Assessment & Plan     Assessment  Prognosis details: Treatment time reduced again today secondary to patient' reports of needing to leave early because she has a death in the family. Patient able to complete single leg heel raises with no complaints.     Goals  Plan Goals: Short Term Goals:  1) I with HEP and have additions/changes by next re-certification.(ongoing/progressing/not met)    2) Patient able to ambulate >= 600' non-antalgicly with no assistive device and no significant gait deviation. (met)    3) AROM B ankle DF >= 10°.( ongoing/progressing/not met)    4) AROM L ankle inv >= 30°.(ongoing/progressing/not met)    5) AROM L ankle erv >= 20°.(ongoing/progressing/not met)    6) Patient able to perform 20 sit to/from stand with 1 UE A, = WB B LEs.(met) Pt performed task without support.    7) patient able to ambulate up/down 3 steps reciprocally with 1 HRA x10, no increase in pain. (met)    Long Term goals  1) AROM for the L ankle all WNL, no increase " in pain. (not met)    2) B ankle 5/5. (not met)    3) Patient able to SLS each LE for 20 seconds EO on level surface. (ongoing)    4) Patient able to SLS each LE for 10 seconds EC on level surface. (ongoing)    5) Patient to have a LEFS score of >= 35/80. (ongoing)    6) Patient able to ambulate with no assistive device >= 900'. (met)    7) I with land final HEP. (ongoing)    8) D/C with a final HEP and free 30 day fitness formula membership. (ongoing)    Plan  Plan details: Continue to progress SLS, add airx beam fwd with MS      Progress strengthening /stabilization /functional activity            Timed:  Manual Therapy:         mins  84585;  Therapeutic Exercise:    30     mins  68216;   Aquatic [place] :        mins  32437    Neuromuscular [place]:        mins  68613;    Therapeutic Activity:          mins  54714;     Gait Training:           mins  07734;     Ultrasound:          mins  13186;    Electrical Stimulation:         mins  33311 ( );    Untimed:  Electrical Stimulation:         mins  66838 ( );  Mechanical Traction:         mins  95951;     Timed Treatment:   30   mins   Total Treatment:     30   mins  Juliette Olivo PTA  Physical Therapist Assistant

## 2019-11-18 ENCOUNTER — TELEPHONE (OUTPATIENT)
Dept: FAMILY MEDICINE CLINIC | Facility: CLINIC | Age: 54
End: 2019-11-18

## 2019-11-18 NOTE — TELEPHONE ENCOUNTER
Called pt to discus mammogram done on 11/15/19 also discussed recent cologuard test which was negative. Mammogram negative for malignancy and repeat in 1 year  Pt voiced understanding and all questions answered. Recheck on next visit        This document has been electronically signed by Brad Arnold MD on November 18, 2019 1:17 PM

## 2019-11-21 DIAGNOSIS — Z12.31 SCREENING MAMMOGRAM, ENCOUNTER FOR: ICD-10-CM

## 2019-12-02 RX ORDER — CLONIDINE HYDROCHLORIDE 0.2 MG/1
TABLET ORAL
Qty: 60 TABLET | Refills: 1 | Status: SHIPPED | OUTPATIENT
Start: 2019-12-02 | End: 2020-01-24

## 2019-12-05 NOTE — PROGRESS NOTES
Subjective:  Joan Blackwell is a 54 y.o. female who presents for       Patient Active Problem List   Diagnosis   • Hypocalcemia   • Hyperlipidemia   • Iron deficiency anemia   • Hypothyroidism   • S/P thyroidectomy   • Uncontrolled type 2 diabetes mellitus with complication, without long-term current use of insulin (CMS/HCC)   • Vaginosis   • Obesity   • Nonintractable headache   • Uncontrolled hypertension   • Hypertensive urgency   • Right wrist pain   • Encounter for screening for malignant neoplasm of breast   • Carpal tunnel syndrome of right wrist   • Encounter for screening for malignant neoplasm of colon   • Controlled type 2 diabetes mellitus with complication, without long-term current use of insulin (CMS/HCC)   • Hypokalemia   • Essential hypertension   • General medical examination   • Vitamin D deficiency   • Sprain of left ankle   • Left ankle pain   • Grieving   • Acute cystitis with hematuria   • Class 2 obesity with body mass index (BMI) of 37.0 to 37.9 in adult           Current Outpatient Medications:   •  amLODIPine (NORVASC) 5 MG tablet, Take 1 tablet by mouth Daily., Disp: 30 tablet, Rfl: 3  •  atorvastatin (LIPITOR) 20 MG tablet, Take 1 tablet by mouth Daily., Disp: 30 tablet, Rfl: 3  •  BUPROPION HCL PO, Take  by mouth., Disp: , Rfl:   •  calcium acetate (PHOS BINDER,) 667 MG capsule capsule, Take 1 capsule by mouth 3 (Three) Times a Day., Disp: 180 capsule, Rfl: 2  •  calcium carbonate (OS-KASSY) 600 MG tablet, Take 1 tablet by mouth Daily., Disp: 60 tablet, Rfl: 3  •  cloNIDine (CATAPRES) 0.2 MG tablet, TAKE ONE TABLET BY MOUTH TWICE A DAY, Disp: 60 tablet, Rfl: 1  •  CloNIDine (CATAPRES) 0.3 MG tablet, Take 1 tablet by mouth Every 12 (Twelve) Hours., Disp: 180 tablet, Rfl: 3  •  Empagliflozin (JARDIANCE) 25 MG tablet, Take 25 mg by mouth Daily., Disp: 90 tablet, Rfl: 3  •  escitalopram (LEXAPRO) 20 MG tablet, Take 1 tablet by mouth Daily., Disp: 30 tablet, Rfl: 3  •  ferrous  sulfate 325 (65 FE) MG tablet, Take 1 tablet by mouth 3 (Three) Times a Day With Meals., Disp: 270 tablet, Rfl: 3  •  fluconazole (DIFLUCAN) 150 MG tablet, Take 1 tablet now and in 72 hours if symptoms develop, Disp: 2 tablet, Rfl: 0  •  fluticasone-salmeterol (ADVAIR DISKUS) 250-50 MCG/DOSE DISKUS, Inhale 1 puff 2 (Two) Times a Day., Disp: 60 each, Rfl: 11  •  glucose blood (ONE TOUCH ULTRA TEST) test strip, Use as instructed check sugar twice a day., Disp: 200 each, Rfl: 3  •  HYDROcodone-acetaminophen (NORCO) 5-325 MG per tablet, Take 1 tablet by mouth Every 12 (Twelve) Hours., Disp: 60 tablet, Rfl: 0  •  levothyroxine (SYNTHROID, LEVOTHROID) 150 MCG tablet, Take 1 tablet by mouth Daily., Disp: 90 tablet, Rfl: 3  •  lisinopril (PRINIVIL,ZESTRIL) 40 MG tablet, Take 1 tablet by mouth Daily., Disp: 90 tablet, Rfl: 3  •  Magnesium 100 MG capsule, Take 133 mg by mouth 3 (Three) Times a Day., Disp: 90 each, Rfl: 11  •  metFORMIN (GLUCOPHAGE) 1000 MG tablet, Take 1 tablet by mouth 2 (Two) Times a Day With Meals., Disp: 180 tablet, Rfl: 3  •  metoprolol succinate XL (TOPROL XL) 100 MG 24 hr tablet, Take 1 tablet by mouth Daily., Disp: 90 tablet, Rfl: 3  •  ondansetron (ZOFRAN) 8 MG tablet, Take 1 tablet by mouth Every 8 (Eight) Hours As Needed for Nausea or Vomiting., Disp: 90 tablet, Rfl: 3  •  potassium chloride ER (K-TAB) 20 MEQ tablet controlled-release ER tablet, Take 1 tablet by mouth Daily., Disp: 90 tablet, Rfl: 3  •  vitamin D (ERGOCALCIFEROL) 38394 units capsule capsule, Take 1 capsule by mouth Every 7 (Seven) Days., Disp: 12 capsule, Rfl: 3    HPI     Pt is 53 yo female with management  of obesity, HLP sp thyroidectomy in  , postoperative hypothyroidism,  Vitamin D deficiency, HTN,  DM type 2, history of hypertensive urgency, iron deficiency anemia, sp hysterectomy, sp ovarian cyst removal, Sp  X 3, carpal tunnel syndrome right wrist. Grieving, chronic back pain. currenntly in pain managmenet             10/2/19 pt is here for recheck. Had cologuard test that was negative. Had labwork done on  19 that showee normavl Vitamin B12. Viamin D was low and pt now on vitmain D 50,000 units once a week. TSH was at  4.270 and pt is now on synthroid 150 mcg daily. T3 and T4 normal lipid panel  Showed normal cholesterol and LDL. hga1c was at 7.40 on CMP her alcium was low at 7.5 GFR was a 115.  Sugars were at 136. CBC  Showed  hemoglboin at 11.7 .she conitnues to see  Orthopedic for her  Left ankle issues. MRI of ankle showed multiple bone spurs.  She also has unspecified skin lesion at base of left thumb. It has bene present for years and it is getting bigger. She would like to see Dermatologist      19. Pt is here for followup. Had recent UA that showed RBC in urien and +1 bacteria she was having burning on urination. She was started on Bactrim -160 mg every 12 hours for acute cystitis. Urine culture was negative . She states her urinary symptoms have improved. She states jardiance helps with sugars . She has not been eating much. Father is ill.  Her back pain is hurting today. She goes to pain management every months. She has x-ray ordered.  She currently takes Norco .she lost 3 lbs since last visit. BP is elevated today despite taking metoprolol 100  m g PO q devi and lisinopril 40 mg daily.  She also is still grieving with loss of grandson who  at a young age. She is tearful today. She is not eating much    19 pt is here for recheck. Is due for new labwork soon.  She is doing better since seeing counselng. Her grieving is better. BP is controlled.   Doing well has been eating better. Has appt for eye exam soon         Ankle Pain    The incident occurred 5 to 7 days ago. The incident occurred at home. The injury mechanism was a fall. The pain is present in the left ankle. The quality of the pain is described as aching. The pain is at a severity of 4/10. The pain is moderate. The pain has  been constant since onset. Associated symptoms include an inability to bear weight, a loss of motion, a loss of sensation, muscle weakness, numbness and tingling. Nothing aggravates the symptoms. She has tried nothing for the symptoms. The treatment provided no relief.   Headache    This is a new problem. The current episode started yesterday. The problem has been rapidly worsening. The pain is located in the occipital region. The pain quality is not similar to prior headaches. The quality of the pain is described as aching, sharp and pulsating. Associated symptoms include dizziness, eye redness, eye watering, nausea, neck pain, numbness, phonophobia, photophobia, rhinorrhea, tingling and vomiting. Pertinent negatives include no abdominal pain, abnormal behavior, anorexia, back pain, blurred vision, coughing, drainage, ear pain, eye pain, facial sweating, fever, hearing loss, insomnia, loss of balance, muscle aches, scalp tenderness, seizures, sore throat, swollen glands, visual change, weakness or weight loss. Her past medical history is significant for hypertension and obesity. There is no history of cancer, immunosuppression, migraine headaches, migraines in the family, pseudotumor cerebri, recent head traumas, sinus disease or TMJ.   Diabetes   She presents for her initial diabetic visit. She has type 2 diabetes mellitus. Her disease course has been stable. Hypoglycemia symptoms include dizziness and headaches. Pertinent negatives for hypoglycemia include no confusion, hunger, mood changes, nervousness/anxiousness, pallor, seizures, sleepiness, speech difficulty, sweats or tremors. Pertinent negatives for diabetes include no blurred vision, no chest pain, no fatigue, no foot paresthesias, no foot ulcerations, no polydipsia, no polyphagia, no polyuria, no visual change, no weakness and no weight loss. Pertinent negatives for hypoglycemia complications include no blackouts, no hospitalization, no nocturnal  hypoglycemia, no required assistance and no required glucagon injection. Pertinent negatives for diabetic complications include no autonomic neuropathy, CVA, heart disease, impotence, nephropathy, peripheral neuropathy, PVD or retinopathy. Risk factors for coronary artery disease include diabetes mellitus, sedentary lifestyle and obesity. Current diabetic treatment includes oral agent (monotherapy). Her weight is stable. She is following a generally healthy diet. She has not had a previous visit with a dietitian. She never participates in exercise. An ACE inhibitor/angiotensin II receptor blocker is being taken. She does not see a podiatrist.Eye exam is not current.   Hypertension   This is a chronic problem. The current episode started more than 1 year ago. The problem has been gradually improving since onset. The problem is uncontrolled. Associated symptoms include headaches, neck pain and shortness of breath. Pertinent negatives include no anxiety, blurred vision, chest pain, malaise/fatigue, orthopnea, palpitations, peripheral edema, PND or sweats. There are no associated agents to hypertension. Risk factors for coronary artery disease include diabetes mellitus and dyslipidemia. Current antihypertension treatment includes beta blockers. The current treatment provides no improvement. There are no compliance problems.  There is no history of angina, kidney disease, CAD/MI, CVA, heart failure, left ventricular hypertrophy, PVD, retinopathy or a thyroid problem. There is no history of chronic renal disease, coarctation of the aorta, hyperaldosteronism, hypercortisolism, hyperparathyroidism, a hypertension causing med, pheochromocytoma or sleep apnea.   Neck Pain    This is a chronic problem. The current episode started more than 1 month ago. The problem occurs daily. The problem has been resolved. The pain is associated with nothing. The pain is present in the left side and midline. The quality of the pain is  described as aching. The pain is at a severity of 3/10. The pain is mild. The symptoms are aggravated by bending. Associated symptoms include headaches, numbness, photophobia and tingling. Pertinent negatives include no chest pain, fever, leg pain, pain with swallowing, paresis, syncope, trouble swallowing, visual change, weakness or weight loss. She has tried muscle relaxants and oral narcotics for the symptoms.   Upper Extremity Issue   Associated symptoms include arthralgias, headaches, myalgias, nausea, neck pain, numbness and vomiting. Pertinent negatives include no abdominal pain, anorexia, change in bowel habit, chest pain, chills, congestion, coughing, diaphoresis, fatigue, fever, joint swelling, rash, sore throat, swollen glands, urinary symptoms, vertigo, visual change or weakness.   Arm Pain    The incident occurred more than 1 week ago. The incident occurred at work. The injury mechanism is unknown. The pain is present in the right elbow, right fingers, right forearm, right hand and right wrist. The quality of the pain is described as cramping and aching. The pain radiates to the right arm. The pain is at a severity of 8/10. The pain is severe. The pain has been fluctuating since the incident. Associated symptoms include numbness and tingling. Pertinent negatives include no chest pain. The symptoms are aggravated by movement, a foreign body and palpation. She has tried NSAIDs and rest (oral narcotics) for the symptoms. The treatment provided no relief.   Wrist Pain    The pain is present in the right arm, right elbow, right wrist, right hand and right fingers. There has been no history of extremity trauma. The problem occurs constantly. The problem has been gradually worsening. The quality of the pain is described as aching. The pain is severe. Associated symptoms include an inability to bear weight, joint swelling, a limited range of motion, numbness, stiffness and tingling. Pertinent negatives include  no fever. The symptoms are aggravated by activity and lying down. The treatment provided no relief. Family history does not include gout or rheumatoid arthritis. Her past medical history is significant for diabetes. There is no history of gout, osteoarthritis or rheumatoid arthritis.   Hypothyroidism   This is a chronic problem. The current episode started more than 1 year ago. The problem occurs daily. The problem has been unchanged. Associated symptoms include arthralgias, headaches, myalgias, nausea, neck pain, numbness and vomiting. Pertinent negatives include no abdominal pain, anorexia, change in bowel habit, chest pain, chills, congestion, coughing, diaphoresis, fatigue, fever, joint swelling, rash, sore throat, swollen glands, urinary symptoms, vertigo, visual change or weakness. Nothing aggravates the symptoms. Treatments tried: synthroid. The treatment provided mild relief.        Review of Systems  Review of Systems   Constitutional: Positive for activity change and fatigue. Negative for appetite change, chills, diaphoresis and fever.   HENT: Negative for congestion, postnasal drip, rhinorrhea, sinus pressure, sinus pain, sneezing, sore throat, trouble swallowing and voice change.    Respiratory: Negative for cough, choking, chest tightness, shortness of breath, wheezing and stridor.    Cardiovascular: Negative for chest pain.   Gastrointestinal: Negative for diarrhea, nausea and vomiting.   Musculoskeletal: Positive for arthralgias, back pain, gait problem, neck pain and neck stiffness.   Neurological: Positive for weakness. Negative for headaches.   Psychiatric/Behavioral: The patient is nervous/anxious.        Patient Active Problem List   Diagnosis   • Hypocalcemia   • Hyperlipidemia   • Iron deficiency anemia   • Hypothyroidism   • S/P thyroidectomy   • Uncontrolled type 2 diabetes mellitus with complication, without long-term current use of insulin (CMS/[facility])   • Vaginosis   • Obesity   •  Nonintractable headache   • Uncontrolled hypertension   • Hypertensive urgency   • Right wrist pain   • Encounter for screening for malignant neoplasm of breast   • Carpal tunnel syndrome of right wrist   • Encounter for screening for malignant neoplasm of colon   • Controlled type 2 diabetes mellitus with complication, without long-term current use of insulin (CMS/[facility])   • Hypokalemia   • Essential hypertension   • General medical examination   • Vitamin D deficiency   • Sprain of left ankle   • Left ankle pain   • Grieving   • Acute cystitis with hematuria   • Class 2 obesity with body mass index (BMI) of 37.0 to 37.9 in adult     Past Surgical History:   Procedure Laterality Date   •  SECTION  01/14/1993    x 3, 84, 82,   • EXPLORATORY LAPAROTOMY Left 1998    Left cystectomy. Partial resection of vthe left ovary. Left ovary- oversewn   • INJECTION OF MEDICATION  2014    Depo Medrol (Methylprednisone) (1)    • INJECTION OF MEDICATION  2015    Kenalog (1)     • LAPAROSCOPIC HYSTERECTOMY  1995    Surgical, lysis of adhesions.Laparoscopic Assisted vaginal Hysterectomy (Tyler/ Doderlein Technique) marsupialization of right Bartholin's Gland Cyst   • LASER ABLATION OF THE CERVIX  1985    Laser vaporization, cervical cone   • OVARIAN CYST REMOVAL     • PAP SMEAR     • THYROIDECTOMY  2006    with partial parathyroidectomy     Social History     Socioeconomic History   • Marital status:      Spouse name: Not on file   • Number of children: Not on file   • Years of education: Not on file   • Highest education level: Not on file   Tobacco Use   • Smoking status: Former Smoker   • Smokeless tobacco: Never Used   Substance and Sexual Activity   • Alcohol use: No   • Drug use: No   • Sexual activity: Defer     Family History   Problem Relation Age of Onset   • Diabetes Mother    • Hypertension Mother    • Diabetes Father    • Arthritis Father      Lab on  10/09/2019   Component Date Value Ref Range Status   • Urine Culture 10/09/2019 No growth   Final   • Color, UA 10/09/2019 Yellow  Yellow, Straw Final   • Appearance, UA 10/09/2019 Clear  Clear Final   • pH, UA 10/09/2019 7.5  5.0 - 8.0 Final   • Specific Gravity, UA 10/09/2019 1.005  1.005 - 1.030 Final   • Glucose, UA 10/09/2019 >=1000 mg/dL (3+)* Negative Final   • Ketones, UA 10/09/2019 Negative  Negative Final   • Bilirubin, UA 10/09/2019 Negative  Negative Final   • Blood, UA 10/09/2019 Large (3+)* Negative Final   • Protein, UA 10/09/2019 >=300 mg/dL (3+)* Negative Final   • Leuk Esterase, UA 10/09/2019 Negative  Negative Final   • Nitrite, UA 10/09/2019 Negative  Negative Final   • Urobilinogen, UA 10/09/2019 0.2 E.U./dL  0.2 - 1.0 E.U./dL Final   • RBC, UA 10/09/2019 3-5* None Seen, 0-2 /HPF Final   • WBC, UA 10/09/2019 0-2  None Seen, 0-2 /HPF Final   • Bacteria, UA 10/09/2019 1+* None Seen /HPF Final   • Squamous Epithelial Cells, UA 10/09/2019 0-2  None Seen, 0-2 /HPF Final   • Hyaline Casts, UA 10/09/2019 0-2  None Seen /LPF Final   • Methodology 10/09/2019 Automated Microscopy   Final   Lab on 09/24/2019   Component Date Value Ref Range Status   • WBC 09/24/2019 4.81  3.40 - 10.80 10*3/mm3 Final   • RBC 09/24/2019 4.26  3.77 - 5.28 10*6/mm3 Final   • Hemoglobin 09/24/2019 11.7* 12.0 - 15.9 g/dL Final   • Hematocrit 09/24/2019 36.0  34.0 - 46.6 % Final   • MCV 09/24/2019 84.5  79.0 - 97.0 fL Final   • MCH 09/24/2019 27.5  26.6 - 33.0 pg Final   • MCHC 09/24/2019 32.5  31.5 - 35.7 g/dL Final   • RDW 09/24/2019 14.7  12.3 - 15.4 % Final   • RDW-SD 09/24/2019 45.1  37.0 - 54.0 fl Final   • MPV 09/24/2019 10.3  6.0 - 12.0 fL Final   • Platelets 09/24/2019 346  140 - 450 10*3/mm3 Final   • Neutrophil % 09/24/2019 42.7  42.7 - 76.0 % Final   • Lymphocyte % 09/24/2019 48.4* 19.6 - 45.3 % Final   • Monocyte % 09/24/2019 5.4  5.0 - 12.0 % Final   • Eosinophil % 09/24/2019 2.7  0.3 - 6.2 % Final   • Basophil %  09/24/2019 0.6  0.0 - 1.5 % Final   • Immature Grans % 09/24/2019 0.2  0.0 - 0.5 % Final   • Neutrophils, Absolute 09/24/2019 2.05  1.70 - 7.00 10*3/mm3 Final   • Lymphocytes, Absolute 09/24/2019 2.33  0.70 - 3.10 10*3/mm3 Final   • Monocytes, Absolute 09/24/2019 0.26  0.10 - 0.90 10*3/mm3 Final   • Eosinophils, Absolute 09/24/2019 0.13  0.00 - 0.40 10*3/mm3 Final   • Basophils, Absolute 09/24/2019 0.03  0.00 - 0.20 10*3/mm3 Final   • Immature Grans, Absolute 09/24/2019 0.01  0.00 - 0.05 10*3/mm3 Final   • nRBC 09/24/2019 0.0  0.0 - 0.2 /100 WBC Final   • Glucose 09/24/2019 136* 65 - 99 mg/dL Final   • BUN 09/24/2019 15  6 - 20 mg/dL Final   • Creatinine 09/24/2019 0.65  0.57 - 1.00 mg/dL Final   • Sodium 09/24/2019 140  136 - 145 mmol/L Final   • Potassium 09/24/2019 3.8  3.5 - 5.2 mmol/L Final   • Chloride 09/24/2019 100  98 - 107 mmol/L Final   • CO2 09/24/2019 26.7  22.0 - 29.0 mmol/L Final   • Calcium 09/24/2019 7.5* 8.6 - 10.5 mg/dL Final   • Total Protein 09/24/2019 7.0  6.0 - 8.5 g/dL Final   • Albumin 09/24/2019 4.10  3.50 - 5.20 g/dL Final   • ALT (SGPT) 09/24/2019 11  1 - 33 U/L Final   • AST (SGOT) 09/24/2019 16  1 - 32 U/L Final   • Alkaline Phosphatase 09/24/2019 71  39 - 117 U/L Final   • Total Bilirubin 09/24/2019 0.4  0.2 - 1.2 mg/dL Final   • eGFR   Amer 09/24/2019 115  >60 mL/min/1.73 Final   • Globulin 09/24/2019 2.9  gm/dL Final   • A/G Ratio 09/24/2019 1.4  g/dL Final   • BUN/Creatinine Ratio 09/24/2019 23.1  7.0 - 25.0 Final   • Anion Gap 09/24/2019 13.3  5.0 - 15.0 mmol/L Final   • Hemoglobin A1C 09/24/2019 7.40* 4.80 - 5.60 % Final   • Total Cholesterol 09/24/2019 149  0 - 200 mg/dL Final   • Triglycerides 09/24/2019 123  0 - 150 mg/dL Final   • HDL Cholesterol 09/24/2019 40  40 - 60 mg/dL Final   • LDL Cholesterol  09/24/2019 84  0 - 100 mg/dL Final   • VLDL Cholesterol 09/24/2019 24.6  5 - 40 mg/dL Final   • LDL/HDL Ratio 09/24/2019 2.11   Final   • TSH 09/24/2019 4.270* 0.270 -  "4.200 uIU/mL Final   • Free T4 09/24/2019 1.08  0.93 - 1.70 ng/dL Final   • T3, Free 09/24/2019 2.35  2.00 - 4.40 pg/mL Final   • 25 Hydroxy, Vitamin D 09/24/2019 22.5* 30.0 - 100.0 ng/ml Final   • Vitamin B-12 09/24/2019 295  211 - 946 pg/mL Final   • Microalbumin/Creatinine Ratio 09/25/2019    Final    Unable to calculate   • Creatinine, Urine 09/25/2019 179.3  mg/dL Final   • Microalbumin, Urine 09/25/2019 <1.2  mg/dL Final      XR Spine Lumbar AP & Lateral With Flex & Ext  Narrative: PROCEDURE: Lumbar spine 5 views    REASON FOR EXAM: Other intervertebral disc degeneration, lumbar  region, M51.36 Other intervertebral disc degeneration, lumbar  region    FINDINGS: . Lumbar spine vertebral body heights and alignment are  normal. There is no evidence of fracture or  dislocation.Intervertebral disc spaces are intact.  Impression: 1. Negative lumbar spine.    Electronically signed by:  Doug Beth MD  11/1/2019 10:57 AM CDT  Workstation: [id number]    @[email or web address]@  Immunization History   Administered Date(s) Administered   • Pneumococcal Polysaccharide (PPSV23) 03/27/2018   • Tdap 03/27/2018       The following portions of the patient's history were reviewed and updated as appropriate: allergies, current medications, past family history, past medical history, past social history, past surgical history and problem list.        Physical Exam  /60 (BP Location: Right arm, Patient Position: Sitting, Cuff Size: Adult)   Pulse 50   Temp 97.7 °F (36.5 °C) (Oral)   Ht 152.4 cm (60\")   Wt 88.1 kg (194 lb 3.2 oz)   SpO2 98%   BMI 37.93 kg/m²     Physical Exam   Constitutional: She is oriented to person, place, and time. She appears well-developed and well-nourished.   HENT:   Head: Normocephalic and atraumatic.   Right Ear: External ear normal.   Eyes: Pupils are equal, round, and reactive to light. Conjunctivae and EOM are normal.   Neck: Normal range of motion. Neck supple.   Cardiovascular: Normal rate, regular " rhythm and normal heart sounds.   No murmur heard.  Pulmonary/Chest: Effort normal and breath sounds normal. No respiratory distress.   Abdominal: Soft. Bowel sounds are normal. She exhibits no distension. There is no tenderness.   Obese abdomen    Musculoskeletal: Normal range of motion. She exhibits tenderness. She exhibits no edema or deformity.   Neurological: She is alert and oriented to person, place, and time. No cranial nerve deficit.   Skin: Skin is warm. No rash noted. She is not diaphoretic. No erythema. No pallor.   Psychiatric: She has a normal mood and affect. Her behavior is normal.   Nursing note and vitals reviewed.      Assessment/Plan    Diagnosis Plan   1. Uncontrolled type 2 diabetes mellitus with complication, without long-term current use of insulin (CMS/[facility])     2. Vitamin D deficiency     3. S/P thyroidectomy     4. Hypothyroidism, unspecified type     5. Iron deficiency anemia, unspecified iron deficiency anemia type     6. Hyperlipidemia, unspecified hyperlipidemia type     7. Essential hypertension     8. Controlled type 2 diabetes mellitus with complication, without long-term current use of insulin (CMS/[facility])     9. Postoperative hypothyroidism     10. Class 2 obesity with body mass index (BMI) of 37.0 to 37.9 in adult, unspecified obesity type, unspecified whether serious comorbidity present              -recommend labwork   -refer to Dermatology for skin lesion on hand   -recommend influenza vaccination   -hypocalcemia  - calcium carbonate 600 mg PO BID.   -recommend colonoscopy - pt declined colonoscopy screening will get cologuard  -recommend mammogram screening - will schedule at imaging center   -refer to [facility] Puri for pap smear   -grieving -recommend continued counseling. Pt declines medication   -left ankle pain/sprain left ankle - will get x-ray referral to Orthopedics. Currently pt has brace and crutches  - DM type 2-  Will continue on metformin 500  But will increase to  1000 mg pO BID. Last hga1c at 7.4.  Start on jardiance 25 mg daily. Drug infromation provided. Side effects discussed . Advised to drink with a lot of water. Samples given today.    - for hypertension - currently uncontrolled. On last visit clonidine increased  from 0.2 to 0.3 mg PO BID.  Continue on Toprol XL but go up from 50 to 100 mg daily and lisionpril 40 mg daily . Advised pt to bring BP log on next visit. Refilled BP medication today . BP at goal on visit.. Start on norvasc 5 mg daiy.   - Obesity BMI >30 . - recommended weight loss information and DASH diet. Counseled weight loss >5 minuutes  BMI at 37.0   - hyperlipidemia - HDL low. REcommended high healthy fat diet stop simvastatin and start on lipitor 20 mg PO qhs. Drug information provided   - hypothyroidism/spt hyroidectomy -- increased synthroid from 125 to 150 mcg PO q daily.  Recheck thyroid studies in 6 weeks  - Vitamin D deficiency -rincrease Vitamin D from 1000 once a day to 50,000 once a week   -hypocalcemia - calcium supplements OTC.    -hypokalemia - potassium levels at goal  - depression/grieving  - was on wellbutrin. Go up on lexapro from 10 to 20 mg daily. Will refer to counseling   - iron deficiency anemia - recheck iron level and ferritin. Continue with iron pill. Last iron levels normal  - mammogram screening at [facility]  - will refer to Dr. Mckinney (Orthopedic Surgery) for right carpal tunnel syndrome. Consultation appreciated. Pt missed last appt and will need x-ray before seeing Orthopedics. For right wrist pain pt will  neurontin 800 mg PO TID.  Will get UDS today. ADDIE printed and refer #[id number].    - will refer to DR. Vega for diabetic eye examination consultation appreciated  - discuss other immunizations on next visit  -advised to go to ER or call 911 if symptoms or severe  -advised to be safe and call with questions and concerns   -advised to followup with specialist and referrals   -recheck in 6 weeks         This  document has been electronically signed by Brad Arnold MD on December 13, 2019 9:24 AM

## 2019-12-13 ENCOUNTER — OFFICE VISIT (OUTPATIENT)
Dept: FAMILY MEDICINE CLINIC | Facility: CLINIC | Age: 54
End: 2019-12-13

## 2019-12-13 VITALS
HEIGHT: 60 IN | DIASTOLIC BLOOD PRESSURE: 60 MMHG | SYSTOLIC BLOOD PRESSURE: 106 MMHG | WEIGHT: 194.2 LBS | TEMPERATURE: 97.7 F | OXYGEN SATURATION: 98 % | BODY MASS INDEX: 38.13 KG/M2 | HEART RATE: 50 BPM

## 2019-12-13 DIAGNOSIS — I10 ESSENTIAL HYPERTENSION: ICD-10-CM

## 2019-12-13 DIAGNOSIS — E66.9 CLASS 2 OBESITY WITH BODY MASS INDEX (BMI) OF 37.0 TO 37.9 IN ADULT, UNSPECIFIED OBESITY TYPE, UNSPECIFIED WHETHER SERIOUS COMORBIDITY PRESENT: ICD-10-CM

## 2019-12-13 DIAGNOSIS — E78.5 HYPERLIPIDEMIA, UNSPECIFIED HYPERLIPIDEMIA TYPE: ICD-10-CM

## 2019-12-13 DIAGNOSIS — E03.9 HYPOTHYROIDISM, UNSPECIFIED TYPE: ICD-10-CM

## 2019-12-13 DIAGNOSIS — E55.9 VITAMIN D DEFICIENCY: ICD-10-CM

## 2019-12-13 DIAGNOSIS — E11.8 CONTROLLED TYPE 2 DIABETES MELLITUS WITH COMPLICATION, WITHOUT LONG-TERM CURRENT USE OF INSULIN (HCC): ICD-10-CM

## 2019-12-13 DIAGNOSIS — E89.0 S/P THYROIDECTOMY: ICD-10-CM

## 2019-12-13 DIAGNOSIS — IMO0002 UNCONTROLLED TYPE 2 DIABETES MELLITUS WITH COMPLICATION, WITHOUT LONG-TERM CURRENT USE OF INSULIN: Primary | ICD-10-CM

## 2019-12-13 DIAGNOSIS — E89.0 POSTOPERATIVE HYPOTHYROIDISM: ICD-10-CM

## 2019-12-13 DIAGNOSIS — D50.9 IRON DEFICIENCY ANEMIA, UNSPECIFIED IRON DEFICIENCY ANEMIA TYPE: ICD-10-CM

## 2019-12-13 PROCEDURE — 99214 OFFICE O/P EST MOD 30 MIN: CPT | Performed by: FAMILY MEDICINE

## 2019-12-13 RX ORDER — LEVOTHYROXINE SODIUM 0.15 MG/1
150 TABLET ORAL DAILY
Qty: 90 TABLET | Refills: 3 | Status: SHIPPED | OUTPATIENT
Start: 2019-12-13 | End: 2020-02-12 | Stop reason: SDUPTHER

## 2019-12-13 RX ORDER — FLUCONAZOLE 150 MG/1
TABLET ORAL
Qty: 2 TABLET | Refills: 0 | Status: SHIPPED | OUTPATIENT
Start: 2019-12-13 | End: 2020-02-19 | Stop reason: SDUPTHER

## 2019-12-13 RX ORDER — FERROUS SULFATE 325(65) MG
1 TABLET ORAL
Qty: 270 TABLET | Refills: 3 | Status: SHIPPED | OUTPATIENT
Start: 2019-12-13 | End: 2020-02-14 | Stop reason: SDUPTHER

## 2019-12-13 RX ORDER — POTASSIUM CHLORIDE 1500 MG/1
20 TABLET, FILM COATED, EXTENDED RELEASE ORAL DAILY
Qty: 90 TABLET | Refills: 3 | Status: SHIPPED | OUTPATIENT
Start: 2019-12-13 | End: 2020-02-14 | Stop reason: SDUPTHER

## 2019-12-13 RX ORDER — CALCIUM ACETATE 667 MG/1
667 CAPSULE ORAL 3 TIMES DAILY
Qty: 180 CAPSULE | Refills: 2 | Status: SHIPPED | OUTPATIENT
Start: 2019-12-13 | End: 2020-02-12 | Stop reason: SDUPTHER

## 2019-12-13 RX ORDER — CLONIDINE HYDROCHLORIDE 0.3 MG/1
0.3 TABLET ORAL EVERY 12 HOURS
Qty: 180 TABLET | Refills: 3 | Status: SHIPPED | OUTPATIENT
Start: 2019-12-13 | End: 2020-01-24 | Stop reason: SDUPTHER

## 2019-12-13 RX ORDER — PHENOL 1.4 %
600 AEROSOL, SPRAY (ML) MUCOUS MEMBRANE DAILY
Qty: 60 TABLET | Refills: 3 | Status: SHIPPED | OUTPATIENT
Start: 2019-12-13 | End: 2020-02-14 | Stop reason: SDUPTHER

## 2019-12-13 RX ORDER — AMLODIPINE BESYLATE 5 MG/1
5 TABLET ORAL DAILY
Qty: 30 TABLET | Refills: 3 | Status: SHIPPED | OUTPATIENT
Start: 2019-12-13 | End: 2020-01-24

## 2019-12-13 RX ORDER — ATORVASTATIN CALCIUM 20 MG/1
20 TABLET, FILM COATED ORAL DAILY
Qty: 30 TABLET | Refills: 3 | Status: SHIPPED | OUTPATIENT
Start: 2019-12-13 | End: 2020-02-12 | Stop reason: SDUPTHER

## 2019-12-13 RX ORDER — ONDANSETRON HYDROCHLORIDE 8 MG/1
8 TABLET, FILM COATED ORAL EVERY 8 HOURS PRN
Qty: 90 TABLET | Refills: 3 | Status: SHIPPED | OUTPATIENT
Start: 2019-12-13 | End: 2020-02-14 | Stop reason: SDUPTHER

## 2019-12-13 RX ORDER — ERGOCALCIFEROL 1.25 MG/1
50000 CAPSULE ORAL
Qty: 12 CAPSULE | Refills: 3 | Status: SHIPPED | OUTPATIENT
Start: 2019-12-13 | End: 2020-02-14 | Stop reason: SDUPTHER

## 2019-12-13 RX ORDER — METOPROLOL SUCCINATE 100 MG/1
100 TABLET, EXTENDED RELEASE ORAL DAILY
Qty: 90 TABLET | Refills: 3 | Status: SHIPPED | OUTPATIENT
Start: 2019-12-13 | End: 2020-02-12 | Stop reason: SDUPTHER

## 2019-12-13 RX ORDER — ESCITALOPRAM OXALATE 20 MG/1
20 TABLET ORAL DAILY
Qty: 30 TABLET | Refills: 3 | Status: SHIPPED | OUTPATIENT
Start: 2019-12-13 | End: 2020-02-14 | Stop reason: SDUPTHER

## 2020-01-08 ENCOUNTER — PROCEDURE VISIT (OUTPATIENT)
Dept: OBSTETRICS AND GYNECOLOGY | Facility: CLINIC | Age: 55
End: 2020-01-08

## 2020-01-08 VITALS
DIASTOLIC BLOOD PRESSURE: 100 MMHG | WEIGHT: 189 LBS | BODY MASS INDEX: 37.11 KG/M2 | SYSTOLIC BLOOD PRESSURE: 150 MMHG | HEIGHT: 60 IN

## 2020-01-08 DIAGNOSIS — Z01.419 ENCOUNTER FOR GYNECOLOGICAL EXAMINATION WITHOUT ABNORMAL FINDING: Primary | ICD-10-CM

## 2020-01-08 PROCEDURE — G0101 CA SCREEN;PELVIC/BREAST EXAM: HCPCS | Performed by: NURSE PRACTITIONER

## 2020-01-08 NOTE — PROGRESS NOTES
Subjective   Chief Complaint   Patient presents with   • Gynecologic Exam     newpt     Joan Blackwell is a 54 y.o. year old No obstetric history on file. presenting to be seen for a GYN exam.     Last mammogram- 11/2019 @ [facility]  S/P TVH in 1997 2/2 uterine prolapse. Ovaries intact but has been experiencing hot flashes and night sweats for several years.    She is sexually active.  In the past 12 months there has not been new sexual partners.  Condoms are not typically used.  She would not like to be screened for STD's at today's exam.     She exercises regularly: no.  She wears her seat belt:yes.  She has concerns about domestic violence: no.  She has noticed changes in height: no.    The following portions of the patient's history were reviewed and updated as appropriate:problem list, current medications, allergies, past family history, past medical history, past social history and past surgical history.  Social History    Tobacco Use      Smoking status: Former Smoker      Smokeless tobacco: Never Used    Review of Systems   Constitutional: Negative for activity change, appetite change, diaphoresis, fatigue, unexpected weight gain and unexpected weight loss.   Respiratory: Negative for chest tightness and shortness of breath.    Cardiovascular: Negative for chest pain and palpitations.   Gastrointestinal: Negative for abdominal distention, abdominal pain, constipation and diarrhea.   Genitourinary: Negative for breast discharge, breast lump, breast pain, decreased libido, dyspareunia, dysuria, pelvic pain, pelvic pressure, urinary incontinence, vaginal bleeding, vaginal discharge and vaginal pain.   Musculoskeletal: Negative for myalgias.   Skin: Negative for color change, dry skin and skin lesions.   Neurological: Negative for light-headedness and headache.   Psychiatric/Behavioral: Negative for agitation, dysphoric mood, sleep disturbance, depressed mood and stress. The patient is not  "nervous/anxious.         Objective   /100   Ht 152.4 cm (60\")   Wt 85.7 kg (189 lb)   Breastfeeding No   BMI 36.91 kg/m²     General:  well developed; well nourished  no acute distress  appears older than stated age  obese - Body mass index is 36.91 kg/m².   Skin:  No suspicious lesions seen   Thyroid: not examined   Breasts:  Examined in supine position  Symmetric without masses or skin dimpling  Nipples normal without inversion, lesions or discharge  There are no palpable axillary nodes  Fibrocystic changes are present both breasts without a discrete mass   Abdomen: soft, non-tender; no masses  no umbilical or inguinal hernias are present  no hepato-splenomegaly  Normal findings: bowel sounds normal   Pelvis: Exam limited by  body habitus  Clinical staff was present for exam  External genitalia:  normal appearance of the external genitalia including Bartholin's and Willcox's glands.  :  urethral meatus normal; urethra hypermobile;  Vaginal:  atrophic mucosal changes are present;  Cervix:  Absent. Pap not obtained.  Uterus:  absent.  Adnexa:  non palpable bilaterally.  Rectal:  digital rectal exam not performed; anus visually normal appearing.  Pelvic floor pain: pelvic floor is nontender to palpation in 4 quadrants.     Lab Review   No data reviewed    Imaging  Mammogram report        Joan was seen today for gynecologic exam.    Diagnoses and all orders for this visit:    Encounter for gynecological examination without abnormal finding      Medicare GYN exam every 2 years. Continue annual screening mammography.    This note was electronically signed.    "

## 2020-01-10 ENCOUNTER — TELEPHONE (OUTPATIENT)
Dept: FAMILY MEDICINE CLINIC | Facility: CLINIC | Age: 55
End: 2020-01-10

## 2020-01-11 DIAGNOSIS — M54.41 CHRONIC BILATERAL LOW BACK PAIN WITH BILATERAL SCIATICA: Primary | ICD-10-CM

## 2020-01-11 DIAGNOSIS — M54.42 CHRONIC BILATERAL LOW BACK PAIN WITH BILATERAL SCIATICA: Primary | ICD-10-CM

## 2020-01-11 DIAGNOSIS — G89.29 CHRONIC BILATERAL LOW BACK PAIN WITH BILATERAL SCIATICA: Primary | ICD-10-CM

## 2020-01-11 NOTE — PROGRESS NOTES
Subjective:  Joan Blackwell is a 54 y.o. female who presents for       Patient Active Problem List   Diagnosis   • Hypocalcemia   • Hyperlipidemia   • Iron deficiency anemia   • Hypothyroidism   • S/P thyroidectomy   • Uncontrolled type 2 diabetes mellitus with complication, without long-term current use of insulin (CMS/HCC)   • Vaginosis   • Obesity   • Nonintractable headache   • Uncontrolled hypertension   • Hypertensive urgency   • Right wrist pain   • Encounter for screening for malignant neoplasm of breast   • Carpal tunnel syndrome of right wrist   • Encounter for screening for malignant neoplasm of colon   • Controlled type 2 diabetes mellitus with complication, without long-term current use of insulin (CMS/HCC)   • Hypokalemia   • Essential hypertension   • General medical examination   • Vitamin D deficiency   • Sprain of left ankle   • Left ankle pain   • Grieving   • Acute cystitis with hematuria   • Class 2 obesity with body mass index (BMI) of 37.0 to 37.9 in adult   • Vitamin D deficiency, unspecified            Current Outpatient Medications:   •  atorvastatin (LIPITOR) 20 MG tablet, Take 1 tablet by mouth Daily., Disp: 30 tablet, Rfl: 3  •  BUPROPION HCL PO, Take  by mouth., Disp: , Rfl:   •  calcium acetate (PHOS BINDER,) 667 MG capsule capsule, Take 1 capsule by mouth 3 (Three) Times a Day., Disp: 180 capsule, Rfl: 2  •  calcium carbonate (OS-KASSY) 600 MG tablet, Take 1 tablet by mouth Daily., Disp: 60 tablet, Rfl: 3  •  cloNIDine (CATAPRES) 0.3 MG tablet, Take 1 tablet by mouth Every 12 (Twelve) Hours., Disp: 180 tablet, Rfl: 3  •  Empagliflozin (JARDIANCE) 25 MG tablet, Take 25 mg by mouth Daily., Disp: 90 tablet, Rfl: 3  •  escitalopram (LEXAPRO) 20 MG tablet, Take 1 tablet by mouth Daily., Disp: 30 tablet, Rfl: 3  •  ferrous sulfate 325 (65 FE) MG tablet, Take 1 tablet by mouth 3 (Three) Times a Day With Meals., Disp: 270 tablet, Rfl: 3  •  fluticasone-salmeterol (ADVAIR DISKUS)  250-50 MCG/DOSE DISKUS, Inhale 1 puff 2 (Two) Times a Day., Disp: 60 each, Rfl: 11  •  glucose blood (ONE TOUCH ULTRA TEST) test strip, Use as instructed check sugar twice a day., Disp: 200 each, Rfl: 3  •  HYDROcodone-acetaminophen (NORCO) 5-325 MG per tablet, Take 1 tablet by mouth Every 12 (Twelve) Hours., Disp: 60 tablet, Rfl: 0  •  levothyroxine (SYNTHROID, LEVOTHROID) 150 MCG tablet, Take 1 tablet by mouth Daily., Disp: 90 tablet, Rfl: 3  •  lisinopril (PRINIVIL,ZESTRIL) 40 MG tablet, Take 1 tablet by mouth Daily., Disp: 90 tablet, Rfl: 3  •  Magnesium 100 MG capsule, Take 133 mg by mouth 3 (Three) Times a Day., Disp: 90 each, Rfl: 3  •  metFORMIN (GLUCOPHAGE) 1000 MG tablet, Take 1 tablet by mouth 2 (Two) Times a Day With Meals., Disp: 180 tablet, Rfl: 3  •  metoprolol succinate XL (TOPROL XL) 100 MG 24 hr tablet, Take 1 tablet by mouth Daily., Disp: 90 tablet, Rfl: 3  •  ondansetron (ZOFRAN) 8 MG tablet, Take 1 tablet by mouth Every 8 (Eight) Hours As Needed for Nausea or Vomiting., Disp: 90 tablet, Rfl: 3  •  potassium chloride ER (K-TAB) 20 MEQ tablet controlled-release ER tablet, Take 1 tablet by mouth Daily., Disp: 90 tablet, Rfl: 3  •  vitamin D (ERGOCALCIFEROL) 1.25 MG (06033 UT) capsule capsule, Take 1 capsule by mouth Every 7 (Seven) Days., Disp: 12 capsule, Rfl: 3  •  amLODIPine (NORVASC) 10 MG tablet, Take 1 tablet by mouth Daily., Disp: 30 tablet, Rfl: 3  •  fluconazole (DIFLUCAN) 150 MG tablet, Take 1 tablet now and in 72 hours if symptoms develop, Disp: 2 tablet, Rfl: 0    Pt is 55 yo female with management  of obesity, HLP sp thyroidectomy in  , postoperative hypothyroidism,  Vitamin D deficiency, HTN,  DM type 2, history of hypertensive urgency, iron deficiency anemia, sp hysterectomy, sp ovarian cyst removal, Sp  X 3, carpal tunnel syndrome right wrist. Grieving, chronic back pain. elzbietay in pain managmenet        19. Pt is here for followup. Had recent UA that showed  RBC in urien and +1 bacteria she was having burning on urination. She was started on Bactrim -160 mg every 12 hours for acute cystitis. Urine culture was negative . She states her urinary symptoms have improved. She states jardiance helps with sugars . She has not been eating much. Father is ill.  Her back pain is hurting today. She goes to pain management every months. She has x-ray ordered.  She currently takes Norco .she lost 3 lbs since last visit. BP is elevated today despite taking metoprolol 100  m g PO q devi and lisinopril 40 mg daily.  She also is still grieving with loss of grandson who  at a young age. She is tearful today. She is not eating much     19 pt is here for recheck. Is due for new labwork soon.  She is doing better since seeing counselng. Her grieving is better. BP is controlled.   Doing well has been eating better. Has appt for eye exam soon.     20/ pt is here for recheck. She states her neck and her right wrist is hurting. BP is up today and runing 170/80.  No chest pain no dizziness. No syncopal episodes. She is taking both clonidine 0.2 mg pO BID and clonidine 0.3 mg PO BID,.  Also on toprol  mg daily and lisionpril 40 mg devi and norvasc 5 mg daily.  She was seeing Neurologist in [place] more than a year ago. She has records  But did not bring today. Did not bring BP readings. Also due for ne labwork. She was seeing Pain Management in [place]. Also has shoulder pain on right side. Has right shoulder mass/bulge present for years. 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             Hand Pain    The incident occurred more than 1 week ago. The incident occurred at work. There was no injury mechanism. The pain is present in the right hand and right wrist. The quality of the pain is described as aching. The pain does not radiate. The pain is at a severity of 8/10. The pain is moderate. The pain has been worsening since the  incident. Associated symptoms include numbness. Pertinent negatives include no chest pain, muscle weakness or tingling. Nothing aggravates the symptoms. She has tried immobilization for the symptoms. The treatment provided no relief.   Ankle Pain    The incident occurred 5 to 7 days ago. The incident occurred at home. The injury mechanism was a fall. The pain is present in the left ankle. The quality of the pain is described as aching. The pain is at a severity of 4/10. The pain is moderate. The pain has been constant since onset. Associated symptoms include an inability to bear weight, a loss of motion, a loss of sensation, muscle weakness, numbness and tingling. Nothing aggravates the symptoms. She has tried nothing for the symptoms. The treatment provided no relief.   Headache    This is a new problem. The current episode started yesterday. The problem has been rapidly worsening. The pain is located in the occipital region. The pain quality is not similar to prior headaches. The quality of the pain is described as aching, sharp and pulsating. Associated symptoms include dizziness, eye redness, eye watering, nausea, neck pain, numbness, phonophobia, photophobia, rhinorrhea, tingling and vomiting. Pertinent negatives include no abdominal pain, abnormal behavior, anorexia, back pain, blurred vision, coughing, drainage, ear pain, eye pain, facial sweating, fever, hearing loss, insomnia, loss of balance, muscle aches, scalp tenderness, seizures, sore throat, swollen glands, visual change, weakness or weight loss. Her past medical history is significant for hypertension and obesity. There is no history of cancer, immunosuppression, migraine headaches, migraines in the family, pseudotumor cerebri, recent head traumas, sinus disease or TMJ.   Diabetes   She presents for her initial diabetic visit. She has type 2 diabetes mellitus. Her disease course has been stable. Hypoglycemia symptoms include dizziness and headaches.  Pertinent negatives for hypoglycemia include no confusion, hunger, mood changes, nervousness/anxiousness, pallor, seizures, sleepiness, speech difficulty, sweats or tremors. Pertinent negatives for diabetes include no blurred vision, no chest pain, no fatigue, no foot paresthesias, no foot ulcerations, no polydipsia, no polyphagia, no polyuria, no visual change, no weakness and no weight loss. Pertinent negatives for hypoglycemia complications include no blackouts, no hospitalization, no nocturnal hypoglycemia, no required assistance and no required glucagon injection. Pertinent negatives for diabetic complications include no autonomic neuropathy, CVA, heart disease, impotence, nephropathy, peripheral neuropathy, PVD or retinopathy. Risk factors for coronary artery disease include diabetes mellitus, sedentary lifestyle and obesity. Current diabetic treatment includes oral agent (monotherapy). Her weight is stable. She is following a generally healthy diet. She has not had a previous visit with a dietitian. She never participates in exercise. An ACE inhibitor/angiotensin II receptor blocker is being taken. She does not see a podiatrist.Eye exam is not current.   Hypertension   This is a chronic problem. The current episode started more than 1 year ago. The problem has been gradually improving since onset. The problem is uncontrolled. Associated symptoms include headaches, neck pain and shortness of breath. Pertinent negatives include no anxiety, blurred vision, chest pain, malaise/fatigue, orthopnea, palpitations, peripheral edema, PND or sweats. There are no associated agents to hypertension. Risk factors for coronary artery disease include diabetes mellitus and dyslipidemia. Current antihypertension treatment includes beta blockers. The current treatment provides no improvement. There are no compliance problems.  There is no history of angina, kidney disease, CAD/MI, CVA, heart failure, left ventricular  hypertrophy, PVD, retinopathy or a thyroid problem. There is no history of chronic renal disease, coarctation of the aorta, hyperaldosteronism, hypercortisolism, hyperparathyroidism, a hypertension causing med, pheochromocytoma or sleep apnea.   Neck Pain    This is a chronic problem. The current episode started more than 1 month ago. The problem occurs daily. The problem has been resolved. The pain is associated with nothing. The pain is present in the left side and midline. The quality of the pain is described as aching. The pain is at a severity of 3/10. The pain is mild. The symptoms are aggravated by bending. Associated symptoms include headaches, numbness, photophobia and tingling. Pertinent negatives include no chest pain, fever, leg pain, pain with swallowing, paresis, syncope, trouble swallowing, visual change, weakness or weight loss. She has tried muscle relaxants and oral narcotics for the symptoms.   Upper Extremity Issue   Associated symptoms include arthralgias, headaches, myalgias, nausea, neck pain, numbness and vomiting. Pertinent negatives include no abdominal pain, anorexia, change in bowel habit, chest pain, chills, congestion, coughing, diaphoresis, fatigue, fever, joint swelling, rash, sore throat, swollen glands, urinary symptoms, vertigo, visual change or weakness.   Arm Pain    The incident occurred more than 1 week ago. The incident occurred at work. The injury mechanism is unknown. The pain is present in the right elbow, right fingers, right forearm, right hand and right wrist. The quality of the pain is described as cramping and aching. The pain radiates to the right arm. The pain is at a severity of 8/10. The pain is severe. The pain has been fluctuating since the incident. Associated symptoms include numbness and tingling. Pertinent negatives include no chest pain. The symptoms are aggravated by movement, a foreign body and palpation. She has tried NSAIDs and rest (oral narcotics) for  the symptoms. The treatment provided no relief.   Wrist Pain    The pain is present in the right arm, right elbow, right wrist, right hand and right fingers. There has been no history of extremity trauma. The problem occurs constantly. The problem has been gradually worsening. The quality of the pain is described as aching. The pain is severe. Associated symptoms include an inability to bear weight, joint swelling, a limited range of motion, numbness, stiffness and tingling. Pertinent negatives include no fever. The symptoms are aggravated by activity and lying down. The treatment provided no relief. Family history does not include gout or rheumatoid arthritis. Her past medical history is significant for diabetes. There is no history of gout, osteoarthritis or rheumatoid arthritis.   Hypothyroidism   This is a chronic problem. The current episode started more than 1 year ago. The problem occurs daily. The problem has been unchanged. Associated symptoms include arthralgias, headaches, myalgias, nausea, neck pain, numbness and vomiting. Pertinent negatives include no abdominal pain, anorexia, change in bowel habit, chest pain, chills, congestion, coughing, diaphoresis, fatigue, fever, joint swelling, rash, sore throat, swollen glands, urinary symptoms, vertigo, visual change or weakness. Nothing aggravates the symptoms. Treatments tried: synthroid. The treatment provided mild relief.     Review of Systems  Review of Systems   Constitutional: Positive for activity change and fatigue. Negative for appetite change, chills, diaphoresis and fever.   HENT: Negative for congestion, postnasal drip, rhinorrhea, sinus pressure, sinus pain, sneezing, sore throat, trouble swallowing and voice change.    Respiratory: Negative for cough, choking, chest tightness, shortness of breath, wheezing and stridor.    Cardiovascular: Negative for chest pain.   Gastrointestinal: Negative for diarrhea, nausea and vomiting.   Musculoskeletal:  Positive for arthralgias, back pain and gait problem.   Neurological: Positive for weakness and numbness. Negative for tingling and headaches.        Right hand pain numbness in right hand   Psychiatric/Behavioral: Positive for agitation and behavioral problems. The patient is nervous/anxious.         Depressed mood        Patient Active Problem List   Diagnosis   • Hypocalcemia   • Hyperlipidemia   • Iron deficiency anemia   • Hypothyroidism   • S/P thyroidectomy   • Uncontrolled type 2 diabetes mellitus with complication, without long-term current use of insulin (CMS/HCC)   • Vaginosis   • Obesity   • Nonintractable headache   • Uncontrolled hypertension   • Hypertensive urgency   • Right wrist pain   • Encounter for screening for malignant neoplasm of breast   • Carpal tunnel syndrome of right wrist   • Encounter for screening for malignant neoplasm of colon   • Controlled type 2 diabetes mellitus with complication, without long-term current use of insulin (CMS/HCC)   • Hypokalemia   • Essential hypertension   • General medical examination   • Vitamin D deficiency   • Sprain of left ankle   • Left ankle pain   • Grieving   • Acute cystitis with hematuria   • Class 2 obesity with body mass index (BMI) of 37.0 to 37.9 in adult   • Vitamin D deficiency, unspecified      Past Surgical History:   Procedure Laterality Date   •  SECTION  01/14/1993    x 3, 84, 82,   • EXPLORATORY LAPAROTOMY Left 1998    Left cystectomy. Partial resection of vthe left ovary. Left ovary- oversewn   • INJECTION OF MEDICATION  2014    Depo Medrol (Methylprednisone) (1)    • INJECTION OF MEDICATION  2015    Kenalog (1)     • LAPAROSCOPIC HYSTERECTOMY  1995    Surgical, lysis of adhesions.Laparoscopic Assisted vaginal Hysterectomy (Tyler/ Doderlein Technique) marsupialization of right Bartholin's Gland Cyst   • LASER ABLATION OF THE CERVIX  1985    Laser vaporization, cervical cone   • OVARIAN  CYST REMOVAL  1996   • PAP SMEAR  2008   • THYROIDECTOMY  02/28/2006    with partial parathyroidectomy     Social History     Socioeconomic History   • Marital status:      Spouse name: Not on file   • Number of children: Not on file   • Years of education: Not on file   • Highest education level: Not on file   Tobacco Use   • Smoking status: Former Smoker   • Smokeless tobacco: Never Used   Substance and Sexual Activity   • Alcohol use: No   • Drug use: No   • Sexual activity: Defer     Family History   Problem Relation Age of Onset   • Diabetes Mother    • Hypertension Mother    • Diabetes Father    • Arthritis Father      Lab on 10/09/2019   Component Date Value Ref Range Status   • Urine Culture 10/09/2019 No growth   Final   • Color, UA 10/09/2019 Yellow  Yellow, Straw Final   • Appearance, UA 10/09/2019 Clear  Clear Final   • pH, UA 10/09/2019 7.5  5.0 - 8.0 Final   • Specific Gravity, UA 10/09/2019 1.005  1.005 - 1.030 Final   • Glucose, UA 10/09/2019 >=1000 mg/dL (3+)* Negative Final   • Ketones, UA 10/09/2019 Negative  Negative Final   • Bilirubin, UA 10/09/2019 Negative  Negative Final   • Blood, UA 10/09/2019 Large (3+)* Negative Final   • Protein, UA 10/09/2019 >=300 mg/dL (3+)* Negative Final   • Leuk Esterase, UA 10/09/2019 Negative  Negative Final   • Nitrite, UA 10/09/2019 Negative  Negative Final   • Urobilinogen, UA 10/09/2019 0.2 E.U./dL  0.2 - 1.0 E.U./dL Final   • RBC, UA 10/09/2019 3-5* None Seen, 0-2 /HPF Final   • WBC, UA 10/09/2019 0-2  None Seen, 0-2 /HPF Final   • Bacteria, UA 10/09/2019 1+* None Seen /HPF Final   • Squamous Epithelial Cells, UA 10/09/2019 0-2  None Seen, 0-2 /HPF Final   • Hyaline Casts, UA 10/09/2019 0-2  None Seen /LPF Final   • Methodology 10/09/2019 Automated Microscopy   Final   Lab on 09/24/2019   Component Date Value Ref Range Status   • WBC 09/24/2019 4.81  3.40 - 10.80 10*3/mm3 Final   • RBC 09/24/2019 4.26  3.77 - 5.28 10*6/mm3 Final   • Hemoglobin  09/24/2019 11.7* 12.0 - 15.9 g/dL Final   • Hematocrit 09/24/2019 36.0  34.0 - 46.6 % Final   • MCV 09/24/2019 84.5  79.0 - 97.0 fL Final   • MCH 09/24/2019 27.5  26.6 - 33.0 pg Final   • MCHC 09/24/2019 32.5  31.5 - 35.7 g/dL Final   • RDW 09/24/2019 14.7  12.3 - 15.4 % Final   • RDW-SD 09/24/2019 45.1  37.0 - 54.0 fl Final   • MPV 09/24/2019 10.3  6.0 - 12.0 fL Final   • Platelets 09/24/2019 346  140 - 450 10*3/mm3 Final   • Neutrophil % 09/24/2019 42.7  42.7 - 76.0 % Final   • Lymphocyte % 09/24/2019 48.4* 19.6 - 45.3 % Final   • Monocyte % 09/24/2019 5.4  5.0 - 12.0 % Final   • Eosinophil % 09/24/2019 2.7  0.3 - 6.2 % Final   • Basophil % 09/24/2019 0.6  0.0 - 1.5 % Final   • Immature Grans % 09/24/2019 0.2  0.0 - 0.5 % Final   • Neutrophils, Absolute 09/24/2019 2.05  1.70 - 7.00 10*3/mm3 Final   • Lymphocytes, Absolute 09/24/2019 2.33  0.70 - 3.10 10*3/mm3 Final   • Monocytes, Absolute 09/24/2019 0.26  0.10 - 0.90 10*3/mm3 Final   • Eosinophils, Absolute 09/24/2019 0.13  0.00 - 0.40 10*3/mm3 Final   • Basophils, Absolute 09/24/2019 0.03  0.00 - 0.20 10*3/mm3 Final   • Immature Grans, Absolute 09/24/2019 0.01  0.00 - 0.05 10*3/mm3 Final   • nRBC 09/24/2019 0.0  0.0 - 0.2 /100 WBC Final   • Glucose 09/24/2019 136* 65 - 99 mg/dL Final   • BUN 09/24/2019 15  6 - 20 mg/dL Final   • Creatinine 09/24/2019 0.65  0.57 - 1.00 mg/dL Final   • Sodium 09/24/2019 140  136 - 145 mmol/L Final   • Potassium 09/24/2019 3.8  3.5 - 5.2 mmol/L Final   • Chloride 09/24/2019 100  98 - 107 mmol/L Final   • CO2 09/24/2019 26.7  22.0 - 29.0 mmol/L Final   • Calcium 09/24/2019 7.5* 8.6 - 10.5 mg/dL Final   • Total Protein 09/24/2019 7.0  6.0 - 8.5 g/dL Final   • Albumin 09/24/2019 4.10  3.50 - 5.20 g/dL Final   • ALT (SGPT) 09/24/2019 11  1 - 33 U/L Final   • AST (SGOT) 09/24/2019 16  1 - 32 U/L Final   • Alkaline Phosphatase 09/24/2019 71  39 - 117 U/L Final   • Total Bilirubin 09/24/2019 0.4  0.2 - 1.2 mg/dL Final   • eGFR  African  Amer 09/24/2019 115  >60 mL/min/1.73 Final   • Globulin 09/24/2019 2.9  gm/dL Final   • A/G Ratio 09/24/2019 1.4  g/dL Final   • BUN/Creatinine Ratio 09/24/2019 23.1  7.0 - 25.0 Final   • Anion Gap 09/24/2019 13.3  5.0 - 15.0 mmol/L Final   • Hemoglobin A1C 09/24/2019 7.40* 4.80 - 5.60 % Final   • Total Cholesterol 09/24/2019 149  0 - 200 mg/dL Final   • Triglycerides 09/24/2019 123  0 - 150 mg/dL Final   • HDL Cholesterol 09/24/2019 40  40 - 60 mg/dL Final   • LDL Cholesterol  09/24/2019 84  0 - 100 mg/dL Final   • VLDL Cholesterol 09/24/2019 24.6  5 - 40 mg/dL Final   • LDL/HDL Ratio 09/24/2019 2.11   Final   • TSH 09/24/2019 4.270* 0.270 - 4.200 uIU/mL Final   • Free T4 09/24/2019 1.08  0.93 - 1.70 ng/dL Final   • T3, Free 09/24/2019 2.35  2.00 - 4.40 pg/mL Final   • 25 Hydroxy, Vitamin D 09/24/2019 22.5* 30.0 - 100.0 ng/ml Final   • Vitamin B-12 09/24/2019 295  211 - 946 pg/mL Final   • Microalbumin/Creatinine Ratio 09/25/2019    Final    Unable to calculate   • Creatinine, Urine 09/25/2019 179.3  mg/dL Final   • Microalbumin, Urine 09/25/2019 <1.2  mg/dL Final      XR Spine Lumbar AP & Lateral With Flex & Ext  Narrative: PROCEDURE: Lumbar spine 5 views    REASON FOR EXAM: Other intervertebral disc degeneration, lumbar  region, M51.36 Other intervertebral disc degeneration, lumbar  region    FINDINGS: . Lumbar spine vertebral body heights and alignment are  normal. There is no evidence of fracture or  dislocation.Intervertebral disc spaces are intact.  Impression: 1. Negative lumbar spine.    Electronically signed by:  Doug Beth MD  11/1/2019 10:57 AM CDT  Workstation: [id number]    [email or web address]  Immunization History   Administered Date(s) Administered   • Pneumococcal Polysaccharide (PPSV23) 03/27/2018   • Tdap 03/27/2018       The following portions of the patient's history were reviewed and updated as appropriate: allergies, current medications, past family history, past medical history, past social history,  "past surgical history and problem list.        Physical Exam  /84 (BP Location: Left arm, Patient Position: Sitting, Cuff Size: Adult)   Temp 98.1 °F (36.7 °C) (Oral)   Ht 152.4 cm (60\")   Wt 86.4 kg (190 lb 6.4 oz)   BMI 37.18 kg/m²     Physical Exam   Constitutional: She is oriented to person, place, and time. She appears well-developed and well-nourished.   HENT:   Head: Normocephalic and atraumatic.   Right Ear: External ear normal.   Eyes: Pupils are equal, round, and reactive to light. Conjunctivae and EOM are normal.   Neck: Normal range of motion. Neck supple.   Cardiovascular: Normal rate, regular rhythm and normal heart sounds.   No murmur heard.  Pulmonary/Chest: Effort normal and breath sounds normal. No respiratory distress.   Abdominal: Soft. Bowel sounds are normal. She exhibits no distension. There is no tenderness.   Obese abdomen    Musculoskeletal: She exhibits tenderness. She exhibits no edema or deformity.        Right shoulder: She exhibits decreased range of motion, tenderness and bony tenderness.        Right wrist: She exhibits decreased range of motion, tenderness and bony tenderness.        Cervical back: She exhibits decreased range of motion, tenderness, bony tenderness, pain and spasm.        Arms:  Neurological: She is alert and oriented to person, place, and time. No cranial nerve deficit.   Skin: Skin is warm. No rash noted. She is not diaphoretic. No erythema. No pallor.   Psychiatric: She has a normal mood and affect. Her behavior is normal.   Nursing note and vitals reviewed.      Assessment/Plan    Diagnosis Plan   1. Chronic right shoulder pain  MRI Shoulder Right Without Contrast   2. Hyperlipidemia, unspecified hyperlipidemia type     3. Essential hypertension     4. Vitamin D deficiency     5. Class 2 obesity with body mass index (BMI) of 37.0 to 37.9 in adult, unspecified obesity type, unspecified whether serious comorbidity present     6. Uncontrolled type 2 " diabetes mellitus with complication, without long-term current use of insulin (CMS/[facility])  CBC Auto Differential    Comprehensive Metabolic Panel    Hemoglobin A1c    Lipid Panel    TSH    T4, Free    T3, Free    Vitamin D 25 Hydroxy    Vitamin B12   7. Postoperative hypothyroidism     8. Iron deficiency anemia, unspecified iron deficiency anemia type     9. S/P thyroidectomy     10. Vitamin D deficiency, unspecified   Vitamin D 25 Hydroxy   11. Right wrist pain  Ambulatory Referral to Orthopedic Surgery   12. Carpal tunnel syndrome of right wrist     13. Mass of skin of right shoulder  MRI Shoulder Right Without Contrast            -recommend labwork   -right wrist pain - referral to Orthopedic surgery for possible release   -right shoulder pain/mass - MRI of right shoulder suspect tear   -hypocalcemia  - calcium carbonate 600 mg PO BID.   -recommend colonoscopy - pt declined colonoscopy screening will get cologuard  -recommend mammogram screening - will schedule at imaging center   -refer to [facility] for pap smear   -grieving -recommend continued counseling. Pt declines medication   -left ankle pain/sprain left ankle - will get x-ray referral to Orthopedics. Currently pt has brace and crutches  - DM type 2-  Will continue on metformin 500  But will increase to 1000 mg pO BID. Last hga1c at 7.4.  Start on jardiance 25 mg daily. Drug infromation provided. Side effects discussed . Advised to drink with a lot of water. Samples given today.    - for hypertension - currently uncontrolled. On last visit clonidine increased  from 0.2 to 0.3 mg PO BID.  Continue on Toprol XL but go up from 50 to 100 mg daily and lisionpril 40 mg daily . Advised pt to bring BP log on next visit. Refilled BP medication today . BP at goal on visit.. Continue    - Obesity BMI >30 . - recommended weight loss information and DASH diet. Counseled weight loss >5 minuutes  BMI at 37.0   - hyperlipidemia - HDL low. REcommended high healthy fat  diet stop simvastatin and start on lipitor 20 mg PO qhs. Drug information provided   - hypothyroidism/spt hyroidectomy -- increased synthroid from 125 to 150 mcg PO q daily.  Recheck thyroid studies in 6 weeks  - Vitamin D deficiency -rincrease Vitamin D from 1000 once a day to 50,000 once a week   -hypocalcemia - calcium supplements OTC.    -hypokalemia - potassium levels at goal  - depression/grieving  - was on wellbutrin. Go up on lexapro from 10 to 20 mg daily. Will refer to counseling   - iron deficiency anemia - recheck iron level and ferritin. Continue with iron pill. Last iron levels normal  - mammogram screening at [facility]  - will refer to Dr. Mckinney (Orthopedic Surgery) for right carpal tunnel syndrome. Consultation appreciated. Pt missed last appt and will need x-ray before seeing Orthopedics. For right wrist pain pt will  neurontin 800 mg PO TID.  Will get UDS today. ADDIE printed and refer #[id number].    - will refer to DR. Vega for diabetic eye examination consultation appreciated  - discuss other immunizations on next visit  -advised to go to ER or call 911 if symptoms or severe  -advised to be safe and call with questions and concerns   -advised to followup with specialist and referrals   -recheck in 4 weeks         This document has been electronically signed by Brad Arnold MD on January 24, 2020 9:13 AM

## 2020-01-24 ENCOUNTER — OFFICE VISIT (OUTPATIENT)
Dept: FAMILY MEDICINE CLINIC | Facility: CLINIC | Age: 55
End: 2020-01-24

## 2020-01-24 ENCOUNTER — LAB (OUTPATIENT)
Dept: LAB | Facility: HOSPITAL | Age: 55
End: 2020-01-24

## 2020-01-24 VITALS
TEMPERATURE: 98.1 F | HEIGHT: 60 IN | BODY MASS INDEX: 37.38 KG/M2 | DIASTOLIC BLOOD PRESSURE: 84 MMHG | SYSTOLIC BLOOD PRESSURE: 172 MMHG | WEIGHT: 190.4 LBS

## 2020-01-24 DIAGNOSIS — G89.29 CHRONIC RIGHT SHOULDER PAIN: Primary | ICD-10-CM

## 2020-01-24 DIAGNOSIS — E66.9 CLASS 2 OBESITY WITH BODY MASS INDEX (BMI) OF 37.0 TO 37.9 IN ADULT, UNSPECIFIED OBESITY TYPE, UNSPECIFIED WHETHER SERIOUS COMORBIDITY PRESENT: ICD-10-CM

## 2020-01-24 DIAGNOSIS — E55.9 VITAMIN D DEFICIENCY, UNSPECIFIED: ICD-10-CM

## 2020-01-24 DIAGNOSIS — E55.9 VITAMIN D DEFICIENCY: ICD-10-CM

## 2020-01-24 DIAGNOSIS — D50.9 IRON DEFICIENCY ANEMIA, UNSPECIFIED IRON DEFICIENCY ANEMIA TYPE: ICD-10-CM

## 2020-01-24 DIAGNOSIS — E89.0 S/P THYROIDECTOMY: ICD-10-CM

## 2020-01-24 DIAGNOSIS — I10 ESSENTIAL HYPERTENSION: ICD-10-CM

## 2020-01-24 DIAGNOSIS — E78.5 HYPERLIPIDEMIA, UNSPECIFIED HYPERLIPIDEMIA TYPE: ICD-10-CM

## 2020-01-24 DIAGNOSIS — M25.531 RIGHT WRIST PAIN: ICD-10-CM

## 2020-01-24 DIAGNOSIS — G56.01 CARPAL TUNNEL SYNDROME OF RIGHT WRIST: ICD-10-CM

## 2020-01-24 DIAGNOSIS — E89.0 POSTOPERATIVE HYPOTHYROIDISM: ICD-10-CM

## 2020-01-24 DIAGNOSIS — IMO0002 UNCONTROLLED TYPE 2 DIABETES MELLITUS WITH COMPLICATION, WITHOUT LONG-TERM CURRENT USE OF INSULIN: ICD-10-CM

## 2020-01-24 DIAGNOSIS — M25.511 CHRONIC RIGHT SHOULDER PAIN: Primary | ICD-10-CM

## 2020-01-24 DIAGNOSIS — R22.31 MASS OF SKIN OF RIGHT SHOULDER: ICD-10-CM

## 2020-01-24 PROCEDURE — 85025 COMPLETE CBC W/AUTO DIFF WBC: CPT

## 2020-01-24 PROCEDURE — 82306 VITAMIN D 25 HYDROXY: CPT

## 2020-01-24 PROCEDURE — 83036 HEMOGLOBIN GLYCOSYLATED A1C: CPT

## 2020-01-24 PROCEDURE — 84481 FREE ASSAY (FT-3): CPT

## 2020-01-24 PROCEDURE — 80053 COMPREHEN METABOLIC PANEL: CPT

## 2020-01-24 PROCEDURE — 80061 LIPID PANEL: CPT

## 2020-01-24 PROCEDURE — 84443 ASSAY THYROID STIM HORMONE: CPT

## 2020-01-24 PROCEDURE — 84439 ASSAY OF FREE THYROXINE: CPT

## 2020-01-24 PROCEDURE — 82607 VITAMIN B-12: CPT

## 2020-01-24 PROCEDURE — 99214 OFFICE O/P EST MOD 30 MIN: CPT | Performed by: FAMILY MEDICINE

## 2020-01-24 RX ORDER — AMLODIPINE BESYLATE 10 MG/1
10 TABLET ORAL DAILY
Qty: 30 TABLET | Refills: 3 | Status: SHIPPED | OUTPATIENT
Start: 2020-01-24 | End: 2020-02-12 | Stop reason: SDUPTHER

## 2020-01-24 RX ORDER — LISINOPRIL 40 MG/1
40 TABLET ORAL DAILY
Qty: 90 TABLET | Refills: 3 | Status: SHIPPED | OUTPATIENT
Start: 2020-01-24 | End: 2020-02-12 | Stop reason: SDUPTHER

## 2020-01-24 RX ORDER — CLONIDINE HYDROCHLORIDE 0.3 MG/1
0.3 TABLET ORAL EVERY 12 HOURS
Qty: 180 TABLET | Refills: 3 | Status: SHIPPED | OUTPATIENT
Start: 2020-01-24 | End: 2020-02-14 | Stop reason: SDUPTHER

## 2020-01-24 RX ORDER — CLONIDINE HYDROCHLORIDE 0.2 MG/1
0.2 TABLET ORAL 2 TIMES DAILY
Qty: 60 TABLET | Refills: 1 | Status: CANCELLED | OUTPATIENT
Start: 2020-01-24

## 2020-01-24 NOTE — PATIENT INSTRUCTIONS
Get labwork    Bring medications    Do not double up on clonidine. Take only 0.3 mg twice a day    Take Hydrocodone still    Go up on norvasc from 5 to 10 mg daily.      Bring BP medications next visit

## 2020-01-25 LAB
25(OH)D3 SERPL-MCNC: 31.6 NG/ML (ref 30–100)
ALBUMIN SERPL-MCNC: 4 G/DL (ref 3.5–5.2)
ALBUMIN/GLOB SERPL: 1.4 G/DL
ALP SERPL-CCNC: 65 U/L (ref 39–117)
ALT SERPL W P-5'-P-CCNC: 14 U/L (ref 1–33)
ANION GAP SERPL CALCULATED.3IONS-SCNC: 15.2 MMOL/L (ref 5–15)
AST SERPL-CCNC: 13 U/L (ref 1–32)
BASOPHILS # BLD AUTO: 0.03 10*3/MM3 (ref 0–0.2)
BASOPHILS NFR BLD AUTO: 0.6 % (ref 0–1.5)
BILIRUB SERPL-MCNC: 0.4 MG/DL (ref 0.2–1.2)
BUN BLD-MCNC: 9 MG/DL (ref 6–20)
BUN/CREAT SERPL: 14.3 (ref 7–25)
CALCIUM SPEC-SCNC: 7.7 MG/DL (ref 8.6–10.5)
CHLORIDE SERPL-SCNC: 102 MMOL/L (ref 98–107)
CHOLEST SERPL-MCNC: 132 MG/DL (ref 0–200)
CO2 SERPL-SCNC: 23.8 MMOL/L (ref 22–29)
CREAT BLD-MCNC: 0.63 MG/DL (ref 0.57–1)
DEPRECATED RDW RBC AUTO: 43.6 FL (ref 37–54)
EOSINOPHIL # BLD AUTO: 0.09 10*3/MM3 (ref 0–0.4)
EOSINOPHIL NFR BLD AUTO: 1.8 % (ref 0.3–6.2)
ERYTHROCYTE [DISTWIDTH] IN BLOOD BY AUTOMATED COUNT: 14.2 % (ref 12.3–15.4)
GFR SERPL CREATININE-BSD FRML MDRD: 119 ML/MIN/1.73
GLOBULIN UR ELPH-MCNC: 2.8 GM/DL
GLUCOSE BLD-MCNC: 116 MG/DL (ref 65–99)
HBA1C MFR BLD: 7.27 % (ref 4.8–5.6)
HCT VFR BLD AUTO: 35.2 % (ref 34–46.6)
HDLC SERPL-MCNC: 35 MG/DL (ref 40–60)
HGB BLD-MCNC: 11.9 G/DL (ref 12–15.9)
IMM GRANULOCYTES # BLD AUTO: 0.01 10*3/MM3 (ref 0–0.05)
IMM GRANULOCYTES NFR BLD AUTO: 0.2 % (ref 0–0.5)
LDLC SERPL CALC-MCNC: 73 MG/DL (ref 0–100)
LDLC/HDLC SERPL: 2.08 {RATIO}
LYMPHOCYTES # BLD AUTO: 2.37 10*3/MM3 (ref 0.7–3.1)
LYMPHOCYTES NFR BLD AUTO: 47.2 % (ref 19.6–45.3)
MCH RBC QN AUTO: 29 PG (ref 26.6–33)
MCHC RBC AUTO-ENTMCNC: 33.8 G/DL (ref 31.5–35.7)
MCV RBC AUTO: 85.6 FL (ref 79–97)
MONOCYTES # BLD AUTO: 0.41 10*3/MM3 (ref 0.1–0.9)
MONOCYTES NFR BLD AUTO: 8.2 % (ref 5–12)
NEUTROPHILS # BLD AUTO: 2.11 10*3/MM3 (ref 1.7–7)
NEUTROPHILS NFR BLD AUTO: 42 % (ref 42.7–76)
NRBC BLD AUTO-RTO: 0 /100 WBC (ref 0–0.2)
PLATELET # BLD AUTO: 307 10*3/MM3 (ref 140–450)
PMV BLD AUTO: 10.6 FL (ref 6–12)
POTASSIUM BLD-SCNC: 4.2 MMOL/L (ref 3.5–5.2)
PROT SERPL-MCNC: 6.8 G/DL (ref 6–8.5)
RBC # BLD AUTO: 4.11 10*6/MM3 (ref 3.77–5.28)
SODIUM BLD-SCNC: 141 MMOL/L (ref 136–145)
T3FREE SERPL-MCNC: 3.21 PG/ML (ref 2–4.4)
T4 FREE SERPL-MCNC: 1.69 NG/DL (ref 0.93–1.7)
TRIGL SERPL-MCNC: 121 MG/DL (ref 0–150)
TSH SERPL DL<=0.05 MIU/L-ACNC: 0.58 UIU/ML (ref 0.27–4.2)
VIT B12 BLD-MCNC: 338 PG/ML (ref 211–946)
VLDLC SERPL-MCNC: 24.2 MG/DL (ref 5–40)
WBC NRBC COR # BLD: 5.02 10*3/MM3 (ref 3.4–10.8)

## 2020-01-28 ENCOUNTER — TELEPHONE (OUTPATIENT)
Dept: FAMILY MEDICINE CLINIC | Facility: CLINIC | Age: 55
End: 2020-01-28

## 2020-02-05 ENCOUNTER — TELEPHONE (OUTPATIENT)
Dept: FAMILY MEDICINE CLINIC | Facility: CLINIC | Age: 55
End: 2020-02-05

## 2020-02-05 RX ORDER — CYCLOBENZAPRINE HYDROCHLORIDE 7.5 MG/1
7.5 TABLET, FILM COATED ORAL 3 TIMES DAILY PRN
Qty: 90 TABLET | Refills: 3 | Status: SHIPPED | OUTPATIENT
Start: 2020-02-05 | End: 2020-02-14 | Stop reason: SDUPTHER

## 2020-02-05 NOTE — TELEPHONE ENCOUNTER
----- Message from Brad Arnold MD sent at 1/26/2020  3:19 PM CST -----  Please call pt    Thyroid studies normal continue synthroid    Vitamin D is normal continue vitamin D supplement     Vitamin B12 levesl normal    On CMP, glucose elevated, calcium low and make sure pt is taking calcium supplements over the counter  Kidney function normal    Liver enzymes normal     On lipid panel LDL at ogal but HDL low and recommend healthy diet, more fish and more vegetables and lean protein in diet     On CBC, hemoglobin  Improved to 11.9 pt slightly anemic. Will continue to monitor     On hga1c it is at 7.2 and goal <7.0 . Recommend pt start taking januvia 100 mg PO Q daily. Give 30 pills and 3 refills if pt agreeable . Continue metformin and jardiance. Send to pharmacy and I will sign  Continue to watch sugar and carb intake    Recheck on next visit. Thanks

## 2020-02-05 NOTE — TELEPHONE ENCOUNTER
Gave pt results and she would like to start januvia. She also stated that she was suppose to be prescribed flexeril but wasn't. Do you want to prescribe?

## 2020-02-05 NOTE — TELEPHONE ENCOUNTER
Please let pt know I sent januvia and also flexeril 7.5 mg every 8 hours PRN for her muscular pain. Thanks

## 2020-02-06 ENCOUNTER — TELEPHONE (OUTPATIENT)
Dept: FAMILY MEDICINE CLINIC | Facility: CLINIC | Age: 55
End: 2020-02-06

## 2020-02-12 RX ORDER — ATORVASTATIN CALCIUM 20 MG/1
20 TABLET, FILM COATED ORAL DAILY
Qty: 30 TABLET | Refills: 3 | Status: SHIPPED | OUTPATIENT
Start: 2020-02-12 | End: 2020-05-22 | Stop reason: SDUPTHER

## 2020-02-12 RX ORDER — LISINOPRIL 40 MG/1
40 TABLET ORAL DAILY
Qty: 90 TABLET | Refills: 3 | Status: SHIPPED | OUTPATIENT
Start: 2020-02-12 | End: 2021-02-22 | Stop reason: SDUPTHER

## 2020-02-12 RX ORDER — LEVOTHYROXINE SODIUM 0.15 MG/1
150 TABLET ORAL DAILY
Qty: 90 TABLET | Refills: 3 | Status: SHIPPED | OUTPATIENT
Start: 2020-02-12 | End: 2021-01-25

## 2020-02-12 RX ORDER — CALCIUM ACETATE 667 MG/1
667 CAPSULE ORAL 3 TIMES DAILY
Qty: 180 CAPSULE | Refills: 2 | Status: SHIPPED | OUTPATIENT
Start: 2020-02-12 | End: 2020-05-22 | Stop reason: SDUPTHER

## 2020-02-12 RX ORDER — AMLODIPINE BESYLATE 10 MG/1
10 TABLET ORAL DAILY
Qty: 30 TABLET | Refills: 3 | Status: SHIPPED | OUTPATIENT
Start: 2020-02-12 | End: 2020-03-02

## 2020-02-12 RX ORDER — METOPROLOL SUCCINATE 100 MG/1
100 TABLET, EXTENDED RELEASE ORAL DAILY
Qty: 90 TABLET | Refills: 3 | Status: SHIPPED | OUTPATIENT
Start: 2020-02-12 | End: 2021-01-18

## 2020-02-14 RX ORDER — ERGOCALCIFEROL 1.25 MG/1
50000 CAPSULE ORAL
Qty: 12 CAPSULE | Refills: 3 | Status: SHIPPED | OUTPATIENT
Start: 2020-02-14 | End: 2020-08-17 | Stop reason: SDUPTHER

## 2020-02-14 RX ORDER — FERROUS SULFATE 325(65) MG
1 TABLET ORAL
Qty: 270 TABLET | Refills: 3 | Status: SHIPPED | OUTPATIENT
Start: 2020-02-14 | End: 2021-02-22 | Stop reason: SDUPTHER

## 2020-02-14 RX ORDER — CYCLOBENZAPRINE HYDROCHLORIDE 7.5 MG/1
7.5 TABLET, FILM COATED ORAL 3 TIMES DAILY PRN
Qty: 90 TABLET | Refills: 3 | Status: SHIPPED | OUTPATIENT
Start: 2020-02-14 | End: 2020-02-21 | Stop reason: DRUGHIGH

## 2020-02-14 RX ORDER — POTASSIUM CHLORIDE 1500 MG/1
20 TABLET, FILM COATED, EXTENDED RELEASE ORAL DAILY
Qty: 90 TABLET | Refills: 3 | Status: SHIPPED | OUTPATIENT
Start: 2020-02-14 | End: 2021-01-18

## 2020-02-14 RX ORDER — PHENOL 1.4 %
600 AEROSOL, SPRAY (ML) MUCOUS MEMBRANE DAILY
Qty: 60 TABLET | Refills: 3 | Status: SHIPPED | OUTPATIENT
Start: 2020-02-14 | End: 2020-05-22 | Stop reason: SDUPTHER

## 2020-02-14 RX ORDER — ONDANSETRON HYDROCHLORIDE 8 MG/1
8 TABLET, FILM COATED ORAL EVERY 8 HOURS PRN
Qty: 90 TABLET | Refills: 3 | Status: SHIPPED | OUTPATIENT
Start: 2020-02-14 | End: 2021-02-22 | Stop reason: SDUPTHER

## 2020-02-14 RX ORDER — CLONIDINE HYDROCHLORIDE 0.3 MG/1
0.3 TABLET ORAL EVERY 12 HOURS
Qty: 180 TABLET | Refills: 3 | Status: SHIPPED | OUTPATIENT
Start: 2020-02-14 | End: 2020-06-03 | Stop reason: SDUPTHER

## 2020-02-14 RX ORDER — ESCITALOPRAM OXALATE 20 MG/1
20 TABLET ORAL DAILY
Qty: 30 TABLET | Refills: 3 | Status: SHIPPED | OUTPATIENT
Start: 2020-02-14 | End: 2020-05-22 | Stop reason: SDUPTHER

## 2020-02-17 ENCOUNTER — TELEPHONE (OUTPATIENT)
Dept: FAMILY MEDICINE CLINIC | Facility: CLINIC | Age: 55
End: 2020-02-17

## 2020-02-17 NOTE — TELEPHONE ENCOUNTER
----- Message from Brad Arnold MD sent at 2/17/2020 10:05 AM CST -----  Regarding: MRI of shoulder   Please call pt     there is moderate degenerative changes with partial/thickness tear of the supraspinatus tendon. No evidence of full thickness rotator cuff tear.      Recommend Orthopedic Referral with Dr. Giraldo. Let me know it pt agreeable.     Recheck on next visit. Thanks

## 2020-02-19 ENCOUNTER — TELEPHONE (OUTPATIENT)
Dept: FAMILY MEDICINE CLINIC | Facility: CLINIC | Age: 55
End: 2020-02-19

## 2020-02-19 DIAGNOSIS — R22.31 MASS OF SKIN OF RIGHT SHOULDER: ICD-10-CM

## 2020-02-19 DIAGNOSIS — G89.29 CHRONIC RIGHT SHOULDER PAIN: ICD-10-CM

## 2020-02-19 DIAGNOSIS — M25.511 CHRONIC RIGHT SHOULDER PAIN: ICD-10-CM

## 2020-02-19 RX ORDER — MULTIVITAMIN WITH IRON
250 TABLET ORAL DAILY
Qty: 90 EACH | Refills: 3 | Status: SHIPPED | OUTPATIENT
Start: 2020-02-19 | End: 2021-02-22 | Stop reason: SDUPTHER

## 2020-02-19 RX ORDER — FLUCONAZOLE 150 MG/1
TABLET ORAL
Qty: 2 TABLET | Refills: 0 | Status: SHIPPED | OUTPATIENT
Start: 2020-02-19 | End: 2021-08-25

## 2020-02-19 NOTE — TELEPHONE ENCOUNTER
[facility] CALLED AND STATED THAT Magnesium 100 MG capsule DOES NOT COME  MG. IT DOES COME   MG. PLEASE ADVISE.

## 2020-02-19 NOTE — TELEPHONE ENCOUNTER
Pharm requesting refill on Diflucan and also states that pt is also needing to have the patches for the Clonidine instead of the tablets.

## 2020-02-21 ENCOUNTER — TELEPHONE (OUTPATIENT)
Dept: FAMILY MEDICINE CLINIC | Facility: CLINIC | Age: 55
End: 2020-02-21

## 2020-02-21 RX ORDER — CYCLOBENZAPRINE HCL 10 MG
10 TABLET ORAL NIGHTLY PRN
Qty: 30 TABLET | Refills: 3 | Status: SHIPPED | OUTPATIENT
Start: 2020-02-21 | End: 2022-08-01

## 2020-02-24 ENCOUNTER — TELEPHONE (OUTPATIENT)
Dept: FAMILY MEDICINE CLINIC | Facility: CLINIC | Age: 55
End: 2020-02-24

## 2020-02-25 DIAGNOSIS — M25.531 RIGHT WRIST PAIN: Primary | ICD-10-CM

## 2020-02-28 ENCOUNTER — OFFICE VISIT (OUTPATIENT)
Dept: ORTHOPEDIC SURGERY | Facility: CLINIC | Age: 55
End: 2020-02-28

## 2020-02-28 VITALS — WEIGHT: 190 LBS | BODY MASS INDEX: 37.3 KG/M2 | HEIGHT: 60 IN

## 2020-02-28 DIAGNOSIS — R20.0 NUMBNESS AND TINGLING OF RIGHT ARM: ICD-10-CM

## 2020-02-28 DIAGNOSIS — R20.2 NUMBNESS AND TINGLING OF RIGHT ARM: ICD-10-CM

## 2020-02-28 DIAGNOSIS — M75.101 ROTATOR CUFF SYNDROME OF RIGHT SHOULDER: ICD-10-CM

## 2020-02-28 DIAGNOSIS — M25.531 RIGHT WRIST PAIN: Primary | ICD-10-CM

## 2020-02-28 DIAGNOSIS — G56.01 CARPAL TUNNEL SYNDROME OF RIGHT WRIST: ICD-10-CM

## 2020-02-28 PROCEDURE — 99203 OFFICE O/P NEW LOW 30 MIN: CPT | Performed by: ORTHOPAEDIC SURGERY

## 2020-02-28 NOTE — PROGRESS NOTES
Joan Blackwell is a 54 y.o. female   Primary provider:  Brad Arnold MD       Chief Complaint   Patient presents with   • Right Wrist - Follow-up   • Establish Care       HISTORY OF PRESENT ILLNESS: Patient being seen for right hand pain. X-rays done today. Reports having past EMG. Reports fall 2/21/2020.  She is also complaining of shoulder pain.  She tells me she has had pain in her right hand and carpal tunnel for 20 years when she worked as a .  About a week ago she fell at a local store and really stirred her hand up and also injured her shoulder.  She had had some problems before this in fact had an MRI scan on February 16.  That MRI scan showed rotator cuff irritation but no obvious full-thickness tear.  She has been in a sling and a splint for her hand.  This really has not helped her very much.    Pain   This is a chronic problem. The current episode started more than 1 year ago. Associated symptoms include headaches, joint swelling and numbness. Pertinent negatives include no abdominal pain, chest pain, chills, fever, nausea or vomiting. Associated symptoms comments: Stabbing, aching, burning, clicking, swelling. The symptoms are aggravated by walking (sitting, driving). She has tried acetaminophen, NSAIDs, ice and rest for the symptoms.        CONCURRENT MEDICAL HISTORY:    Past Medical History:   Diagnosis Date   • Abdominal pain     suspect Kidney Stone      • Acquired hypothyroidism    • Acute bronchitis    • Acute maxillary sinusitis    • Acute pharyngitis    • Allergic    • Anxiety    • Asthma    • Axillary lymphadenopathy    • Blood in urine    • Bronchitis     with an asthmatic reaction      • Colitis    • Cough    • Cyst of Bartholin's gland duct     History of    • Degenerative joint disease involving multiple joints    • Depression    • Diabetes (CMS/HCC)    • Encounter for gynecological examination with abnormal finding    • Essential (primary) hypertension    • Essential  hypertension    • GERD (gastroesophageal reflux disease)    • Goiter    • Hemorrhoids    • Hordeolum     right lower eyelid      • Injury of back    • Low back pain    • Lung nodule, solitary    • Osteoporosis    • Polyp of vagina     possible      • Postsurgical hypoparathyroidism (CMS/HCC)    • Shortness of breath    • Tobacco dependence syndrome     Past history   • Urinary tract infectious disease    • Vulvovaginitis        Allergies   Allergen Reactions   • Naproxen Nausea And Vomiting and Swelling   • Other Anaphylaxis and Swelling     Blueberries         Current Outpatient Medications:   •  amLODIPine (NORVASC) 10 MG tablet, Take 1 tablet by mouth Daily., Disp: 30 tablet, Rfl: 3  •  atorvastatin (LIPITOR) 20 MG tablet, Take 1 tablet by mouth Daily., Disp: 30 tablet, Rfl: 3  •  BUPROPION HCL PO, Take  by mouth., Disp: , Rfl:   •  calcium acetate (PHOS BINDER,) 667 MG capsule capsule, Take 1 capsule by mouth 3 (Three) Times a Day., Disp: 180 capsule, Rfl: 2  •  calcium carbonate (OS-KASSY) 600 MG tablet, Take 1 tablet by mouth Daily., Disp: 60 tablet, Rfl: 3  •  cloNIDine (CATAPRES) 0.3 MG tablet, Take 1 tablet by mouth Every 12 (Twelve) Hours., Disp: 180 tablet, Rfl: 3  •  cyclobenzaprine (FLEXERIL) 10 MG tablet, Take 1 tablet by mouth At Night As Needed for Muscle Spasms., Disp: 30 tablet, Rfl: 3  •  Empagliflozin (JARDIANCE) 25 MG tablet, Take 25 mg by mouth Daily., Disp: 90 tablet, Rfl: 3  •  escitalopram (LEXAPRO) 20 MG tablet, Take 1 tablet by mouth Daily., Disp: 30 tablet, Rfl: 3  •  ferrous sulfate 325 (65 FE) MG tablet, Take 1 tablet by mouth 3 (Three) Times a Day With Meals., Disp: 270 tablet, Rfl: 3  •  fluconazole (DIFLUCAN) 150 MG tablet, Take 1 tablet now and in 72 hours if symptoms develop, Disp: 2 tablet, Rfl: 0  •  fluticasone-salmeterol (ADVAIR DISKUS) 250-50 MCG/DOSE DISKUS, Inhale 1 puff 2 (Two) Times a Day., Disp: 60 each, Rfl: 11  •  glucose blood (ONE TOUCH ULTRA TEST) test strip, Use as  instructed check sugar twice a day., Disp: 200 each, Rfl: 3  •  HYDROcodone-acetaminophen (NORCO) 5-325 MG per tablet, Take 1 tablet by mouth Every 12 (Twelve) Hours., Disp: 60 tablet, Rfl: 0  •  levothyroxine (SYNTHROID, LEVOTHROID) 150 MCG tablet, Take 1 tablet by mouth Daily., Disp: 90 tablet, Rfl: 3  •  lisinopril (PRINIVIL,ZESTRIL) 40 MG tablet, Take 1 tablet by mouth Daily., Disp: 90 tablet, Rfl: 3  •  Magnesium 250 MG tablet, Take 250 mg by mouth Daily., Disp: 90 each, Rfl: 3  •  metFORMIN (GLUCOPHAGE) 1000 MG tablet, Take 1 tablet by mouth 2 (Two) Times a Day With Meals., Disp: 180 tablet, Rfl: 3  •  metoprolol succinate XL (TOPROL XL) 100 MG 24 hr tablet, Take 1 tablet by mouth Daily., Disp: 90 tablet, Rfl: 3  •  ondansetron (ZOFRAN) 8 MG tablet, Take 1 tablet by mouth Every 8 (Eight) Hours As Needed for Nausea or Vomiting., Disp: 90 tablet, Rfl: 3  •  potassium chloride ER (K-TAB) 20 MEQ tablet controlled-release ER tablet, Take 1 tablet by mouth Daily., Disp: 90 tablet, Rfl: 3  •  SITagliptin (JANUVIA) 100 MG tablet, Take 1 tablet by mouth Daily., Disp: 30 tablet, Rfl: 3  •  vitamin D (ERGOCALCIFEROL) 1.25 MG (45482 UT) capsule capsule, Take 1 capsule by mouth Every 7 (Seven) Days., Disp: 12 capsule, Rfl: 3    Past Surgical History:   Procedure Laterality Date   •  SECTION  01/14/1993    x 3, 84, 82,   • EXPLORATORY LAPAROTOMY Left 1998    Left cystectomy. Partial resection of vthe left ovary. Left ovary- oversewn   • INJECTION OF MEDICATION  2014    Depo Medrol (Methylprednisone) (1)    • INJECTION OF MEDICATION  2015    Kenalog (1)     • LAPAROSCOPIC HYSTERECTOMY  1995    Surgical, lysis of adhesions.Laparoscopic Assisted vaginal Hysterectomy (Tyler/ Doderlein Technique) marsupialization of right Bartholin's Gland Cyst   • LASER ABLATION OF THE CERVIX  1985    Laser vaporization, cervical cone   • OVARIAN CYST REMOVAL     • PAP SMEAR     •  "THYROIDECTOMY  02/28/2006    with partial parathyroidectomy       Family History   Problem Relation Age of Onset   • Diabetes Mother    • Hypertension Mother    • Diabetes Father    • Arthritis Father         Social History     Socioeconomic History   • Marital status:      Spouse name: Not on file   • Number of children: Not on file   • Years of education: Not on file   • Highest education level: Not on file   Tobacco Use   • Smoking status: Former Smoker   • Smokeless tobacco: Never Used   Substance and Sexual Activity   • Alcohol use: No   • Drug use: No   • Sexual activity: Defer        Review of Systems   Constitutional: Negative for chills and fever.        Hair loss   HENT: Negative for facial swelling.    Respiratory: Negative for apnea and shortness of breath.    Cardiovascular: Negative for chest pain and leg swelling.   Gastrointestinal: Negative for abdominal pain, nausea and vomiting.   Endocrine: Positive for cold intolerance and heat intolerance.   Genitourinary: Negative for dysuria.   Musculoskeletal: Positive for joint swelling.        Stiffness, muscle pain   Skin: Negative for color change.   Neurological: Positive for numbness and headaches. Negative for seizures and syncope.   Hematological: Negative for adenopathy.   Psychiatric/Behavioral: Positive for agitation. Negative for dysphoric mood. The patient is nervous/anxious.    All other systems reviewed and are negative.      PHYSICAL EXAMINATION:       Ht 152.4 cm (60\")   Wt 86.2 kg (190 lb)   BMI 37.11 kg/m²     Physical Exam   Constitutional: She is oriented to person, place, and time. She appears well-developed.   HENT:   Head: Normocephalic and atraumatic.   Eyes: Pupils are equal, round, and reactive to light. EOM are normal.   Neck: Neck supple.   Pulmonary/Chest: Effort normal.   Musculoskeletal: She exhibits tenderness.   Neurological: She is alert and oriented to person, place, and time. A sensory deficit is present.   Skin: " Skin is warm and dry.   Psychiatric: She has a normal mood and affect.       GAIT:     [x]  Normal  []  Antalgic    Assistive device: [x]  None  []  Walker     []  Crutches  []  Cane     []  Wheelchair  []  Stretcher    Ortho Exam  Passive motion of the shoulder seems intact positive impingement does have some tenderness over the clavicle there is some thickness superior to the clavicle on the right side.  Numbness in the hand no obvious atrophy numbness is more in a stocking glove distribution of the hand.  She is tender over the carpal canal.  Positive Tinel's both at the elbow and at the wrist.  Difficult to examine for weakness no gross weakness is noted.  Positive impingement noted the shoulder pain resistance of the cuff.  Some pain with resistance of the supraspinatus and infraspinatus.    No results found.  New x-rays the wrist are negative      ASSESSMENT:    Diagnoses and all orders for this visit:    Right wrist pain  -     EMG & Nerve Conduction Test; Future  -     Ambulatory Referral to Physical Therapy Evaluate and treat; ROM, Strengthening    Numbness and tingling of right arm  -     EMG & Nerve Conduction Test; Future  -     Ambulatory Referral to Physical Therapy Evaluate and treat; ROM, Strengthening    Rotator cuff syndrome of right shoulder    Carpal tunnel syndrome of right wrist          PLAN previous testing showed no obvious tear.  She is got pretty good strength today.  Have offered an injection she does not want to do that she does have a fear of needles.  With her address in physical therapy is regard to her shoulder.  With regard to the hand this is a 20-year problem she tells me she had studies done 4 years ago but never really has had anything done.  She is wearing a splint and feels he really cannot use her hand from oxygen feels that it was exacerbated by her fall of 1 week ago.  At this point I will not send her for selector diagnostics the hand will try and therapy see her back  after electrodiagnostics.  We will consider injection at that point    Patient's Body mass index is 37.11 kg/m². BMI is above normal parameters. Recommendations include: exercise counseling and nutrition counseling.      No follow-ups on file.        This document has been electronically signed by Jonh Obregon MD on February 28, 2020 3:08 PM

## 2020-03-02 ENCOUNTER — OFFICE VISIT (OUTPATIENT)
Dept: FAMILY MEDICINE CLINIC | Facility: CLINIC | Age: 55
End: 2020-03-02

## 2020-03-02 VITALS
TEMPERATURE: 98 F | BODY MASS INDEX: 36.87 KG/M2 | WEIGHT: 187.8 LBS | SYSTOLIC BLOOD PRESSURE: 98 MMHG | DIASTOLIC BLOOD PRESSURE: 58 MMHG | HEIGHT: 60 IN

## 2020-03-02 DIAGNOSIS — IMO0002 UNCONTROLLED TYPE 2 DIABETES MELLITUS WITH COMPLICATION, WITHOUT LONG-TERM CURRENT USE OF INSULIN: ICD-10-CM

## 2020-03-02 DIAGNOSIS — E55.9 VITAMIN D DEFICIENCY: ICD-10-CM

## 2020-03-02 DIAGNOSIS — E11.8 CONTROLLED TYPE 2 DIABETES MELLITUS WITH COMPLICATION, WITHOUT LONG-TERM CURRENT USE OF INSULIN (HCC): ICD-10-CM

## 2020-03-02 DIAGNOSIS — I10 ESSENTIAL HYPERTENSION: ICD-10-CM

## 2020-03-02 DIAGNOSIS — E89.0 POSTOPERATIVE HYPOTHYROIDISM: ICD-10-CM

## 2020-03-02 DIAGNOSIS — E66.9 OBESITY, UNSPECIFIED CLASSIFICATION, UNSPECIFIED OBESITY TYPE, UNSPECIFIED WHETHER SERIOUS COMORBIDITY PRESENT: ICD-10-CM

## 2020-03-02 DIAGNOSIS — G56.01 CARPAL TUNNEL SYNDROME OF RIGHT WRIST: ICD-10-CM

## 2020-03-02 DIAGNOSIS — E83.51 HYPOCALCEMIA: ICD-10-CM

## 2020-03-02 DIAGNOSIS — I95.9 HYPOTENSION, UNSPECIFIED HYPOTENSION TYPE: ICD-10-CM

## 2020-03-02 DIAGNOSIS — E66.9 CLASS 2 OBESITY WITH BODY MASS INDEX (BMI) OF 37.0 TO 37.9 IN ADULT, UNSPECIFIED OBESITY TYPE, UNSPECIFIED WHETHER SERIOUS COMORBIDITY PRESENT: ICD-10-CM

## 2020-03-02 DIAGNOSIS — D50.9 IRON DEFICIENCY ANEMIA, UNSPECIFIED IRON DEFICIENCY ANEMIA TYPE: ICD-10-CM

## 2020-03-02 DIAGNOSIS — M75.101 ROTATOR CUFF SYNDROME OF RIGHT SHOULDER: ICD-10-CM

## 2020-03-02 DIAGNOSIS — M25.511 RIGHT SHOULDER PAIN, UNSPECIFIED CHRONICITY: ICD-10-CM

## 2020-03-02 DIAGNOSIS — M54.41 BILATERAL LOW BACK PAIN WITH BILATERAL SCIATICA, UNSPECIFIED CHRONICITY: ICD-10-CM

## 2020-03-02 DIAGNOSIS — M25.531 RIGHT WRIST PAIN: ICD-10-CM

## 2020-03-02 DIAGNOSIS — E89.0 S/P THYROIDECTOMY: ICD-10-CM
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
## 2020-03-02 DIAGNOSIS — E78.5 HYPERLIPIDEMIA, UNSPECIFIED HYPERLIPIDEMIA TYPE: ICD-10-CM

## 2020-03-02 DIAGNOSIS — M54.42 BILATERAL LOW BACK PAIN WITH BILATERAL SCIATICA, UNSPECIFIED CHRONICITY: ICD-10-CM

## 2020-03-02 PROBLEM — Z12.39 ENCOUNTER FOR SCREENING FOR MALIGNANT NEOPLASM OF BREAST: Status: RESOLVED | Noted: 2018-03-08 | Resolved: 2020-03-02

## 2020-03-02 PROBLEM — Z12.11 ENCOUNTER FOR SCREENING FOR MALIGNANT NEOPLASM OF COLON: Status: RESOLVED | Noted: 2018-03-08 | Resolved: 2020-03-02

## 2020-03-02 PROBLEM — M25.572 LEFT ANKLE PAIN: Status: RESOLVED | Noted: 2019-09-18 | Resolved: 2020-03-02

## 2020-03-02 PROBLEM — E87.6 HYPOKALEMIA: Status: RESOLVED | Noted: 2018-03-08 | Resolved: 2020-03-02

## 2020-03-02 PROBLEM — R51.9 NONINTRACTABLE HEADACHE: Status: RESOLVED | Noted: 2018-02-19 | Resolved: 2020-03-02

## 2020-03-02 PROBLEM — N30.01 ACUTE CYSTITIS WITH HEMATURIA: Status: RESOLVED | Noted: 2019-11-01 | Resolved: 2020-03-02

## 2020-03-02 PROBLEM — N76.0 VAGINOSIS: Status: RESOLVED | Noted: 2017-06-22 | Resolved: 2020-03-02

## 2020-03-02 PROBLEM — Z00.00 GENERAL MEDICAL EXAMINATION: Status: RESOLVED | Noted: 2018-03-08 | Resolved: 2020-03-02

## 2020-03-02 PROCEDURE — 99214 OFFICE O/P EST MOD 30 MIN: CPT | Performed by: FAMILY MEDICINE

## 2020-03-02 RX ORDER — HYDROCODONE BITARTRATE AND ACETAMINOPHEN 10; 325 MG/1; MG/1
TABLET ORAL
COMMUNITY
Start: 2020-02-20 | End: 2020-11-13

## 2020-03-02 RX ORDER — ALPRAZOLAM 1 MG/1
1 TABLET ORAL AS NEEDED
COMMUNITY
Start: 2020-02-20

## 2020-03-02 RX ORDER — BLOOD-GLUCOSE METER
EACH MISCELLANEOUS
COMMUNITY
Start: 2020-02-20 | End: 2020-07-16 | Stop reason: SDUPTHER

## 2020-03-02 RX ORDER — BLOOD GLUCOSE CONTROL HIGH,LOW
EACH MISCELLANEOUS
COMMUNITY
Start: 2020-02-20 | End: 2020-07-16 | Stop reason: SDUPTHER

## 2020-03-02 RX ORDER — LANCETS
EACH MISCELLANEOUS
COMMUNITY
Start: 2020-02-20 | End: 2020-07-16 | Stop reason: SDUPTHER

## 2020-03-02 RX ORDER — ISOPROPYL ALCOHOL 0.75 G/1
SWAB TOPICAL
COMMUNITY
Start: 2020-02-20 | End: 2021-08-19 | Stop reason: SDUPTHER

## 2020-03-02 RX ORDER — AMLODIPINE BESYLATE 5 MG/1
5 TABLET ORAL DAILY
Qty: 30 TABLET | Refills: 3 | Status: SHIPPED | OUTPATIENT
Start: 2020-03-02 | End: 2020-05-22 | Stop reason: SDUPTHER

## 2020-03-02 RX ORDER — METHOCARBAMOL 500 MG/1
TABLET, FILM COATED ORAL
COMMUNITY
Start: 2020-02-21 | End: 2022-08-01

## 2020-03-02 NOTE — PATIENT INSTRUCTIONS
Discuss with [facility] about stopping xanax or tapering dose.     Cut back on norvasc from 10 to 5 mg daily.       Bring all medicaitons next visit and blood pressure readings     Recheck in 4 weeks

## 2020-03-03 ENCOUNTER — TELEPHONE (OUTPATIENT)
Dept: FAMILY MEDICINE CLINIC | Facility: CLINIC | Age: 55
End: 2020-03-03

## 2020-03-03 NOTE — TELEPHONE ENCOUNTER
Patient called in to request referral to physical therapy for her back    Callback Number confirmed     Please Advise

## 2020-03-11 DIAGNOSIS — R20.0 NUMBNESS AND TINGLING OF RIGHT ARM: ICD-10-CM

## 2020-03-11 DIAGNOSIS — R20.2 NUMBNESS AND TINGLING OF RIGHT ARM: ICD-10-CM

## 2020-03-11 DIAGNOSIS — M25.531 RIGHT WRIST PAIN: ICD-10-CM

## 2020-03-13 ENCOUNTER — TELEPHONE (OUTPATIENT)
Dept: FAMILY MEDICINE CLINIC | Facility: CLINIC | Age: 55
End: 2020-03-13

## 2020-03-13 NOTE — TELEPHONE ENCOUNTER
YOLANDA FROM [facility] RECEIVED A REQUEST FOR MRI AND NEEDS THE LAST 2 OFFICE CLINICAL VISIT NOTES [phone] PHONE [phone] FAX  THIS IS TIME SENSITIVE AND IS NEEDED WITHIN 24-48 BUSINESS HOURS

## 2020-03-17 DIAGNOSIS — G89.29 CHRONIC LOW BACK PAIN WITH BILATERAL SCIATICA, UNSPECIFIED BACK PAIN LATERALITY: Primary | ICD-10-CM

## 2020-03-17 DIAGNOSIS — M54.42 CHRONIC LOW BACK PAIN WITH BILATERAL SCIATICA, UNSPECIFIED BACK PAIN LATERALITY: Primary | ICD-10-CM

## 2020-03-17 DIAGNOSIS — E83.51 HYPOCALCEMIA: Primary | ICD-10-CM

## 2020-03-17 DIAGNOSIS — M54.41 CHRONIC LOW BACK PAIN WITH BILATERAL SCIATICA, UNSPECIFIED BACK PAIN LATERALITY: Primary | ICD-10-CM

## 2020-03-17 NOTE — TELEPHONE ENCOUNTER
Will order PT/OT    But recommend pt get labwork to check calcium levels. Pt has history of hypocalcemia contributing to muscle pain as well. Does not need to fast for labwork. Thanks

## 2020-03-23 ENCOUNTER — TELEPHONE (OUTPATIENT)
Dept: ORTHOPEDIC SURGERY | Facility: CLINIC | Age: 55
End: 2020-03-23

## 2020-03-25 NOTE — TELEPHONE ENCOUNTER
Called patient and let her know that the EMG did show carpal tunnel syndrome and once we are able to see her back in office then the treatment will be discussed and to continue to wear her splint if it is helpful per Dr. Obregon.

## 2020-03-25 NOTE — TELEPHONE ENCOUNTER
She does have carpal tunnel syndrome on the right side.  At this point our plan will be to see her back once we are able to do so and will discuss further treatment at that point.  Would continue to wear splint if this is helpful

## 2020-03-30 ENCOUNTER — TELEPHONE (OUTPATIENT)
Dept: FAMILY MEDICINE CLINIC | Facility: CLINIC | Age: 55
End: 2020-03-30

## 2020-03-30 NOTE — TELEPHONE ENCOUNTER
----- Message from Brad Arnold MD sent at 3/30/2020  8:30 AM CDT -----  Regarding: MRI of lumbar spine and right shoulder   Please call pt    1. On MRI of lumbar spine she has minimal arthritis and degenerative disc bulge changes but no stenosis     2. On MRI of right shoulder she has mild arthritis of AC joint and also a possible partial tear on the supraspinatus tendon.  I recommend pt see an Orthopedic for this and ask if she has seen one in the past. I'd recommend either [facility] or in [place]. Let me know which one she prefers. Thanks

## 2020-03-30 NOTE — TELEPHONE ENCOUNTER
Spoke to pt and gave results and recommendations. Pt stated she sees . Pt stated she would have labs done.

## 2020-03-30 NOTE — TELEPHONE ENCOUNTER
----- Message from Brad Arnold MD sent at 3/30/2020  8:38 AM CDT -----  Regarding: labwork   Also pt was ordered labwork and she can come anytime to do that fasting. It is to recheck her calcium and parathyroid levels since she has low calcium levels. This may be contributing as well to her muscle aches and pains. Thanks

## 2020-03-31 DIAGNOSIS — M75.101 ROTATOR CUFF SYNDROME OF RIGHT SHOULDER: ICD-10-CM

## 2020-03-31 DIAGNOSIS — M25.511 RIGHT SHOULDER PAIN, UNSPECIFIED CHRONICITY: ICD-10-CM

## 2020-03-31 DIAGNOSIS — M54.42 BILATERAL LOW BACK PAIN WITH BILATERAL SCIATICA, UNSPECIFIED CHRONICITY: ICD-10-CM
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
## 2020-03-31 DIAGNOSIS — M54.41 BILATERAL LOW BACK PAIN WITH BILATERAL SCIATICA, UNSPECIFIED CHRONICITY: ICD-10-CM

## 2020-04-27 RX ORDER — AMLODIPINE BESYLATE 10 MG/1
TABLET ORAL
Qty: 90 TABLET | OUTPATIENT
Start: 2020-04-27

## 2020-04-27 RX ORDER — SITAGLIPTIN 100 MG/1
TABLET, FILM COATED ORAL
Qty: 90 TABLET | Refills: 1 | Status: SHIPPED | OUTPATIENT
Start: 2020-04-27 | End: 2020-08-05

## 2020-05-22 RX ORDER — CLONIDINE HYDROCHLORIDE 0.3 MG/1
0.3 TABLET ORAL EVERY 12 HOURS
Qty: 180 TABLET | Refills: 3 | OUTPATIENT
Start: 2020-05-22

## 2020-05-22 RX ORDER — BLOOD-GLUCOSE METER
EACH MISCELLANEOUS
OUTPATIENT
Start: 2020-05-22

## 2020-05-22 RX ORDER — ERGOCALCIFEROL 1.25 MG/1
50000 CAPSULE ORAL
Qty: 12 CAPSULE | Refills: 3 | OUTPATIENT
Start: 2020-05-22

## 2020-05-22 RX ORDER — AMLODIPINE BESYLATE 5 MG/1
5 TABLET ORAL DAILY
Qty: 90 TABLET | Refills: 1 | Status: SHIPPED | OUTPATIENT
Start: 2020-05-22 | End: 2021-02-22 | Stop reason: SDUPTHER

## 2020-05-22 RX ORDER — ISOPROPYL ALCOHOL 0.75 G/1
SWAB TOPICAL
OUTPATIENT
Start: 2020-05-22

## 2020-05-22 RX ORDER — FLUCONAZOLE 150 MG/1
TABLET ORAL
Qty: 2 TABLET | Refills: 0 | OUTPATIENT
Start: 2020-05-22

## 2020-05-22 RX ORDER — LISINOPRIL 40 MG/1
40 TABLET ORAL DAILY
Qty: 90 TABLET | Refills: 3 | OUTPATIENT
Start: 2020-05-22

## 2020-05-22 RX ORDER — PHENOL 1.4 %
600 AEROSOL, SPRAY (ML) MUCOUS MEMBRANE DAILY
Qty: 90 TABLET | Refills: 1 | Status: SHIPPED | OUTPATIENT
Start: 2020-05-22 | End: 2021-02-22 | Stop reason: SDUPTHER

## 2020-05-22 RX ORDER — FERROUS SULFATE 325(65) MG
1 TABLET ORAL
Qty: 270 TABLET | Refills: 3 | OUTPATIENT
Start: 2020-05-22

## 2020-05-22 RX ORDER — MULTIVITAMIN WITH IRON
250 TABLET ORAL DAILY
Qty: 90 EACH | Refills: 3 | OUTPATIENT
Start: 2020-05-22

## 2020-05-22 RX ORDER — CALCIUM ACETATE 667 MG/1
667 CAPSULE ORAL 3 TIMES DAILY
Qty: 180 CAPSULE | Refills: 2 | Status: SHIPPED | OUTPATIENT
Start: 2020-05-22 | End: 2020-06-04 | Stop reason: SDUPTHER

## 2020-05-22 RX ORDER — ATORVASTATIN CALCIUM 20 MG/1
20 TABLET, FILM COATED ORAL DAILY
Qty: 90 TABLET | Refills: 1 | Status: SHIPPED | OUTPATIENT
Start: 2020-05-22 | End: 2020-07-01

## 2020-05-22 RX ORDER — ESCITALOPRAM OXALATE 20 MG/1
20 TABLET ORAL DAILY
Qty: 90 TABLET | Refills: 1 | Status: SHIPPED | OUTPATIENT
Start: 2020-05-22 | End: 2020-07-01 | Stop reason: SDUPTHER

## 2020-05-22 RX ORDER — LEVOTHYROXINE SODIUM 0.15 MG/1
150 TABLET ORAL DAILY
Qty: 90 TABLET | Refills: 3 | OUTPATIENT
Start: 2020-05-22

## 2020-05-22 RX ORDER — METHOCARBAMOL 500 MG/1
TABLET, FILM COATED ORAL
OUTPATIENT
Start: 2020-05-22

## 2020-05-22 RX ORDER — BLOOD GLUCOSE CONTROL HIGH,LOW
EACH MISCELLANEOUS
OUTPATIENT
Start: 2020-05-22

## 2020-05-22 RX ORDER — CYCLOBENZAPRINE HCL 10 MG
10 TABLET ORAL NIGHTLY PRN
Qty: 30 TABLET | Refills: 3 | OUTPATIENT
Start: 2020-05-22

## 2020-05-22 RX ORDER — POTASSIUM CHLORIDE 1500 MG/1
20 TABLET, FILM COATED, EXTENDED RELEASE ORAL DAILY
Qty: 90 TABLET | Refills: 3 | OUTPATIENT
Start: 2020-05-22

## 2020-05-22 RX ORDER — ALPRAZOLAM 1 MG/1
TABLET ORAL
OUTPATIENT
Start: 2020-05-22

## 2020-05-22 RX ORDER — ONDANSETRON HYDROCHLORIDE 8 MG/1
8 TABLET, FILM COATED ORAL EVERY 8 HOURS PRN
Qty: 90 TABLET | Refills: 3 | OUTPATIENT
Start: 2020-05-22

## 2020-05-22 RX ORDER — HYDROCODONE BITARTRATE AND ACETAMINOPHEN 10; 325 MG/1; MG/1
TABLET ORAL
OUTPATIENT
Start: 2020-05-22

## 2020-05-22 RX ORDER — LANCETS
EACH MISCELLANEOUS
Qty: 100 EACH | OUTPATIENT
Start: 2020-05-22

## 2020-05-22 RX ORDER — METOPROLOL SUCCINATE 100 MG/1
100 TABLET, EXTENDED RELEASE ORAL DAILY
Qty: 90 TABLET | Refills: 3 | OUTPATIENT
Start: 2020-05-22

## 2020-05-22 NOTE — TELEPHONE ENCOUNTER
PT called, states she received a notification from her pharmacy ([facility]) that new scripts were due for her medications. Attempted to review PT's medication list with her, but she insisted on all medications. Some of PT's medications are controlled substances. Please advise/call Joan with any questions or concerns: [phone].    Confirmed [facility]  [facility] [address] - [phone] [facility] [phone] FX

## 2020-06-01 ENCOUNTER — LAB (OUTPATIENT)
Dept: LAB | Facility: HOSPITAL | Age: 55
End: 2020-06-01

## 2020-06-01 DIAGNOSIS — E83.51 HYPOCALCEMIA: ICD-10-CM

## 2020-06-01 LAB
ALBUMIN SERPL-MCNC: 4.3 G/DL (ref 3.5–5.2)
ALBUMIN/GLOB SERPL: 1.5 G/DL
ALP SERPL-CCNC: 69 U/L (ref 39–117)
ALT SERPL W P-5'-P-CCNC: 20 U/L (ref 1–33)
ANION GAP SERPL CALCULATED.3IONS-SCNC: 16.8 MMOL/L (ref 5–15)
AST SERPL-CCNC: 22 U/L (ref 1–32)
BILIRUB SERPL-MCNC: 0.2 MG/DL (ref 0.2–1.2)
BUN BLD-MCNC: 8 MG/DL (ref 6–20)
BUN/CREAT SERPL: 11 (ref 7–25)
CALCIUM SPEC-SCNC: 7.7 MG/DL (ref 8.6–10.5)
CALCIUM SPEC-SCNC: 7.8 MG/DL (ref 8.6–10.5)
CHLORIDE SERPL-SCNC: 102 MMOL/L (ref 98–107)
CO2 SERPL-SCNC: 24.2 MMOL/L (ref 22–29)
CREAT BLD-MCNC: 0.73 MG/DL (ref 0.57–1)
GFR SERPL CREATININE-BSD FRML MDRD: 100 ML/MIN/1.73
GLOBULIN UR ELPH-MCNC: 2.9 GM/DL
GLUCOSE BLD-MCNC: 160 MG/DL (ref 65–99)
MAGNESIUM SERPL-MCNC: 2.3 MG/DL (ref 1.6–2.6)
PHOSPHATE SERPL-MCNC: 5.8 MG/DL (ref 2.5–4.5)
POTASSIUM BLD-SCNC: 4.3 MMOL/L (ref 3.5–5.2)
PROT SERPL-MCNC: 7.2 G/DL (ref 6–8.5)
PTH-INTACT SERPL-MCNC: 8 PG/ML (ref 15–65)
SODIUM BLD-SCNC: 143 MMOL/L (ref 136–145)

## 2020-06-01 PROCEDURE — 83735 ASSAY OF MAGNESIUM: CPT

## 2020-06-01 PROCEDURE — 84100 ASSAY OF PHOSPHORUS: CPT

## 2020-06-01 PROCEDURE — 83970 ASSAY OF PARATHORMONE: CPT

## 2020-06-01 PROCEDURE — 80053 COMPREHEN METABOLIC PANEL: CPT

## 2020-06-01 PROCEDURE — 82310 ASSAY OF CALCIUM: CPT

## 2020-06-03 RX ORDER — CLONIDINE HYDROCHLORIDE 0.2 MG/1
TABLET ORAL
Qty: 60 TABLET | Refills: 0 | OUTPATIENT
Start: 2020-06-03

## 2020-06-03 RX ORDER — CLONIDINE HYDROCHLORIDE 0.3 MG/1
0.3 TABLET ORAL EVERY 12 HOURS
Qty: 180 TABLET | Refills: 3 | Status: SHIPPED | OUTPATIENT
Start: 2020-06-03 | End: 2021-01-28

## 2020-06-04 ENCOUNTER — TELEPHONE (OUTPATIENT)
Dept: FAMILY MEDICINE CLINIC | Facility: CLINIC | Age: 55
End: 2020-06-04

## 2020-06-04 RX ORDER — CALCIUM ACETATE 667 MG/1
667 CAPSULE ORAL 2 TIMES DAILY
Qty: 180 CAPSULE | Refills: 2 | Status: SHIPPED | OUTPATIENT
Start: 2020-06-04 | End: 2020-10-16

## 2020-06-04 NOTE — TELEPHONE ENCOUNTER
----- Message from Brad Arnold MD sent at 6/2/2020  2:28 PM CDT -----  Please call pt     Pt has low PTH and primary hypoparathyroidism related to pt pervious thyroid surgery.  Calcium is low at 7.8     Please verify if pt is taking calcium supplements and at what dosage. Also pt needs to be taking vitamin D once a week    Let me know what pt is taking and may need to adjust. Thanks

## 2020-06-04 NOTE — TELEPHONE ENCOUNTER
Gave results and recommendations. Pt stated she is taking Calcium 667 daily, Vitamin D 600 daily, and Vitamin D 50,000 weekly.

## 2020-07-01 RX ORDER — ATORVASTATIN CALCIUM 20 MG/1
TABLET, FILM COATED ORAL
Qty: 90 TABLET | Refills: 2 | Status: SHIPPED | OUTPATIENT
Start: 2020-07-01 | End: 2020-12-30 | Stop reason: SDUPTHER

## 2020-07-01 RX ORDER — ESCITALOPRAM OXALATE 20 MG/1
20 TABLET ORAL DAILY
Qty: 90 TABLET | Refills: 1 | Status: SHIPPED | OUTPATIENT
Start: 2020-07-01 | End: 2021-01-11

## 2020-07-01 RX ORDER — CLONIDINE HYDROCHLORIDE 0.2 MG/1
TABLET ORAL
Qty: 180 TABLET | Refills: 2 | Status: SHIPPED | OUTPATIENT
Start: 2020-07-01 | End: 2021-08-25

## 2020-07-16 RX ORDER — BLOOD GLUCOSE CONTROL HIGH,LOW
EACH MISCELLANEOUS
Qty: 1 EACH | Refills: 0 | Status: SHIPPED | OUTPATIENT
Start: 2020-07-16 | End: 2021-08-12

## 2020-07-16 RX ORDER — BLOOD-GLUCOSE METER
EACH MISCELLANEOUS
Qty: 1 KIT | Refills: 0 | Status: SHIPPED | OUTPATIENT
Start: 2020-07-16 | End: 2020-10-14 | Stop reason: SDUPTHER

## 2020-07-16 RX ORDER — LANCETS
EACH MISCELLANEOUS
Qty: 100 EACH | Refills: 3 | Status: SHIPPED | OUTPATIENT
Start: 2020-07-16 | End: 2020-10-14 | Stop reason: SDUPTHER

## 2020-08-05 RX ORDER — SITAGLIPTIN 100 MG/1
TABLET, FILM COATED ORAL
Qty: 30 TABLET | Refills: 2 | Status: SHIPPED | OUTPATIENT
Start: 2020-08-05 | End: 2020-11-30

## 2020-08-17 RX ORDER — ERGOCALCIFEROL 1.25 MG/1
50000 CAPSULE ORAL
Qty: 12 CAPSULE | Refills: 3 | Status: SHIPPED | OUTPATIENT
Start: 2020-08-17 | End: 2021-02-22 | Stop reason: SDUPTHER

## 2020-10-13 ENCOUNTER — TELEPHONE (OUTPATIENT)
Dept: FAMILY MEDICINE CLINIC | Facility: CLINIC | Age: 55
End: 2020-10-13

## 2020-10-13 NOTE — TELEPHONE ENCOUNTER
NEEDS REFILL ON Accu-Chek Softclix Lancets lancets    Blood Glucose Monitoring Suppl (ACCU-CHEK ABRAHAM PLUS) w/Device kit    [facility] - [place], [place] - [address] AT [facility] [address] - [phone]  - [phone] FX

## 2020-10-14 RX ORDER — LANCETS
EACH MISCELLANEOUS
Qty: 100 EACH | Refills: 3 | Status: SHIPPED | OUTPATIENT
Start: 2020-10-14 | End: 2021-02-26 | Stop reason: SDUPTHER

## 2020-10-14 RX ORDER — BLOOD-GLUCOSE METER
EACH MISCELLANEOUS
Qty: 1 KIT | Refills: 1 | Status: SHIPPED | OUTPATIENT
Start: 2020-10-14 | End: 2021-08-25 | Stop reason: SDUPTHER

## 2020-10-15 RX ORDER — BLOOD SUGAR DIAGNOSTIC
STRIP MISCELLANEOUS
Qty: 300 EACH | Refills: 1 | Status: SHIPPED | OUTPATIENT
Start: 2020-10-15 | End: 2021-02-22 | Stop reason: SDUPTHER

## 2020-10-15 NOTE — TELEPHONE ENCOUNTER
Caller: Joan Blackwell    Relationship: Self    Best call back number: [phone]    Medication needed:   Requested Prescriptions     Pending Prescriptions Disp Refills   • glucose blood (Accu-Chek Yany Plus) test strip 300 each 1     Sig: Use to check sugars three times a day. E11.8 diabetes.       When do you need the refill by: 10/15/2020    Does the patient have less than a 3 day supply:  [x] Yes  [] No    What is the patient's preferred pharmacy: [facility] [address] AT [facility] [address] - [phone] [facility] [phone] FX

## 2020-10-16 RX ORDER — CALCIUM ACETATE 667 MG/1
CAPSULE ORAL
Qty: 270 CAPSULE | Refills: 0 | Status: SHIPPED | OUTPATIENT
Start: 2020-10-16 | End: 2021-01-18

## 2020-10-29 ENCOUNTER — TELEPHONE (OUTPATIENT)
Dept: FAMILY MEDICINE CLINIC | Facility: CLINIC | Age: 55
End: 2020-10-29

## 2020-11-10 NOTE — PROGRESS NOTES
Subjective:  Joan Blackwell is a 55 y.o. female who presents for diabetes type 2       Patient Active Problem List   Diagnosis   • Hypocalcemia   • Hyperlipidemia   • Iron deficiency anemia   • Hypothyroidism   • S/P thyroidectomy   • Uncontrolled type 2 diabetes mellitus with complication, without long-term current use of insulin (CMS/[facility])   • Obesity   • Uncontrolled hypertension   • Hypertensive urgency   • Right wrist pain   • Carpal tunnel syndrome of right wrist   • Controlled type 2 diabetes mellitus with complication, without long-term current use of insulin (CMS/[facility])   • Essential hypertension   • Vitamin D deficiency   • Sprain of left ankle   • Grieving   • Class 2 obesity with body mass index (BMI) of 37.0 to 37.9 in adult   • Rotator cuff syndrome of right shoulder   • Hypotension   • Bilateral low back pain with bilateral sciatica   • Fall   • Right shoulder pain           Current Outpatient Medications:   •  Accu-Chek Softclix Lancets lancets, Use to check sugars three times a day. E11.8 diabetes., Disp: 100 each, Rfl: 3  •  Alcohol Swabs (B-D SINGLE USE SWABS REGULAR) pads, , Disp: , Rfl:   •  ALPRAZolam (XANAX) 1 MG tablet, , Disp: , Rfl:   •  amLODIPine (Norvasc) 5 MG tablet, Take 1 tablet by mouth Daily., Disp: 90 tablet, Rfl: 1  •  ARIPiprazole (ABILIFY) 30 MG tablet, , Disp: , Rfl:   •  atorvastatin (LIPITOR) 20 MG tablet, TAKE ONE TABLET BY MOUTH DAILY, Disp: 90 tablet, Rfl: 2  •  Blood Glucose Calibration (ACCU-CHEK YANY) solution, Use to check sugars three times a day. E11.8 diabetes., Disp: 1 each, Rfl: 0  •  Blood Glucose Monitoring Suppl (Accu-Chek Yany Plus) w/Device kit, Use to check sugars three times a day. E11.8 diabetes., Disp: 1 kit, Rfl: 1  •  BUPROPION HCL PO, Take  by mouth., Disp: , Rfl:   •  buPROPion XL (WELLBUTRIN XL) 300 MG 24 hr tablet, , Disp: , Rfl:   •  calcium acetate (PHOS BINDER,) 667 MG capsule capsule, TAKE ONE CAPSULE BY MOUTH THREE TIMES A DAY, Disp:  270 capsule, Rfl: 0  •  calcium carbonate (OS-KASSY) 600 MG tablet, Take 1 tablet by mouth Daily., Disp: 90 tablet, Rfl: 1  •  cloNIDine (CATAPRES) 0.2 MG tablet, TAKE ONE TABLET BY MOUTH TWICE A DAY, Disp: 180 tablet, Rfl: 2  •  cloNIDine (CATAPRES) 0.3 MG tablet, Take 1 tablet by mouth Every 12 (Twelve) Hours., Disp: 180 tablet, Rfl: 3  •  cyclobenzaprine (FLEXERIL) 10 MG tablet, Take 1 tablet by mouth At Night As Needed for Muscle Spasms., Disp: 30 tablet, Rfl: 3  •  Empagliflozin (JARDIANCE) 25 MG tablet, Take 25 mg by mouth Daily., Disp: 90 tablet, Rfl: 3  •  escitalopram (Lexapro) 20 MG tablet, Take 1 tablet by mouth Daily., Disp: 90 tablet, Rfl: 1  •  ferrous sulfate 325 (65 FE) MG tablet, Take 1 tablet by mouth 3 (Three) Times a Day With Meals., Disp: 270 tablet, Rfl: 3  •  fluconazole (DIFLUCAN) 150 MG tablet, Take 1 tablet now and in 72 hours if symptoms develop, Disp: 2 tablet, Rfl: 0  •  fluticasone-salmeterol (Advair Diskus) 250-50 MCG/DOSE DISKUS, Inhale 1 puff 2 (Two) Times a Day., Disp: 180 each, Rfl: 1  •  glucose blood (Accu-Chek Yany Plus) test strip, Use to check sugars three times a day. E11.8 diabetes., Disp: 300 each, Rfl: 1  •  HYDROcodone-acetaminophen (NORCO)  MG per tablet, , Disp: , Rfl:   •  JANUVIA 100 MG tablet, TAKE ONE TABLET BY MOUTH DAILY, Disp: 30 tablet, Rfl: 2  •  lamoTRIgine (LaMICtal) 200 MG tablet, , Disp: , Rfl:   •  levothyroxine (SYNTHROID, LEVOTHROID) 150 MCG tablet, Take 1 tablet by mouth Daily., Disp: 90 tablet, Rfl: 3  •  lisinopril (PRINIVIL,ZESTRIL) 40 MG tablet, Take 1 tablet by mouth Daily., Disp: 90 tablet, Rfl: 3  •  Magnesium 250 MG tablet, Take 250 mg by mouth Daily., Disp: 90 each, Rfl: 3  •  metFORMIN (GLUCOPHAGE) 1000 MG tablet, Take 1 tablet by mouth 2 (Two) Times a Day With Meals., Disp: 180 tablet, Rfl: 3  •  methocarbamol (ROBAXIN) 500 MG tablet, , Disp: , Rfl:   •  metoprolol succinate XL (TOPROL XL) 100 MG 24 hr tablet, Take 1 tablet by mouth  Daily., Disp: 90 tablet, Rfl: 3  •  ondansetron (ZOFRAN) 8 MG tablet, Take 1 tablet by mouth Every 8 (Eight) Hours As Needed for Nausea or Vomiting., Disp: 90 tablet, Rfl: 3  •  potassium chloride ER (K-TAB) 20 MEQ tablet controlled-release ER tablet, Take 1 tablet by mouth Daily., Disp: 90 tablet, Rfl: 3  •  traZODone (DESYREL) 100 MG tablet, , Disp: , Rfl:   •  vitamin D (ERGOCALCIFEROL) 1.25 MG (05102 UT) capsule capsule, Take 1 capsule by mouth Every 7 (Seven) Days., Disp: 12 capsule, Rfl: 3    HPI        Pt is 55 yo female with management  of obesity, HLP sp thyroidectomy in  , postoperative hypothyroidism,  Vitamin D deficiency, HTN,  DM type 2, history of hypertensive urgency, iron deficiency anemia, sp hysterectomy, sp ovarian cyst removal, Sp  X 3, carpal tunnel syndrome right wrist. Grieving, chronic back pain. currenntly in pain managmment, hypocalcemia        3/2/20 pt is here for recheck. On last visit pt was having pain on right wrist, carpal tunnel. Along with pain on right shoulder. MRI of shoulder done on 20 showed moderate AC Degenerative changes. She was referred to Orthopedic with Dr. Mckinney on 20  For her right wrist pain, numbnss and ginglin on right arm. Rotator cuff syndrome of right shoulder and EMG studies was ordered. Also had labwork done on 20 that showed normal Vitamin B12 and Vitamin D. Thyroid studiesn ormal. Lipid panel showed LDL at 73 with HDL at 35. hga1c is at 7.40.  CMP shows low calcium. GFR is at 115 and liver enzymes normal. CBC shows stable hemolgoibn at 11.7 . She conitnues to have right shoulder pain. And hand pain. She also recently fell at [employer] on 20. She went to ER.  She had x-ray of back and CT fo head.   Per patient was results were normal . She was advised to followup with PCP and get another MRI of shoulder and of her back. She also hurt her right shoulder after the fall.  She continues to see Pain Managmeent and take  Norco and flexeril. BP on lower side today. No chest pain no dizzines    11/13/20 in office visit for recheck on pt's above medical issues. Pt states she is doing well overall. She is doing well on medications.  She continues to take her medicaitons for DM type 2,, HLP, HTN, hypothyroidism        Diabetes   She presents for her  followup  diabetic visit. She has type 2 diabetes mellitus. Her disease course has been stable. Hypoglycemia symptoms include dizziness and headaches. Pertinent negatives for hypoglycemia include no confusion, hunger, mood changes, nervousness/anxiousness, pallor, seizures, sleepiness, speech difficulty, sweats or tremors. Pertinent negatives for diabetes include no blurred vision, no chest pain, no fatigue, no foot paresthesias, no foot ulcerations, no polydipsia, no polyphagia, no polyuria, no visual change, no weakness and no weight loss. Pertinent negatives for hypoglycemia complications include no blackouts, no hospitalization, no nocturnal hypoglycemia, no required assistance and no required glucagon injection. Pertinent negatives for diabetic complications include no autonomic neuropathy, CVA, heart disease, impotence, nephropathy, peripheral neuropathy, PVD or retinopathy. Risk factors for coronary artery disease include diabetes mellitus, sedentary lifestyle and obesity. Current diabetic treatment includes oral agent (monotherapy). Her weight is stable. She is following a generally healthy diet. She has not had a previous visit with a dietitian. She never participates in exercise. An ACE inhibitor/angiotensin II receptor blocker is being taken. She does not see a podiatrist.Eye exam is not current.   Hypertension   This is a chronic problem. The current episode started more than 1 year ago. The problem has been gradually improving since onset. The problem is uncontrolled. Associated symptoms include headaches, neck pain and shortness of breath. Pertinent negatives include  no anxiety, blurred vision, chest pain, malaise/fatigue, orthopnea, palpitations, peripheral edema, PND or sweats. There are no associated agents to hypertension. Risk factors for coronary artery disease include diabetes mellitus and dyslipidemia. Current antihypertension treatment includes beta blockers. The current treatment provides no improvement. There are no compliance problems.  There is no history of angina, kidney disease, CAD/MI, CVA, heart failure, left ventricular hypertrophy, PVD, retinopathy or a thyroid problem. There is no history of chronic renal disease, coarctation of the aorta, hyperaldosteronism, hypercortisolism, hyperparathyroidism, a hypertension causing med, pheochromocytoma or sleep apnea.   Hypothyroidism   This is a chronic problem. The current episode started more than 1 year ago. The problem occurs daily. The problem has been unchanged. Associated symptoms include arthralgias, headaches, myalgias, nausea, neck pain, numbness and vomiting. Pertinent negatives include no abdominal pain, anorexia, change in bowel habit, chest pain, chills, congestion, coughing, diaphoresis, fatigue, fever, joint swelling, rash, sore throat, swollen glands, urinary symptoms, vertigo, visual change or weakness. Nothing aggravates the symptoms. Treatments tried: synthroid. The treatment provided mild relief.     Review of Systems  Review of Systems   Constitutional: Positive for activity change and fatigue. Negative for appetite change, chills, diaphoresis and fever.   HENT: Negative for congestion, postnasal drip, rhinorrhea, sinus pressure, sinus pain, sneezing, sore throat, trouble swallowing and voice change.    Respiratory: Negative for cough, choking, chest tightness, shortness of breath, wheezing and stridor.    Cardiovascular: Negative for chest pain.   Gastrointestinal: Negative for diarrhea, nausea and vomiting.   Musculoskeletal: Positive for arthralgias.   Neurological: Positive for weakness and  numbness. Negative for headaches.   Psychiatric/Behavioral: The patient is nervous/anxious.         Depressed mood        Patient Active Problem List   Diagnosis   • Hypocalcemia   • Hyperlipidemia   • Iron deficiency anemia   • Hypothyroidism   • S/P thyroidectomy   • Uncontrolled type 2 diabetes mellitus with complication, without long-term current use of insulin (CMS/[facility])   • Obesity   • Uncontrolled hypertension   • Hypertensive urgency   • Right wrist pain   • Carpal tunnel syndrome of right wrist   • Controlled type 2 diabetes mellitus with complication, without long-term current use of insulin (CMS/[facility])   • Essential hypertension   • Vitamin D deficiency   • Sprain of left ankle   • Grieving   • Class 2 obesity with body mass index (BMI) of 37.0 to 37.9 in adult   • Rotator cuff syndrome of right shoulder   • Hypotension   • Bilateral low back pain with bilateral sciatica   • Fall   • Right shoulder pain     Past Surgical History:   Procedure Laterality Date   •  SECTION  01/14/1993    x 3, 84, 82,   • EXPLORATORY LAPAROTOMY Left 1998    Left cystectomy. Partial resection of vthe left ovary. Left ovary- oversewn   • INJECTION OF MEDICATION  2014    Depo Medrol (Methylprednisone) (1)    • INJECTION OF MEDICATION  2015    Kenalog (1)     • LAPAROSCOPIC HYSTERECTOMY  1995    Surgical, lysis of adhesions.Laparoscopic Assisted vaginal Hysterectomy (Tyler/ Doderlein Technique) marsupialization of right Bartholin's Gland Cyst   • LASER ABLATION OF THE CERVIX  1985    Laser vaporization, cervical cone   • OVARIAN CYST REMOVAL     • PAP SMEAR     • THYROIDECTOMY  2006    with partial parathyroidectomy     Social History     Socioeconomic History   • Marital status:      Spouse name: Not on file   • Number of children: Not on file   • Years of education: Not on file   • Highest education level: Not on file   Tobacco Use   • Smoking status: Former  Smoker   • Smokeless tobacco: Never Used   Substance and Sexual Activity   • Alcohol use: No   • Drug use: No   • Sexual activity: Defer     Family History   Problem Relation Age of Onset   • Diabetes Mother    • Hypertension Mother    • Diabetes Father    • Arthritis Father      Lab on 06/01/2020   Component Date Value Ref Range Status   • Glucose 06/01/2020 160* 65 - 99 mg/dL Final   • BUN 06/01/2020 8  6 - 20 mg/dL Final   • Creatinine 06/01/2020 0.73  0.57 - 1.00 mg/dL Final   • Sodium 06/01/2020 143  136 - 145 mmol/L Final   • Potassium 06/01/2020 4.3  3.5 - 5.2 mmol/L Final   • Chloride 06/01/2020 102  98 - 107 mmol/L Final   • CO2 06/01/2020 24.2  22.0 - 29.0 mmol/L Final   • Calcium 06/01/2020 7.8* 8.6 - 10.5 mg/dL Final   • Total Protein 06/01/2020 7.2  6.0 - 8.5 g/dL Final   • Albumin 06/01/2020 4.30  3.50 - 5.20 g/dL Final   • ALT (SGPT) 06/01/2020 20  1 - 33 U/L Final   • AST (SGOT) 06/01/2020 22  1 - 32 U/L Final   • Alkaline Phosphatase 06/01/2020 69  39 - 117 U/L Final   • Total Bilirubin 06/01/2020 0.2  0.2 - 1.2 mg/dL Final   • eGFR  African Amer 06/01/2020 100  >60 mL/min/1.73 Final   • Globulin 06/01/2020 2.9  gm/dL Final   • A/G Ratio 06/01/2020 1.5  g/dL Final   • BUN/Creatinine Ratio 06/01/2020 11.0  7.0 - 25.0 Final   • Anion Gap 06/01/2020 16.8* 5.0 - 15.0 mmol/L Final   • PTH, Intact 06/01/2020 8.0* 15.0 - 65.0 pg/mL Final   • Calcium 06/01/2020 7.7* 8.6 - 10.5 mg/dL Final   • Phosphorus 06/01/2020 5.8* 2.5 - 4.5 mg/dL Final   • Magnesium 06/01/2020 2.3  1.6 - 2.6 mg/dL Final      XR Wrist 3+ View Right  3 views right wrist without comparison    Overall no obvious acute findings.  There is a little spurring off the   lateral edge of the scaphoid that may be reactive radial scaphoid   arthritis.  A little calcification seen best on the lateral at the CMC   joint with minimal subluxation consistent with mild CMC joint arthritis.    Otherwise no acute findings are noted.    Impression: Possible  "moderate CMC arthritis with calcification and no   significant subluxation.  Spurring off scaphoid appears chronic may be   related to either old injury versus radial scaphoid impingement.    [unfilled]  Immunization History   Administered Date(s) Administered   • Pneumococcal Polysaccharide (PPSV23) 03/27/2018   • Tdap 03/27/2018       The following portions of the patient's history were reviewed and updated as appropriate: allergies, current medications, past family history, past medical history, past social history, past surgical history and problem list.        Physical Exam  Ht 152.4 cm (60\")   BMI 36.68 kg/m²     Physical Exam  Vitals signs and nursing note reviewed.   Constitutional:       Appearance: She is well-developed.   Neurological:      Mental Status: She is alert.   Psychiatric:         Mood and Affect: Mood normal.         Behavior: Behavior normal.         Assessment/Plan    Diagnosis Plan   1. Uncontrolled type 2 diabetes mellitus with complication, without long-term current use of insulin (CMS/[facility])  CBC Auto Differential    Comprehensive Metabolic Panel    Hemoglobin A1c    Lipid Panel    TSH    T4, Free    T3, Free    Vitamin D 25 Hydroxy    Vitamin B12    Iron Profile    Ferritin    Microalbumin / Creatinine Urine Ratio - Urine, Clean Catch   2. S/P thyroidectomy  CBC Auto Differential    Comprehensive Metabolic Panel    Hemoglobin A1c    Lipid Panel    TSH    T4, Free    T3, Free    Vitamin D 25 Hydroxy    Vitamin B12    Iron Profile    Ferritin   3. Postoperative hypothyroidism  CBC Auto Differential    Comprehensive Metabolic Panel    Hemoglobin A1c    Lipid Panel    TSH    T4, Free    T3, Free    Vitamin D 25 Hydroxy    Vitamin B12    Iron Profile    Ferritin   4. Iron deficiency anemia, unspecified iron deficiency anemia type  CBC Auto Differential    Comprehensive Metabolic Panel    Hemoglobin A1c    Lipid Panel    TSH    T4, Free    T3, Free    Vitamin D 25 Hydroxy    Vitamin B12    " Iron Profile    Ferritin   5. Mixed hyperlipidemia  CBC Auto Differential    Comprehensive Metabolic Panel    Hemoglobin A1c    Lipid Panel    TSH    T4, Free    T3, Free    Vitamin D 25 Hydroxy    Vitamin B12    Iron Profile    Ferritin   6. Hypocalcemia  CBC Auto Differential    Comprehensive Metabolic Panel    Hemoglobin A1c    Lipid Panel    TSH    T4, Free    T3, Free    Vitamin D 25 Hydroxy    Vitamin B12    Iron Profile    Ferritin   7. Essential hypertension  CBC Auto Differential    Comprehensive Metabolic Panel    Hemoglobin A1c    Lipid Panel    TSH    T4, Free    T3, Free    Vitamin D 25 Hydroxy    Vitamin B12    Iron Profile    Ferritin   8. Vitamin D deficiency  CBC Auto Differential    Comprehensive Metabolic Panel    Hemoglobin A1c    Lipid Panel    TSH    T4, Free    T3, Free    Vitamin D 25 Hydroxy    Vitamin B12    Iron Profile    Ferritin            -recmomend labwork   -recommend shingles vaccination  -reocmmend influenza vaccination  -recommend DEXA scan  -recommend diabetic eye exam   -had discussion with pt about taking x[facility] and [facility] together. [facility] Discussed that both medication can increase mortality risk. Pt states she will stop xanax   -right wrist pain/right shoulder pain/carpal tunnel of right hand   - Orthopedic Following. Had recent MRI. Low suspicion for tear of rotator cuff. Pt offered injection but declined.   Has upcomign EMG study. On neurontin for pain   -hypocalcemia  - calcium carbonate 600 mg PO BID.   -recommend colonoscopy - pt declined colonoscopy screening will get cologuard  -recommend mammogram screening - will schedule at imaging center   - DM type 2-  Will continue on metformin 500  But will increase to 1000 mg pO BID. Last hga1c at 7.4.  Start on jardiance 25 mg daily. Drug infromation provided. Side effects discussed . Advised to drink with a lot of water. Samples given today.    -hypertension -  lower today  On last visit clonidine increased  from 0.2 to 0.3 mg PO BID.  Continue on Toprol XL but go up from 100 mg daily and lisionpril 40 mg daily . norvasc from 10 to 5 mg daily.  Advised pt to bring BP log on next visit. Refilled BP medication today . BP at goal on visit.. Continue    - Obesity BMI >30 . - recommended weight loss information and DASH diet. Counseled weight loss >5 minuutes  BMI at 37.0   - hyperlipidemia - HDL low. REcommended high healthy fat diet stop simvastatin and start on lipitor 20 mg PO qhs. Drug information provided   - hypothyroidism/spt hyroidectomy -- increased synthroid from 125 to 150 mcg PO q daily.  Recheck thyroid studies in 6 weeks  - Vitamin D deficiency -rincrease Vitamin D from 1000 once a day to 50,000 once a week    - depression/grieving  - on lexapro from 10 to 20 mg daily. Will refer to counseling   - iron deficiency anemia - recheck iron level and ferritin. Continue with iron pill. Last iron levels normal  -advised to go to ER or call 911 if symptoms or severe  -advised to be safe and call with questions and concerns   -advised to followup with specialist and referrals   -adivsed pt to be safe during COVID-19 pandemic  This visit has been rescheduled as a phone visit to comply with patient safety concerns in accordance with CDC recommendations. Total time of discussion was 25  Minutes.  -recheck in 6 weeks             This document has been electronically signed by Brad Arnold MD on November 13, 2020 13:07 CST

## 2020-11-13 ENCOUNTER — OFFICE VISIT (OUTPATIENT)
Dept: FAMILY MEDICINE CLINIC | Facility: CLINIC | Age: 55
End: 2020-11-13

## 2020-11-13 VITALS — HEIGHT: 60 IN | BODY MASS INDEX: 36.68 KG/M2

## 2020-11-13 DIAGNOSIS — D50.9 IRON DEFICIENCY ANEMIA, UNSPECIFIED IRON DEFICIENCY ANEMIA TYPE: ICD-10-CM

## 2020-11-13 DIAGNOSIS — I10 ESSENTIAL HYPERTENSION: ICD-10-CM

## 2020-11-13 DIAGNOSIS — E55.9 VITAMIN D DEFICIENCY: ICD-10-CM

## 2020-11-13 DIAGNOSIS — E89.0 POSTOPERATIVE HYPOTHYROIDISM: ICD-10-CM

## 2020-11-13 DIAGNOSIS — E83.51 HYPOCALCEMIA: Primary | ICD-10-CM

## 2020-11-13 DIAGNOSIS — IMO0002 UNCONTROLLED TYPE 2 DIABETES MELLITUS WITH COMPLICATION, WITHOUT LONG-TERM CURRENT USE OF INSULIN: ICD-10-CM

## 2020-11-13 DIAGNOSIS — E78.2 MIXED HYPERLIPIDEMIA: ICD-10-CM

## 2020-11-13 DIAGNOSIS — E89.0 S/P THYROIDECTOMY: ICD-10-CM

## 2020-11-13 PROCEDURE — 99423 OL DIG E/M SVC 21+ MIN: CPT | Performed by: FAMILY MEDICINE

## 2020-11-13 RX ORDER — TRAZODONE HYDROCHLORIDE 100 MG/1
TABLET ORAL
COMMUNITY
Start: 2020-09-29 | End: 2021-08-25 | Stop reason: SDUPTHER

## 2020-11-13 RX ORDER — ARIPIPRAZOLE 30 MG/1
30 TABLET ORAL DAILY PRN
COMMUNITY
Start: 2020-09-29

## 2020-11-13 RX ORDER — LAMOTRIGINE 200 MG/1
100 TABLET ORAL DAILY PRN
COMMUNITY
Start: 2020-09-29

## 2020-11-13 RX ORDER — BUPROPION HYDROCHLORIDE 300 MG/1
300 TABLET ORAL DAILY PRN
COMMUNITY
Start: 2020-09-29

## 2020-11-30 RX ORDER — SITAGLIPTIN 100 MG/1
TABLET, FILM COATED ORAL
Qty: 90 TABLET | Refills: 1 | Status: SHIPPED | OUTPATIENT
Start: 2020-11-30 | End: 2021-02-22 | Stop reason: SDUPTHER

## 2020-12-22 ENCOUNTER — TELEPHONE (OUTPATIENT)
Dept: FAMILY MEDICINE CLINIC | Facility: CLINIC | Age: 55
End: 2020-12-22

## 2020-12-29 ENCOUNTER — TELEPHONE (OUTPATIENT)
Dept: FAMILY MEDICINE CLINIC | Facility: CLINIC | Age: 55
End: 2020-12-29

## 2020-12-29 DIAGNOSIS — M54.41 CHRONIC MIDLINE LOW BACK PAIN WITH BILATERAL SCIATICA: Primary | ICD-10-CM

## 2020-12-29 DIAGNOSIS — M54.42 CHRONIC MIDLINE LOW BACK PAIN WITH BILATERAL SCIATICA: Primary | ICD-10-CM

## 2020-12-29 DIAGNOSIS — G89.29 CHRONIC MIDLINE LOW BACK PAIN WITH BILATERAL SCIATICA: Primary | ICD-10-CM

## 2020-12-29 NOTE — TELEPHONE ENCOUNTER
Please call patient about her referral to pain management no body is answering  their phone  Calling since Dec 14

## 2020-12-30 RX ORDER — ATORVASTATIN CALCIUM 20 MG/1
20 TABLET, FILM COATED ORAL DAILY
Qty: 90 TABLET | Refills: 0 | Status: SHIPPED | OUTPATIENT
Start: 2020-12-30 | End: 2021-03-30

## 2020-12-30 NOTE — TELEPHONE ENCOUNTER
Caller: [facility] [place] [id number] [address] [facility] [phone] [place] [phone] FX    Relationship: Pharmacy      Medication needed:   Requested Prescriptions     Pending Prescriptions Disp Refills   • atorvastatin (LIPITOR) 20 MG tablet 90 tablet 2     Sig: Take 1 tablet by mouth Daily.       When do you need the refill by: NEED NEW SCRIPT    What details did the patient provide when requesting the medication: IS THIS THE CORRECT BP MEDICINE?    What is the patient's preferred pharmacy: [facility] [address] [facility]  [phone] [place] [phone] FX

## 2021-01-11 RX ORDER — ESCITALOPRAM OXALATE 20 MG/1
TABLET ORAL
Qty: 16 TABLET | Refills: 0 | Status: SHIPPED | OUTPATIENT
Start: 2021-01-11 | End: 2021-02-22 | Stop reason: SDUPTHER

## 2021-01-18 RX ORDER — CALCIUM ACETATE 667 MG/1
CAPSULE ORAL
Qty: 270 CAPSULE | Refills: 0 | Status: SHIPPED | OUTPATIENT
Start: 2021-01-18 | End: 2021-01-26

## 2021-01-18 RX ORDER — POTASSIUM CHLORIDE 1500 MG/1
TABLET, FILM COATED, EXTENDED RELEASE ORAL
Qty: 90 TABLET | Refills: 2 | Status: SHIPPED | OUTPATIENT
Start: 2021-01-18 | End: 2021-02-22 | Stop reason: SDUPTHER

## 2021-01-18 RX ORDER — METOPROLOL SUCCINATE 100 MG/1
TABLET, EXTENDED RELEASE ORAL
Qty: 90 TABLET | Refills: 2 | Status: SHIPPED | OUTPATIENT
Start: 2021-01-18 | End: 2021-02-22 | Stop reason: SDUPTHER

## 2021-01-25 RX ORDER — LEVOTHYROXINE SODIUM 0.15 MG/1
TABLET ORAL
Qty: 90 TABLET | Refills: 1 | Status: SHIPPED | OUTPATIENT
Start: 2021-01-25 | End: 2021-01-28

## 2021-01-25 RX ORDER — EMPAGLIFLOZIN 25 MG/1
TABLET, FILM COATED ORAL
Qty: 90 TABLET | Refills: 1 | Status: SHIPPED | OUTPATIENT
Start: 2021-01-25 | End: 2021-02-22 | Stop reason: SDUPTHER

## 2021-01-26 RX ORDER — CALCIUM ACETATE 667 MG/1
CAPSULE ORAL
Qty: 270 CAPSULE | Refills: 0 | Status: SHIPPED | OUTPATIENT
Start: 2021-01-26 | End: 2021-02-22 | Stop reason: SDUPTHER

## 2021-01-28 RX ORDER — CLONIDINE HYDROCHLORIDE 0.3 MG/1
TABLET ORAL
Qty: 180 TABLET | Refills: 0 | Status: SHIPPED | OUTPATIENT
Start: 2021-01-28 | End: 2021-04-20

## 2021-01-28 RX ORDER — LEVOTHYROXINE SODIUM 0.15 MG/1
TABLET ORAL
Qty: 90 TABLET | Refills: 0 | Status: SHIPPED | OUTPATIENT
Start: 2021-01-28 | End: 2021-02-22 | Stop reason: SDUPTHER

## 2021-02-22 RX ORDER — POTASSIUM CHLORIDE 1500 MG/1
20 TABLET, FILM COATED, EXTENDED RELEASE ORAL DAILY
Qty: 90 TABLET | Refills: 3 | Status: SHIPPED | OUTPATIENT
Start: 2021-02-22 | End: 2021-08-10

## 2021-02-22 RX ORDER — PHENOL 1.4 %
600 AEROSOL, SPRAY (ML) MUCOUS MEMBRANE DAILY
Qty: 90 TABLET | Refills: 1 | Status: SHIPPED | OUTPATIENT
Start: 2021-02-22 | End: 2021-08-25 | Stop reason: SDUPTHER

## 2021-02-22 RX ORDER — CALCIUM ACETATE 667 MG/1
667 CAPSULE ORAL 3 TIMES DAILY
Qty: 270 CAPSULE | Refills: 3 | Status: SHIPPED | OUTPATIENT
Start: 2021-02-22 | End: 2021-04-26

## 2021-02-22 RX ORDER — EMPAGLIFLOZIN 25 MG/1
1 TABLET, FILM COATED ORAL DAILY
Qty: 90 TABLET | Refills: 3 | Status: SHIPPED | OUTPATIENT
Start: 2021-02-22 | End: 2021-08-25 | Stop reason: SDUPTHER

## 2021-02-22 RX ORDER — MULTIVITAMIN WITH IRON
250 TABLET ORAL DAILY
Qty: 90 EACH | Refills: 3 | Status: SHIPPED | OUTPATIENT
Start: 2021-02-22 | End: 2021-08-25 | Stop reason: SDUPTHER

## 2021-02-22 RX ORDER — ESCITALOPRAM OXALATE 20 MG/1
20 TABLET ORAL DAILY
Qty: 16 TABLET | Refills: 3 | Status: SHIPPED | OUTPATIENT
Start: 2021-02-22 | End: 2021-02-26 | Stop reason: SDUPTHER

## 2021-02-22 RX ORDER — BLOOD SUGAR DIAGNOSTIC
STRIP MISCELLANEOUS
Qty: 300 EACH | Refills: 1 | Status: SHIPPED | OUTPATIENT
Start: 2021-02-22 | End: 2021-02-22 | Stop reason: SDUPTHER

## 2021-02-22 RX ORDER — FERROUS SULFATE 325(65) MG
1 TABLET ORAL
Qty: 270 TABLET | Refills: 3 | Status: SHIPPED | OUTPATIENT
Start: 2021-02-22 | End: 2021-08-10

## 2021-02-22 RX ORDER — LISINOPRIL 40 MG/1
40 TABLET ORAL DAILY
Qty: 90 TABLET | Refills: 3 | Status: SHIPPED | OUTPATIENT
Start: 2021-02-22 | End: 2021-04-15

## 2021-02-22 RX ORDER — ERGOCALCIFEROL 1.25 MG/1
50000 CAPSULE ORAL
Qty: 12 CAPSULE | Refills: 3 | Status: SHIPPED | OUTPATIENT
Start: 2021-02-22 | End: 2021-08-10

## 2021-02-22 RX ORDER — AMLODIPINE BESYLATE 5 MG/1
5 TABLET ORAL DAILY
Qty: 90 TABLET | Refills: 1 | Status: SHIPPED | OUTPATIENT
Start: 2021-02-22 | End: 2021-08-10

## 2021-02-22 RX ORDER — LEVOTHYROXINE SODIUM 0.15 MG/1
150 TABLET ORAL DAILY
Qty: 90 TABLET | Refills: 0 | Status: SHIPPED | OUTPATIENT
Start: 2021-02-22 | End: 2021-08-25 | Stop reason: SDUPTHER

## 2021-02-22 RX ORDER — METOPROLOL SUCCINATE 100 MG/1
100 TABLET, EXTENDED RELEASE ORAL DAILY
Qty: 90 TABLET | Refills: 2 | Status: SHIPPED | OUTPATIENT
Start: 2021-02-22 | End: 2021-08-25 | Stop reason: SDUPTHER

## 2021-02-22 RX ORDER — BLOOD SUGAR DIAGNOSTIC
STRIP MISCELLANEOUS
Qty: 300 EACH | Refills: 1 | Status: SHIPPED | OUTPATIENT
Start: 2021-02-22 | End: 2021-02-26 | Stop reason: SDUPTHER

## 2021-02-22 RX ORDER — ONDANSETRON HYDROCHLORIDE 8 MG/1
8 TABLET, FILM COATED ORAL EVERY 8 HOURS PRN
Qty: 90 TABLET | Refills: 3 | Status: SHIPPED | OUTPATIENT
Start: 2021-02-22 | End: 2021-02-26 | Stop reason: SDUPTHER

## 2021-02-22 NOTE — TELEPHONE ENCOUNTER
Pharmacy called stating pt is switching pharmacy and sent fax requesting these medications be sent in.

## 2021-02-22 NOTE — TELEPHONE ENCOUNTER
Caller: Joan Blackwell    Relationship: Self    Best call back number: [phone]      Medication needed:   Requested Prescriptions     Pending Prescriptions Disp Refills   • glucose blood (Accu-Chek Yany Plus) test strip 300 each 1     Sig: Use to check sugars three times a day. E11.8 diabetes.       When do you need the refill by: asap        Does the patient have less than a 3 day supply:  [x] Yes  [] No    What is the patient's preferred pharmacy: [facility] [place] [address] [address] AT [facility] [address] - [phone] [facility] [phone] FX

## 2021-02-26 RX ORDER — ONDANSETRON HYDROCHLORIDE 8 MG/1
8 TABLET, FILM COATED ORAL EVERY 8 HOURS PRN
Qty: 90 TABLET | Refills: 3 | Status: SHIPPED | OUTPATIENT
Start: 2021-02-26 | End: 2021-08-10

## 2021-02-26 RX ORDER — BLOOD SUGAR DIAGNOSTIC
STRIP MISCELLANEOUS
Qty: 300 EACH | Refills: 1 | Status: SHIPPED | OUTPATIENT
Start: 2021-02-26 | End: 2021-03-02

## 2021-02-26 RX ORDER — LANCETS
EACH MISCELLANEOUS
Qty: 100 EACH | Refills: 3 | Status: SHIPPED | OUTPATIENT
Start: 2021-02-26 | End: 2021-03-02

## 2021-02-26 RX ORDER — ESCITALOPRAM OXALATE 20 MG/1
20 TABLET ORAL DAILY
Qty: 16 TABLET | Refills: 3 | Status: SHIPPED | OUTPATIENT
Start: 2021-02-26 | End: 2021-03-02

## 2021-03-02 RX ORDER — ESCITALOPRAM OXALATE 20 MG/1
20 TABLET ORAL DAILY
Qty: 30 TABLET | Refills: 3 | Status: SHIPPED | OUTPATIENT
Start: 2021-03-02 | End: 2021-05-05

## 2021-03-02 RX ORDER — BLOOD SUGAR DIAGNOSTIC
STRIP MISCELLANEOUS
Qty: 100 EACH | Refills: 3 | Status: SHIPPED | OUTPATIENT
Start: 2021-03-02 | End: 2021-08-12

## 2021-03-02 RX ORDER — LANCETS 33 GAUGE
EACH MISCELLANEOUS
Qty: 100 EACH | Refills: 3 | Status: SHIPPED | OUTPATIENT
Start: 2021-03-02 | End: 2021-08-25 | Stop reason: SDUPTHER

## 2021-03-29 DIAGNOSIS — I10 ESSENTIAL HYPERTENSION: Primary | ICD-10-CM

## 2021-03-30 RX ORDER — ATORVASTATIN CALCIUM 20 MG/1
TABLET, FILM COATED ORAL
Qty: 90 TABLET | Refills: 0 | Status: SHIPPED | OUTPATIENT
Start: 2021-03-30 | End: 2021-04-12

## 2021-04-09 DIAGNOSIS — I10 ESSENTIAL HYPERTENSION: ICD-10-CM

## 2021-04-12 DIAGNOSIS — I10 ESSENTIAL HYPERTENSION: ICD-10-CM

## 2021-04-12 RX ORDER — ATORVASTATIN CALCIUM 20 MG/1
TABLET, FILM COATED ORAL
Qty: 90 TABLET | Refills: 0 | Status: SHIPPED | OUTPATIENT
Start: 2021-04-12 | End: 2021-08-10

## 2021-04-12 RX ORDER — ATORVASTATIN CALCIUM 20 MG/1
TABLET, FILM COATED ORAL
Qty: 90 TABLET | Refills: 0 | Status: SHIPPED | OUTPATIENT
Start: 2021-04-12 | End: 2021-04-12

## 2021-04-15 RX ORDER — LISINOPRIL 40 MG/1
TABLET ORAL
Qty: 90 TABLET | Refills: 0 | Status: SHIPPED | OUTPATIENT
Start: 2021-04-15 | End: 2021-05-05

## 2021-04-20 RX ORDER — CLONIDINE HYDROCHLORIDE 0.3 MG/1
TABLET ORAL
Qty: 180 TABLET | Refills: 0 | Status: SHIPPED | OUTPATIENT
Start: 2021-04-20 | End: 2021-08-10

## 2021-04-26 RX ORDER — CALCIUM ACETATE 667 MG/1
CAPSULE ORAL
Qty: 270 CAPSULE | Refills: 0 | Status: SHIPPED | OUTPATIENT
Start: 2021-04-26 | End: 2021-10-18

## 2021-05-05 RX ORDER — ESCITALOPRAM OXALATE 20 MG/1
TABLET ORAL
Qty: 90 TABLET | Refills: 0 | Status: SHIPPED | OUTPATIENT
Start: 2021-05-05 | End: 2021-08-10

## 2021-05-05 RX ORDER — LISINOPRIL 40 MG/1
TABLET ORAL
Qty: 86 TABLET | Refills: 2 | Status: SHIPPED | OUTPATIENT
Start: 2021-05-05 | End: 2021-08-25 | Stop reason: SDUPTHER

## 2021-05-24 ENCOUNTER — TELEPHONE (OUTPATIENT)
Dept: FAMILY MEDICINE CLINIC | Facility: CLINIC | Age: 56
End: 2021-05-24

## 2021-05-25 ENCOUNTER — TELEPHONE (OUTPATIENT)
Dept: FAMILY MEDICINE CLINIC | Facility: CLINIC | Age: 56
End: 2021-05-25

## 2021-05-25 RX ORDER — GABAPENTIN 300 MG/1
300 CAPSULE ORAL 3 TIMES DAILY
Qty: 42 CAPSULE | Refills: 0 | Status: SHIPPED | OUTPATIENT
Start: 2021-05-25 | End: 2021-08-25 | Stop reason: SDUPTHER

## 2021-06-02 ENCOUNTER — OFFICE VISIT (OUTPATIENT)
Dept: ORTHOPEDIC SURGERY | Facility: CLINIC | Age: 56
End: 2021-06-02

## 2021-06-02 VITALS
WEIGHT: 202.3 LBS | BODY MASS INDEX: 39.72 KG/M2 | OXYGEN SATURATION: 97 % | RESPIRATION RATE: 18 BRPM | DIASTOLIC BLOOD PRESSURE: 62 MMHG | HEART RATE: 84 BPM | HEIGHT: 60 IN | SYSTOLIC BLOOD PRESSURE: 120 MMHG

## 2021-06-02 DIAGNOSIS — M75.101 ROTATOR CUFF SYNDROME OF RIGHT SHOULDER: Primary | ICD-10-CM

## 2021-06-02 DIAGNOSIS — M75.51 SUBACROMIAL BURSITIS OF RIGHT SHOULDER JOINT: ICD-10-CM

## 2021-06-02 PROCEDURE — 99214 OFFICE O/P EST MOD 30 MIN: CPT | Performed by: ORTHOPAEDIC SURGERY

## 2021-06-02 NOTE — PROGRESS NOTES
Joan Blackwell is a 55 y.o. female   Primary provider:  Brad Arnold MD       Chief Complaint   Patient presents with   • Right Shoulder - Pain, Initial Evaluation     X-rays [facility]   HISTORY OF PRESENT ILLNESS:left shoulder pain.  Patient states she fell on 02/21/20    This is the first office visit for evaluation of pain in the right shoulder and arm.    Mrs. Blackwell is 55 years old and right-hand dominant.  She said she fell in February of 2 of 2020 landing on the lateral aspect of her right shoulder.  It is unclear if she has had problems with the shoulder in the past.  She complains of pain radiating from the apex of the shoulder into the arm worse with elevation also worse at night.  Her pain is also worse with cooking.  The pain is been relieved by use of hot water.  There is been no numbness or tingling in the limb.  Treatment has included activity restriction.  He had an MRI scan performed last year but apparently further treatment was put on hold because of the pandemic.    Home medications include Xanax Abilify Wellbutrin clonidine Lexapro Advair Lamictal Synthroid lisinopril Metformin and metoprolol among others.  She is allergic to Naprosyn.  She does not smoke.  Past medical history is remarkable for diabetes hypertension and reflux.  She is an unemployed .  She is .    Dr. Arnold has asked that I see her for evaluation and treatment.    Pain  This is a new problem. The problem occurs constantly. Associated symptoms include joint swelling. Associated symptoms comments: Grinding,aching,stabbing,bruising,swelling. The symptoms are aggravated by standing and walking (ddriving). She has tried ice and rest for the symptoms. The treatment provided no relief.        CONCURRENT MEDICAL HISTORY:    Past Medical History:   Diagnosis Date   • Abdominal pain     suspect Kidney Stone      • Acquired hypothyroidism    • Acute bronchitis    • Acute maxillary sinusitis    • Acute pharyngitis    •  Allergic    • Anxiety    • Asthma    • Axillary lymphadenopathy    • Blood in urine    • Bronchitis     with an asthmatic reaction      • Colitis    • Cough    • Cyst of Bartholin's gland duct     History of    • Degenerative joint disease involving multiple joints    • Depression    • Diabetes (CMS/HCC)    • Encounter for gynecological examination with abnormal finding    • Essential (primary) hypertension    • Essential hypertension    • GERD (gastroesophageal reflux disease)    • Goiter    • Hemorrhoids    • Hordeolum     right lower eyelid      • Injury of back    • Low back pain    • Lung nodule, solitary    • Osteoporosis    • Polyp of vagina     possible      • Postsurgical hypoparathyroidism (CMS/HCC)    • Shortness of breath    • Tobacco dependence syndrome     Past history   • Urinary tract infectious disease    • Vulvovaginitis        Allergies   Allergen Reactions   • Naproxen Nausea And Vomiting and Swelling   • Other Anaphylaxis and Swelling     Blueberries         Current Outpatient Medications:   •  Alcohol Swabs (B-D SINGLE USE SWABS REGULAR) pads, , Disp: , Rfl:   •  ALPRAZolam (XANAX) 1 MG tablet, , Disp: , Rfl:   •  amLODIPine (Norvasc) 5 MG tablet, Take 1 tablet by mouth Daily., Disp: 90 tablet, Rfl: 1  •  ARIPiprazole (ABILIFY) 30 MG tablet, , Disp: , Rfl:   •  atorvastatin (LIPITOR) 20 MG tablet, TAKE ONE TABLET BY MOUTH DAILY, Disp: 90 tablet, Rfl: 0  •  Blood Glucose Calibration (ACCU-CHEK ABRAHAM) solution, Use to check sugars three times a day. E11.8 diabetes., Disp: 1 each, Rfl: 0  •  Blood Glucose Monitoring Suppl (Accu-Chek Abraham Plus) w/Device kit, Use to check sugars three times a day. E11.8 diabetes., Disp: 1 kit, Rfl: 1  •  buPROPion XL (WELLBUTRIN XL) 300 MG 24 hr tablet, , Disp: , Rfl:   •  calcium acetate (PHOS BINDER,) 667 MG capsule capsule, TAKE ONE CAPSULE BY MOUTH THREE TIMES A DAY, Disp: 270 capsule, Rfl: 0  •  calcium carbonate (OS-KASSY) 600 MG tablet, Take 1 tablet by mouth  Daily., Disp: 90 tablet, Rfl: 1  •  cloNIDine (CATAPRES) 0.2 MG tablet, TAKE ONE TABLET BY MOUTH TWICE A DAY, Disp: 180 tablet, Rfl: 2  •  cloNIDine (CATAPRES) 0.3 MG tablet, TAKE ONE TABLET BY MOUTH EVERY 12 HOURS, Disp: 180 tablet, Rfl: 0  •  cyclobenzaprine (FLEXERIL) 10 MG tablet, Take 1 tablet by mouth At Night As Needed for Muscle Spasms., Disp: 30 tablet, Rfl: 3  •  Empagliflozin (Jardiance) 25 MG tablet, Take 25 mg by mouth Daily., Disp: 90 tablet, Rfl: 3  •  escitalopram (LEXAPRO) 20 MG tablet, TAKE ONE TABLET BY MOUTH DAILY, Disp: 90 tablet, Rfl: 0  •  ferrous sulfate 325 (65 FE) MG tablet, Take 1 tablet by mouth 3 (Three) Times a Day With Meals., Disp: 270 tablet, Rfl: 3  •  fluconazole (DIFLUCAN) 150 MG tablet, Take 1 tablet now and in 72 hours if symptoms develop, Disp: 2 tablet, Rfl: 0  •  fluticasone-salmeterol (Advair Diskus) 250-50 MCG/DOSE DISKUS, Inhale 1 puff 2 (Two) Times a Day., Disp: 180 each, Rfl: 1  •  gabapentin (Neurontin) 300 MG capsule, Take 1 capsule by mouth 3 (Three) Times a Day for 14 days., Disp: 42 capsule, Rfl: 0  •  glucose blood (Accu-Chek Yany Plus) test strip, USE TO TEST BLOOD SUGAR THREE TIMES A DAY, Disp: 100 each, Rfl: 3  •  lamoTRIgine (LaMICtal) 200 MG tablet, , Disp: , Rfl:   •  Lancets (OneTouch Delica Plus [id number]) misc, USE TO TEST BLOOD SUGAR 3 TIMES DAILY, Disp: 100 each, Rfl: 3  •  levothyroxine (SYNTHROID, LEVOTHROID) 150 MCG tablet, Take 1 tablet by mouth Daily., Disp: 90 tablet, Rfl: 0  •  lisinopril (PRINIVIL,ZESTRIL) 40 MG tablet, TAKE ONE TABLET BY MOUTH DAILY, Disp: 86 tablet, Rfl: 2  •  Magnesium 250 MG tablet, Take 1 tablet by mouth Daily., Disp: 90 each, Rfl: 3  •  metFORMIN (GLUCOPHAGE) 1000 MG tablet, Take 1 tablet by mouth 2 (Two) Times a Day With Meals., Disp: 180 tablet, Rfl: 3  •  methocarbamol (ROBAXIN) 500 MG tablet, , Disp: , Rfl:   •  metoprolol succinate XL (TOPROL-XL) 100 MG 24 hr tablet, Take 1 tablet by mouth Daily., Disp: 90 tablet,  Rfl: 2  •  ondansetron (Zofran) 8 MG tablet, Take 1 tablet by mouth Every 8 (Eight) Hours As Needed for Nausea or Vomiting., Disp: 90 tablet, Rfl: 3  •  potassium chloride ER (K-TAB) 20 MEQ tablet controlled-release ER tablet, Take 1 tablet by mouth Daily., Disp: 90 tablet, Rfl: 3  •  SITagliptin (Januvia) 100 MG tablet, Take 1 tablet by mouth Daily., Disp: 90 tablet, Rfl: 3  •  traZODone (DESYREL) 100 MG tablet, , Disp: , Rfl:   •  vitamin D (ERGOCALCIFEROL) 1.25 MG (79109 UT) capsule capsule, Take 1 capsule by mouth Every 7 (Seven) Days., Disp: 12 capsule, Rfl: 3    Past Surgical History:   Procedure Laterality Date   •  SECTION  01/14/1993    x 3, 84, 82,   • EXPLORATORY LAPAROTOMY Left 1998    Left cystectomy. Partial resection of vthe left ovary. Left ovary- oversewn   • INJECTION OF MEDICATION  2014    Depo Medrol (Methylprednisone) (1)    • INJECTION OF MEDICATION  2015    Kenalog (1)     • LAPAROSCOPIC HYSTERECTOMY  1995    Surgical, lysis of adhesions.Laparoscopic Assisted vaginal Hysterectomy (Tyler/ Doderlein Technique) marsupialization of right Bartholin's Gland Cyst   • LASER ABLATION OF THE CERVIX  1985    Laser vaporization, cervical cone   • OVARIAN CYST REMOVAL     • PAP SMEAR     • THYROIDECTOMY  2006    with partial parathyroidectomy       Family History   Problem Relation Age of Onset   • Diabetes Mother    • Hypertension Mother    • Diabetes Father    • Arthritis Father         Social History     Socioeconomic History   • Marital status:      Spouse name: Not on file   • Number of children: Not on file   • Years of education: Not on file   • Highest education level: Not on file   Tobacco Use   • Smoking status: Former Smoker   • Smokeless tobacco: Never Used   Substance and Sexual Activity   • Alcohol use: No   • Drug use: No   • Sexual activity: Defer        Review of Systems   Musculoskeletal: Positive for joint swelling.  "       Muscle pain,stiffness   Psychiatric/Behavioral: Positive for sleep disturbance. The patient is nervous/anxious.    All other systems reviewed and are negative.    Review of systems is positive as noted above.  PHYSICAL EXAMINATION:       Vitals:    06/02/21 1406   BP: 120/62   Pulse: 84   Resp: 18   SpO2: 97%   Weight: 91.8 kg (202 lb 4.8 oz)   Height: 152.4 cm (60\")   PainSc: 10-Worst pain ever       Physical Exam she is alert pleasant and in apparent mild discomfort at rest.  She responds appropriately to questions and commands.    GAIT:     [x]  Normal  []  Antalgic    Assistive device: [x]  None  []  Walker     []  Crutches  []  Cane     []  Wheelchair  []  Stretcher    Ortho Exam is directed to the upper extremities.  Biceps contours are normal.  Active elevation of the right shoulder is smooth but painfully restricted to about 80 degrees.  Active assisted elevation is to about 110 degrees.  Active external rotation is limited to about 20 degrees with complaints of pain.  There is mild tenderness diffusely about the shoulder.  There is little if any tenderness over the acromioclavicular joint.  Impingement testing produces moderate discomfort.  Strength of rotation of the shoulder is mildly decreased with complaints of pain.  Abduction strength is moderately decreased also with complaints of pain.  Radial pulses strong.  Sensory exam is intact to soft touch.        MRI scan of the shoulder dated March 2020 is reported showing mild degenerative change of the AC joint and stable changes in the distal supraspinatus tendon consistent with tendinopathy or partial-thickness tear.  The studies were done at [facility].  The films not available for me to review.      ASSESSMENT: Right shoulder pain probably secondary to rotator cuff tendinitis, probably chronic in nature.      Treatment options were reviewed.  She understands surgery may eventually be needed to control her symptoms.  She was " given Thera-Band and instructed in strengthening exercises for the shoulder rotators.  She was also instructed in symptomatic measures.  Potential benefits of injection therapy were discussed but she declined this.    Return to see me in 1 month.        Diagnoses and all orders for this visit:    Rotator cuff syndrome of right shoulder    Subacromial bursitis of right shoulder joint          PLAN    Return in about 4 weeks (around 6/30/2021).    Anthony Oneal MD

## 2021-07-19 RX ORDER — CALCIUM ACETATE 667 MG/1
CAPSULE ORAL
Qty: 228 CAPSULE | Refills: 0 | OUTPATIENT
Start: 2021-07-19

## 2021-07-20 RX ORDER — CALCIUM ACETATE 667 MG/1
CAPSULE ORAL
Qty: 228 CAPSULE | Refills: 0 | OUTPATIENT
Start: 2021-07-20

## 2021-07-21 RX ORDER — CALCIUM ACETATE 667 MG/1
CAPSULE ORAL
Qty: 228 CAPSULE | Refills: 0 | OUTPATIENT
Start: 2021-07-21

## 2021-07-22 RX ORDER — CALCIUM ACETATE 667 MG/1
CAPSULE ORAL
Qty: 228 CAPSULE | Refills: 0 | Status: SHIPPED | OUTPATIENT
Start: 2021-07-22 | End: 2021-08-16

## 2021-08-02 RX ORDER — CLONIDINE HYDROCHLORIDE 0.3 MG/1
TABLET ORAL
Qty: 180 TABLET | Refills: 0 | OUTPATIENT
Start: 2021-08-02

## 2021-08-03 ENCOUNTER — TRANSCRIBE ORDERS (OUTPATIENT)
Dept: PHYSICAL THERAPY | Facility: CLINIC | Age: 56
End: 2021-08-03

## 2021-08-03 DIAGNOSIS — M54.16 RADICULOPATHY, LUMBAR REGION: Primary | ICD-10-CM

## 2021-08-05 RX ORDER — CLONIDINE HYDROCHLORIDE 0.3 MG/1
TABLET ORAL
Qty: 180 TABLET | Refills: 0 | OUTPATIENT
Start: 2021-08-05

## 2021-08-09 RX ORDER — CLONIDINE HYDROCHLORIDE 0.3 MG/1
TABLET ORAL
Qty: 180 TABLET | Refills: 0 | OUTPATIENT
Start: 2021-08-09

## 2021-08-10 DIAGNOSIS — I10 ESSENTIAL HYPERTENSION: ICD-10-CM

## 2021-08-10 RX ORDER — FERROUS SULFATE 325(65) MG
TABLET ORAL
Qty: 270 TABLET | Refills: 3 | Status: SHIPPED | OUTPATIENT
Start: 2021-08-10 | End: 2021-08-25 | Stop reason: SDUPTHER

## 2021-08-10 RX ORDER — ERGOCALCIFEROL 1.25 MG/1
CAPSULE ORAL
Qty: 12 CAPSULE | Refills: 3 | Status: SHIPPED | OUTPATIENT
Start: 2021-08-10 | End: 2021-08-25 | Stop reason: SDUPTHER

## 2021-08-10 RX ORDER — ESCITALOPRAM OXALATE 20 MG/1
TABLET ORAL
Qty: 90 TABLET | Refills: 0 | Status: SHIPPED | OUTPATIENT
Start: 2021-08-10 | End: 2021-08-25 | Stop reason: SDUPTHER

## 2021-08-10 RX ORDER — AMLODIPINE BESYLATE 5 MG/1
TABLET ORAL
Qty: 90 TABLET | Refills: 1 | Status: SHIPPED | OUTPATIENT
Start: 2021-08-10 | End: 2021-08-25 | Stop reason: SDUPTHER

## 2021-08-10 RX ORDER — CLONIDINE HYDROCHLORIDE 0.3 MG/1
TABLET ORAL
Qty: 180 TABLET | Refills: 0 | Status: SHIPPED | OUTPATIENT
Start: 2021-08-10 | End: 2021-08-16

## 2021-08-10 RX ORDER — POTASSIUM CHLORIDE 1500 MG/1
TABLET, FILM COATED, EXTENDED RELEASE ORAL
Qty: 90 TABLET | Refills: 3 | Status: SHIPPED | OUTPATIENT
Start: 2021-08-10 | End: 2022-08-30 | Stop reason: SDUPTHER

## 2021-08-10 RX ORDER — DM/PE/ACETAMINOPHEN/CHLORPHENR 10-5-325-2
TABLET, SEQUENTIAL ORAL
Qty: 30 TABLET | Refills: 0 | Status: SHIPPED | OUTPATIENT
Start: 2021-08-10 | End: 2021-08-16

## 2021-08-10 RX ORDER — SITAGLIPTIN 100 MG/1
TABLET, FILM COATED ORAL
Qty: 90 TABLET | Refills: 3 | Status: SHIPPED | OUTPATIENT
Start: 2021-08-10 | End: 2021-08-30 | Stop reason: ALTCHOICE

## 2021-08-10 RX ORDER — ATORVASTATIN CALCIUM 20 MG/1
TABLET, FILM COATED ORAL
Qty: 90 TABLET | Refills: 0 | Status: SHIPPED | OUTPATIENT
Start: 2021-08-10 | End: 2021-08-25 | Stop reason: SDUPTHER

## 2021-08-10 RX ORDER — ONDANSETRON HYDROCHLORIDE 8 MG/1
TABLET, FILM COATED ORAL
Qty: 90 TABLET | Refills: 3 | Status: SHIPPED | OUTPATIENT
Start: 2021-08-10 | End: 2021-08-25 | Stop reason: SDUPTHER

## 2021-08-10 NOTE — TELEPHONE ENCOUNTER
Rx Refill Note  Requested Prescriptions     Pending Prescriptions Disp Refills   • amLODIPine (NORVASC) 5 MG tablet [Pharmacy Med Name: AMLODIPINE BESYLATE 5 MG Tablet] 90 tablet 1     Sig: TAKE 1 TABLET EVERY DAY   • vitamin D (ERGOCALCIFEROL) 1.25 MG (60907 UT) capsule capsule [Pharmacy Med Name: VITAMIN D 1.25 MG (53833 UT) Capsule] 12 capsule 3     Sig: TAKE 1 CAPSULE EVERY 7 DAYS   • potassium chloride ER (K-TAB) 20 MEQ tablet controlled-release ER tablet [Pharmacy Med Name: POTASSIUM CHLORIDE ER 20 MEQ Tablet Extended Release] 90 tablet 3     Sig: TAKE 1 TABLET EVERY DAY   • ondansetron (ZOFRAN) 8 MG tablet [Pharmacy Med Name: ONDANSETRON HYDROCHLORIDE 8 MG Tablet] 90 tablet 3     Sig: TAKE 1 TABLET EVERY 8 HOURS AS NEEDED FOR  NAUSEA OR VOMITING (DO NOT EXCEED 3 TABLETS PER DAY)   • FeroSul 325 (65 Fe) MG tablet [Pharmacy Med Name: FEROSUL 325 (65 Fe) MG Tablet] 270 tablet 3     Sig: TAKE 1 TABLET THREE TIMES DAILY WITH MEALS   • cloNIDine (CATAPRES) 0.3 MG tablet [Pharmacy Med Name: CLONIDINE HYDROCHLORIDE 0.3 MG Tablet] 180 tablet 0     Sig: TAKE 1 TABLET EVERY 12 HOURS   • GNP Magnesium Oxide 250 MG tablet [Pharmacy Med Name: GNP MAGNESIUM 250 MG Tablet] 90 tablet      Sig: TAKE 1 TABLET EVERY DAY   • atorvastatin (LIPITOR) 20 MG tablet [Pharmacy Med Name: ATORVASTATIN CALCIUM 20 MG Tablet] 90 tablet 0     Sig: TAKE 1 TABLET EVERY DAY   • Calcium Carbonate 1500 (600 Ca) MG tablet [Pharmacy Med Name: CALCIUM 1500 (600 Ca) MG Tablet] 90 tablet      Sig: TAKE 1 TABLET EVERY DAY   • Wixela Inhub 250-50 MCG/DOSE DISKUS [Pharmacy Med Name: WIXELA INHUB 250-50 MCG/DOSE Aerosol Powder Breath Activated] 180 each 1     Sig: INHALE 1 PUFF 2 (TWO) TIMES A DAY.   • escitalopram (LEXAPRO) 20 MG tablet [Pharmacy Med Name: ESCITALOPRAM OXALATE 20 MG Tablet] 90 tablet 0     Sig: TAKE 1 TABLET EVERY DAY   • Januvia 100 MG tablet [Pharmacy Med Name: JANUVIA 100 MG Tablet] 90 tablet 3     Sig: TAKE 1 TABLET EVERY DAY       Last office visit with prescribing clinician: 11/13/2020      Next office visit with prescribing clinician: Visit date not found            Pati Marmolejo Rep  08/10/21, 10:49 CDT

## 2021-08-12 RX ORDER — BLOOD GLUCOSE CONTROL HIGH,LOW
EACH MISCELLANEOUS
Qty: 1 EACH | Refills: 0 | Status: SHIPPED | OUTPATIENT
Start: 2021-08-12 | End: 2021-08-16

## 2021-08-12 RX ORDER — BLOOD SUGAR DIAGNOSTIC
STRIP MISCELLANEOUS
Qty: 100 EACH | Refills: 3 | Status: SHIPPED | OUTPATIENT
Start: 2021-08-12 | End: 2021-08-16

## 2021-08-16 RX ORDER — BLOOD GLUCOSE CONTROL HIGH,LOW
EACH MISCELLANEOUS
Qty: 1 EACH | Refills: 0 | Status: SHIPPED | OUTPATIENT
Start: 2021-08-16 | End: 2021-08-25 | Stop reason: SDUPTHER

## 2021-08-16 RX ORDER — CALCIUM ACETATE 667 MG/1
CAPSULE ORAL
Qty: 270 CAPSULE | Refills: 1 | Status: SHIPPED | OUTPATIENT
Start: 2021-08-16 | End: 2021-08-25 | Stop reason: SDUPTHER

## 2021-08-16 RX ORDER — CLONIDINE HYDROCHLORIDE 0.3 MG/1
TABLET ORAL
Qty: 180 TABLET | Refills: 0 | Status: SHIPPED | OUTPATIENT
Start: 2021-08-16 | End: 2021-08-25 | Stop reason: SDUPTHER

## 2021-08-16 RX ORDER — BLOOD SUGAR DIAGNOSTIC
STRIP MISCELLANEOUS
Qty: 300 EACH | Refills: 0 | Status: SHIPPED | OUTPATIENT
Start: 2021-08-16 | End: 2021-08-25 | Stop reason: SDUPTHER

## 2021-08-16 RX ORDER — DM/PE/ACETAMINOPHEN/CHLORPHENR 10-5-325-2
TABLET, SEQUENTIAL ORAL
Qty: 30 TABLET | Refills: 0 | Status: SHIPPED | OUTPATIENT
Start: 2021-08-16 | End: 2021-09-09

## 2021-08-19 RX ORDER — ISOPROPYL ALCOHOL 0.75 G/1
SWAB TOPICAL
Qty: 100 EACH | Refills: 3 | Status: SHIPPED | OUTPATIENT
Start: 2021-08-19 | End: 2021-08-23 | Stop reason: SDUPTHER

## 2021-08-23 RX ORDER — ISOPROPYL ALCOHOL 0.75 G/1
SWAB TOPICAL
Qty: 100 EACH | Refills: 3 | Status: SHIPPED | OUTPATIENT
Start: 2021-08-23 | End: 2021-08-25 | Stop reason: SDUPTHER

## 2021-08-25 ENCOUNTER — LAB (OUTPATIENT)
Dept: LAB | Facility: HOSPITAL | Age: 56
End: 2021-08-25

## 2021-08-25 ENCOUNTER — OFFICE VISIT (OUTPATIENT)
Dept: FAMILY MEDICINE CLINIC | Facility: CLINIC | Age: 56
End: 2021-08-25

## 2021-08-25 VITALS
WEIGHT: 197 LBS | TEMPERATURE: 98 F | BODY MASS INDEX: 38.68 KG/M2 | SYSTOLIC BLOOD PRESSURE: 132 MMHG | HEIGHT: 60 IN | DIASTOLIC BLOOD PRESSURE: 80 MMHG

## 2021-08-25 DIAGNOSIS — E89.0 S/P THYROIDECTOMY: ICD-10-CM

## 2021-08-25 DIAGNOSIS — I10 ESSENTIAL HYPERTENSION: ICD-10-CM

## 2021-08-25 DIAGNOSIS — E89.0 POSTOPERATIVE HYPOTHYROIDISM: ICD-10-CM

## 2021-08-25 DIAGNOSIS — E83.51 HYPOCALCEMIA: ICD-10-CM

## 2021-08-25 DIAGNOSIS — Z13.820 OSTEOPOROSIS SCREENING: Primary | ICD-10-CM

## 2021-08-25 DIAGNOSIS — E78.2 MIXED HYPERLIPIDEMIA: ICD-10-CM

## 2021-08-25 DIAGNOSIS — E55.9 VITAMIN D DEFICIENCY: ICD-10-CM

## 2021-08-25 DIAGNOSIS — E11.40 TYPE 2 DIABETES MELLITUS WITH DIABETIC NEUROPATHY, WITHOUT LONG-TERM CURRENT USE OF INSULIN (HCC): ICD-10-CM

## 2021-08-25 DIAGNOSIS — D50.9 IRON DEFICIENCY ANEMIA, UNSPECIFIED IRON DEFICIENCY ANEMIA TYPE: ICD-10-CM

## 2021-08-25 DIAGNOSIS — Z09 ENCOUNTER FOR FOLLOW-UP EXAMINATION AFTER COMPLETED TREATMENT FOR CONDITIONS OTHER THAN MALIGNANT NEOPLASM: ICD-10-CM

## 2021-08-25 DIAGNOSIS — K04.7 DENTAL ABSCESS: ICD-10-CM

## 2021-08-25 DIAGNOSIS — IMO0002 UNCONTROLLED TYPE 2 DIABETES MELLITUS WITH COMPLICATION, WITHOUT LONG-TERM CURRENT USE OF INSULIN: ICD-10-CM

## 2021-08-25 DIAGNOSIS — E66.01 CLASS 2 SEVERE OBESITY WITH SERIOUS COMORBIDITY AND BODY MASS INDEX (BMI) OF 38.0 TO 38.9 IN ADULT, UNSPECIFIED OBESITY TYPE (HCC): ICD-10-CM

## 2021-08-25 DIAGNOSIS — G47.00 INSOMNIA, UNSPECIFIED TYPE: ICD-10-CM

## 2021-08-25 DIAGNOSIS — Z12.31 SCREENING MAMMOGRAM, ENCOUNTER FOR: ICD-10-CM

## 2021-08-25 PROCEDURE — 83036 HEMOGLOBIN GLYCOSYLATED A1C: CPT

## 2021-08-25 PROCEDURE — 82728 ASSAY OF FERRITIN: CPT

## 2021-08-25 PROCEDURE — 83970 ASSAY OF PARATHORMONE: CPT

## 2021-08-25 PROCEDURE — 84443 ASSAY THYROID STIM HORMONE: CPT

## 2021-08-25 PROCEDURE — 84481 FREE ASSAY (FT-3): CPT

## 2021-08-25 PROCEDURE — 82607 VITAMIN B-12: CPT

## 2021-08-25 PROCEDURE — 85025 COMPLETE CBC W/AUTO DIFF WBC: CPT

## 2021-08-25 PROCEDURE — 99214 OFFICE O/P EST MOD 30 MIN: CPT | Performed by: FAMILY MEDICINE

## 2021-08-25 PROCEDURE — 80061 LIPID PANEL: CPT

## 2021-08-25 PROCEDURE — 82306 VITAMIN D 25 HYDROXY: CPT

## 2021-08-25 PROCEDURE — 83540 ASSAY OF IRON: CPT

## 2021-08-25 PROCEDURE — 82570 ASSAY OF URINE CREATININE: CPT

## 2021-08-25 PROCEDURE — 84466 ASSAY OF TRANSFERRIN: CPT

## 2021-08-25 PROCEDURE — 82043 UR ALBUMIN QUANTITATIVE: CPT

## 2021-08-25 PROCEDURE — 80053 COMPREHEN METABOLIC PANEL: CPT

## 2021-08-25 PROCEDURE — 84439 ASSAY OF FREE THYROXINE: CPT

## 2021-08-25 RX ORDER — MULTIVITAMIN WITH IRON
250 TABLET ORAL DAILY
Qty: 90 EACH | Refills: 3 | Status: SHIPPED | OUTPATIENT
Start: 2021-08-25 | End: 2021-09-09 | Stop reason: SDUPTHER

## 2021-08-25 RX ORDER — TRAZODONE HYDROCHLORIDE 100 MG/1
100 TABLET ORAL NIGHTLY
Qty: 90 TABLET | Refills: 1 | Status: SHIPPED | OUTPATIENT
Start: 2021-08-25 | End: 2021-12-27 | Stop reason: SDUPTHER

## 2021-08-25 RX ORDER — METOPROLOL SUCCINATE 100 MG/1
100 TABLET, EXTENDED RELEASE ORAL DAILY
Qty: 90 TABLET | Refills: 2 | Status: SHIPPED | OUTPATIENT
Start: 2021-08-25 | End: 2022-02-01

## 2021-08-25 RX ORDER — BLOOD SUGAR DIAGNOSTIC
STRIP MISCELLANEOUS
Qty: 300 EACH | Refills: 0 | Status: SHIPPED | OUTPATIENT
Start: 2021-08-25 | End: 2021-09-09

## 2021-08-25 RX ORDER — AMOXICILLIN 500 MG/1
1000 CAPSULE ORAL 2 TIMES DAILY
Qty: 14 CAPSULE | Refills: 0 | Status: SHIPPED | OUTPATIENT
Start: 2021-08-25 | End: 2021-08-26 | Stop reason: SDUPTHER

## 2021-08-25 RX ORDER — AMLODIPINE BESYLATE 5 MG/1
5 TABLET ORAL DAILY
Qty: 90 TABLET | Refills: 1 | Status: SHIPPED | OUTPATIENT
Start: 2021-08-25 | End: 2021-12-27 | Stop reason: SDUPTHER

## 2021-08-25 RX ORDER — CLONIDINE HYDROCHLORIDE 0.3 MG/1
0.3 TABLET ORAL EVERY 12 HOURS
Qty: 180 TABLET | Refills: 1 | Status: SHIPPED | OUTPATIENT
Start: 2021-08-25 | End: 2021-11-10

## 2021-08-25 RX ORDER — LEVOTHYROXINE SODIUM 0.15 MG/1
150 TABLET ORAL DAILY
Qty: 90 TABLET | Refills: 0 | Status: SHIPPED | OUTPATIENT
Start: 2021-08-25 | End: 2021-08-30 | Stop reason: DRUGHIGH

## 2021-08-25 RX ORDER — HYDROCODONE BITARTRATE AND ACETAMINOPHEN 10; 325 MG/1; MG/1
1 TABLET ORAL EVERY 8 HOURS PRN
COMMUNITY
Start: 2021-08-24

## 2021-08-25 RX ORDER — ONDANSETRON HYDROCHLORIDE 8 MG/1
8 TABLET, FILM COATED ORAL EVERY 8 HOURS PRN
Qty: 90 TABLET | Refills: 1 | Status: SHIPPED | OUTPATIENT
Start: 2021-08-25 | End: 2021-12-27

## 2021-08-25 RX ORDER — BLOOD-GLUCOSE METER
EACH MISCELLANEOUS
Qty: 1 KIT | Refills: 1 | Status: SHIPPED | OUTPATIENT
Start: 2021-08-25 | End: 2021-09-09

## 2021-08-25 RX ORDER — ISOPROPYL ALCOHOL 0.75 G/1
SWAB TOPICAL
Qty: 100 EACH | Refills: 3 | Status: SHIPPED | OUTPATIENT
Start: 2021-08-25 | End: 2021-10-22

## 2021-08-25 RX ORDER — LANCETS 33 GAUGE
1 EACH MISCELLANEOUS 3 TIMES DAILY
Qty: 100 EACH | Refills: 3 | Status: SHIPPED | OUTPATIENT
Start: 2021-08-25 | End: 2021-09-09

## 2021-08-25 RX ORDER — ERGOCALCIFEROL 1.25 MG/1
50000 CAPSULE ORAL
Qty: 12 CAPSULE | Refills: 1 | Status: SHIPPED | OUTPATIENT
Start: 2021-08-25 | End: 2021-12-27 | Stop reason: SDUPTHER

## 2021-08-25 RX ORDER — ATORVASTATIN CALCIUM 20 MG/1
20 TABLET, FILM COATED ORAL DAILY
Qty: 90 TABLET | Refills: 1 | Status: SHIPPED | OUTPATIENT
Start: 2021-08-25 | End: 2021-12-06

## 2021-08-25 RX ORDER — BLOOD GLUCOSE CONTROL HIGH,LOW
EACH MISCELLANEOUS
Qty: 1 EACH | Refills: 1 | Status: SHIPPED | OUTPATIENT
Start: 2021-08-25 | End: 2021-11-10

## 2021-08-25 RX ORDER — ESCITALOPRAM OXALATE 20 MG/1
20 TABLET ORAL DAILY
Qty: 90 TABLET | Refills: 1 | Status: SHIPPED | OUTPATIENT
Start: 2021-08-25 | End: 2021-09-09

## 2021-08-25 RX ORDER — FERROUS SULFATE 325(65) MG
1 TABLET ORAL
Qty: 270 TABLET | Refills: 1 | Status: SHIPPED | OUTPATIENT
Start: 2021-08-25 | End: 2021-12-27 | Stop reason: SDUPTHER

## 2021-08-25 RX ORDER — GABAPENTIN 300 MG/1
300 CAPSULE ORAL 3 TIMES DAILY
Qty: 42 CAPSULE | Refills: 0 | Status: SHIPPED | OUTPATIENT
Start: 2021-08-25 | End: 2021-10-05 | Stop reason: SDUPTHER

## 2021-08-25 RX ORDER — LISINOPRIL 40 MG/1
40 TABLET ORAL DAILY
Qty: 90 TABLET | Refills: 1 | Status: SHIPPED | OUTPATIENT
Start: 2021-08-25 | End: 2021-12-27 | Stop reason: SDUPTHER

## 2021-08-25 NOTE — PROGRESS NOTES
Chief Complaint  Dental Pain    Subjective          Joan Blackwell presents to [facility]     Pt is 57 yo female with management  of obesity, HLP sp thyroidectomy in  , postoperative hypothyroidism,  Vitamin D deficiency, HTN,  DM type 2, history of hypertensive urgency, iron deficiency anemia, sp hysterectomy, sp ovarian cyst removal, Sp  X 3, carpal tunnel syndrome right wrist. Grieving, chronic back pain. currenntly in pain managmment, hypocalcemia        20 in office visit for recheck on pt's above medical issues. Pt states she is doing well overall. She is doing well on medications.  She continues to take her medicaitons for DM type 2,, HLP, HTN, hypothyroidism     21 in office visit for recheck on pt's above medical issues. Pt is due for new labwork and has yet to get labowrk ordered on 20. She continues to take her medications for DM type 2, HTN, HLP, hypothyroidism, vitamin D deficiency, iron defcieincy, hypocalcemia. She had right shoulder surgery in  with Dr. Anderson  She has been having dental pain  On her right upper jaw for past few weeks. She has been trying to get a Dentist to pull her  Teeth. She is doing well on medications. Denies any chest pain no dizziness no breathing issues          Hyperlipidemia  This is a chronic problem. Recent lipid tests were reviewed and are variable. Exacerbating diseases include obesity. She has no history of chronic renal disease, diabetes, hypothyroidism or liver disease. Pertinent negatives include no chest pain, focal sensory loss, focal weakness, leg pain or myalgias. The current treatment provides no improvement of lipids. Compliance problems include adherence to diet.  Risk factors for coronary artery disease include diabetes mellitus, hypertension, obesity, dyslipidemia, a sedentary lifestyle and post-menopausal.   Obesity  This is a chronic problem. The current episode started more than 1  year ago. The problem occurs constantly. The problem has been unchanged. Pertinent negatives include no abdominal pain, anorexia, arthralgias, change in bowel habit, chest pain, chills, congestion, coughing, diaphoresis, fatigue, fever, headaches, joint swelling, myalgias, nausea, neck pain or numbness. Nothing aggravates the symptoms. She has tried nothing for the symptoms. The treatment provided no relief.   Dental Pain   This is a new problem. The current episode started in the past 7 days. The problem occurs constantly. The problem has been gradually worsening. The pain is at a severity of 4/10. The pain is mild. Associated symptoms include facial pain. Pertinent negatives include no fever. She has tried nothing for the symptoms.   Diabetes   She presents for her  followup  diabetic visit. She has type 2 diabetes mellitus. Her disease course has been stable. Hypoglycemia symptoms include dizziness and headaches. Pertinent negatives for hypoglycemia include no confusion, hunger, mood changes, nervousness/anxiousness, pallor, seizures, sleepiness, speech difficulty, sweats or tremors. Pertinent negatives for diabetes include no blurred vision, no chest pain, no fatigue, no foot paresthesias, no foot ulcerations, no polydipsia, no polyphagia, no polyuria, no visual change, no weakness and no weight loss. Pertinent negatives for hypoglycemia complications include no blackouts, no hospitalization, no nocturnal hypoglycemia, no required assistance and no required glucagon injection. Pertinent negatives for diabetic complications include no autonomic neuropathy, CVA, heart disease, impotence, nephropathy, peripheral neuropathy, PVD or retinopathy. Risk factors for coronary artery disease include diabetes mellitus, sedentary lifestyle and obesity. Current diabetic treatment includes oral agent (monotherapy). Her weight is stable. She is following a generally healthy diet. She has not had a previous visit with a  "dietitian. She never participates in exercise. An ACE inhibitor/angiotensin II receptor blocker is being taken. She does not see a podiatrist.Eye exam is not current.   Hypertension   This is a chronic problem. The current episode started more than 1 year ago. The problem has been gradually improving since onset. The problem is uncontrolled. Associated symptoms include headaches, neck pain and shortness of breath. Pertinent negatives include no anxiety, blurred vision, chest pain, malaise/fatigue, orthopnea, palpitations, peripheral edema, PND or sweats. There are no associated agents to hypertension. Risk factors for coronary artery disease include diabetes mellitus and dyslipidemia. Current antihypertension treatment includes beta blockers. The current treatment provides no improvement. There are no compliance problems.  There is no history of angina, kidney disease, CAD/MI, CVA, heart failure, left ventricular hypertrophy, PVD, retinopathy or a thyroid problem. There is no history of chronic renal disease, coarctation of the aorta, hyperaldosteronism, hypercortisolism, hyperparathyroidism, a hypertension causing med, pheochromocytoma or sleep apnea.   Hypothyroidism   This is a chronic problem. The current episode started more than 1 year ago. The problem occurs daily. The problem has been unchanged. Associated symptoms include arthralgias, headaches, myalgias, nausea, neck pain, numbness and vomiting. Pertinent negatives include no abdominal pain, anorexia, change in bowel habit, chest pain, chills, congestion, coughing, diaphoresis, fatigue, fever, joint swelling, rash, sore throat, swollen glands, urinary symptoms, vertigo, visual change or weakness. Nothing aggravates the symptoms. Treatments tried: synthroid. The treatment provided mild relief.     Objective   Vital Signs:   /80 (BP Location: Right arm, Patient Position: Sitting, Cuff Size: Large Adult)   Temp 98 °F (36.7 °C)   Ht 152.4 cm (60\")   " Wt 89.4 kg (197 lb)   BMI 38.47 kg/m²       Physical Exam  Vitals and nursing note reviewed.   Constitutional:       Appearance: She is well-developed. She is not diaphoretic.   HENT:      Head: Normocephalic and atraumatic.      Right Ear: External ear normal.      Mouth/Throat:     Eyes:      Conjunctiva/sclera: Conjunctivae normal.      Pupils: Pupils are equal, round, and reactive to light.   Cardiovascular:      Rate and Rhythm: Normal rate and regular rhythm.      Heart sounds: Normal heart sounds. No murmur heard.     Pulmonary:      Effort: Pulmonary effort is normal. No respiratory distress.      Breath sounds: Normal breath sounds.   Abdominal:      General: Bowel sounds are normal. There is no distension.      Palpations: Abdomen is soft.      Tenderness: There is no abdominal tenderness.      Comments: Obese abdomen    Musculoskeletal:         General: Tenderness present. No deformity. Normal range of motion.      Cervical back: Normal range of motion and neck supple.   Skin:     General: Skin is warm.      Coloration: Skin is not pale.      Findings: No erythema or rash.   Neurological:      Mental Status: She is alert and oriented to person, place, and time.      Cranial Nerves: No cranial nerve deficit.   Psychiatric:         Behavior: Behavior normal.        Result Review :                 Assessment and Plan    Diagnoses and all orders for this visit:    1. Osteoporosis screening (Primary)  -     DEXA Bone Density Axial; Future    2. Uncontrolled type 2 diabetes mellitus with complication, without long-term current use of insulin (CMS/[facility])  -     gabapentin (Neurontin) 300 MG capsule; Take 1 capsule by mouth 3 (Three) Times a Day for 14 days.  Dispense: 42 capsule; Refill: 0    3. Vitamin D deficiency  -     gabapentin (Neurontin) 300 MG capsule; Take 1 capsule by mouth 3 (Three) Times a Day for 14 days.  Dispense: 42 capsule; Refill: 0    4. Iron deficiency anemia, unspecified iron deficiency  anemia type  -     gabapentin (Neurontin) 300 MG capsule; Take 1 capsule by mouth 3 (Three) Times a Day for 14 days.  Dispense: 42 capsule; Refill: 0    5. Postoperative hypothyroidism  -     gabapentin (Neurontin) 300 MG capsule; Take 1 capsule by mouth 3 (Three) Times a Day for 14 days.  Dispense: 42 capsule; Refill: 0    6. Mixed hyperlipidemia  -     gabapentin (Neurontin) 300 MG capsule; Take 1 capsule by mouth 3 (Three) Times a Day for 14 days.  Dispense: 42 capsule; Refill: 0    7. Essential hypertension  -     atorvastatin (LIPITOR) 20 MG tablet; Take 1 tablet by mouth Daily.  Dispense: 90 tablet; Refill: 1  -     gabapentin (Neurontin) 300 MG capsule; Take 1 capsule by mouth 3 (Three) Times a Day for 14 days.  Dispense: 42 capsule; Refill: 0    8. Hypocalcemia  -     gabapentin (Neurontin) 300 MG capsule; Take 1 capsule by mouth 3 (Three) Times a Day for 14 days.  Dispense: 42 capsule; Refill: 0    9. Class 2 severe obesity with serious comorbidity and body mass index (BMI) of 38.0 to 38.9 in adult, unspecified obesity type (CMS/HCC)  -     gabapentin (Neurontin) 300 MG capsule; Take 1 capsule by mouth 3 (Three) Times a Day for 14 days.  Dispense: 42 capsule; Refill: 0    10. Insomnia, unspecified type  -     gabapentin (Neurontin) 300 MG capsule; Take 1 capsule by mouth 3 (Three) Times a Day for 14 days.  Dispense: 42 capsule; Refill: 0    11. Type 2 diabetes mellitus with diabetic neuropathy, without long-term current use of insulin (CMS/HCC)  -     gabapentin (Neurontin) 300 MG capsule; Take 1 capsule by mouth 3 (Three) Times a Day for 14 days.  Dispense: 42 capsule; Refill: 0    12. Screening mammogram, encounter for  -     Mammo Screening Bilateral With CAD; Future    13. Encounter for follow-up examination after completed treatment for conditions other than malignant neoplasm   -     DEXA Bone Density Axial; Future    14. Dental abscess    Other orders  -     amLODIPine (NORVASC) 5 MG tablet; Take 1  tablet by mouth Daily.  Dispense: 90 tablet; Refill: 1  -     Calcium Carbonate 1500 (600 Ca) MG tablet; Take 1 tablet by mouth Daily.  Dispense: 90 tablet; Refill: 1  -     cloNIDine (CATAPRES) 0.3 MG tablet; Take 1 tablet by mouth Every 12 (Twelve) Hours.  Dispense: 180 tablet; Refill: 1  -     empagliflozin (Jardiance) 25 MG tablet tablet; Take 1 tablet by mouth Daily.  Dispense: 90 tablet; Refill: 3  -     vitamin D (ERGOCALCIFEROL) 1.25 MG (79286 UT) capsule capsule; Take 1 capsule by mouth Every 7 (Seven) Days.  Dispense: 12 capsule; Refill: 1  -     escitalopram (LEXAPRO) 20 MG tablet; Take 1 tablet by mouth Daily.  Dispense: 90 tablet; Refill: 1  -     ferrous sulfate (FeroSul) 325 (65 FE) MG tablet; Take 1 tablet by mouth 3 (Three) Times a Day With Meals.  Dispense: 270 tablet; Refill: 1  -     levothyroxine (SYNTHROID, LEVOTHROID) 150 MCG tablet; Take 1 tablet by mouth Daily.  Dispense: 90 tablet; Refill: 0  -     lisinopril (PRINIVIL,ZESTRIL) 40 MG tablet; Take 1 tablet by mouth Daily.  Dispense: 90 tablet; Refill: 1  -     Magnesium 250 MG tablet; Take 1 tablet by mouth Daily.  Dispense: 90 each; Refill: 3  -     metFORMIN (GLUCOPHAGE) 1000 MG tablet; Take 1 tablet by mouth 2 (Two) Times a Day With Meals.  Dispense: 180 tablet; Refill: 3  -     metoprolol succinate XL (TOPROL-XL) 100 MG 24 hr tablet; Take 1 tablet by mouth Daily.  Dispense: 90 tablet; Refill: 2  -     ondansetron (ZOFRAN) 8 MG tablet; Take 1 tablet by mouth Every 8 (Eight) Hours As Needed for Nausea or Vomiting.  Dispense: 90 tablet; Refill: 1  -     traZODone (DESYREL) 100 MG tablet; Take 1 tablet by mouth Every Night.  Dispense: 90 tablet; Refill: 1  -     amoxicillin (AMOXIL) 500 MG capsule; Take 2 capsules by mouth 2 (Two) Times a Day for 7 days.  Dispense: 14 capsule; Refill: 0             -recmomend labwork   -recommend shingles vaccination  -recommend mammogram screening - schedule at imaging center   -recommend colonoscopy  sreenign   -recommend DEXA scan - schedule at imaging center   -recommend diabetic eye exam   -dental pain/dental abscess - start on amoxcillin 500 mg PO BID for 7 days   -right wrist pain/right shoulder pain/carpal tunnel of right hand   - Orthopedic Following. Had recent MRI. Low suspicion for tear of rotator cuff. Pt offered injection but declined.   Has upcomign EMG study. On neurontin for pain   -hypocalcemia  -on calcium carbonate 600 mg PO BID.   -DM type 2-  on januvia 100 mg daily on metformin 1000 mg PO BID, on jardiance 25 mg daily.   -insomniia - on trazodone    -diabetic neuropathy - on neurontin 300 mg PO TID   -hypertension - on clonidine 0.3 mg PO BID on  lisionpril 40 mg daily. On toprol  mg daily.  On norvasc 10 mg daily.   - Obesity BMI >30 . - recommended weight loss information and DASH diet. Counseled weight loss >5 minuutes  BMI at 38.47   - hyperlipidemia - HDL low. REcommended high healthy fat diet stop simvastatin and start on lipitor 20 mg PO qhs. Drug information provided   - hypothyroidism/spt hyroidectomy -- on synthroid 150 mcg PO q daily.    - Vitamin D deficiency -rincrease Vitamin D from 1000 once a day to 50,000 once a week    - depression/grieving - on lexapro 20 mg daily. On abilify 300 mg daily on wellbutrin  mg daily.   - iron deficiency anemia - recheck iron level and ferritin. Continue with iron pill. Last iron levels normal  -advised to go to ER or call 911 if symptoms or severe  -advised to be safe and call with questions and concerns   -advised to followup with specialist and referrals   -adivsed pt to be safe during COVID-19 pandemic  I spent 30  minutes caring for Joan on this date of service. This time includes time spent by me in the following activities: preparing for the visit, reviewing tests, obtaining and/or reviewing a separately obtained history, performing a medically appropriate examination and/or evaluation, counseling and educating the  patient/family/caregiver, ordering medications, tests, or procedures, referring and communicating with other health care professionals, documenting information in the medical record, independently interpreting results and communicating that information with the patient/family/caregiver and care coordination.   -recheck in 4 weeks         This document has been electronically signed by Brad Arnold MD on August 25, 2021 16:21 CDT            Follow Up   Return in about 1 month (around 9/25/2021).  Patient was given instructions and counseling regarding her condition or for health maintenance advice. Please see specific information pulled into the AVS if appropriate.

## 2021-08-26 ENCOUNTER — TELEPHONE (OUTPATIENT)
Dept: FAMILY MEDICINE CLINIC | Facility: CLINIC | Age: 56
End: 2021-08-26

## 2021-08-26 LAB
25(OH)D3 SERPL-MCNC: 37.7 NG/ML
ALBUMIN SERPL-MCNC: 4.4 G/DL (ref 3.5–5.2)
ALBUMIN UR-MCNC: 13 MG/DL
ALBUMIN/GLOB SERPL: 1.6 G/DL
ALP SERPL-CCNC: 87 U/L (ref 39–117)
ALT SERPL W P-5'-P-CCNC: 32 U/L (ref 1–33)
ANION GAP SERPL CALCULATED.3IONS-SCNC: 12.4 MMOL/L (ref 5–15)
AST SERPL-CCNC: 30 U/L (ref 1–32)
BASOPHILS # BLD AUTO: 0.05 10*3/MM3 (ref 0–0.2)
BASOPHILS NFR BLD AUTO: 0.9 % (ref 0–1.5)
BILIRUB SERPL-MCNC: 0.4 MG/DL (ref 0–1.2)
BUN SERPL-MCNC: 10 MG/DL (ref 6–20)
BUN/CREAT SERPL: 12.5 (ref 7–25)
CALCIUM SPEC-SCNC: 8.8 MG/DL (ref 8.6–10.5)
CALCIUM SPEC-SCNC: 8.8 MG/DL (ref 8.6–10.5)
CHLORIDE SERPL-SCNC: 100 MMOL/L (ref 98–107)
CHOLEST SERPL-MCNC: 166 MG/DL (ref 0–200)
CO2 SERPL-SCNC: 26.6 MMOL/L (ref 22–29)
CREAT SERPL-MCNC: 0.8 MG/DL (ref 0.57–1)
CREAT UR-MCNC: 140.5 MG/DL
DEPRECATED RDW RBC AUTO: 43.6 FL (ref 37–54)
EOSINOPHIL # BLD AUTO: 0.1 10*3/MM3 (ref 0–0.4)
EOSINOPHIL NFR BLD AUTO: 1.9 % (ref 0.3–6.2)
ERYTHROCYTE [DISTWIDTH] IN BLOOD BY AUTOMATED COUNT: 13.9 % (ref 12.3–15.4)
FERRITIN SERPL-MCNC: 145 NG/ML (ref 13–150)
GFR SERPL CREATININE-BSD FRML MDRD: 90 ML/MIN/1.73
GLOBULIN UR ELPH-MCNC: 2.8 GM/DL
GLUCOSE SERPL-MCNC: 150 MG/DL (ref 65–99)
HBA1C MFR BLD: 7.56 % (ref 4.8–5.6)
HCT VFR BLD AUTO: 37 % (ref 34–46.6)
HDLC SERPL-MCNC: 45 MG/DL (ref 40–60)
HGB BLD-MCNC: 12.5 G/DL (ref 12–15.9)
IMM GRANULOCYTES # BLD AUTO: 0.01 10*3/MM3 (ref 0–0.05)
IMM GRANULOCYTES NFR BLD AUTO: 0.2 % (ref 0–0.5)
IRON 24H UR-MRATE: 68 MCG/DL (ref 37–145)
IRON SATN MFR SERPL: 20 % (ref 20–50)
LDLC SERPL CALC-MCNC: 97 MG/DL (ref 0–100)
LDLC/HDLC SERPL: 2.09 {RATIO}
LYMPHOCYTES # BLD AUTO: 2.37 10*3/MM3 (ref 0.7–3.1)
LYMPHOCYTES NFR BLD AUTO: 44.4 % (ref 19.6–45.3)
MCH RBC QN AUTO: 29.3 PG (ref 26.6–33)
MCHC RBC AUTO-ENTMCNC: 33.8 G/DL (ref 31.5–35.7)
MCV RBC AUTO: 86.7 FL (ref 79–97)
MICROALBUMIN/CREAT UR: 92.5 MG/G
MONOCYTES # BLD AUTO: 0.31 10*3/MM3 (ref 0.1–0.9)
MONOCYTES NFR BLD AUTO: 5.8 % (ref 5–12)
NEUTROPHILS NFR BLD AUTO: 2.5 10*3/MM3 (ref 1.7–7)
NEUTROPHILS NFR BLD AUTO: 46.8 % (ref 42.7–76)
NRBC BLD AUTO-RTO: 0 /100 WBC (ref 0–0.2)
PLATELET # BLD AUTO: 319 10*3/MM3 (ref 140–450)
PMV BLD AUTO: 10.6 FL (ref 6–12)
POTASSIUM SERPL-SCNC: 4.1 MMOL/L (ref 3.5–5.2)
PROT SERPL-MCNC: 7.2 G/DL (ref 6–8.5)
PTH-INTACT SERPL-MCNC: 7.4 PG/ML (ref 15–65)
RBC # BLD AUTO: 4.27 10*6/MM3 (ref 3.77–5.28)
SODIUM SERPL-SCNC: 139 MMOL/L (ref 136–145)
T3FREE SERPL-MCNC: 2.13 PG/ML (ref 2–4.4)
T4 FREE SERPL-MCNC: 0.85 NG/DL (ref 0.93–1.7)
TIBC SERPL-MCNC: 344 MCG/DL (ref 298–536)
TRANSFERRIN SERPL-MCNC: 231 MG/DL (ref 200–360)
TRIGL SERPL-MCNC: 135 MG/DL (ref 0–150)
TSH SERPL DL<=0.05 MIU/L-ACNC: 9.59 UIU/ML (ref 0.27–4.2)
VIT B12 BLD-MCNC: 333 PG/ML (ref 211–946)
VLDLC SERPL-MCNC: 24 MG/DL (ref 5–40)
WBC # BLD AUTO: 5.34 10*3/MM3 (ref 3.4–10.8)

## 2021-08-26 RX ORDER — FLUCONAZOLE 150 MG/1
150 TABLET ORAL ONCE
Qty: 2 TABLET | Refills: 0 | Status: SHIPPED | OUTPATIENT
Start: 2021-08-26 | End: 2021-08-26 | Stop reason: SDUPTHER

## 2021-08-26 RX ORDER — AMOXICILLIN 500 MG/1
500 CAPSULE ORAL 2 TIMES DAILY
Qty: 14 CAPSULE | Refills: 0 | Status: SHIPPED | OUTPATIENT
Start: 2021-08-26 | End: 2021-09-02

## 2021-08-26 RX ORDER — FLUCONAZOLE 150 MG/1
TABLET ORAL
Qty: 2 TABLET | Refills: 0 | Status: SHIPPED | OUTPATIENT
Start: 2021-08-26 | End: 2021-10-05 | Stop reason: SDUPTHER

## 2021-08-26 NOTE — TELEPHONE ENCOUNTER
Patient called and states that [facility] contacted her in regards to her antibiotic saying they need clarification on it.

## 2021-08-26 NOTE — TELEPHONE ENCOUNTER
It should be amoxilllin 500 mg PO BID for 7 days 14 pills and no refills.   Please resend     Message landon

## 2021-08-27 ENCOUNTER — TELEPHONE (OUTPATIENT)
Dept: FAMILY MEDICINE CLINIC | Facility: CLINIC | Age: 56
End: 2021-08-27

## 2021-08-27 NOTE — TELEPHONE ENCOUNTER
----- Message from Brad Arnold MD sent at 8/27/2021  8:03 AM CDT -----  Please call pt    Vitamin B12 low normal and recommend Vitamin B12 500 mcg PO q daily give 30 pills and 3 refills    Vitamin D low normal and recommend pt continue taking her vitamin D once a week    PTH level low at 7.4 pt has hypoparathyroidism. Calcium levels stable     On thyroid sutdies TSH high and T4 low if pt is taking synthroid 150 mcg daily go up to synthroid 175 mcg daily give 30 pills and 3 refills     On CMP kidney and liver function stable     Iron levels stable    Hga1c at 7.56 form 7.27 continue with metformin and jardiance. Recommend pt start taking trulicty 0.75 mg subq weekly instead of januvia . Do have samples in office. This is not insulin and would help lower sugars. Will discuss on next visit      Rest of labwork stable    Recheck on next visit thanks

## 2021-08-30 ENCOUNTER — TELEPHONE (OUTPATIENT)
Dept: FAMILY MEDICINE CLINIC | Facility: CLINIC | Age: 56
End: 2021-08-30

## 2021-08-30 RX ORDER — CHOLECALCIFEROL (VITAMIN D3) 125 MCG
500 CAPSULE ORAL DAILY
Qty: 30 TABLET | Refills: 3 | Status: SHIPPED | OUTPATIENT
Start: 2021-08-30 | End: 2022-08-30 | Stop reason: SDUPTHER

## 2021-08-30 RX ORDER — LEVOTHYROXINE SODIUM 175 UG/1
175 TABLET ORAL DAILY
Qty: 30 TABLET | Refills: 3 | Status: SHIPPED | OUTPATIENT
Start: 2021-08-30 | End: 2021-11-22 | Stop reason: SDUPTHER

## 2021-08-31 ENCOUNTER — TELEPHONE (OUTPATIENT)
Dept: FAMILY MEDICINE CLINIC | Facility: CLINIC | Age: 56
End: 2021-08-31

## 2021-08-31 NOTE — TELEPHONE ENCOUNTER
Called patient to reschedule appointment on 9/24. Left voicemail for patient to call the office back

## 2021-09-09 RX ORDER — LANCETS
EACH MISCELLANEOUS
Qty: 300 EACH | Refills: 0 | Status: SHIPPED | OUTPATIENT
Start: 2021-09-09 | End: 2021-12-06

## 2021-09-09 RX ORDER — BLOOD-GLUCOSE METER
EACH MISCELLANEOUS
Qty: 1 KIT | Refills: 0 | Status: SHIPPED | OUTPATIENT
Start: 2021-09-09 | End: 2023-02-09 | Stop reason: SDUPTHER

## 2021-09-09 RX ORDER — ESCITALOPRAM OXALATE 20 MG/1
TABLET ORAL
Qty: 90 TABLET | Refills: 1 | Status: SHIPPED | OUTPATIENT
Start: 2021-09-09 | End: 2021-12-15

## 2021-09-09 RX ORDER — BLOOD SUGAR DIAGNOSTIC
STRIP MISCELLANEOUS
Qty: 300 EACH | Refills: 0 | Status: SHIPPED | OUTPATIENT
Start: 2021-09-09 | End: 2021-11-10

## 2021-09-09 RX ORDER — DM/PE/ACETAMINOPHEN/CHLORPHENR 10-5-325-2
TABLET, SEQUENTIAL ORAL
Qty: 30 TABLET | Refills: 0 | Status: SHIPPED | OUTPATIENT
Start: 2021-09-09 | End: 2021-10-22

## 2021-09-10 ENCOUNTER — TELEPHONE (OUTPATIENT)
Dept: FAMILY MEDICINE CLINIC | Facility: CLINIC | Age: 56
End: 2021-09-10

## 2021-09-10 RX ORDER — FLUCONAZOLE 150 MG/1
TABLET ORAL
Qty: 2 TABLET | Refills: 0 | Status: SHIPPED | OUTPATIENT
Start: 2021-09-10 | End: 2022-02-05

## 2021-09-13 NOTE — PROGRESS NOTES
Chief Complaint  Hypertension (follow up)    Subjective          Joan Blackwell presents to [facility] [place]  History of Present Illness     Pt is 57 yo female with management  of obesity, HLP sp thyroidectomy in  , postoperative hypothyroidism,  Vitamin D deficiency, HTN,  DM type 2, history of hypertensive urgency, iron deficiency anemia, sp hysterectomy, sp ovarian cyst removal, Sp  X 3, carpal tunnel syndrome right wrist. Grieving, chronic back pain. currenntly in pain managmment, hypocalcemia      21 in office visit for recheck on pt's above medical issues. Pt is due for new labwork and has yet to get labowrk ordered on 20. She continues to take her medications for DM type 2, HTN, HLP, hypothyroidism, vitamin D deficiency, iron defcieincy, hypocalcemia. She had right shoulder surgery in  with Dr. Anderson  She has been having dental pain  On her right upper jaw for past few weeks. She has been trying to get a Dentist to pull her  Teeth. She is doing well on medications. Denies any chest pain no dizziness no breathing issues     21 telemedicine  visit for recheck on pt's above medical issues. Pt agreed to telemedicine visit today  Pt had labwork doneo n 21 that showed ferritin and iron profile stable vitamin B12 was low normal at 333 vitamin D was at 37.7.  On thyroid studies TSH was at 9.590. T4 was at 0.85. T3 was normal lipid panel stable hga1c was at 7.56. CMP showed calcium stalbe at 8.8 with GFR at 90.  CBC showed stable hemoglobin and WBC. Pt continues to take her medications for DM type 2, HTN, HLP, vitamin D deficiency, hypothyroidism, vitamin D and b12 deficiency, major depression. She states trulicity hurts and she would like to take an oral GLP 1 agonist instead. She denies any chest pain no dizzines. She has upcoming appt with mammogram and DEXA scan soon            Hyperlipidemia  This is a chronic problem.  Recent lipid tests were reviewed and are variable. Exacerbating diseases include obesity. She has no history of chronic renal disease, diabetes, hypothyroidism or liver disease. Pertinent negatives include no chest pain, focal sensory loss, focal weakness, leg pain or myalgias. The current treatment provides no improvement of lipids. Compliance problems include adherence to diet.  Risk factors for coronary artery disease include diabetes mellitus, hypertension, obesity, dyslipidemia, a sedentary lifestyle and post-menopausal.   Obesity  This is a chronic problem. The current episode started more than 1 year ago. The problem occurs constantly. The problem has been unchanged. Pertinent negatives include no abdominal pain, anorexia, arthralgias, change in bowel habit, chest pain, chills, congestion, coughing, diaphoresis, fatigue, fever, headaches, joint swelling, myalgias, nausea, neck pain or numbness. Nothing aggravates the symptoms. She has tried nothing for the symptoms. The treatment provided no relief. ms.   Diabetes   She presents for her  followup  diabetic visit. She has type 2 diabetes mellitus. Her disease course has been stable. Hypoglycemia symptoms include dizziness and headaches. Pertinent negatives for hypoglycemia include no confusion, hunger, mood changes, nervousness/anxiousness, pallor, seizures, sleepiness, speech difficulty, sweats or tremors. Pertinent negatives for diabetes include no blurred vision, no chest pain, no fatigue, no foot paresthesias, no foot ulcerations, no polydipsia, no polyphagia, no polyuria, no visual change, no weakness and no weight loss. Pertinent negatives for hypoglycemia complications include no blackouts, no hospitalization, no nocturnal hypoglycemia, no required assistance and no required glucagon injection. Pertinent negatives for diabetic complications include no autonomic neuropathy, CVA, heart disease, impotence, nephropathy, peripheral neuropathy, PVD or  retinopathy. Risk factors for coronary artery disease include diabetes mellitus, sedentary lifestyle and obesity. Current diabetic treatment includes oral agent (monotherapy). Her weight is stable. She is following a generally healthy diet. She has not had a previous visit with a dietitian. She never participates in exercise. An ACE inhibitor/angiotensin II receptor blocker is being taken. She does not see a podiatrist.Eye exam is not current.   Hypertension   This is a chronic problem. The current episode started more than 1 year ago. The problem has been gradually improving since onset. The problem is uncontrolled. Associated symptoms include headaches, neck pain and shortness of breath. Pertinent negatives include no anxiety, blurred vision, chest pain, malaise/fatigue, orthopnea, palpitations, peripheral edema, PND or sweats. There are no associated agents to hypertension. Risk factors for coronary artery disease include diabetes mellitus and dyslipidemia. Current antihypertension treatment includes beta blockers. The current treatment provides no improvement. There are no compliance problems.  There is no history of angina, kidney disease, CAD/MI, CVA, heart failure, left ventricular hypertrophy, PVD, retinopathy or a thyroid problem. There is no history of chronic renal disease, coarctation of the aorta, hyperaldosteronism, hypercortisolism, hyperparathyroidism, a hypertension causing med, pheochromocytoma or sleep apnea.   Hypothyroidism   This is a chronic problem. The current episode started more than 1 year ago. The problem occurs daily. The problem has been unchanged. Associated symptoms include arthralgias, headaches, myalgias, nausea, neck pain, numbness and vomiting. Pertinent negatives include no abdominal pain, anorexia, change in bowel habit, chest pain, chills, congestion, coughing, diaphoresis, fatigue, fever, joint swelling, rash, sore throat, swollen glands, urinary symptoms, vertigo, visual  "change or weakness. Nothing aggravates the symptoms. Treatments tried: synthroid. The treatment provided mild relief.     Objective   Vital Signs:   Ht 152.4 cm (60\")   BMI 38.47 kg/m²     Physical Exam  Vitals and nursing note reviewed.   Constitutional:       Appearance: She is well-developed. She is not diaphoretic.   HENT:      Head: Normocephalic and atraumatic.      Right Ear: External ear normal.   Eyes:      Conjunctiva/sclera: Conjunctivae normal.      Pupils: Pupils are equal, round, and reactive to light.   Cardiovascular:      Rate and Rhythm: Normal rate and regular rhythm.      Heart sounds: Normal heart sounds. No murmur heard.     Pulmonary:      Effort: Pulmonary effort is normal. No respiratory distress.      Breath sounds: Normal breath sounds.   Abdominal:      General: Bowel sounds are normal. There is no distension.      Palpations: Abdomen is soft.      Tenderness: There is no abdominal tenderness.      Comments: Obese abdomen    Musculoskeletal:         General: Tenderness present. No deformity. Normal range of motion.      Cervical back: Normal range of motion and neck supple.   Skin:     General: Skin is warm.      Coloration: Skin is not pale.      Findings: No erythema or rash.   Neurological:      Mental Status: She is alert and oriented to person, place, and time.      Cranial Nerves: No cranial nerve deficit.   Psychiatric:         Behavior: Behavior normal.        Result Review :                 Assessment and Plan    Diagnoses and all orders for this visit:    1. Essential hypertension (Primary)    2. Mixed hyperlipidemia    3. Vitamin D deficiency    4. S/P thyroidectomy    5. Postoperative hypothyroidism    6. Hypocalcemia    7. Hypoparathyroidism, unspecified hypoparathyroidism type (HCC)    8. Uncontrolled type 2 diabetes mellitus with hyperglycemia (CMS/HCC)    Other orders  -     Semaglutide (Rybelsus) 3 MG tablet; Take 1 tablet by mouth Daily.  Dispense: 30 tablet; Refill: " 3           -went over labwork   -recommend shingles vaccination  -recommend COVID-19 vaccination   -recommend mammogram screening - schedule at imaging center   -next cologuard test in 2022   -recommend DEXA scan - schedule at imaging center   -recommend diabetic eye exam   -right wrist pain/right shoulder pain/carpal tunnel of right hand   - Orthopedic Following. Had recent MRI. Low suspicion for tear of rotator cuff. Pt offered injection but declined.   Has upcomign EMG study. On neurontin for pain   -hypocalcemia/hypoparathyroidism   -on calcium carbonate 600 mg PO BID.   -DM type 2-  on metformin 1000 mg PO BID, on jardiance 25 mg daily. Stop trulicity and start on rybelsus 3 mg daily. Information provided   -insomnia - on trazodone    -diabetic neuropathy - on neurontin 300 mg PO TID   -hypertension - on clonidine 0.3 mg PO BID on  lisionpril 40 mg daily. On toprol  mg daily.  On norvasc 10 mg daily.   -Obesity BMI >30 . - recommended weight loss information and DASH diet. Counseled weight loss >5 minuutes  BMI at 38.47   -hyperlipidemia - HDL low. REcommended high healthy fat diet stop simvastatin and start on lipitor 20 mg PO qhs. Drug information provided   - hypothyroidism/sp thyroidectomy -- on synthroid 175 mcg PO q daily.    -Vitamin D deficiency -rincrease Vitamin D from 1000 once a day to 50,000 once a week    -depression/grieving - on lexapro 20 mg daily. On abilify 300 mg daily  Was on wellbutrin  mg daily.    iron deficiency anemia - on ferrous sulfate 325 mg PO TID  -advised to go to ER or call 911 if symptoms or severe  -advised to be safe and call with questions and concerns   -advised to followup with specialist and referrals   -adivsed pt to be safe during COVID-19 pandemic  This visit has been rescheduled as a phone visit to comply with patient safety concerns in accordance with CDC recommendations. Total time of discussion was 30 minutes.            This document has been  electronically signed by Brad Arnold MD on September 30, 2021 09:34 CDT            Follow Up   Return in about 1 month (around 10/30/2021).  Patient was given instructions and counseling regarding her condition or for health maintenance advice. Please see specific information pulled into the AVS if appropriate.

## 2021-09-30 ENCOUNTER — OFFICE VISIT (OUTPATIENT)
Dept: FAMILY MEDICINE CLINIC | Facility: CLINIC | Age: 56
End: 2021-09-30

## 2021-09-30 VITALS — BODY MASS INDEX: 38.47 KG/M2 | HEIGHT: 60 IN

## 2021-09-30 DIAGNOSIS — E89.0 POSTOPERATIVE HYPOTHYROIDISM: ICD-10-CM

## 2021-09-30 DIAGNOSIS — E11.65 UNCONTROLLED TYPE 2 DIABETES MELLITUS WITH HYPERGLYCEMIA (HCC): ICD-10-CM

## 2021-09-30 DIAGNOSIS — E20.9 HYPOPARATHYROIDISM, UNSPECIFIED HYPOPARATHYROIDISM TYPE (HCC): ICD-10-CM

## 2021-09-30 DIAGNOSIS — E55.9 VITAMIN D DEFICIENCY: ICD-10-CM

## 2021-09-30 DIAGNOSIS — E78.2 MIXED HYPERLIPIDEMIA: ICD-10-CM

## 2021-09-30 DIAGNOSIS — E83.51 HYPOCALCEMIA: ICD-10-CM

## 2021-09-30 DIAGNOSIS — E89.0 S/P THYROIDECTOMY: ICD-10-CM

## 2021-09-30 DIAGNOSIS — I10 ESSENTIAL HYPERTENSION: Primary | ICD-10-CM

## 2021-09-30 PROCEDURE — 99214 OFFICE O/P EST MOD 30 MIN: CPT | Performed by: FAMILY MEDICINE

## 2021-09-30 RX ORDER — ORAL SEMAGLUTIDE 3 MG/1
3 TABLET ORAL DAILY
Qty: 30 TABLET | Refills: 3 | Status: SHIPPED | OUTPATIENT
Start: 2021-09-30 | End: 2022-02-07

## 2021-09-30 NOTE — PATIENT INSTRUCTIONS
Semaglutide oral tablets  What is this medicine?  SEMAGLUTIDE (Theresa a GLOO tide) is used to improve blood sugar control in adults with type 2 diabetes. This medicine may be used with other diabetes medicines.  This medicine may be used for other purposes; ask your health care provider or pharmacist if you have questions.  COMMON BRAND NAME(S): oTno  What should I tell my health care provider before I take this medicine?  They need to know if you have any of these conditions:  · endocrine tumors (MEN 2) or if someone in your family had these tumors  · eye disease, vision problems  · history of pancreatitis  · kidney disease  · stomach problems  · thyroid cancer or if someone in your family had thyroid cancer  · an unusual or allergic reaction to semaglutide, other medicines, foods, dyes, or preservatives  · pregnant or trying to get pregnant  · breast-feeding  How should I use this medicine?  Take this medicine by mouth with a glass of plain water that is less than 4 ounces (less than 120 mL). Follow the directions on the prescription label. Do not cut, crush or chew this medicine. Swallow the tablets whole. Take at least 30 minutes before the first food, other beverage, or other oral medications of the day. Take your medicine at regular intervals. Do not take your medicine more often than directed. Do not stop taking except on your doctor's advice.  A special MedGuide will be given to you by the pharmacist with each prescription and refill. Be sure to read this information carefully each time.  Talk to your pediatrician regarding the use of this medicine in children. Special care may be needed.  Overdosage: If you think you have taken too much of this medicine contact a poison control center or emergency room at once.  NOTE: This medicine is only for you. Do not share this medicine with others.  What if I miss a dose?  If you miss a dose, skip it. Take your next dose at the normal time. Do not take extra or 2  doses at the same time to make up for the missed dose.  What may interact with this medicine?  What may interact with this medicine?  · aminophylline  · carbamazepine  · cyclosporine  · digoxin  · levothyroxine  · other medicines for diabetes  · phenytoin  · tacrolimus  · theophylline  · warfarin  Many medications may cause changes in blood sugar, these include:  · alcohol containing beverages  · antiviral medicines for HIV or AIDS  · aspirin and aspirin-like drugs  · certain medicines for blood pressure, heart disease, irregular heart beat  · chromium  · diuretics  · female hormones, such as estrogens or progestins, birth control pills  · fenofibrate  · gemfibrozil  · isoniazid  · lanreotide  · male hormones or anabolic steroids  · MAOIs like Carbex, Eldepryl, Marplan, Nardil, and Parnate  · medicines for weight loss  · medicines for allergies, asthma, cold, or cough  · medicines for depression, anxiety, or psychotic disturbances  · niacin  · nicotine  · NSAIDs, medicines for pain and inflammation, like ibuprofen or naproxen  · octreotide  · pasireotide  · pentamidine  · phenytoin  · probenecid  · quinolone antibiotics such as ciprofloxacin, levofloxacin, ofloxacin  · some herbal dietary supplements  · steroid medicines such as prednisone or cortisone  · sulfamethoxazole; trimethoprim  · thyroid hormones  Some medications can hide the warning symptoms of low blood sugar (hypoglycemia). You may need to monitor your blood sugar more closely if you are taking one of these medications. These include:  · beta-blockers, often used for high blood pressure or heart problems (examples include atenolol, metoprolol, propranolol)  · clonidine  · guanethidine  · reserpine  This list may not describe all possible interactions. Give your health care provider a list of all the medicines, herbs, non-prescription drugs, or dietary supplements you use. Also tell them if you smoke, drink alcohol, or use illegal drugs. Some items may  interact with your medicine.  What should I watch for while using this medicine?  Visit your doctor or health care professional for regular checks on your progress.  Drink plenty of fluids while taking this medicine. Check with your doctor or health care professional if you get an attack of severe diarrhea, nausea, and vomiting. The loss of too much body fluid can make it dangerous for you to take this medicine.  A test called the HbA1C (A1C) will be monitored. This is a simple blood test. It measures your blood sugar control over the last 2 to 3 months. You will receive this test every 3 to 6 months.  Learn how to check your blood sugar. Learn the symptoms of low and high blood sugar and how to manage them.  Always carry a quick-source of sugar with you in case you have symptoms of low blood sugar. Examples include hard sugar candy or glucose tablets. Make sure others know that you can choke if you eat or drink when you develop serious symptoms of low blood sugar, such as seizures or unconsciousness. They must get medical help at once.  Tell your doctor or health care professional if you have high blood sugar. You might need to change the dose of your medicine. If you are sick or exercising more than usual, you might need to change the dose of your medicine.  Do not skip meals. Ask your doctor or health care professional if you should avoid alcohol. Many nonprescription cough and cold products contain sugar or alcohol. These can affect blood sugar.  Wear a medical ID bracelet or chain, and carry a card that describes your disease and details of your medicine and dosage times.  Do not become pregnant while taking this medicine. Women should inform their doctor if they wish to become pregnant or think they might be pregnant. There is a potential for serious side effects to an unborn child. Talk to your health care professional or pharmacist for more information. Do not breast-feed an infant while taking this  medicine.  What side effects may I notice from receiving this medicine?  Side effects that you should report to your doctor or health care professional as soon as possible:  · allergic reactions like skin rash, itching or hives, swelling of the face, lips, or tongue  · breathing problems  · changes in vision  · diarrhea that continues or is severe  · lump or swelling on the neck  · severe nausea  · signs and symptoms of infection like fever or chills; cough; sore throat; pain or trouble passing urine  · signs and symptoms of low blood sugar such as feeling anxious, confusion, dizziness, increased hunger, unusually weak or tired, sweating, shakiness, cold, irritable, headache, blurred vision, fast heartbeat, loss of consciousness  · signs and symptoms of kidney injury like trouble passing urine or change in the amount of urine  · trouble swallowing  · unusual stomach upset or pain  · vomiting  Side effects that usually do not require medical attention (report these to your doctor or health care professional if they continue or are bothersome):  · constipation  · diarrhea  · nausea  · stomach upset  This list may not describe all possible side effects. Call your doctor for medical advice about side effects. You may report side effects to FDA at 5-179-FDA-1583.  Where should I keep my medicine?  Keep out of the reach of children.  Store at room temperature between 15 and 30 degrees C (59 and 86 degrees F). Keep this medicine in the original blister card until use. Keep in a dry place. Throw away any unused medicine after the expiration date.  NOTE: This sheet is a summary. It may not cover all possible information. If you have questions about this medicine, talk to your doctor, pharmacist, or health care provider.  © 2021 [employer] (2019-09-24 10:07:43)

## 2021-10-05 DIAGNOSIS — D50.9 IRON DEFICIENCY ANEMIA, UNSPECIFIED IRON DEFICIENCY ANEMIA TYPE: ICD-10-CM

## 2021-10-05 DIAGNOSIS — IMO0002 UNCONTROLLED TYPE 2 DIABETES MELLITUS WITH COMPLICATION, WITHOUT LONG-TERM CURRENT USE OF INSULIN: ICD-10-CM

## 2021-10-05 DIAGNOSIS — E78.2 MIXED HYPERLIPIDEMIA: ICD-10-CM

## 2021-10-05 DIAGNOSIS — E83.51 HYPOCALCEMIA: ICD-10-CM

## 2021-10-05 DIAGNOSIS — E89.0 POSTOPERATIVE HYPOTHYROIDISM: ICD-10-CM

## 2021-10-05 DIAGNOSIS — E66.01 CLASS 2 SEVERE OBESITY WITH SERIOUS COMORBIDITY AND BODY MASS INDEX (BMI) OF 38.0 TO 38.9 IN ADULT, UNSPECIFIED OBESITY TYPE (HCC): ICD-10-CM

## 2021-10-05 DIAGNOSIS — E11.40 TYPE 2 DIABETES MELLITUS WITH DIABETIC NEUROPATHY, WITHOUT LONG-TERM CURRENT USE OF INSULIN (HCC): ICD-10-CM

## 2021-10-05 DIAGNOSIS — G47.00 INSOMNIA, UNSPECIFIED TYPE: ICD-10-CM

## 2021-10-05 DIAGNOSIS — E55.9 VITAMIN D DEFICIENCY: ICD-10-CM

## 2021-10-05 DIAGNOSIS — I10 ESSENTIAL HYPERTENSION: ICD-10-CM

## 2021-10-05 RX ORDER — FLUCONAZOLE 150 MG/1
TABLET ORAL
Qty: 2 TABLET | Refills: 0 | Status: SHIPPED | OUTPATIENT
Start: 2021-10-05 | End: 2022-08-01

## 2021-10-05 RX ORDER — GABAPENTIN 300 MG/1
300 CAPSULE ORAL 3 TIMES DAILY
Qty: 42 CAPSULE | Refills: 0 | Status: SHIPPED | OUTPATIENT
Start: 2021-10-05 | End: 2022-08-01

## 2021-10-22 RX ORDER — DM/PE/ACETAMINOPHEN/CHLORPHENR 10-5-325-2
TABLET, SEQUENTIAL ORAL
Qty: 30 TABLET | Refills: 3 | Status: SHIPPED | OUTPATIENT
Start: 2021-10-22 | End: 2021-12-27

## 2021-10-22 RX ORDER — ISOPROPYL ALCOHOL 0.75 G/1
SWAB TOPICAL
Qty: 300 EACH | Refills: 3 | Status: SHIPPED | OUTPATIENT
Start: 2021-10-22 | End: 2022-08-30 | Stop reason: SDUPTHER

## 2021-11-04 RX ORDER — SITAGLIPTIN 100 MG/1
TABLET, FILM COATED ORAL
Qty: 30 TABLET | Refills: 3 | OUTPATIENT
Start: 2021-11-04

## 2021-11-04 NOTE — TELEPHONE ENCOUNTER
Rx Refill Note  Requested Prescriptions     Refused Prescriptions Disp Refills   • Januvia 100 MG tablet [Pharmacy Med Name: JANUVIA 100 MG TABLET] 30 tablet 3     Sig: TAKE ONE TABLET BY MOUTH DAILY      Last office visit with prescribing clinician: 9/30/2021      Next office visit with prescribing clinician: Visit date not found   {TIP  Encounters:      D/CD BY PROVIDER 8/30/21. PT SHOULD BE ON TRULICITY    {TIP  Please add Last Relevant Lab Date if appropriate  {TIP  Is Refill Pharmacy correct?  Gracy Gutierrez LPN  11/04/21, 10:59 CDT

## 2021-11-10 RX ORDER — BLOOD SUGAR DIAGNOSTIC
STRIP MISCELLANEOUS
Qty: 300 EACH | Refills: 3 | Status: SHIPPED | OUTPATIENT
Start: 2021-11-10 | End: 2022-03-04

## 2021-11-10 RX ORDER — BLOOD GLUCOSE CONTROL HIGH,LOW
EACH MISCELLANEOUS
Qty: 1 EACH | Refills: 1 | Status: SHIPPED | OUTPATIENT
Start: 2021-11-10 | End: 2021-12-06

## 2021-11-10 RX ORDER — CLONIDINE HYDROCHLORIDE 0.3 MG/1
TABLET ORAL
Qty: 180 TABLET | Refills: 1 | Status: SHIPPED | OUTPATIENT
Start: 2021-11-10 | End: 2022-03-04 | Stop reason: SDUPTHER

## 2021-11-18 ENCOUNTER — TELEPHONE (OUTPATIENT)
Dept: FAMILY MEDICINE CLINIC | Facility: CLINIC | Age: 56
End: 2021-11-18

## 2021-11-18 RX ORDER — SITAGLIPTIN 100 MG/1
TABLET, FILM COATED ORAL
Qty: 90 TABLET | Refills: 0 | Status: SHIPPED | OUTPATIENT
Start: 2021-11-18 | End: 2021-11-18 | Stop reason: CLARIF

## 2021-11-18 NOTE — TELEPHONE ENCOUNTER
----- Message from Brad Arnold MD sent at 11/18/2021 10:15 AM CST -----  Regarding: patient and medication  For pt I recently refilled januvia but she is also on rybelsus. Which one is she taking .She is supposed to be on rybelsus only. Please verify and let me know thanks

## 2021-11-22 RX ORDER — LEVOTHYROXINE SODIUM 175 UG/1
175 TABLET ORAL DAILY
Qty: 30 TABLET | Refills: 3 | Status: SHIPPED | OUTPATIENT
Start: 2021-11-22 | End: 2022-03-04 | Stop reason: SDUPTHER

## 2021-11-22 RX ORDER — LEVOTHYROXINE SODIUM 0.15 MG/1
TABLET ORAL
Qty: 90 TABLET | Refills: 0 | OUTPATIENT
Start: 2021-11-22

## 2021-11-22 NOTE — TELEPHONE ENCOUNTER
Rx Refill Note  Requested Prescriptions     Pending Prescriptions Disp Refills   • levothyroxine (Synthroid) 175 MCG tablet 30 tablet 3     Sig: Take 1 tablet by mouth Daily.     Refused Prescriptions Disp Refills   • levothyroxine (SYNTHROID, LEVOTHROID) 150 MCG tablet [Pharmacy Med Name: LEVOTHYROXINE 150 MCG TABLET] 90 tablet 0     Sig: TAKE ONE TABLET BY MOUTH DAILY      Last office visit with prescribing clinician: 9/30/2021      Next office visit with prescribing clinician: Visit date not found   {TIP  Encounters:    DR ALVA INCREASED DOSE AFTER LAST TSH LAB         Gracy Gutierrez LPN  11/22/21, 08:45 CST

## 2021-11-23 ENCOUNTER — TELEPHONE (OUTPATIENT)
Dept: FAMILY MEDICINE CLINIC | Facility: CLINIC | Age: 56
End: 2021-11-23

## 2021-11-23 RX ORDER — AMOXICILLIN 500 MG/1
500 CAPSULE ORAL 2 TIMES DAILY
Qty: 14 CAPSULE | Refills: 0 | Status: SHIPPED | OUTPATIENT
Start: 2021-11-23 | End: 2021-11-30

## 2021-11-23 RX ORDER — AMOXICILLIN 500 MG/1
1000 CAPSULE ORAL 2 TIMES DAILY
Qty: 14 CAPSULE | Refills: 0 | Status: SHIPPED | OUTPATIENT
Start: 2021-11-23 | End: 2021-11-23

## 2021-11-23 NOTE — TELEPHONE ENCOUNTER
RICKY CALLED AND IS NEEDING CLARIFICATION ON THE amoxicillin (AMOXIL) 500 MG capsule.. PLEASE CALL [phone]

## 2021-11-29 ENCOUNTER — TELEPHONE (OUTPATIENT)
Dept: FAMILY MEDICINE CLINIC | Facility: CLINIC | Age: 56
End: 2021-11-29

## 2021-12-01 NOTE — TELEPHONE ENCOUNTER
Patient called and was very argumentive in regards to getting something for a yeast infection. Spoke with MATILDA Mar and she stated that patient would need to be seen and I relayed this message and patient once again was argumentive about this.  I tried to make an appointment for the same day but patient stated that she would go to another facility.

## 2021-12-02 ENCOUNTER — TELEPHONE (OUTPATIENT)
Dept: FAMILY MEDICINE CLINIC | Facility: CLINIC | Age: 56
End: 2021-12-02

## 2021-12-02 NOTE — TELEPHONE ENCOUNTER
She would like a call back... she is needing refills on some medications.. I asked what she needed and she kept say she wanted you to call her. She said she needs quite a few and again asked to talk to the nurse

## 2021-12-02 NOTE — TELEPHONE ENCOUNTER
Incoming Refill Request      Medication requested (name and dose): no    Pharmacy where request should be sent: no    Additional details provided by patient: no    Best call back number: no    Does the patient have less than a 3 day supply:  [] Yes  [x] No    Khloe Cochran  12/02/21, 07:42 CST

## 2021-12-02 NOTE — TELEPHONE ENCOUNTER
Spoke to pt and she is wanting an antibiotic called in to the pharmacy. I informed her should would need to be seen since PCP is out of the office. She stated she does not want to be seen and have a swab done because it throws her balance off. She wants medication sent to the pharmacy. I informed her that she will not be able to have medication sent without being seen. She said PCP sends medication for her without being seen. I explained he is out of the office and another provider is not going to send in medication with her coming into the office for treatment. Pt named a few medications she said needed refills but I informed her all of them had refills and she would need to contact pharmacy to have them filled.

## 2021-12-04 DIAGNOSIS — I10 ESSENTIAL HYPERTENSION: ICD-10-CM

## 2021-12-06 RX ORDER — LANCETS
EACH MISCELLANEOUS
Qty: 300 EACH | Refills: 0 | Status: SHIPPED | OUTPATIENT
Start: 2021-12-06 | End: 2022-12-20 | Stop reason: SDUPTHER

## 2021-12-06 RX ORDER — ATORVASTATIN CALCIUM 20 MG/1
TABLET, FILM COATED ORAL
Qty: 90 TABLET | Refills: 0 | Status: SHIPPED | OUTPATIENT
Start: 2021-12-06 | End: 2022-03-07 | Stop reason: SDUPTHER

## 2021-12-06 RX ORDER — BLOOD GLUCOSE CONTROL HIGH,LOW
EACH MISCELLANEOUS
Qty: 1 EACH | Refills: 1 | Status: SHIPPED | OUTPATIENT
Start: 2021-12-06 | End: 2023-02-09 | Stop reason: SDUPTHER

## 2021-12-08 ENCOUNTER — TELEPHONE (OUTPATIENT)
Dept: FAMILY MEDICINE CLINIC | Facility: CLINIC | Age: 56
End: 2021-12-08

## 2021-12-08 RX ORDER — CALCIUM ACETATE 667 MG/1
CAPSULE ORAL
Qty: 90 CAPSULE | Refills: 3 | Status: SHIPPED | OUTPATIENT
Start: 2021-12-08 | End: 2022-03-04 | Stop reason: SDUPTHER

## 2021-12-08 NOTE — TELEPHONE ENCOUNTER
Patient called stating she put in a request for refills on her medication last week, but has yet to get anything called in. She said she talked to someone who had seemed to get an attitude with her about getting the medication refilled and that her doctor would be out, as well as the nurse. She said she finally talked to the nurse, but still couldn't get her medication. She said she ended up having to buy some from the store out of her pocket. Patient said her doctor told her she could call him any time to get refills.    She wants her doctor to call her once he is back in office.

## 2021-12-09 ENCOUNTER — TELEPHONE (OUTPATIENT)
Dept: FAMILY MEDICINE CLINIC | Facility: CLINIC | Age: 56
End: 2021-12-09

## 2021-12-09 NOTE — TELEPHONE ENCOUNTER
Called patient to try to get a list of medications she was requesting. She said she already told the girl last week the medications she needed and the girl told her she already had those medications, that she wouldn't do it. I told her a nurse was going to look into it, but I needed the names of the medications she needed. She raised her voice as she talked about the last appointment she had, about a yeast infection, and then rattled off 3 medications she needed refills on before she said she's just done, she's not going to list them all. She doesn't want to talk to anyone anymore just her doctor, she's through talking to anyone else. Don't contact her until the doctor is back in.

## 2021-12-09 NOTE — TELEPHONE ENCOUNTER
Patient called and stated that someone had called her and she was returning the call.  I read all of the telephone messages and spoke with provider's MA.  Patient has been told multiple times that her provider is out of the office and that she will need to be seen before any medications can be sent in for a yeast infection.  I read to patient the last note and she began to raise her voice stating that she knew all of that and didn't understand what the problem was.  She kept stating that her provider said all she had to do was call and he would send something in, I explained once again that her provider was off and a message had been sent. She once again raised her voice and stated that if she had to go to [place] she would or maybe she just needed to find another provider and she didn't understand why she couldn't send it in.  I asked who she was and patient hung up the phone

## 2021-12-15 RX ORDER — LEVOTHYROXINE SODIUM 0.15 MG/1
TABLET ORAL
Qty: 90 TABLET | Refills: 0 | OUTPATIENT
Start: 2021-12-15

## 2021-12-15 RX ORDER — ESCITALOPRAM OXALATE 20 MG/1
TABLET ORAL
Qty: 30 TABLET | Refills: 1 | Status: SHIPPED | OUTPATIENT
Start: 2021-12-15 | End: 2022-03-30

## 2021-12-20 ENCOUNTER — TELEPHONE (OUTPATIENT)
Dept: FAMILY MEDICINE CLINIC | Facility: CLINIC | Age: 56
End: 2021-12-20

## 2021-12-20 DIAGNOSIS — Z12.31 SCREENING MAMMOGRAM, ENCOUNTER FOR: ICD-10-CM

## 2021-12-20 DIAGNOSIS — Z13.820 OSTEOPOROSIS SCREENING: ICD-10-CM

## 2021-12-20 DIAGNOSIS — Z09 ENCOUNTER FOR FOLLOW-UP EXAMINATION AFTER COMPLETED TREATMENT FOR CONDITIONS OTHER THAN MALIGNANT NEOPLASM: ICD-10-CM

## 2021-12-20 NOTE — TELEPHONE ENCOUNTER
----- Message from Brad Arnold MD sent at 12/20/2021  2:21 PM CST -----  Please let pt know that mammogram screening on 12/2/21 showed stable lymph  nodes on right outer breast. Has mildly dense breast tissue     Benign findings with no evidence of malignancy. Repeat mammogram in 1 year. Thanks

## 2021-12-20 NOTE — TELEPHONE ENCOUNTER
----- Message from Brad Arnold MD sent at 12/20/2021 11:58 AM CST -----  Please let pt know bone density exam on 12/2/21 is normal    Recheck in 2 years

## 2021-12-27 DIAGNOSIS — E83.51 HYPOCALCEMIA: ICD-10-CM

## 2021-12-27 DIAGNOSIS — G47.00 INSOMNIA, UNSPECIFIED TYPE: ICD-10-CM

## 2021-12-27 DIAGNOSIS — D50.9 IRON DEFICIENCY ANEMIA, UNSPECIFIED IRON DEFICIENCY ANEMIA TYPE: ICD-10-CM

## 2021-12-27 DIAGNOSIS — I10 ESSENTIAL HYPERTENSION: ICD-10-CM

## 2021-12-27 DIAGNOSIS — IMO0002 UNCONTROLLED TYPE 2 DIABETES MELLITUS WITH COMPLICATION, WITHOUT LONG-TERM CURRENT USE OF INSULIN: ICD-10-CM

## 2021-12-27 DIAGNOSIS — E55.9 VITAMIN D DEFICIENCY: ICD-10-CM

## 2021-12-27 DIAGNOSIS — E89.0 POSTOPERATIVE HYPOTHYROIDISM: ICD-10-CM

## 2021-12-27 DIAGNOSIS — E11.40 TYPE 2 DIABETES MELLITUS WITH DIABETIC NEUROPATHY, WITHOUT LONG-TERM CURRENT USE OF INSULIN (HCC): ICD-10-CM

## 2021-12-27 DIAGNOSIS — E66.01 CLASS 2 SEVERE OBESITY WITH SERIOUS COMORBIDITY AND BODY MASS INDEX (BMI) OF 38.0 TO 38.9 IN ADULT, UNSPECIFIED OBESITY TYPE (HCC): ICD-10-CM

## 2021-12-27 DIAGNOSIS — E78.2 MIXED HYPERLIPIDEMIA: ICD-10-CM

## 2021-12-27 RX ORDER — ONDANSETRON HYDROCHLORIDE 8 MG/1
TABLET, FILM COATED ORAL
Qty: 90 TABLET | Refills: 1 | Status: SHIPPED | OUTPATIENT
Start: 2021-12-27 | End: 2022-04-12

## 2021-12-27 RX ORDER — GABAPENTIN 300 MG/1
300 CAPSULE ORAL 3 TIMES DAILY
Qty: 42 CAPSULE | Refills: 0 | Status: CANCELLED | OUTPATIENT
Start: 2021-12-27 | End: 2022-01-10

## 2021-12-27 NOTE — TELEPHONE ENCOUNTER
Patient called she stated [facility] pharmacy has been trying to get ahold of us since last week. Her medications are needing a new script. She didn't know what medications those was.

## 2021-12-27 NOTE — TELEPHONE ENCOUNTER
Rx Refill Note  Requested Prescriptions     Pending Prescriptions Disp Refills   • gabapentin (Neurontin) 300 MG capsule 42 capsule 0     Sig: Take 1 capsule by mouth 3 (Three) Times a Day for 14 days.   • amLODIPine (NORVASC) 5 MG tablet 90 tablet 1     Sig: Take 1 tablet by mouth Daily.   • ferrous sulfate (FeroSul) 325 (65 FE) MG tablet 270 tablet 1     Sig: Take 1 tablet by mouth 3 (Three) Times a Day With Meals.   • lisinopril (PRINIVIL,ZESTRIL) 40 MG tablet 90 tablet 1     Sig: Take 1 tablet by mouth Daily.   • traZODone (DESYREL) 100 MG tablet 90 tablet 1     Sig: Take 1 tablet by mouth Every Night.   • vitamin D (ERGOCALCIFEROL) 1.25 MG (08362 UT) capsule capsule 12 capsule 1     Sig: Take 1 capsule by mouth Every 7 (Seven) Days.      Last office visit with prescribing clinician: 9/30/2021      Next office visit with prescribing clinician: 1/11/2022            Pati Marmolejo Rep  12/27/21, 11:14 CST

## 2021-12-28 RX ORDER — ERGOCALCIFEROL 1.25 MG/1
50000 CAPSULE ORAL
Qty: 12 CAPSULE | Refills: 0 | Status: SHIPPED | OUTPATIENT
Start: 2021-12-28 | End: 2022-03-07 | Stop reason: SDUPTHER

## 2021-12-28 RX ORDER — AMLODIPINE BESYLATE 5 MG/1
5 TABLET ORAL DAILY
Qty: 90 TABLET | Refills: 0 | Status: SHIPPED | OUTPATIENT
Start: 2021-12-28 | End: 2022-01-31

## 2021-12-28 RX ORDER — FERROUS SULFATE 325(65) MG
1 TABLET ORAL
Qty: 270 TABLET | Refills: 0 | Status: SHIPPED | OUTPATIENT
Start: 2021-12-28 | End: 2022-12-20 | Stop reason: SDUPTHER

## 2021-12-28 RX ORDER — TRAZODONE HYDROCHLORIDE 100 MG/1
100 TABLET ORAL NIGHTLY
Qty: 90 TABLET | Refills: 0 | Status: SHIPPED | OUTPATIENT
Start: 2021-12-28 | End: 2022-03-07 | Stop reason: SDUPTHER

## 2021-12-28 RX ORDER — LISINOPRIL 40 MG/1
40 TABLET ORAL DAILY
Qty: 90 TABLET | Refills: 0 | Status: SHIPPED | OUTPATIENT
Start: 2021-12-28 | End: 2022-03-04 | Stop reason: SDUPTHER

## 2022-01-05 ENCOUNTER — TELEPHONE (OUTPATIENT)
Dept: FAMILY MEDICINE CLINIC | Facility: CLINIC | Age: 57
End: 2022-01-05

## 2022-01-05 NOTE — TELEPHONE ENCOUNTER
Left detailed message to call and reschedule cancelled appt as provider is out due to death in family.

## 2022-01-20 RX ORDER — LEVOTHYROXINE SODIUM 0.15 MG/1
TABLET ORAL
Qty: 90 TABLET | Refills: 0 | OUTPATIENT
Start: 2022-01-20

## 2022-01-20 NOTE — TELEPHONE ENCOUNTER
Rx Refill Note  Requested Prescriptions     Refused Prescriptions Disp Refills   • levothyroxine (SYNTHROID, LEVOTHROID) 150 MCG tablet [Pharmacy Med Name: LEVOTHYROXINE 150 MCG TABLET] 90 tablet 0     Sig: TAKE ONE TABLET BY MOUTH DAILY      Last office visit with prescribing clinician: 9/30/2021      Next office visit with prescribing clinician: 3/25/2022   {TIP  Encounters:      PT IS  [facility] AND SHOULD HAVE A CURRENT RX         Gracy Gutierrez LPN  01/20/22, 09:22 CST

## 2022-01-31 RX ORDER — AMLODIPINE BESYLATE 5 MG/1
TABLET ORAL
Qty: 90 TABLET | Refills: 0 | Status: SHIPPED | OUTPATIENT
Start: 2022-01-31 | End: 2022-03-10 | Stop reason: SDUPTHER

## 2022-02-01 NOTE — TELEPHONE ENCOUNTER
Rx Refill Note  Requested Prescriptions     Pending Prescriptions Disp Refills   • fluconazole (DIFLUCAN) 150 MG tablet [Pharmacy Med Name: FLUCONAZOLE 150 MG TABLET] 2 tablet 0     Sig: TAKE ONE TABLET BY MOUTH IN A SINGLE DOSE NOW AND REPEAT IN 72 HOURS   • metoprolol succinate XL (TOPROL-XL) 100 MG 24 hr tablet [Pharmacy Med Name: METOPROLOL SUCC  MG TAB] 90 tablet 0     Sig: TAKE ONE TABLET BY MOUTH DAILY      Last office visit with prescribing clinician: 9/30/2021      Next office visit with prescribing clinician: 3/25/2022   {TIP  Encounters:    DR ALVA PT         Gracy Gutierrez, EVANS  02/01/22, 16:41 CST

## 2022-02-05 RX ORDER — FLUCONAZOLE 150 MG/1
TABLET ORAL
Qty: 2 TABLET | Refills: 0 | Status: SHIPPED | OUTPATIENT
Start: 2022-02-05 | End: 2022-08-01

## 2022-02-05 RX ORDER — METOPROLOL SUCCINATE 100 MG/1
TABLET, EXTENDED RELEASE ORAL
Qty: 90 TABLET | Refills: 0 | Status: SHIPPED | OUTPATIENT
Start: 2022-02-05 | End: 2022-03-04 | Stop reason: SDUPTHER

## 2022-02-07 RX ORDER — ORAL SEMAGLUTIDE 3 MG/1
TABLET ORAL
Qty: 30 TABLET | Refills: 1 | Status: SHIPPED | OUTPATIENT
Start: 2022-02-07 | End: 2022-03-07 | Stop reason: SDUPTHER

## 2022-03-03 ENCOUNTER — TELEPHONE (OUTPATIENT)
Dept: FAMILY MEDICINE CLINIC | Facility: CLINIC | Age: 57
End: 2022-03-03

## 2022-03-04 DIAGNOSIS — G47.00 INSOMNIA, UNSPECIFIED TYPE: ICD-10-CM

## 2022-03-04 DIAGNOSIS — E66.01 CLASS 2 SEVERE OBESITY WITH SERIOUS COMORBIDITY AND BODY MASS INDEX (BMI) OF 38.0 TO 38.9 IN ADULT, UNSPECIFIED OBESITY TYPE: ICD-10-CM

## 2022-03-04 DIAGNOSIS — E11.40 TYPE 2 DIABETES MELLITUS WITH DIABETIC NEUROPATHY, WITHOUT LONG-TERM CURRENT USE OF INSULIN: ICD-10-CM

## 2022-03-04 DIAGNOSIS — D50.9 IRON DEFICIENCY ANEMIA, UNSPECIFIED IRON DEFICIENCY ANEMIA TYPE: ICD-10-CM

## 2022-03-04 DIAGNOSIS — E89.0 POSTOPERATIVE HYPOTHYROIDISM: ICD-10-CM

## 2022-03-04 DIAGNOSIS — I10 ESSENTIAL HYPERTENSION: ICD-10-CM

## 2022-03-04 DIAGNOSIS — E78.2 MIXED HYPERLIPIDEMIA: ICD-10-CM

## 2022-03-04 DIAGNOSIS — IMO0002 UNCONTROLLED TYPE 2 DIABETES MELLITUS WITH COMPLICATION, WITHOUT LONG-TERM CURRENT USE OF INSULIN: Primary | ICD-10-CM

## 2022-03-04 DIAGNOSIS — IMO0002 UNCONTROLLED TYPE 2 DIABETES MELLITUS WITH COMPLICATION, WITHOUT LONG-TERM CURRENT USE OF INSULIN: ICD-10-CM

## 2022-03-04 DIAGNOSIS — E55.9 VITAMIN D DEFICIENCY: ICD-10-CM

## 2022-03-04 DIAGNOSIS — E83.51 HYPOCALCEMIA: ICD-10-CM

## 2022-03-04 RX ORDER — GABAPENTIN 300 MG/1
300 CAPSULE ORAL 3 TIMES DAILY
Qty: 42 CAPSULE | Refills: 0 | Status: CANCELLED | OUTPATIENT
Start: 2022-03-04 | End: 2022-03-18

## 2022-03-04 RX ORDER — CALCIUM ACETATE 667 MG/1
667 CAPSULE ORAL 3 TIMES DAILY
Qty: 90 CAPSULE | Refills: 1 | Status: SHIPPED | OUTPATIENT
Start: 2022-03-04 | End: 2022-03-10 | Stop reason: SDUPTHER

## 2022-03-04 RX ORDER — BLOOD SUGAR DIAGNOSTIC
STRIP MISCELLANEOUS
Qty: 200 EACH | Refills: 3 | Status: SHIPPED | OUTPATIENT
Start: 2022-03-04 | End: 2022-12-21

## 2022-03-04 RX ORDER — LISINOPRIL 40 MG/1
40 TABLET ORAL DAILY
Qty: 90 TABLET | Refills: 0 | Status: SHIPPED | OUTPATIENT
Start: 2022-03-04 | End: 2022-03-07 | Stop reason: SDUPTHER

## 2022-03-04 RX ORDER — METOPROLOL SUCCINATE 100 MG/1
100 TABLET, EXTENDED RELEASE ORAL DAILY
Qty: 90 TABLET | Refills: 0 | Status: SHIPPED | OUTPATIENT
Start: 2022-03-04 | End: 2022-03-07 | Stop reason: SDUPTHER

## 2022-03-04 RX ORDER — CLONIDINE HYDROCHLORIDE 0.3 MG/1
0.3 TABLET ORAL EVERY 12 HOURS
Qty: 180 TABLET | Refills: 0 | Status: SHIPPED | OUTPATIENT
Start: 2022-03-04 | End: 2022-03-10 | Stop reason: SDUPTHER

## 2022-03-04 RX ORDER — LEVOTHYROXINE SODIUM 175 UG/1
175 TABLET ORAL DAILY
Qty: 90 TABLET | Refills: 0 | Status: SHIPPED | OUTPATIENT
Start: 2022-03-04 | End: 2022-03-07 | Stop reason: SDUPTHER

## 2022-03-04 NOTE — TELEPHONE ENCOUNTER
Rx Refill Note  Requested Prescriptions     Pending Prescriptions Disp Refills   • Accu-Chek Yany Plus test strip [Pharmacy Med Name: ACCU-CHEK YANY PLUS TEST STRP]       Sig: USE ONE STRIP TO TEST BLOOD SUGAR THREE TIMES A DAY FOR DIABETES      Last office visit with prescribing clinician: 9/30/2021      Next office visit with prescribing clinician: 4/20/2022   {TIP  Encounters:    DR ALVA PT   Diabetic Supplies Protocol Failed 03/04/2022 10:04 AM   Protocol Details  A1c in past 6 months            {TIP  Please add Last Relevant Lab Date if appropriate  {TIP  Is Refill Pharmacy correct? YES  Gracy Gutierrez LPN  03/04/22, 10:32 CST   Subjective   Patient ID: Allie is a 38 year old female.    Chief Complaint   Patient presents with   • Foreign Body     RT HAND MIDDLE FINGER      Pain in 3dr R finger medial side of nail : on-off for months ( 3-4 months).  Denies biting nails/skin.  Started hurting 3 d ago.  No discharge, but red.    Still with neck sprain/spasm.  Collar did not help so stopped.  Does not use NSAIDs.    HPI    Allei has a past medical history of Anxiety, Depression, Essential (primary) hypertension, Seizure (CMS/HCC), and Seizures (CMS/HCC).  Patient Active Problem List   Diagnosis   • Anxiety   • Depression   • Essential (primary) hypertension   • Seizures (CMS/HCC)   • Alcohol use disorder, severe, dependence (CMS/HCC)   • Hypokalemia   • Hypokalemia      Allie has a past surgical history that includes  section, classic; Nasal septum surgery; and  section, low transverse.  Her family history includes Cancer in her maternal aunt and paternal aunt.  Allie reports that she has never smoked. She has never used smokeless tobacco. She reports previous alcohol use. She reports that she does not use drugs.  Allie has a current medication list which includes the following prescription(s): aurovela 1.5/30, folic acid, levetiracetam, sertraline, and trazodone.  Allie has No Known Allergies.  Current Outpatient Medications   Medication Sig Dispense Refill   • Aurovela 1.5/30 1.5-30 MG-MCG tablet TAKE 1 TABLET BY MOUTH DAILY 90 tablet 3   • folic acid (FOLATE) 1 MG tablet      • levETIRAcetam (KepPRA) 500 MG tablet Take 500 mg by mouth 2 times daily.     • sertraline (ZOLOFT) 100 MG tablet Take 100 mg by mouth daily.     • trazodone (DESYREL) 150 MG tablet Take 150 mg by mouth nightly.       No current facility-administered medications for this visit.   Vivitrol 360 mg monthly (Psych)    Visit Vitals  BP 98/70   Pulse (!) 51   Temp 98.1 °F (36.7 °C) (Tympanic)   Resp 14   Ht 5' 3\" (1.6 m)   Wt 69.9 kg (154  lb)   LMP 06/18/2021 (LMP Unknown)   SpO2 98%   BMI 27.28 kg/m²       Review of Systems  As above.  No fever, chills, cough , sin ena, PND, CP, SOB, wheezing, abd pain, N/V/D, HA, dizziness, dysuria, LOC, falls, rash.    Objective   Physical Exam  Constitutional:       General: She is not in acute distress.     Appearance: She is not ill-appearing, toxic-appearing or diaphoretic.   HENT:      Head: Normocephalic and atraumatic.   Eyes:      General:         Right eye: No discharge.         Left eye: No discharge.      Conjunctiva/sclera: Conjunctivae normal.   Musculoskeletal:      Comments: Pressing of the BL neck area musculature did not result with pain. I see no limitation of ROM or neck deformities.   Skin:     General: Skin is warm and dry.      Coloration: Skin is not jaundiced or pale.      Comments: Sl redness of the medial aspect of the skin next to the nail. I see no FB there, but the skin is relatively hard just by the nail.  It is sl swollen and red, mildly tender. No pus collection.   Neurological:      Mental Status: She is alert and oriented to person, place, and time.   Psychiatric:         Behavior: Behavior normal.         Thought Content: Thought content normal.         Assessment  and plan :    Muscle spasms : try Tizanidine 2 mg TID TN.  Mild massage therapy should be OK.  Declines PT.    Mild paronychia 3rd R finger.  I see no FB (splinter).  Try Augmentin 500 mg BID x 1 week + Epson salt soaking BID - warm water.  If not better within next 7-10 days, pt to let me know.

## 2022-03-04 NOTE — TELEPHONE ENCOUNTER
Rx Refill Note  Requested Prescriptions     Pending Prescriptions Disp Refills   • lisinopril (PRINIVIL,ZESTRIL) 40 MG tablet 90 tablet 0     Sig: Take 1 tablet by mouth Daily.   • cloNIDine (CATAPRES) 0.3 MG tablet 180 tablet 0     Sig: Take 1 tablet by mouth Every 12 (Twelve) Hours.   • metoprolol succinate XL (TOPROL-XL) 100 MG 24 hr tablet 90 tablet 0     Sig: Take 1 tablet by mouth Daily.   • calcium acetate (PHOS BINDER,) 667 MG capsule capsule 90 capsule 1     Sig: Take 1 capsule by mouth 3 (Three) Times a Day.   • levothyroxine (Synthroid) 175 MCG tablet 30 tablet 1     Sig: Take 1 tablet by mouth Daily.      Last office visit with prescribing clinician: 9/30/2021      Next office visit with prescribing clinician: 3/4/2022   {TIP  Encounters:    DR ALVA PT    {TIP  Please add Last Relevant Lab Date if appropriate  {TIP  Is Refill Pharmacy correct? YES  Gracy Gutierrez LPN  03/04/22, 10:30 CST

## 2022-03-04 NOTE — TELEPHONE ENCOUNTER
Incoming Refill Request      Medication requested (name and dose): gabapentin (Neurontin) 300 MG capsule (), cloNIDine (CATAPRES) 0.3 MG tablet, levothyroxine (Synthroid) 175 MCG tablet, metoprolol succinate XL (TOPROL-XL) 100 MG 24 hr tablet, cloNIDine (CATAPRES) 0.3 MG tablet,calcium acetate (PHOS BINDER,) 667 MG capsule capsule    Pharmacy where request should be sent: [facility]    Additional details provided by patient: HAS NO GABAPENTIN    Best call back number: [phone]    Does the patient have less than a 3 day supply:  [x] Yes  [] No    Khloe Cochran  22, 09:18 CST

## 2022-03-07 ENCOUNTER — TELEPHONE (OUTPATIENT)
Dept: FAMILY MEDICINE CLINIC | Facility: CLINIC | Age: 57
End: 2022-03-07

## 2022-03-07 DIAGNOSIS — I10 ESSENTIAL HYPERTENSION: ICD-10-CM

## 2022-03-07 RX ORDER — LEVOTHYROXINE SODIUM 175 UG/1
175 TABLET ORAL DAILY
Qty: 90 TABLET | Refills: 0 | Status: SHIPPED | OUTPATIENT
Start: 2022-03-07 | End: 2022-08-30 | Stop reason: SDUPTHER

## 2022-03-07 RX ORDER — ATORVASTATIN CALCIUM 20 MG/1
20 TABLET, FILM COATED ORAL DAILY
Qty: 90 TABLET | Refills: 0 | Status: SHIPPED | OUTPATIENT
Start: 2022-03-07 | End: 2022-07-01

## 2022-03-07 RX ORDER — TRAZODONE HYDROCHLORIDE 100 MG/1
100 TABLET ORAL NIGHTLY
Qty: 90 TABLET | Refills: 0 | Status: SHIPPED | OUTPATIENT
Start: 2022-03-07 | End: 2022-05-31 | Stop reason: SDUPTHER

## 2022-03-07 RX ORDER — ORAL SEMAGLUTIDE 3 MG/1
1 TABLET ORAL DAILY
Qty: 30 TABLET | Refills: 1 | Status: SHIPPED | OUTPATIENT
Start: 2022-03-07 | End: 2022-06-20

## 2022-03-07 RX ORDER — METOPROLOL SUCCINATE 100 MG/1
100 TABLET, EXTENDED RELEASE ORAL DAILY
Qty: 90 TABLET | Refills: 0 | Status: SHIPPED | OUTPATIENT
Start: 2022-03-07 | End: 2022-05-31 | Stop reason: SDUPTHER

## 2022-03-07 RX ORDER — ERGOCALCIFEROL 1.25 MG/1
50000 CAPSULE ORAL
Qty: 12 CAPSULE | Refills: 0 | Status: SHIPPED | OUTPATIENT
Start: 2022-03-07 | End: 2022-08-30 | Stop reason: SDUPTHER

## 2022-03-07 RX ORDER — LISINOPRIL 40 MG/1
40 TABLET ORAL DAILY
Qty: 90 TABLET | Refills: 0 | Status: SHIPPED | OUTPATIENT
Start: 2022-03-07 | End: 2022-05-31 | Stop reason: SDUPTHER

## 2022-03-07 NOTE — TELEPHONE ENCOUNTER
Patient would like a call back in regards to her medication refills.  Call back number: [phone]

## 2022-03-07 NOTE — TELEPHONE ENCOUNTER
Spoke with pt and she is needing her refills sent to [facility].  They were all sent to [facility] on 3/4/22 but she reports that she does not use [facility] anymore.

## 2022-03-08 RX ORDER — LEVOTHYROXINE SODIUM 0.15 MG/1
TABLET ORAL
Qty: 90 TABLET | Refills: 0 | OUTPATIENT
Start: 2022-03-08

## 2022-03-08 NOTE — TELEPHONE ENCOUNTER
Rx Refill Note  Requested Prescriptions     Refused Prescriptions Disp Refills   • levothyroxine (SYNTHROID, LEVOTHROID) 150 MCG tablet [Pharmacy Med Name: LEVOTHYROXINE 150 MCG TABLET] 90 tablet 0     Sig: TAKE ONE TABLET BY MOUTH DAILY      Last office visit with prescribing clinician: 9/30/2021      Next office visit with prescribing clinician: 4/20/2022   {TIP  Encounters:  THIS WAS D/CD 8/2021 FOR DOSE ADJUSTMENT. RX SENT 3/7/22. PT IS ON LEVOTHYROXINE 175 MCG.       {TIP  Please add Last Relevant Lab Date if appropriate  {TIP  Is Refill Pharmacy correct? YES  Gracy Gutierrez LPN  03/08/22, 16:42 CST

## 2022-03-09 DIAGNOSIS — E78.2 MIXED HYPERLIPIDEMIA: ICD-10-CM

## 2022-03-09 DIAGNOSIS — E89.0 POSTOPERATIVE HYPOTHYROIDISM: ICD-10-CM

## 2022-03-09 DIAGNOSIS — E55.9 VITAMIN D DEFICIENCY: ICD-10-CM

## 2022-03-09 DIAGNOSIS — E11.40 TYPE 2 DIABETES MELLITUS WITH DIABETIC NEUROPATHY, WITHOUT LONG-TERM CURRENT USE OF INSULIN: ICD-10-CM

## 2022-03-09 DIAGNOSIS — E83.51 HYPOCALCEMIA: ICD-10-CM

## 2022-03-09 DIAGNOSIS — G47.00 INSOMNIA, UNSPECIFIED TYPE: ICD-10-CM

## 2022-03-09 DIAGNOSIS — I10 ESSENTIAL HYPERTENSION: ICD-10-CM

## 2022-03-09 DIAGNOSIS — IMO0002 UNCONTROLLED TYPE 2 DIABETES MELLITUS WITH COMPLICATION, WITHOUT LONG-TERM CURRENT USE OF INSULIN: ICD-10-CM

## 2022-03-09 DIAGNOSIS — D50.9 IRON DEFICIENCY ANEMIA, UNSPECIFIED IRON DEFICIENCY ANEMIA TYPE: ICD-10-CM

## 2022-03-09 DIAGNOSIS — E66.01 CLASS 2 SEVERE OBESITY WITH SERIOUS COMORBIDITY AND BODY MASS INDEX (BMI) OF 38.0 TO 38.9 IN ADULT, UNSPECIFIED OBESITY TYPE: ICD-10-CM

## 2022-03-09 RX ORDER — LISINOPRIL 40 MG/1
40 TABLET ORAL DAILY
Qty: 90 TABLET | Refills: 0 | Status: CANCELLED | OUTPATIENT
Start: 2022-03-09

## 2022-03-09 RX ORDER — LEVOTHYROXINE SODIUM 175 UG/1
175 TABLET ORAL DAILY
Qty: 90 TABLET | Refills: 0 | Status: CANCELLED | OUTPATIENT
Start: 2022-03-09

## 2022-03-09 RX ORDER — BLOOD SUGAR DIAGNOSTIC
STRIP MISCELLANEOUS
Qty: 200 EACH | Refills: 3 | Status: CANCELLED | OUTPATIENT
Start: 2022-03-09

## 2022-03-09 NOTE — TELEPHONE ENCOUNTER
Rx Refill Note  Requested Prescriptions     Pending Prescriptions Disp Refills   • gabapentin (Neurontin) 300 MG capsule 42 capsule 0     Sig: Take 1 capsule by mouth 3 (Three) Times a Day for 14 days.      Last office visit with prescribing clinician: 9/30/2021      Next office visit with prescribing clinician: 4/20/2022   {TIP  Encounters:    DR ALVA PT.         {TIP  Please add Last Relevant Lab Date if appropriate  {TIP  Is Refill Pharmacy correct? YES  Gracy Gutierrez LPN  03/09/22, 11:06 CST

## 2022-03-10 DIAGNOSIS — E83.51 HYPOCALCEMIA: ICD-10-CM

## 2022-03-10 DIAGNOSIS — IMO0002 UNCONTROLLED TYPE 2 DIABETES MELLITUS WITH COMPLICATION, WITHOUT LONG-TERM CURRENT USE OF INSULIN: ICD-10-CM

## 2022-03-10 DIAGNOSIS — E55.9 VITAMIN D DEFICIENCY: ICD-10-CM

## 2022-03-10 DIAGNOSIS — E78.2 MIXED HYPERLIPIDEMIA: ICD-10-CM

## 2022-03-10 DIAGNOSIS — I10 ESSENTIAL HYPERTENSION: ICD-10-CM

## 2022-03-10 DIAGNOSIS — E11.40 TYPE 2 DIABETES MELLITUS WITH DIABETIC NEUROPATHY, WITHOUT LONG-TERM CURRENT USE OF INSULIN: ICD-10-CM

## 2022-03-10 DIAGNOSIS — D50.9 IRON DEFICIENCY ANEMIA, UNSPECIFIED IRON DEFICIENCY ANEMIA TYPE: ICD-10-CM

## 2022-03-10 DIAGNOSIS — E66.01 CLASS 2 SEVERE OBESITY WITH SERIOUS COMORBIDITY AND BODY MASS INDEX (BMI) OF 38.0 TO 38.9 IN ADULT, UNSPECIFIED OBESITY TYPE: ICD-10-CM

## 2022-03-10 DIAGNOSIS — E89.0 POSTOPERATIVE HYPOTHYROIDISM: ICD-10-CM

## 2022-03-10 DIAGNOSIS — G47.00 INSOMNIA, UNSPECIFIED TYPE: ICD-10-CM

## 2022-03-10 RX ORDER — CLONIDINE HYDROCHLORIDE 0.3 MG/1
0.3 TABLET ORAL EVERY 12 HOURS
Qty: 180 TABLET | Refills: 0 | Status: SHIPPED | OUTPATIENT
Start: 2022-03-10 | End: 2022-05-31 | Stop reason: SDUPTHER

## 2022-03-10 RX ORDER — GABAPENTIN 300 MG/1
300 CAPSULE ORAL 3 TIMES DAILY
Qty: 42 CAPSULE | Refills: 0 | OUTPATIENT
Start: 2022-03-10 | End: 2022-03-24

## 2022-03-10 RX ORDER — CALCIUM ACETATE 667 MG/1
667 CAPSULE ORAL 3 TIMES DAILY
Qty: 90 CAPSULE | Refills: 0 | Status: SHIPPED | OUTPATIENT
Start: 2022-03-10 | End: 2022-03-30

## 2022-03-10 RX ORDER — AMLODIPINE BESYLATE 5 MG/1
5 TABLET ORAL DAILY
Qty: 90 TABLET | Refills: 0 | Status: SHIPPED | OUTPATIENT
Start: 2022-03-10 | End: 2022-05-31 | Stop reason: SDUPTHER

## 2022-03-10 NOTE — TELEPHONE ENCOUNTER
Rx Refill Note  Requested Prescriptions     Pending Prescriptions Disp Refills   • amLODIPine (NORVASC) 5 MG tablet 90 tablet 0     Sig: Take 1 tablet by mouth Daily.   • cloNIDine (CATAPRES) 0.3 MG tablet 180 tablet 0     Sig: Take 1 tablet by mouth Every 12 (Twelve) Hours.   • calcium acetate (PHOS BINDER,) 667 MG capsule capsule 90 capsule 0     Sig: Take 1 capsule by mouth 3 (Three) Times a Day.   • gabapentin (Neurontin) 300 MG capsule 42 capsule 0     Sig: Take 1 capsule by mouth 3 (Three) Times a Day for 14 days.      Last office visit with prescribing clinician: 9/30/2021      Next office visit with prescribing clinician: 4/20/2022   {TIP  Encounters:    FOR GABAPENTIN: THIS WAS PRESCRIBED FOR SHORT TERM (14 DAYS ONLY)    {TIP  Please add Last Relevant Lab Date if appropriate  {TIP  Is Refill Pharmacy correct? YES  Gracy Gutierrez LPN  03/10/22, 16:15 CST

## 2022-03-11 DIAGNOSIS — E66.01 CLASS 2 SEVERE OBESITY WITH SERIOUS COMORBIDITY AND BODY MASS INDEX (BMI) OF 38.0 TO 38.9 IN ADULT, UNSPECIFIED OBESITY TYPE: ICD-10-CM

## 2022-03-11 DIAGNOSIS — E11.40 TYPE 2 DIABETES MELLITUS WITH DIABETIC NEUROPATHY, WITHOUT LONG-TERM CURRENT USE OF INSULIN: ICD-10-CM

## 2022-03-11 DIAGNOSIS — E55.9 VITAMIN D DEFICIENCY: ICD-10-CM

## 2022-03-11 DIAGNOSIS — E89.0 POSTOPERATIVE HYPOTHYROIDISM: ICD-10-CM

## 2022-03-11 DIAGNOSIS — G47.00 INSOMNIA, UNSPECIFIED TYPE: ICD-10-CM

## 2022-03-11 DIAGNOSIS — E83.51 HYPOCALCEMIA: ICD-10-CM

## 2022-03-11 DIAGNOSIS — D50.9 IRON DEFICIENCY ANEMIA, UNSPECIFIED IRON DEFICIENCY ANEMIA TYPE: ICD-10-CM

## 2022-03-11 DIAGNOSIS — I10 ESSENTIAL HYPERTENSION: ICD-10-CM

## 2022-03-11 DIAGNOSIS — IMO0002 UNCONTROLLED TYPE 2 DIABETES MELLITUS WITH COMPLICATION, WITHOUT LONG-TERM CURRENT USE OF INSULIN: ICD-10-CM

## 2022-03-11 DIAGNOSIS — E78.2 MIXED HYPERLIPIDEMIA: ICD-10-CM

## 2022-03-11 RX ORDER — GABAPENTIN 300 MG/1
300 CAPSULE ORAL 3 TIMES DAILY
Qty: 42 CAPSULE | Refills: 0 | OUTPATIENT
Start: 2022-03-11 | End: 2022-03-25

## 2022-03-11 RX ORDER — GABAPENTIN 300 MG/1
CAPSULE ORAL
Qty: 42 CAPSULE | OUTPATIENT
Start: 2022-03-11

## 2022-03-18 RX ORDER — CALCIUM ACETATE 667 MG/1
CAPSULE ORAL
Qty: 180 CAPSULE | OUTPATIENT
Start: 2022-03-18

## 2022-03-30 RX ORDER — CALCIUM ACETATE 667 MG/1
CAPSULE ORAL
Qty: 180 CAPSULE | Refills: 0 | Status: SHIPPED | OUTPATIENT
Start: 2022-03-30 | End: 2022-06-16

## 2022-03-30 RX ORDER — ESCITALOPRAM OXALATE 20 MG/1
TABLET ORAL
Qty: 30 TABLET | Refills: 0 | Status: SHIPPED | OUTPATIENT
Start: 2022-03-30 | End: 2022-06-01

## 2022-03-30 RX ORDER — LEVOTHYROXINE SODIUM 0.15 MG/1
TABLET ORAL
Qty: 90 TABLET | Refills: 0 | OUTPATIENT
Start: 2022-03-30

## 2022-04-07 ENCOUNTER — TELEPHONE (OUTPATIENT)
Dept: FAMILY MEDICINE CLINIC | Facility: CLINIC | Age: 57
End: 2022-04-07

## 2022-04-12 RX ORDER — ONDANSETRON HYDROCHLORIDE 8 MG/1
TABLET, FILM COATED ORAL
Qty: 90 TABLET | Refills: 0 | Status: SHIPPED | OUTPATIENT
Start: 2022-04-12 | End: 2022-06-03 | Stop reason: SDUPTHER

## 2022-04-12 NOTE — TELEPHONE ENCOUNTER
Rx Refill Note  Requested Prescriptions     Pending Prescriptions Disp Refills   • ondansetron (ZOFRAN) 8 MG tablet [Pharmacy Med Name: ONDANSETRON HCL 8 MG TABLET] 90 tablet 1     Sig: TAKE ONE TABLETBY MOUTH EVERY EIGHT HOURS AS NEEDED FOR NAUSEA OR VOMITING      Last office visit with prescribing clinician: 9/30/2021      Next office visit with prescribing clinician: 5/26/2022   {TIP  Encounters:    Dr Arnold pt.    {TIP  Please add Last Relevant Lab Date if appropriate  {TIP  Is Refill Pharmacy correct? yes  Gracy Gutierrez LPN  04/12/22, 16:15 CDT

## 2022-04-18 RX ORDER — SITAGLIPTIN 100 MG/1
TABLET, FILM COATED ORAL
Qty: 90 TABLET | OUTPATIENT
Start: 2022-04-18

## 2022-04-18 NOTE — TELEPHONE ENCOUNTER
Rx Refill Note  Requested Prescriptions     Refused Prescriptions Disp Refills   • Januvia 100 MG tablet [Pharmacy Med Name: JANUVIA 100 MG TABLET] 90 tablet      Sig: TAKE ONE TABLET BY MOUTH DAILY      Last office visit with prescribing clinician: 9/30/2021      Next office visit with prescribing clinician: 5/26/2022   {TIP  Encounters:    D/CD BY PROVIDER 11/18/21 FOR FORMULARY CHANGE    {TIP  Please add Last Relevant Lab Date if appropriate  {TIP  Is Refill Pharmacy correct?  2Gracy Gutierrez LPN  04/18/22, 10:17 CDT

## 2022-04-27 RX ORDER — LEVOTHYROXINE SODIUM 0.15 MG/1
TABLET ORAL
Qty: 90 TABLET | Refills: 0 | OUTPATIENT
Start: 2022-04-27

## 2022-04-27 RX ORDER — SITAGLIPTIN 100 MG/1
TABLET, FILM COATED ORAL
Qty: 90 TABLET | Refills: 0 | OUTPATIENT
Start: 2022-04-27

## 2022-04-27 NOTE — TELEPHONE ENCOUNTER
Rx Refill Note  Requested Prescriptions     Refused Prescriptions Disp Refills   • levothyroxine (SYNTHROID, LEVOTHROID) 150 MCG tablet [Pharmacy Med Name: LEVOTHYROXINE 150 MCG TABLET] 90 tablet 0     Sig: TAKE ONE TABLET BY MOUTH DAILY   • Januvia 100 MG tablet [Pharmacy Med Name: JANUVIA 100 MG TABLET] 90 tablet      Sig: TAKE ONE TABLET BY MOUTH DAILY      Last office visit with prescribing clinician: 9/30/2021      Next office visit with prescribing clinician: 5/26/2022   {TIP  Encounters:      LEVOTHYROXINE 150 MCG WAS D/CD 8/2021 FOR DOSE ADJUSTMENT. RX SENT 3/7/22. PT IS ON LEVOTHYROXINE 175 MCG.     PT IS NOT TAKING JANUVIA. SHE IS ON RYBELSUS.       Gracy Gutierrez LPN  04/27/22, 10:19 CDT

## 2022-05-26 ENCOUNTER — OFFICE VISIT (OUTPATIENT)
Dept: FAMILY MEDICINE CLINIC | Facility: CLINIC | Age: 57
End: 2022-05-26

## 2022-05-26 VITALS
HEIGHT: 60 IN | TEMPERATURE: 98 F | SYSTOLIC BLOOD PRESSURE: 120 MMHG | BODY MASS INDEX: 36.36 KG/M2 | WEIGHT: 185.2 LBS | DIASTOLIC BLOOD PRESSURE: 62 MMHG

## 2022-05-26 DIAGNOSIS — E83.51 HYPOCALCEMIA: ICD-10-CM

## 2022-05-26 DIAGNOSIS — E11.649 UNCONTROLLED TYPE 2 DIABETES MELLITUS WITH HYPOGLYCEMIA WITHOUT COMA: Primary | ICD-10-CM

## 2022-05-26 DIAGNOSIS — Z00.00 MEDICARE ANNUAL WELLNESS VISIT, INITIAL: Primary | ICD-10-CM

## 2022-05-26 PROCEDURE — 1125F AMNT PAIN NOTED PAIN PRSNT: CPT | Performed by: FAMILY MEDICINE

## 2022-05-26 PROCEDURE — 1170F FXNL STATUS ASSESSED: CPT | Performed by: FAMILY MEDICINE

## 2022-05-26 PROCEDURE — G0438 PPPS, INITIAL VISIT: HCPCS | Performed by: FAMILY MEDICINE

## 2022-05-26 PROCEDURE — 99214 OFFICE O/P EST MOD 30 MIN: CPT | Performed by: FAMILY MEDICINE

## 2022-05-26 PROCEDURE — 1159F MED LIST DOCD IN RCRD: CPT | Performed by: FAMILY MEDICINE

## 2022-05-26 NOTE — PROGRESS NOTES
The ABCs of the Annual Wellness Visit  Initial Medicare Wellness Visit    Chief Complaint   Patient presents with   • Annual Exam     Initial Medicare Wellness     Subjective   History of Present Illness:  Joan Blackwell is a 56 y.o. female who presents for an Initial Medicare Wellness Visit.    The following portions of the patient's history were reviewed and   updated as appropriate: allergies, current medications, past family history, past medical history, past social history, past surgical history and problem list.     Compared to one year ago, the patient feels her physical   health is the same.    Compared to one year ago, the patient feels her mental   health is the same.    Recent Hospitalizations:  She was not admitted to the hospital during the last year.       Current Medical Providers:  Patient Care Team:  Brad Arnold MD as PCP - General (Family Medicine)    Outpatient Medications Prior to Visit   Medication Sig Dispense Refill   • Accu-Chek Yany Plus test strip USE ONE STRIP TO TEST BLOOD SUGAR THREE TIMES A DAY FOR DIABETES 200 each 3   • Accu-Chek Softclix Lancets lancets TEST BLOOD SUGAR THREE TIMES DAILY AS DIRECTED 300 each 0   • Alcohol Swabs (B-D SINGLE USE SWABS REGULAR) pads USE AS DIRECTED AS NEEDED 300 each 3   • ALPRAZolam (XANAX) 1 MG tablet      • amLODIPine (NORVASC) 5 MG tablet Take 1 tablet by mouth Daily. 90 tablet 0   • ARIPiprazole (ABILIFY) 30 MG tablet      • atorvastatin (LIPITOR) 20 MG tablet Take 1 tablet by mouth Daily. 90 tablet 0   • Blood Glucose Calibration (Accu-Chek Guide Control) liquid USE AS DIRECTED 1 each 1   • Blood Glucose Monitoring Suppl (Accu-Chek Guide) w/Device kit USE AS DIRECTED 1 kit 0   • buPROPion XL (WELLBUTRIN XL) 300 MG 24 hr tablet      • calcium acetate (PHOS BINDER,) 667 MG capsule capsule TAKE ONE CAPSULE BY MOUTH THREE TIMES A  capsule 0   • cloNIDine (CATAPRES) 0.3 MG tablet Take 1 tablet by mouth Every 12 (Twelve) Hours. 180 tablet 0    • cyclobenzaprine (FLEXERIL) 10 MG tablet Take 1 tablet by mouth At Night As Needed for Muscle Spasms. 30 tablet 3   • empagliflozin (Jardiance) 25 MG tablet tablet Take 1 tablet by mouth Daily. 90 tablet 3   • escitalopram (LEXAPRO) 20 MG tablet TAKE ONE TABLET BY MOUTH DAILY 30 tablet 0   • ferrous sulfate (FeroSul) 325 (65 FE) MG tablet Take 1 tablet by mouth 3 (Three) Times a Day With Meals. 270 tablet 0   • fluconazole (Diflucan) 150 MG tablet Take 1 tablet now and in 72 hours 2 tablet 0   • fluconazole (DIFLUCAN) 150 MG tablet TAKE ONE TABLET BY MOUTH IN A SINGLE DOSE NOW AND REPEAT IN 72 HOURS 2 tablet 0   • fluticasone-salmeterol (ADVAIR) 250-50 MCG/DOSE DISKUS INHALE 1 PUFF 2 (TWO) TIMES A DAY. 180 each 1   • HYDROcodone-acetaminophen (NORCO)  MG per tablet      • lamoTRIgine (LaMICtal) 200 MG tablet      • levothyroxine (Synthroid) 175 MCG tablet Take 1 tablet by mouth Daily. 90 tablet 0   • lisinopril (PRINIVIL,ZESTRIL) 40 MG tablet Take 1 tablet by mouth Daily. 90 tablet 0   • magnesium oxide 250 MG tablet TAKE 1 TABLET EVERY DAY 90 tablet 1   • metFORMIN (GLUCOPHAGE) 1000 MG tablet Take 1 tablet by mouth 2 (Two) Times a Day With Meals. 180 tablet 3   • methocarbamol (ROBAXIN) 500 MG tablet      • metoprolol succinate XL (TOPROL-XL) 100 MG 24 hr tablet Take 1 tablet by mouth Daily. 90 tablet 0   • ondansetron (ZOFRAN) 8 MG tablet TAKE ONE TABLETBY MOUTH EVERY EIGHT HOURS AS NEEDED FOR NAUSEA OR VOMITING 90 tablet 0   • potassium chloride ER (K-TAB) 20 MEQ tablet controlled-release ER tablet TAKE 1 TABLET EVERY DAY 90 tablet 3   • Semaglutide (Rybelsus) 3 MG tablet Take 1 tablet by mouth Daily. 30 tablet 1   • traZODone (DESYREL) 100 MG tablet Take 1 tablet by mouth Every Night. 90 tablet 0   • vitamin B-12 (CYANOCOBALAMIN) 500 MCG tablet Take 1 tablet by mouth Daily. 30 tablet 3   • vitamin D (ERGOCALCIFEROL) 1.25 MG (67878 UT) capsule capsule Take 1 capsule by mouth Every 7 (Seven) Days. 12  capsule 0   • gabapentin (Neurontin) 300 MG capsule Take 1 capsule by mouth 3 (Three) Times a Day for 14 days. 42 capsule 0     No facility-administered medications prior to visit.       Opioid medication/s are on active medication list.  and I have evaluated her active treatment plan and pain score trends (see table).  Vitals:    05/26/22 1108   PainSc:   6   PainLoc: Back     I have reviewed the chart for potential of high risk medication and harmful drug interactions in the elderly.            Aspirin is not on active medication list.  Aspirin use is not indicated based on review of current medical condition/s. Risk of harm outweighs potential benefits.  .    Patient Active Problem List   Diagnosis   • Hypocalcemia   • Hyperlipidemia   • Iron deficiency anemia   • Hypothyroidism   • S/P thyroidectomy   • Uncontrolled type 2 diabetes mellitus with complication, without long-term current use of insulin   • Obesity   • Uncontrolled hypertension   • Hypertensive urgency   • Right wrist pain   • Carpal tunnel syndrome of right wrist   • Controlled type 2 diabetes mellitus with complication, without long-term current use of insulin (HCC)   • Essential hypertension   • Vitamin D deficiency   • Sprain of left ankle   • Grieving   • Class 2 obesity with body mass index (BMI) of 37.0 to 37.9 in adult   • Rotator cuff syndrome of right shoulder   • Hypotension   • Bilateral low back pain with bilateral sciatica   • Fall   • Right shoulder pain   • Dental abscess     Advance Care Planning  Advance Directive is not on file.  ACP discussion was held with the patient during this visit. Patient has an advance directive (not in EMR), copy requested.          Objective       Vitals:    05/26/22 1108   BP: 120/62   BP Location: Left arm   Patient Position: Sitting   Cuff Size: Adult   Pulse: Comment: Unable to obtain due to nails   Temp: 98 °F (36.7 °C)   SpO2: Comment: Unable to obtain due to nails   Weight: 84 kg (185 lb 3.2 oz)  "  Height: 152.4 cm (60\")   PainSc:   6   PainLoc: Back     Body mass index is 36.17 kg/m².      Does the patient have evidence of cognitive impairment? No    Physical Exam   /62 (BP Location: Left arm, Patient Position: Sitting, Cuff Size: Adult)   Temp 98 °F (36.7 °C)   Ht 152.4 cm (60\")   Wt 84 kg (185 lb 3.2 oz)   BMI 36.17 kg/m²             HEALTH RISK ASSESSMENT    Smoking Status:  Social History     Tobacco Use   Smoking Status Former Smoker   Smokeless Tobacco Never Used     Alcohol Consumption:  Social History     Substance and Sexual Activity   Alcohol Use No     Fall Risk Screen:    STEADI Fall Risk Assessment was completed, and patient is at LOW risk for falls.Assessment completed on:5/26/2022    Depression Screen:   PHQ-2/PHQ-9 Depression Screening 5/26/2022   Retired Total Score -   Little Interest or Pleasure in Doing Things 0-->not at all   Feeling Down, Depressed or Hopeless 0-->not at all   PHQ-9: Brief Depression Severity Measure Score 0       Health Habits and Functional and Cognitive Screening:  Functional & Cognitive Status 5/26/2022   Do you have difficulty preparing food and eating? No   Do you have difficulty bathing yourself, getting dressed or grooming yourself? No   Do you have difficulty using the toilet? No   Do you have difficulty moving around from place to place? Yes   Do you have trouble with steps or getting out of a bed or a chair? Yes   Current Diet Well Balanced Diet   Dental Exam Up to date   Eye Exam Up to date   Exercise (times per week) 7 times per week   Current Exercises Include Walking   Do you need help using the phone?  No   Are you deaf or do you have serious difficulty hearing?  No   Do you need help with transportation? Yes   Do you need help shopping? No   Do you need help preparing meals?  No   Do you need help with housework?  No   Do you need help with laundry? No   Do you need help taking your medications? No   Do you need help managing money? No   Do " you ever drive or ride in a car without wearing a seat belt? No   Have you felt unusual stress, anger or loneliness in the last month? No   Who do you live with? Spouse   If you need help, do you have trouble finding someone available to you? No   Have you been bothered in the last four weeks by sexual problems? No   Do you have difficulty concentrating, remembering or making decisions? No       Age-appropriate Screening Schedule:  Refer to the list below for future screening recommendations based on patient's age, sex and/or medical conditions. Orders for these recommended tests are listed in the plan section. The patient has been provided with a written plan.    Health Maintenance   Topic Date Due   • HEMOGLOBIN A1C  02/25/2022   • ZOSTER VACCINE (1 of 2) 05/26/2023 (Originally 8/24/2014)   • INFLUENZA VACCINE  08/01/2022   • DIABETIC FOOT EXAM  08/25/2022   • LIPID PANEL  08/25/2022   • URINE MICROALBUMIN  08/25/2022   • DIABETIC EYE EXAM  09/13/2022   • MAMMOGRAM  12/02/2022   • DXA SCAN  12/02/2023   • TDAP/TD VACCINES (2 - Td or Tdap) 03/27/2028            Assessment & Plan   CMS Preventative Services Quick Reference  Risk Factors Identified During Encounter  Alcohol Misuse  Cardiovascular Disease  Chronic Pain   Dementia/Memory   Depression/Dysphoria  Drug Use/Abuse Identified or Suspected  Fall Risk-High or Moderate  Glaucoma or Family History of Glaucoma  Hearing Problem  Inadequate Social Support, Isolation, Loneliness, Lack of Transportation, Financial Difficulties, or Caregiver Stress   Inactivity/Sedentary  Obesity/Overweight   Polypharmacy  High Risk Sexual Behavior   Tobacco Use/Dependance (use dotphrase .tobaccocessation for documentation)  Urinary Incontinence  The above risks/problems have been discussed with the patient.  Follow up actions/plans if indicated are seen below in the Assessment/Plan Section.  Pertinent information has been shared with the patient in the After Visit  Summary.    Diagnoses and all orders for this visit:    1. Medicare annual wellness visit, initial (Primary)        Follow Up:  Return in about 1 year (around 5/26/2023) for Medicare Wellness.     An After Visit Summary and PPPS were made available to the patient.        -Action plan Discussed, please see Scanned Documents  -repeat in 1 year        This document has been electronically signed by Brad Arnold MD on May 26, 2022 11:32 CDT

## 2022-05-26 NOTE — PATIENT INSTRUCTIONS
Medicare Wellness  Personal Prevention Plan of Service     Date of Office Visit:    Encounter Provider:  Brad Arnold MD  Place of Service:  [facility]  Patient Name: Joan Blackwell  :  1965    As part of the Medicare Wellness portion of your visit today, we are providing you with this personalized preventive plan of services (PPPS). This plan is based upon recommendations of the [place] Preventive Services Task Force (USPSTF) and the Advisory Committee on Immunization Practices (ACIP).    This lists the preventive care services that should be considered, and provides dates of when you are due. Items listed as completed are up-to-date and do not require any further intervention.    Health Maintenance   Topic Date Due    HEMOGLOBIN A1C  2022    Hepatitis B (1 of 3 - Risk 3-dose series) 2022 (Originally 1983)    Pneumococcal Vaccine 0-64 (2 - PCV) 2023 (Originally 3/27/2019)    ZOSTER VACCINE (1 of 2) 2023 (Originally 2014)    COVID-19 Vaccine (1) 2026 (Originally 1969)    INFLUENZA VACCINE  2022    DIABETIC FOOT EXAM  2022    LIPID PANEL  2022    URINE MICROALBUMIN  2022    DIABETIC EYE EXAM  2022    MAMMOGRAM  2022    ANNUAL WELLNESS VISIT  2023    DXA SCAN  2023    TDAP/TD VACCINES (2 - Td or Tdap) 2028    HEPATITIS C SCREENING  Completed    COLORECTAL CANCER SCREENING  Discontinued       No orders of the defined types were placed in this encounter.      Return in about 1 year (around 2023) for Medicare Wellness.

## 2022-05-31 RX ORDER — AMLODIPINE BESYLATE 5 MG/1
5 TABLET ORAL DAILY
Qty: 90 TABLET | Refills: 0 | Status: SHIPPED | OUTPATIENT
Start: 2022-05-31 | End: 2022-08-30 | Stop reason: SDUPTHER

## 2022-05-31 RX ORDER — CLONIDINE HYDROCHLORIDE 0.3 MG/1
0.3 TABLET ORAL EVERY 12 HOURS
Qty: 180 TABLET | Refills: 0 | Status: SHIPPED | OUTPATIENT
Start: 2022-05-31 | End: 2022-08-30 | Stop reason: SDUPTHER

## 2022-05-31 RX ORDER — LISINOPRIL 40 MG/1
40 TABLET ORAL DAILY
Qty: 90 TABLET | Refills: 0 | Status: SHIPPED | OUTPATIENT
Start: 2022-05-31 | End: 2022-08-25

## 2022-05-31 RX ORDER — METOPROLOL SUCCINATE 100 MG/1
100 TABLET, EXTENDED RELEASE ORAL DAILY
Qty: 90 TABLET | Refills: 0 | Status: SHIPPED | OUTPATIENT
Start: 2022-05-31 | End: 2022-08-30 | Stop reason: SDUPTHER

## 2022-05-31 RX ORDER — TRAZODONE HYDROCHLORIDE 100 MG/1
100 TABLET ORAL NIGHTLY
Qty: 90 TABLET | Refills: 0 | Status: SHIPPED | OUTPATIENT
Start: 2022-05-31

## 2022-06-01 RX ORDER — ESCITALOPRAM OXALATE 20 MG/1
TABLET ORAL
Qty: 27 TABLET | Refills: 1 | Status: SHIPPED | OUTPATIENT
Start: 2022-06-01 | End: 2022-07-01

## 2022-06-01 RX ORDER — SITAGLIPTIN 100 MG/1
TABLET, FILM COATED ORAL
Qty: 90 TABLET | Refills: 31 | Status: SHIPPED | OUTPATIENT
Start: 2022-06-01 | End: 2022-06-01

## 2022-06-01 RX ORDER — LEVOTHYROXINE SODIUM 0.15 MG/1
TABLET ORAL
Qty: 90 TABLET | Refills: 0 | Status: SHIPPED | OUTPATIENT
Start: 2022-06-01 | End: 2022-08-01

## 2022-06-03 RX ORDER — ONDANSETRON HYDROCHLORIDE 8 MG/1
TABLET, FILM COATED ORAL
Qty: 90 TABLET | Refills: 0 | Status: SHIPPED | OUTPATIENT
Start: 2022-06-03 | End: 2022-06-17

## 2022-06-03 RX ORDER — FLUCONAZOLE 150 MG/1
TABLET ORAL
Qty: 2 TABLET | Refills: 0 | Status: SHIPPED | OUTPATIENT
Start: 2022-06-03 | End: 2022-08-01

## 2022-06-16 RX ORDER — CALCIUM ACETATE 667 MG/1
CAPSULE ORAL
Qty: 180 CAPSULE | Refills: 0 | Status: SHIPPED | OUTPATIENT
Start: 2022-06-16 | End: 2022-08-15

## 2022-06-17 RX ORDER — ONDANSETRON HYDROCHLORIDE 8 MG/1
TABLET, FILM COATED ORAL
Qty: 90 TABLET | Refills: 0 | Status: SHIPPED | OUTPATIENT
Start: 2022-06-17 | End: 2022-07-21

## 2022-06-20 RX ORDER — ORAL SEMAGLUTIDE 3 MG/1
TABLET ORAL
Qty: 30 TABLET | Refills: 1 | Status: SHIPPED | OUTPATIENT
Start: 2022-06-20 | End: 2022-08-24

## 2022-06-20 NOTE — TELEPHONE ENCOUNTER
"Rx Refill Note  Requested Prescriptions     Pending Prescriptions Disp Refills   • Rybelsus 3 MG tablet [Pharmacy Med Name: RYBELSUS 3 MG TABLET] 30 tablet 1     Sig: TAKE ONE TABLET BY MOUTH DAILY      Last office visit with prescribing clinician: 5/26/2022      Next office visit with prescribing clinician: 8/11/2022   {TIP  Encounters\"   GLP-1 Agonist Protocol Failed 06/20/2022 02:20 PM   Protocol Details  A1c in last six months        :        {TIP  Please add Last Relevant Lab Date if appropriate  {TIP  Is Refill Pharmacy correct? yes  Gracy Gutierrez LPN  06/20/22, 16:03 CDT  "

## 2022-06-21 RX ORDER — ORAL SEMAGLUTIDE 3 MG/1
1 TABLET ORAL DAILY
Qty: 30 TABLET | Refills: 1 | OUTPATIENT
Start: 2022-06-21

## 2022-06-21 NOTE — TELEPHONE ENCOUNTER
Rx Refill Note  Requested Prescriptions     Refused Prescriptions Disp Refills   • Semaglutide (Rybelsus) 3 MG tablet 30 tablet 1     Sig: Take 1 tablet by mouth Daily.      Last office visit with prescribing clinician: 5/26/2022      Next office visit with prescribing clinician: 8/11/2022   {TIP  Encounters:    RX WAS SENT ON 6/20/22 #30 X 1 REFILL    {TIP  Please add Last Relevant Lab Date if appropriate  {TIP  Is Refill Pharmacy correct? YES  Gracy Gutierrez LPN  06/21/22, 16:26 CDT

## 2022-07-01 DIAGNOSIS — I10 ESSENTIAL HYPERTENSION: ICD-10-CM

## 2022-07-01 RX ORDER — ESCITALOPRAM OXALATE 20 MG/1
TABLET ORAL
Qty: 30 TABLET | Refills: 1 | Status: SHIPPED | OUTPATIENT
Start: 2022-07-01 | End: 2022-08-01

## 2022-07-01 RX ORDER — ATORVASTATIN CALCIUM 20 MG/1
TABLET, FILM COATED ORAL
Qty: 90 TABLET | Refills: 0 | Status: SHIPPED | OUTPATIENT
Start: 2022-07-01 | End: 2022-08-15

## 2022-07-21 RX ORDER — ONDANSETRON HYDROCHLORIDE 8 MG/1
TABLET, FILM COATED ORAL
Qty: 90 TABLET | Refills: 0 | Status: SHIPPED | OUTPATIENT
Start: 2022-07-21 | End: 2022-08-24

## 2022-08-01 ENCOUNTER — OFFICE VISIT (OUTPATIENT)
Dept: NEUROSURGERY | Facility: CLINIC | Age: 57
End: 2022-08-01

## 2022-08-01 VITALS — WEIGHT: 180.38 LBS | BODY MASS INDEX: 35.41 KG/M2 | HEIGHT: 60 IN

## 2022-08-01 DIAGNOSIS — Z87.891 FORMER SMOKER: ICD-10-CM

## 2022-08-01 PROBLEM — E66.9 CLASS 2 OBESITY WITH BODY MASS INDEX (BMI) OF 37.0 TO 37.9 IN ADULT: Status: RESOLVED | Noted: 2019-12-13 | Resolved: 2022-08-01

## 2022-08-01 PROCEDURE — 99203 OFFICE O/P NEW LOW 30 MIN: CPT | Performed by: NEUROLOGICAL SURGERY

## 2022-08-01 NOTE — PROGRESS NOTES
Primary Care Provider: Brad Arnold MD    Chief Complaint:   Chief Complaint   Patient presents with   • Back Pain     New patient here for evaluation.       History of Present Illness  Joan Blackwell is a 56 y.o. female is being seen for consultation today at the request of CANDACE Barboza*    Jona is being evaluated today for back and right leg pain.  Patient states that she was in her normal state of health until a fall at the [employer] on 2/21/2020.  She was initially treated by Dr. Anderson orthopedic surgeon particularly for right shoulder pain.  Then was referred to my office for her back pain.  States that she fell in the corner of a shipping container.  She laid in the ground until EMS arrived.  She had back pain and pain going down the right side of her leg.    Pain today is a 5 out of 10.  This is 50% lumbosacral pain and 50% right leg pain.  She states that it radiates down the back of her leg all the way to her heel.  She also endorses numbness and tingling in the same distribution.  She has no weakness in her lower extremity.  She brings a cane to the office today but does not use it as she walks around the office.  No bowel or bladder incontinence.    Oswestry Disability Index Lumbar = 70%   Score   Pain Intensity Fairly severe pain-3   Personal Care I need some help but manage most of my personal care-3   Lifting I can not carry anything-5   Walking Pain prevents > 100 yards-3   Sitting Pain prevents sitting > 30 min-3   Standing Pain limits standing to < 10 min-4   Sleeping Can only sleep < 4 hrs-3   Sex Life (if applicable) Sex is nearly absent due to pain-5   Social Life Pain restricts me to my home-4   Traveling Pain restricts to < 1 hr-3   (Mariel et al, 1980)    SCORE INTERPRETATION OF THE OSWESTRY LBP DISABILITY QUESTIONNAIRE   60-80% Crippled Back pain impinges on all aspects of these patients' lives both at home and at work. Positive intervention is required.     Review  of Systems   Constitutional: Positive for activity change.   HENT: Negative.    Eyes: Negative.    Respiratory: Negative.    Cardiovascular: Negative.    Gastrointestinal: Negative.    Endocrine: Negative.    Genitourinary: Negative.    Musculoskeletal: Positive for back pain, neck pain and neck stiffness.   Skin: Negative.    Allergic/Immunologic: Negative.    Neurological: Positive for numbness.   Hematological: Negative.    Psychiatric/Behavioral: Negative.        Past Medical History:   Diagnosis Date   • Abdominal pain     suspect Kidney Stone      • Acquired hypothyroidism    • Acute bronchitis    • Acute maxillary sinusitis    • Acute pharyngitis    • Allergic    • Anxiety    • Asthma    • Axillary lymphadenopathy    • Blood in urine    • Bronchitis     with an asthmatic reaction      • Colitis    • Cough    • Cyst of Bartholin's gland duct     History of    • Degenerative joint disease involving multiple joints    • Depression    • Diabetes (HCC)    • Encounter for gynecological examination with abnormal finding    • Essential (primary) hypertension    • Essential hypertension    • GERD (gastroesophageal reflux disease)    • Goiter    • Hemorrhoids    • Hordeolum     right lower eyelid      • Injury of back    • Low back pain    • Lung nodule, solitary    • Osteoporosis    • Polyp of vagina     possible      • Postsurgical hypoparathyroidism (HCC)    • Shortness of breath    • Tobacco dependence syndrome     Past history   • Urinary tract infectious disease    • Vulvovaginitis        Past Surgical History:   Procedure Laterality Date   •  SECTION  01/14/1993    x 3, 84, 82,   • EXPLORATORY LAPAROTOMY Left 1998    Left cystectomy. Partial resection of vthe left ovary. Left ovary- oversewn   • INJECTION OF MEDICATION  2014    Depo Medrol (Methylprednisone) (1)    • INJECTION OF MEDICATION  2015    Kenalog (1)     • LAPAROSCOPIC HYSTERECTOMY  1995    Surgical, lysis of  adhesions.Laparoscopic Assisted vaginal Hysterectomy (Tyler/ Doderlein Technique) marsupialization of right Bartholin's Gland Cyst   • LASER ABLATION OF THE CERVIX  03/25/1985    Laser vaporization, cervical cone   • OVARIAN CYST REMOVAL  1996   • PAP SMEAR  2008   • SHOULDER SURGERY     • THYROIDECTOMY  02/28/2006    with partial parathyroidectomy   • TOOTH EXTRACTION         Family History: family history includes Arthritis in her father; Diabetes in her father and mother; Hypertension in her mother.    Social History:  reports that she has quit smoking. She has never used smokeless tobacco. She reports that she does not drink alcohol and does not use drugs.    Medications:    Current Outpatient Medications:   •  Accu-Chek Yany Plus test strip, USE ONE STRIP TO TEST BLOOD SUGAR THREE TIMES A DAY FOR DIABETES, Disp: 200 each, Rfl: 3  •  Accu-Chek Softclix Lancets lancets, TEST BLOOD SUGAR THREE TIMES DAILY AS DIRECTED, Disp: 300 each, Rfl: 0  •  Alcohol Swabs (B-D SINGLE USE SWABS REGULAR) pads, USE AS DIRECTED AS NEEDED, Disp: 300 each, Rfl: 3  •  ALPRAZolam (XANAX) 1 MG tablet, , Disp: , Rfl:   •  amLODIPine (NORVASC) 5 MG tablet, Take 1 tablet by mouth Daily., Disp: 90 tablet, Rfl: 0  •  ARIPiprazole (ABILIFY) 30 MG tablet, , Disp: , Rfl:   •  atorvastatin (LIPITOR) 20 MG tablet, TAKE ONE TABLET BY MOUTH DAILY, Disp: 90 tablet, Rfl: 0  •  Blood Glucose Calibration (Accu-Chek Guide Control) liquid, USE AS DIRECTED, Disp: 1 each, Rfl: 1  •  Blood Glucose Monitoring Suppl (Accu-Chek Guide) w/Device kit, USE AS DIRECTED, Disp: 1 kit, Rfl: 0  •  buPROPion XL (WELLBUTRIN XL) 300 MG 24 hr tablet, , Disp: , Rfl:   •  calcium acetate (PHOS BINDER,) 667 MG capsule capsule, TAKE ONE CAPSULE BY MOUTH THREE TIMES A DAY, Disp: 180 capsule, Rfl: 0  •  cloNIDine (CATAPRES) 0.3 MG tablet, Take 1 tablet by mouth Every 12 (Twelve) Hours., Disp: 180 tablet, Rfl: 0  •  empagliflozin (Jardiance) 25 MG tablet tablet, Take 1 tablet  by mouth Daily., Disp: 90 tablet, Rfl: 3  •  ferrous sulfate (FeroSul) 325 (65 FE) MG tablet, Take 1 tablet by mouth 3 (Three) Times a Day With Meals., Disp: 270 tablet, Rfl: 0  •  HYDROcodone-acetaminophen (NORCO)  MG per tablet, , Disp: , Rfl:   •  lamoTRIgine (LaMICtal) 200 MG tablet, , Disp: , Rfl:   •  levothyroxine (Synthroid) 175 MCG tablet, Take 1 tablet by mouth Daily., Disp: 90 tablet, Rfl: 0  •  lisinopril (PRINIVIL,ZESTRIL) 40 MG tablet, Take 1 tablet by mouth Daily., Disp: 90 tablet, Rfl: 0  •  magnesium oxide 250 MG tablet, TAKE 1 TABLET EVERY DAY, Disp: 90 tablet, Rfl: 1  •  metFORMIN (GLUCOPHAGE) 1000 MG tablet, Take 1 tablet by mouth 2 (Two) Times a Day With Meals., Disp: 180 tablet, Rfl: 3  •  metoprolol succinate XL (TOPROL-XL) 100 MG 24 hr tablet, Take 1 tablet by mouth Daily., Disp: 90 tablet, Rfl: 0  •  ondansetron (ZOFRAN) 8 MG tablet, TAKE ONE TABLET BY MOUTH EVERY 8 HOURS AS NEEDED, Disp: 90 tablet, Rfl: 0  •  potassium chloride ER (K-TAB) 20 MEQ tablet controlled-release ER tablet, TAKE 1 TABLET EVERY DAY, Disp: 90 tablet, Rfl: 3  •  Rybelsus 3 MG tablet, TAKE ONE TABLET BY MOUTH DAILY, Disp: 30 tablet, Rfl: 1  •  traZODone (DESYREL) 100 MG tablet, Take 1 tablet by mouth Every Night., Disp: 90 tablet, Rfl: 0  •  vitamin B-12 (CYANOCOBALAMIN) 500 MCG tablet, Take 1 tablet by mouth Daily., Disp: 30 tablet, Rfl: 3  •  vitamin D (ERGOCALCIFEROL) 1.25 MG (25346 UT) capsule capsule, Take 1 capsule by mouth Every 7 (Seven) Days., Disp: 12 capsule, Rfl: 0    Allergies:  Naproxen and Other    Objective   Physical Exam  Constitutional:       Appearance: She is well-developed.   HENT:      Head: Normocephalic and atraumatic.   Eyes:      Extraocular Movements: EOM normal.      Pupils: Pupils are equal, round, and reactive to light.   Pulmonary:      Effort: Pulmonary effort is normal.      Breath sounds: Normal breath sounds.   Abdominal:      Palpations: Abdomen is soft.   Musculoskeletal:          General: Normal range of motion.      Cervical back: Normal range of motion and neck supple.   Skin:     General: Skin is warm and dry.   Neurological:      Mental Status: She is oriented to person, place, and time.      Coordination: Finger-Nose-Finger Test and Heel to Shin Test normal.      Gait: Gait is intact. Tandem walk normal.      Deep Tendon Reflexes:      Reflex Scores:       Tricep reflexes are 2+ on the right side and 2+ on the left side.       Bicep reflexes are 2+ on the right side and 2+ on the left side.       Brachioradialis reflexes are 2+ on the right side and 2+ on the left side.       Patellar reflexes are 2+ on the right side and 2+ on the left side.       Achilles reflexes are 2+ on the right side and 2+ on the left side.  Psychiatric:         Speech: Speech normal.       Neurologic Exam     Mental Status   Oriented to person, place, and time.   Registration: recalls 3 of 3 objects. Recall at 5 minutes: recalls 3 of 3 objects.   Attention: normal. Concentration: normal.   Speech: speech is normal   Level of consciousness: alert  Knowledge: consistent with education.     Cranial Nerves     CN II   Visual acuity: normal    CN III, IV, VI   Pupils are equal, round, and reactive to light.  Extraocular motions are normal.   Diplopia: none    CN V   Facial sensation intact.   Right corneal reflex: normal  Left corneal reflex: normal    CN VII   Right facial weakness: none  Left facial weakness: none    CN VIII   Hearing: intact    CN IX, X   Palate: symmetric  Right gag reflex: normal  Left gag reflex: normal    CN XI   Right trapezius strength: normal  Left trapezius strength: normal    CN XII   Tongue deviation: none    Motor Exam   Right arm tone: normal  Left arm tone: normal  Right arm pronator drift: absent  Left arm pronator drift: absent  Right leg tone: normal  Left leg tone: normal    Strength   Right deltoid: 5/5  Left deltoid: 5/5  Right biceps: 5/5  Left biceps: 5/5  Right triceps:  5/5  Left triceps: 5/5  Right interossei: 5/5  Left interossei: 5/5  Right iliopsoas: 5/5  Left iliopsoas: 5/5  Right quadriceps: 5/5  Left quadriceps: 5/5  Right anterior tibial: 5/5  Left anterior tibial: 5/5  Right gastroc: 5/5  Left gastroc: 5/5Right EHL 5/5   Left EHL 5/5     Sensory Exam   Light touch normal.   Proprioception normal.     Gait, Coordination, and Reflexes     Gait  Gait: normal    Coordination   Finger to nose coordination: normal  Heel to shin coordination: normal  Tandem walking coordination: normal    Reflexes   Right brachioradialis: 2+  Left brachioradialis: 2+  Right biceps: 2+  Left biceps: 2+  Right triceps: 2+  Left triceps: 2+  Right patellar: 2+  Left patellar: 2+  Right achilles: 2+  Left achilles: 2+  Right plantar: normal  Left plantar: normal  Right Cabrera: absent  Left Cabrera: absent  Right ankle clonus: absent  Left ankle clonus: absent      Gait:  Able to heel and toe walk without difficulty    Back exam:   + point tenderness over spine   No point tenderness over SI joint right and left   No pain with later loading of hip    Hip exam:   Negative KATI right and left    Negative straight leg Raise bilaterally.      Imaging: (independent review and interpretation)  No radiology results for the last 30 days.    Noncontrast MRI of the lumbar spine is reviewed.  Evidence of a very mild bulging disc without extrusion or annular tear at L5/S1.  No evidence of nerve root effacement.  No spondylolisthesis age-appropriate degeneration otherwise normal MRI             2015      ASSESSMENT and PLAN  Joan Blackwell is a 56 y.o. female with a significant comorbidity of hyperlipidemia, hypertension, hypothyroidism, obesity, vitamin D deficiency, iron deficiency anemia, carpal tunnel, and former smoker. She presents with a new problem of back pain radiating into her right leg since February 2020 after a fall.  Oswestry disability index of 70% physical exam findings of neurologically intact.   Her imaging shows very mild disc bulge at L5/S1 without nerve root encroachment or effacement.    Axial Lumbar Pain with right leg pain:  Normal MRI of the lumbar spine  Define his back pain without significant radiculopathy or weakness.  This can include referred pain radiating down posterior thighs without decent below the knees.     Differential diagnosis for imaging negative back pain  Acute (<6 weeks) myofascial pain, drug induced myalgias (statins, Neulasta, or phosphodiesterase type V inhibitors such as tadalafil).   Subacute (6 weeks - 3 months) multiple myeloma, injectable bone metabolism agents such as prolia, sacroiliitis.   Chronic (>3 months) facet arthropathy, failed back syndrome after surgery, flat back syndrome. Spondyloarthropathies including ankylosis spondylitis (HLA-B27), Paget's disease.  Fibromyalgia or other rheumatological disease. Malingering, conversion disorder and secondary gain are diagnoses of exclusion.    Joan's signs and symptoms are most concerning for myofascial pain given her synptoms of back and right leg pain and signs of intact neurological exam.  Furthermore her imaging shows normal MRI notes [place] disability index out of proportion neurological examination.    Joan and I reviewed her history of presenting illness, physical examination, and relevant imaging.  The role for surgical evaluation is to determine if there is a surgical intervention that can partially or totally relieve the patient's current pain complaints.  I explained that the role for surgery is limited to 5 broad categories including ...  1. Bone fractures  2. Compression (stenosis) of the spinal cord or nerve roots from degeneration, infection, or neoplasm  3. Abnormal movements of the bones (I.e. spondylolisthesis)  4. Spinal deformity  5. Consideration of spinal cord stimulator or intrathecal pain pump after evaluation by Pain Management     A structural diagnosis is possible and only 50% of patients with  chronic back pain.  These patients account for 85% of the cost of lost work and compensation. (Alexandra. Back Pain and Sciatica. NEJ.1988; 318:291-300).    Fortunately Joan has no evidence of any of the above indications.  Therefore, I explained that, following national guidelines, surgical intervention is unlikely to ameliorate her current pain complaints. Several large studies have shown that spinal surgery for isolated axial back pain without radiculopathy has no significant effect on objective outcomes measures. Joan voiced understanding and was relieved to learn they would not be needing surgery.    TREATMENT RECOMMENDATIONS ...  -No surgical indications  -Continue with pain management    Obesity Counseling  We discussed Joan's current weight and the effect on her spine, including premature disc degeneration and increased anterior force on the lumbar spine resulting in worsening facet arthropathy.  Joan's Body mass index is 35.23 kg/m²..  We spent 1 minutes in weight management counseling including discussing current weight, current diet, and exercise patterns.  Additional we set goals for weight reduction.  Therefore above normal parameters. Recommendations include: educational material.       Diagnoses and all orders for this visit:    1. Body mass index (BMI) of 35.0 to 35.9 (Primary)    2. Former smoker        Return if symptoms worsen or fail to improve.    Thank you for this Consultation and the opportunity to participate in Joan's care.    Sincerely,  Jameel Nathan MD    I spent 21 minutes caring for Joan on this date of service. This time includes time spent by me in the following activities: preparing for the visit, reviewing tests, obtaining and/or reviewing a separately obtained history, performing a medically appropriate examination and/or evaluation, counseling and educating the patient/family/caregiver, ordering medications, tests, or procedures, referring and communicating with other  health care professionals, documenting information in the medical record, independently interpreting results and communicating that information with the patient/family/caregiver, and/or care coordination.     Medical Decision Making (2/3)  Problem Points (2,3,4 or more)  Established Problem, stable = 1x1  New, Problem, no workup (Max 1) = 3x1  Data Points (2,3,4 or more)  Independent review of imaging or specimen = 2x1  Risk (Low, Mod, High)  Undiagnosed New problem (Moderate)    E/M = MDM 3 out of 3   or   TIME  New Level 3 - 86026 = Low (2, 2, Low)   or   30-44 minutes

## 2022-08-15 DIAGNOSIS — I10 ESSENTIAL HYPERTENSION: ICD-10-CM

## 2022-08-15 RX ORDER — CALCIUM ACETATE 667 MG/1
CAPSULE ORAL
Qty: 180 CAPSULE | Refills: 0 | Status: SHIPPED | OUTPATIENT
Start: 2022-08-15 | End: 2022-08-30 | Stop reason: SDUPTHER

## 2022-08-15 RX ORDER — ATORVASTATIN CALCIUM 20 MG/1
TABLET, FILM COATED ORAL
Qty: 90 TABLET | Refills: 0 | Status: SHIPPED | OUTPATIENT
Start: 2022-08-15 | End: 2022-08-30 | Stop reason: SDUPTHER

## 2022-08-22 NOTE — PROGRESS NOTES
Subjective:  Joan Blackwell is a 57 y.o. female who presents for       Patient Active Problem List   Diagnosis   • Hypocalcemia   • Hyperlipidemia   • Iron deficiency anemia   • Hypothyroidism   • S/P thyroidectomy   • Uncontrolled type 2 diabetes mellitus with complication, without long-term current use of insulin   • Obesity   • Uncontrolled hypertension   • Hypertensive urgency   • Right wrist pain   • Carpal tunnel syndrome of right wrist   • Controlled type 2 diabetes mellitus with complication, without long-term current use of insulin (HCC)   • Essential hypertension   • Vitamin D deficiency   • Sprain of left ankle   • Grieving   • Rotator cuff syndrome of right shoulder   • Hypotension   • Bilateral low back pain with bilateral sciatica   • Fall   • Right shoulder pain   • Dental abscess   • Body mass index (BMI) of 35.0 to 35.9   • Former smoker   • Iron overload   • High vitamin D level   • Hypoparathyroidism (HCC)           Current Outpatient Medications:   •  Accu-Chek Yany Plus test strip, USE ONE STRIP TO TEST BLOOD SUGAR THREE TIMES A DAY FOR DIABETES, Disp: 200 each, Rfl: 3  •  Accu-Chek Softclix Lancets lancets, TEST BLOOD SUGAR THREE TIMES DAILY AS DIRECTED, Disp: 300 each, Rfl: 0  •  Alcohol Swabs (B-D SINGLE USE SWABS REGULAR) pads, USE AS DIRECTED AS NEEDED, Disp: 300 each, Rfl: 3  •  ALPRAZolam (XANAX) 1 MG tablet, , Disp: , Rfl:   •  amLODIPine (NORVASC) 5 MG tablet, Take 1 tablet by mouth Daily., Disp: 90 tablet, Rfl: 0  •  ARIPiprazole (ABILIFY) 30 MG tablet, , Disp: , Rfl:   •  atorvastatin (LIPITOR) 20 MG tablet, Take 1 tablet by mouth Daily., Disp: 90 tablet, Rfl: 1  •  Blood Glucose Calibration (Accu-Chek Guide Control) liquid, USE AS DIRECTED, Disp: 1 each, Rfl: 1  •  Blood Glucose Monitoring Suppl (Accu-Chek Guide) w/Device kit, USE AS DIRECTED, Disp: 1 kit, Rfl: 0  •  buPROPion XL (WELLBUTRIN XL) 300 MG 24 hr tablet, , Disp: , Rfl:   •  calcium acetate (PHOS BINDER,) 667 MG  capsule capsule, Take 1 capsule by mouth 3 (Three) Times a Day., Disp: 180 capsule, Rfl: 3  •  cloNIDine (CATAPRES) 0.3 MG tablet, Take 1 tablet by mouth Every 12 (Twelve) Hours., Disp: 180 tablet, Rfl: 0  •  empagliflozin (Jardiance) 25 MG tablet tablet, Take 1 tablet by mouth Daily., Disp: 90 tablet, Rfl: 3  •  ferrous sulfate (FeroSul) 325 (65 FE) MG tablet, Take 1 tablet by mouth 3 (Three) Times a Day With Meals., Disp: 270 tablet, Rfl: 0  •  HYDROcodone-acetaminophen (NORCO)  MG per tablet, , Disp: , Rfl:   •  lamoTRIgine (LaMICtal) 200 MG tablet, , Disp: , Rfl:   •  lisinopril (PRINIVIL,ZESTRIL) 40 MG tablet, Take 1 tablet by mouth Daily., Disp: 90 tablet, Rfl: 1  •  magnesium oxide 250 MG tablet, Take 1 tablet by mouth Daily., Disp: 90 tablet, Rfl: 1  •  metFORMIN (GLUCOPHAGE) 1000 MG tablet, Take 1 tablet by mouth 2 (Two) Times a Day With Meals., Disp: 180 tablet, Rfl: 3  •  metoprolol succinate XL (TOPROL-XL) 100 MG 24 hr tablet, Take 1 tablet by mouth Daily., Disp: 90 tablet, Rfl: 0  •  ondansetron (ZOFRAN) 8 MG tablet, Take 1 tablet by mouth Every 8 (Eight) Hours As Needed for Nausea or Vomiting., Disp: 90 tablet, Rfl: 2  •  potassium chloride ER (K-TAB) 20 MEQ tablet controlled-release ER tablet, Take 1 tablet by mouth Daily., Disp: 90 tablet, Rfl: 1  •  Semaglutide (Rybelsus) 3 MG tablet, Take 1 tablet by mouth Daily., Disp: 90 tablet, Rfl: 1  •  traZODone (DESYREL) 100 MG tablet, Take 1 tablet by mouth Every Night., Disp: 90 tablet, Rfl: 0  •  vitamin B-12 (CYANOCOBALAMIN) 500 MCG tablet, Take 1 tablet by mouth Daily., Disp: 30 tablet, Rfl: 3  •  vitamin D (ERGOCALCIFEROL) 1.25 MG (14442 UT) capsule capsule, Take 1 capsule by mouth Every 7 (Seven) Days., Disp: 12 capsule, Rfl: 0  •  levothyroxine (Synthroid) 150 MCG tablet, Take 1 tablet by mouth Daily., Disp: 30 tablet, Rfl: 3    HPI     Pt is 56 yo female with management  of obesity, HLP sp thyroidectomy in 2006 , postoperative hypothyroidism,   Vitamin D deficiency, HTN,  DM type 2, history of hypertensive urgency, iron deficiency anemia, sp hysterectomy, sp ovarian cyst removal, Sp  X 3, carpal tunnel syndrome right wrist. Grieving, chronic back pain. currenntly in pain managmment, hypocalcemia, hypoparathyroidism        21 telemedicine  visit for recheck on pt's above medical issues. Pt agreed to telemedicine visit today  Pt had labwork doneo n 21 that showed ferritin and iron profile stable vitamin B12 was low normal at 333 vitamin D was at 37.7.  On thyroid studies TSH was at 9.590. T4 was at 0.85. T3 was normal lipid panel stable hga1c was at 7.56. CMP showed calcium stalbe at 8.8 with GFR at 90.  CBC showed stable hemoglobin and WBC. Pt continues to take her medications for DM type 2, HTN, HLP, vitamin D deficiency, hypothyroidism, vitamin D and b12 deficiency, major depression. She states trulicity hurts and she would like to take an oral GLP 1 agonist instead. She denies any chest pain no dizzines. She has upcoming appt with mammogram and DEXA scan soon     22 in office visit for recheck. Pt recently saw Neurosurge[facility] for her chronic back pain. She had MRI of lumbar spine that showed DDD lumbar spine. She had labowrk done on  22 that showed on thyroid studies TSH was at <0.005 with T4. Lipid panel showed triglcyerides at 228 HDL at 40. hga1c at 7.00. CMP showed elevated liver enzymes with ALT at 52 AST at 39. Calcium at 8.2. CMP showed GFR At 105.9. CBC showed hemoglobin at 11.5 WBC normal. PTH was at 7.4 blood pressure is stable. She states she had her parathyroid gland cut by accident while getting her thyroidectomy back in             Hyperlipidemia  This is a chronic problem. Recent lipid tests were reviewed and are variable. Exacerbating diseases include obesity. She has no history of chronic renal disease, diabetes, hypothyroidism or liver disease. Pertinent negatives include no chest pain, focal sensory  loss, focal weakness, leg pain or myalgias. The current treatment provides no improvement of lipids. Compliance problems include adherence to diet.  Risk factors for coronary artery disease include diabetes mellitus, hypertension, obesity, dyslipidemia, a sedentary lifestyle and post-menopausal.   Obesity  This is a chronic problem. The current episode started more than 1 year ago. The problem occurs constantly. The problem has been unchanged. Pertinent negatives include no abdominal pain, anorexia, arthralgias, change in bowel habit, chest pain, chills, congestion, coughing, diaphoresis, fatigue, fever, headaches, joint swelling, myalgias, nausea, neck pain or numbness. Nothing aggravates the symptoms. She has tried nothing for the symptoms. The treatment provided no relief. ms.   Diabetes   She presents for her  followup  diabetic visit. She has type 2 diabetes mellitus. Her disease course has been stable. Hypoglycemia symptoms include dizziness and headaches. Pertinent negatives for hypoglycemia include no confusion, hunger, mood changes, nervousness/anxiousness, pallor, seizures, sleepiness, speech difficulty, sweats or tremors. Pertinent negatives for diabetes include no blurred vision, no chest pain, no fatigue, no foot paresthesias, no foot ulcerations, no polydipsia, no polyphagia, no polyuria, no visual change, no weakness and no weight loss. Pertinent negatives for hypoglycemia complications include no blackouts, no hospitalization, no nocturnal hypoglycemia, no required assistance and no required glucagon injection. Pertinent negatives for diabetic complications include no autonomic neuropathy, CVA, heart disease, impotence, nephropathy, peripheral neuropathy, PVD or retinopathy. Risk factors for coronary artery disease include diabetes mellitus, sedentary lifestyle and obesity. Current diabetic treatment includes oral agent (monotherapy). Her weight is stable. She is following a generally healthy diet.  She has not had a previous visit with a dietitian. She never participates in exercise. An ACE inhibitor/angiotensin II receptor blocker is being taken. She does not see a podiatrist.Eye exam is not current.   Hypertension   This is a chronic problem. The current episode started more than 1 year ago. The problem has been gradually improving since onset. The problem is uncontrolled. Associated symptoms include headaches, neck pain and shortness of breath. Pertinent negatives include no anxiety, blurred vision, chest pain, malaise/fatigue, orthopnea, palpitations, peripheral edema, PND or sweats. There are no associated agents to hypertension. Risk factors for coronary artery disease include diabetes mellitus and dyslipidemia. Current antihypertension treatment includes beta blockers. The current treatment provides no improvement. There are no compliance problems.  There is no history of angina, kidney disease, CAD/MI, CVA, heart failure, left ventricular hypertrophy, PVD, retinopathy or a thyroid problem. There is no history of chronic renal disease, coarctation of the aorta, hyperaldosteronism, hypercortisolism, hyperparathyroidism, a hypertension causing med, pheochromocytoma or sleep apnea.   Hypothyroidism   This is a chronic problem. The current episode started more than 1 year ago. The problem occurs daily. The problem has been unchanged. Associated symptoms include arthralgias, headaches, myalgias, nausea, neck pain, numbness and vomiting. Pertinent negatives include no abdominal pain, anorexia, change in bowel habit, chest pain, chills, congestion, coughing, diaphoresis, fatigue, fever, joint swelling, rash, sore throat, swollen glands, urinary symptoms, vertigo, visual change or weakness. Nothing aggravates the symptoms. Treatments tried: synthroid. The treatment provided mild relief.    Review of Systems  Review of Systems   Constitutional: Positive for activity change. Negative for appetite change, chills,  diaphoresis, fatigue and fever.   HENT: Negative for congestion, postnasal drip, rhinorrhea, sinus pressure, sinus pain, sneezing, sore throat, trouble swallowing and voice change.    Respiratory: Negative for cough, choking, chest tightness, shortness of breath, wheezing and stridor.    Cardiovascular: Negative for chest pain.   Gastrointestinal: Negative for abdominal pain, diarrhea, nausea and vomiting.   Genitourinary: Negative for dysuria and pelvic pain.   Musculoskeletal: Positive for back pain.   Neurological: Positive for weakness and numbness. Negative for headaches.       Patient Active Problem List   Diagnosis   • Hypocalcemia   • Hyperlipidemia   • Iron deficiency anemia   • Hypothyroidism   • S/P thyroidectomy   • Uncontrolled type 2 diabetes mellitus with complication, without long-term current use of insulin   • Obesity   • Uncontrolled hypertension   • Hypertensive urgency   • Right wrist pain   • Carpal tunnel syndrome of right wrist   • Controlled type 2 diabetes mellitus with complication, without long-term current use of insulin (HCC)   • Essential hypertension   • Vitamin D deficiency   • Sprain of left ankle   • Grieving   • Rotator cuff syndrome of right shoulder   • Hypotension   • Bilateral low back pain with bilateral sciatica   • Fall   • Right shoulder pain   • Dental abscess   • Body mass index (BMI) of 35.0 to 35.9   • Former smoker   • Iron overload   • High vitamin D level   • Hypoparathyroidism (HCC)     Past Surgical History:   Procedure Laterality Date   •  SECTION  01/14/1993    x 3, 84, 82,   • EXPLORATORY LAPAROTOMY Left 1998    Left cystectomy. Partial resection of vthe left ovary. Left ovary- oversewn   • INJECTION OF MEDICATION  2014    Depo Medrol (Methylprednisone) (1)    • INJECTION OF MEDICATION  2015    Kenalog (1)     • LAPAROSCOPIC HYSTERECTOMY  1995    Surgical, lysis of adhesions.Laparoscopic Assisted vaginal Hysterectomy  (Tyler/ [employer]) marsupialization of right Bartholin's Gland Cyst   • LASER ABLATION OF THE CERVIX  03/25/1985    Laser vaporization, cervical cone   • OVARIAN CYST REMOVAL  1996   • PAP SMEAR  2008   • SHOULDER SURGERY     • THYROIDECTOMY  02/28/2006    with partial parathyroidectomy   • TOOTH EXTRACTION       Social History     Socioeconomic History   • Marital status:    Tobacco Use   • Smoking status: Former Smoker   • Smokeless tobacco: Never Used   Vaping Use   • Vaping Use: Never used   Substance and Sexual Activity   • Alcohol use: No   • Drug use: No   • Sexual activity: Defer     Family History   Problem Relation Age of Onset   • Diabetes Mother    • Hypertension Mother    • Diabetes Father    • Arthritis Father      Lab on 08/29/2022   Component Date Value Ref Range Status   • WBC 08/29/2022 6.19  3.40 - 10.80 10*3/mm3 Final   • RBC 08/29/2022 4.18  3.77 - 5.28 10*6/mm3 Final   • Hemoglobin 08/29/2022 11.5 (A) 12.0 - 15.9 g/dL Final   • Hematocrit 08/29/2022 34.0  34.0 - 46.6 % Final   • MCV 08/29/2022 81.3  79.0 - 97.0 fL Final   • MCH 08/29/2022 27.5  26.6 - 33.0 pg Final   • MCHC 08/29/2022 33.8  31.5 - 35.7 g/dL Final   • RDW 08/29/2022 13.8  12.3 - 15.4 % Final   • RDW-SD 08/29/2022 40.6  37.0 - 54.0 fl Final   • MPV 08/29/2022 10.4  6.0 - 12.0 fL Final   • Platelets 08/29/2022 343  140 - 450 10*3/mm3 Final   • Neutrophil % 08/29/2022 53.1  42.7 - 76.0 % Final   • Lymphocyte % 08/29/2022 38.6  19.6 - 45.3 % Final   • Monocyte % 08/29/2022 5.8  5.0 - 12.0 % Final   • Eosinophil % 08/29/2022 1.8  0.3 - 6.2 % Final   • Basophil % 08/29/2022 0.5  0.0 - 1.5 % Final   • Immature Grans % 08/29/2022 0.2  0.0 - 0.5 % Final   • Neutrophils, Absolute 08/29/2022 3.29  1.70 - 7.00 10*3/mm3 Final   • Lymphocytes, Absolute 08/29/2022 2.39  0.70 - 3.10 10*3/mm3 Final   • Monocytes, Absolute 08/29/2022 0.36  0.10 - 0.90 10*3/mm3 Final   • Eosinophils, Absolute 08/29/2022 0.11  0.00 - 0.40 10*3/mm3  Final   • Basophils, Absolute 08/29/2022 0.03  0.00 - 0.20 10*3/mm3 Final   • Immature Grans, Absolute 08/29/2022 0.01  0.00 - 0.05 10*3/mm3 Final   • nRBC 08/29/2022 0.0  0.0 - 0.2 /100 WBC Final   • Glucose 08/29/2022 134 (A) 65 - 99 mg/dL Final   • BUN 08/29/2022 7  6 - 20 mg/dL Final   • Creatinine 08/29/2022 0.56 (A) 0.57 - 1.00 mg/dL Final   • Sodium 08/29/2022 139  136 - 145 mmol/L Final   • Potassium 08/29/2022 4.3  3.5 - 5.2 mmol/L Final   • Chloride 08/29/2022 102  98 - 107 mmol/L Final   • CO2 08/29/2022 25.2  22.0 - 29.0 mmol/L Final   • Calcium 08/29/2022 8.2 (A) 8.6 - 10.5 mg/dL Final   • Total Protein 08/29/2022 6.7  6.0 - 8.5 g/dL Final   • Albumin 08/29/2022 3.90  3.50 - 5.20 g/dL Final   • ALT (SGPT) 08/29/2022 52 (A) 1 - 33 U/L Final   • AST (SGOT) 08/29/2022 39 (A) 1 - 32 U/L Final   • Alkaline Phosphatase 08/29/2022 114  39 - 117 U/L Final   • Total Bilirubin 08/29/2022 0.3  0.0 - 1.2 mg/dL Final   • Globulin 08/29/2022 2.8  gm/dL Final   • A/G Ratio 08/29/2022 1.4  g/dL Final   • BUN/Creatinine Ratio 08/29/2022 12.5  7.0 - 25.0 Final   • Anion Gap 08/29/2022 11.8  5.0 - 15.0 mmol/L Final   • eGFR 08/29/2022 105.9  >60.0 mL/min/1.73 Final    National Kidney Foundation and American Society of Nephrology (ASN) Task Force recommended calculation based on the Chronic Kidney Disease Epidemiology Collaboration (CKD-EPI) equation refit without adjustment for race.   • Hemoglobin A1C 08/29/2022 7.00 (A) 4.80 - 5.60 % Final   • Total Cholesterol 08/29/2022 133  0 - 200 mg/dL Final   • Triglycerides 08/29/2022 228 (A) 0 - 150 mg/dL Final   • HDL Cholesterol 08/29/2022 40  40 - 60 mg/dL Final   • LDL Cholesterol  08/29/2022 56  0 - 100 mg/dL Final   • VLDL Cholesterol 08/29/2022 37  5 - 40 mg/dL Final   • LDL/HDL Ratio 08/29/2022 1.19   Final   • TSH 08/29/2022 <0.005 (A) 0.270 - 4.200 uIU/mL Final   • Free T4 08/29/2022 1.78 (A) 0.93 - 1.70 ng/dL Final   • 25 Hydroxy, Vitamin D 08/29/2022 161.0 (A) 30.0 -  100.0 ng/ml Final   • Vitamin B-12 2022 316  211 - 946 pg/mL Final   • Microalbumin/Creatinine Ratio 2022    Final    Unable to calculate   • Creatinine, Urine 2022 106.5  mg/dL Final   • Microalbumin, Urine 2022 <1.2  mg/dL Final   • PTH, Intact 2022 7.4 (A) 15.0 - 65.0 pg/mL Final   • Calcium 2022 8.1 (A) 8.6 - 10.5 mg/dL Final   • Iron 2022 67  37 - 145 mcg/dL Final   • Iron Saturation 2022 22  20 - 50 % Final   • Transferrin 2022 207  200 - 360 mg/dL Final   • TIBC 2022 308  298 - 536 mcg/dL Final   • Ferritin 2022 254.00 (A) 13.00 - 150.00 ng/mL Final      CT Head Wo Contrast  RADIOLOGY REPORT    FACILITY:  [facility]  UNIT/AGE/GENDER: N.ED  ER      AGE:52 Y          SEX:F  PATIENT NAME/:  TRU ROBLES    1965  UNIT NUMBER:  [id number]  ACCOUNT NUMBER:  [id number]  ACCESSION NUMBER:  [id number]  [id number]    EXAMINATION: HEAD CT WITHOUT CONTRAST.    DATE: 2018 at 0009 hours    HISTORY: Fall, headache, neck pain.        PQRS: Dose reduction techniques were employed per ALARA protocol.    FINDINGS: Ventricles are normal in size and position. Parenchymal attenuation is normal with preserved gray-white differentiation. No mass, hemorrhage, or acute infarct is detected.    The calvarium is intact. The included paranasal sinuses and mastoid air cells are well-aerated.    IMPRESSION: No acute intracranial abnormality.     EXAMINATION: CT OF THE CERVICAL SPINE WITHOUT CONTRAST.    FINDINGS: Cervical vertebral bodies are aligned with normal height and no fracture. Straightening of the usual lordosis may reflect patient position or muscle spasm. Minor endplate spurring is noted in the lower cervical spine.    Disc spaces are uniform and maintained. The bony central canal and foramina are patent. No paravertebral mass or fluid collection is identified. A 1.8 cm round hyperattenuating mass is seen in the anterior midline soft  tissues, anterior to the thyroid   cartilage, consistent with ectopic thyroid. The patient has had a thyroidectomy in the usual thyroid bed.    IMPRESSION:   1. No acute abnormality.   2. 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    This examination was reviewed by teleradiology ([facility]) and a preliminary report faxed at 0052 hours.    Dictated by: Barry Hines M.D.    Images and Report reviewed and interpreted by: Barry Hines M.D.    <PS><Electronically signed by: Barry Hines M.D.>  2018    D: 2018  T: 2018  CT Cervical Spine Wo Contrast  RADIOLOGY REPORT    FACILITY:  [facility]  UNIT/AGE/GENDER: N.ED  ER      AGE:52 Y          SEX:F  PATIENT NAME/:  TRU ROBLES    1965  UNIT NUMBER:  [id number]  ACCOUNT NUMBER:  [id number]  ACCESSION NUMBER:  [id number]  [id number]    EXAMINATION: HEAD CT WITHOUT CONTRAST.    DATE: 2018 at 0009 hours    HISTORY: Fall, headache, neck pain.        PQRS: Dose reduction techniques were employed per ALARA protocol.    FINDINGS: Ventricles are normal in size and position. Parenchymal attenuation is normal with preserved gray-white differentiation. No mass, hemorrhage, or acute infarct is detected.    The calvarium is intact. The included paranasal sinuses and mastoid air cells are well-aerated.    IMPRESSION: No acute intracranial abnormality.     EXAMINATION: CT OF THE CERVICAL SPINE WITHOUT CONTRAST.    FINDINGS: Cervical vertebral bodies are aligned with normal height and no fracture. Straightening of the usual lordosis may reflect patient position or muscle spasm. Minor endplate spurring is noted in the lower cervical spine.    Disc spaces are uniform and maintained. The bony central canal and foramina are patent. No paravertebral mass or fluid collection is identified. A 1.8 cm round hyperattenuating mass is seen in the anterior midline soft tissues, anterior to the thyroid   cartilage,  "consistent with ectopic thyroid. The patient has had a thyroidectomy in the usual thyroid bed.    IMPRESSION:   1. No acute abnormality.   2. 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    This examination was reviewed by teleradiology ([facility]) and a preliminary report faxed at 0052 hours.    Dictated by: Barry Hines M.D.    Images and Report reviewed and interpreted by: Barry Hines M.D.    <PS><Electronically signed by: Barry Hines M.D.>  02/12/2018 0653    D: 02/12/2018 0649  T: 02/12/2018 0649    @[facility] [email or web address]@  Immunization History   Administered Date(s) Administered   • Pneumococcal Polysaccharide (PPSV23) 03/27/2018   • Tdap 03/27/2018       The following portions of the patient's history were reviewed and updated as appropriate: allergies, current medications, past family history, past medical history, past social history, past surgical history and problem list.        Physical Exam  /66 (BP Location: Right arm, Patient Position: Sitting, Cuff Size: Adult)   Temp 97.7 °F (36.5 °C)   Ht 152.4 cm (60\")   Wt 85.1 kg (187 lb 9.6 oz)   BMI 36.64 kg/m²     Physical Exam  Vitals and nursing note reviewed.   Constitutional:       Appearance: She is well-developed. She is not diaphoretic.   HENT:      Head: Normocephalic and atraumatic.      Right Ear: External ear normal.   Eyes:      Conjunctiva/sclera: Conjunctivae normal.      Pupils: Pupils are equal, round, and reactive to light.   Cardiovascular:      Rate and Rhythm: Normal rate and regular rhythm.      Heart sounds: Normal heart sounds. No murmur heard.  Pulmonary:      Effort: Pulmonary effort is normal. No respiratory distress.      Breath sounds: Normal breath sounds.   Abdominal:      General: Bowel sounds are normal. There is no distension.      Palpations: Abdomen is soft.      Tenderness: There is no abdominal tenderness.   Musculoskeletal:         General: No deformity. Normal range of motion.      Cervical " back: Normal range of motion and neck supple.   Skin:     General: Skin is warm.      Coloration: Skin is not pale.      Findings: No erythema or rash.   Neurological:      Mental Status: She is alert and oriented to person, place, and time.      Cranial Nerves: No cranial nerve deficit.   Psychiatric:         Behavior: Behavior normal.         [unfilled]   Diagnosis Plan   1. Controlled type 2 diabetes mellitus with complication, without long-term current use of insulin (HCC)  CBC Auto Differential    Comprehensive Metabolic Panel    TSH    T4, Free    T3, Free    Vitamin D 25 Hydroxy   2. Essential hypertension  atorvastatin (LIPITOR) 20 MG tablet    CBC Auto Differential    Comprehensive Metabolic Panel    TSH    T4, Free    T3, Free    Vitamin D 25 Hydroxy   3. Vitamin D deficiency  CBC Auto Differential    Comprehensive Metabolic Panel    TSH    T4, Free    T3, Free    Vitamin D 25 Hydroxy   4. Mixed hyperlipidemia  CBC Auto Differential    Comprehensive Metabolic Panel    TSH    T4, Free    T3, Free    Vitamin D 25 Hydroxy   5. Hypocalcemia  Ambulatory Referral to Endocrinology    CBC Auto Differential    Comprehensive Metabolic Panel    TSH    T4, Free    T3, Free    Vitamin D 25 Hydroxy   6. High vitamin D level  CBC Auto Differential    Comprehensive Metabolic Panel    TSH    T4, Free    T3, Free    Vitamin D 25 Hydroxy   7. Hypoparathyroidism, unspecified hypoparathyroidism type (HCC)  Ambulatory Referral to Endocrinology    CBC Auto Differential    Comprehensive Metabolic Panel    TSH    T4, Free    T3, Free    Vitamin D 25 Hydroxy   8. Elevated liver enzymes  US Liver    CBC Auto Differential    Comprehensive Metabolic Panel    TSH    T4, Free    T3, Free    Vitamin D 25 Hydroxy   9. Class 2 severe obesity with serious comorbidity and body mass index (BMI) of 36.0 to 36.9 in adult, unspecified obesity type (HCC)  CBC Auto Differential    Comprehensive Metabolic Panel    TSH    T4, Free    T3, Free     Vitamin D 25 Hydroxy   10. Iron deficiency anemia, unspecified iron deficiency anemia type              -went over labwork   -recommend COVID-19 vaccination   -recommend mammogram screening - schedule at imaging center   -recommend cologuard test -will order   -recommend DEXA scan - schedule at imaging center   -recommend diabetic eye exam   -elevated liver enzymes - recommend US of liver   -hypocalcemia/hypoparathyroidism   -on calcium carbonate 600 mg PO BID. Recommend Endocrinology referral   -DM type 2-  on metformin 1000 mg PO BID, on jardiance 25 mg daily. on rybelsus 3 mg PO q daily.   -insomnia - on trazodone  100 mg PO Q daily.   -diabetic neuropathy - on neurontin 300 mg PO TID   -hypertension - on clonidine 0.3 mg PO BID on  lisionpril 40 mg daily. On toprol  mg daily.  On norvasc 10 mg daily.   -Obesity BMI >30 . - recommended weight loss information and DASH diet. Counseled weight loss >5 minuutes  BMI at 36.64    -hyperlipidemia - HDL low. REcommended high healthy fat diet stop simvastatin and start on lipitor 20 mg PO qhs. Drug information provided   - hypothyroidism/sp thyroidectomy -- on synthroid 175 mcg PO q daily.  will cut back on synthorid from 175 to 150 mcg PO q daily.   -high vitamin D - stop vitamin D supplement   -depression/grieving - on lexapro 20 mg daily. On abilify 300 mg daily  Was on wellbutrin  mg daily.   -iron deficiency anemia - on ferrous sulfate 325 mg PO TID. Will stop this due to high iron levls   -advised to go to ER or call 911 if symptoms or severe  -advised to be safe and call with questions and concerns   -advised to followup with specialist and referrals   -adivsed pt to be safe during COVID-19 pandemic   I spent 45  minutes caring for Joan on this date of service. This time includes time spent by me in the following activities: preparing for the visit, reviewing tests, obtaining and/or reviewing a separately obtained history, performing a medically  appropriate examination and/or evaluation, counseling and educating the patient/family/caregiver, ordering medications, tests, or procedures, referring and communicating with other health care professionals, documenting information in the medical record, independently interpreting results and communicating that information with the patient/family/caregiver and care coordination.   -recheck in 2 months         This document has been electronically signed by Brad Arnold MD on August 30, 2022 16:09 CDT                    Answers for HPI/ROS submitted by the patient on 8/23/2022  What is the primary reason for your visit?: Back Pain

## 2022-08-24 RX ORDER — ONDANSETRON HYDROCHLORIDE 8 MG/1
TABLET, FILM COATED ORAL
Qty: 90 TABLET | Refills: 0 | Status: SHIPPED | OUTPATIENT
Start: 2022-08-24 | End: 2022-08-30 | Stop reason: SDUPTHER

## 2022-08-24 RX ORDER — ORAL SEMAGLUTIDE 3 MG/1
TABLET ORAL
Qty: 30 TABLET | Refills: 1 | Status: SHIPPED | OUTPATIENT
Start: 2022-08-24 | End: 2022-08-30 | Stop reason: SDUPTHER

## 2022-08-25 RX ORDER — LISINOPRIL 40 MG/1
TABLET ORAL
Qty: 90 TABLET | Refills: 0 | Status: SHIPPED | OUTPATIENT
Start: 2022-08-25 | End: 2022-08-30 | Stop reason: SDUPTHER

## 2022-08-29 ENCOUNTER — LAB (OUTPATIENT)
Dept: LAB | Facility: HOSPITAL | Age: 57
End: 2022-08-29

## 2022-08-29 DIAGNOSIS — E83.51 HYPOCALCEMIA: ICD-10-CM

## 2022-08-29 DIAGNOSIS — E11.649 UNCONTROLLED TYPE 2 DIABETES MELLITUS WITH HYPOGLYCEMIA WITHOUT COMA: ICD-10-CM

## 2022-08-29 LAB
25(OH)D3 SERPL-MCNC: 161 NG/ML (ref 30–100)
ALBUMIN SERPL-MCNC: 3.9 G/DL (ref 3.5–5.2)
ALBUMIN UR-MCNC: <1.2 MG/DL
ALBUMIN/GLOB SERPL: 1.4 G/DL
ALP SERPL-CCNC: 114 U/L (ref 39–117)
ALT SERPL W P-5'-P-CCNC: 52 U/L (ref 1–33)
ANION GAP SERPL CALCULATED.3IONS-SCNC: 11.8 MMOL/L (ref 5–15)
AST SERPL-CCNC: 39 U/L (ref 1–32)
BASOPHILS # BLD AUTO: 0.03 10*3/MM3 (ref 0–0.2)
BASOPHILS NFR BLD AUTO: 0.5 % (ref 0–1.5)
BILIRUB SERPL-MCNC: 0.3 MG/DL (ref 0–1.2)
BUN SERPL-MCNC: 7 MG/DL (ref 6–20)
BUN/CREAT SERPL: 12.5 (ref 7–25)
CALCIUM SPEC-SCNC: 8.1 MG/DL (ref 8.6–10.5)
CALCIUM SPEC-SCNC: 8.2 MG/DL (ref 8.6–10.5)
CHLORIDE SERPL-SCNC: 102 MMOL/L (ref 98–107)
CHOLEST SERPL-MCNC: 133 MG/DL (ref 0–200)
CO2 SERPL-SCNC: 25.2 MMOL/L (ref 22–29)
CREAT SERPL-MCNC: 0.56 MG/DL (ref 0.57–1)
CREAT UR-MCNC: 106.5 MG/DL
DEPRECATED RDW RBC AUTO: 40.6 FL (ref 37–54)
EGFRCR SERPLBLD CKD-EPI 2021: 105.9 ML/MIN/1.73
EOSINOPHIL # BLD AUTO: 0.11 10*3/MM3 (ref 0–0.4)
EOSINOPHIL NFR BLD AUTO: 1.8 % (ref 0.3–6.2)
ERYTHROCYTE [DISTWIDTH] IN BLOOD BY AUTOMATED COUNT: 13.8 % (ref 12.3–15.4)
FERRITIN SERPL-MCNC: 254 NG/ML (ref 13–150)
GLOBULIN UR ELPH-MCNC: 2.8 GM/DL
GLUCOSE SERPL-MCNC: 134 MG/DL (ref 65–99)
HBA1C MFR BLD: 7 % (ref 4.8–5.6)
HCT VFR BLD AUTO: 34 % (ref 34–46.6)
HDLC SERPL-MCNC: 40 MG/DL (ref 40–60)
HGB BLD-MCNC: 11.5 G/DL (ref 12–15.9)
IMM GRANULOCYTES # BLD AUTO: 0.01 10*3/MM3 (ref 0–0.05)
IMM GRANULOCYTES NFR BLD AUTO: 0.2 % (ref 0–0.5)
IRON 24H UR-MRATE: 67 MCG/DL (ref 37–145)
IRON SATN MFR SERPL: 22 % (ref 20–50)
LDLC SERPL CALC-MCNC: 56 MG/DL (ref 0–100)
LDLC/HDLC SERPL: 1.19 {RATIO}
LYMPHOCYTES # BLD AUTO: 2.39 10*3/MM3 (ref 0.7–3.1)
LYMPHOCYTES NFR BLD AUTO: 38.6 % (ref 19.6–45.3)
MCH RBC QN AUTO: 27.5 PG (ref 26.6–33)
MCHC RBC AUTO-ENTMCNC: 33.8 G/DL (ref 31.5–35.7)
MCV RBC AUTO: 81.3 FL (ref 79–97)
MICROALBUMIN/CREAT UR: NORMAL MG/G{CREAT}
MONOCYTES # BLD AUTO: 0.36 10*3/MM3 (ref 0.1–0.9)
MONOCYTES NFR BLD AUTO: 5.8 % (ref 5–12)
NEUTROPHILS NFR BLD AUTO: 3.29 10*3/MM3 (ref 1.7–7)
NEUTROPHILS NFR BLD AUTO: 53.1 % (ref 42.7–76)
NRBC BLD AUTO-RTO: 0 /100 WBC (ref 0–0.2)
PLATELET # BLD AUTO: 343 10*3/MM3 (ref 140–450)
PMV BLD AUTO: 10.4 FL (ref 6–12)
POTASSIUM SERPL-SCNC: 4.3 MMOL/L (ref 3.5–5.2)
PROT SERPL-MCNC: 6.7 G/DL (ref 6–8.5)
PTH-INTACT SERPL-MCNC: 7.4 PG/ML (ref 15–65)
RBC # BLD AUTO: 4.18 10*6/MM3 (ref 3.77–5.28)
SODIUM SERPL-SCNC: 139 MMOL/L (ref 136–145)
T4 FREE SERPL-MCNC: 1.78 NG/DL (ref 0.93–1.7)
TIBC SERPL-MCNC: 308 MCG/DL (ref 298–536)
TRANSFERRIN SERPL-MCNC: 207 MG/DL (ref 200–360)
TRIGL SERPL-MCNC: 228 MG/DL (ref 0–150)
TSH SERPL DL<=0.05 MIU/L-ACNC: <0.005 UIU/ML (ref 0.27–4.2)
VIT B12 BLD-MCNC: 316 PG/ML (ref 211–946)
VLDLC SERPL-MCNC: 37 MG/DL (ref 5–40)
WBC NRBC COR # BLD: 6.19 10*3/MM3 (ref 3.4–10.8)

## 2022-08-29 PROCEDURE — 80061 LIPID PANEL: CPT

## 2022-08-29 PROCEDURE — 82607 VITAMIN B-12: CPT

## 2022-08-29 PROCEDURE — 84443 ASSAY THYROID STIM HORMONE: CPT

## 2022-08-29 PROCEDURE — 83540 ASSAY OF IRON: CPT

## 2022-08-29 PROCEDURE — 83970 ASSAY OF PARATHORMONE: CPT

## 2022-08-29 PROCEDURE — 84466 ASSAY OF TRANSFERRIN: CPT

## 2022-08-29 PROCEDURE — 84439 ASSAY OF FREE THYROXINE: CPT

## 2022-08-29 PROCEDURE — 82043 UR ALBUMIN QUANTITATIVE: CPT

## 2022-08-29 PROCEDURE — 83036 HEMOGLOBIN GLYCOSYLATED A1C: CPT

## 2022-08-29 PROCEDURE — 82728 ASSAY OF FERRITIN: CPT

## 2022-08-29 PROCEDURE — 85025 COMPLETE CBC W/AUTO DIFF WBC: CPT

## 2022-08-29 PROCEDURE — 80053 COMPREHEN METABOLIC PANEL: CPT

## 2022-08-29 PROCEDURE — 82570 ASSAY OF URINE CREATININE: CPT

## 2022-08-29 PROCEDURE — 82306 VITAMIN D 25 HYDROXY: CPT

## 2022-08-30 ENCOUNTER — OFFICE VISIT (OUTPATIENT)
Dept: FAMILY MEDICINE CLINIC | Facility: CLINIC | Age: 57
End: 2022-08-30

## 2022-08-30 VITALS
SYSTOLIC BLOOD PRESSURE: 120 MMHG | BODY MASS INDEX: 36.83 KG/M2 | TEMPERATURE: 97.7 F | HEIGHT: 60 IN | DIASTOLIC BLOOD PRESSURE: 66 MMHG | WEIGHT: 187.6 LBS

## 2022-08-30 DIAGNOSIS — E55.9 VITAMIN D DEFICIENCY: ICD-10-CM

## 2022-08-30 DIAGNOSIS — Z12.11 SPECIAL SCREENING FOR MALIGNANT NEOPLASMS, COLON: Primary | ICD-10-CM

## 2022-08-30 DIAGNOSIS — D50.9 IRON DEFICIENCY ANEMIA, UNSPECIFIED IRON DEFICIENCY ANEMIA TYPE: ICD-10-CM

## 2022-08-30 DIAGNOSIS — E78.2 MIXED HYPERLIPIDEMIA: ICD-10-CM

## 2022-08-30 DIAGNOSIS — E20.9 HYPOPARATHYROIDISM, UNSPECIFIED HYPOPARATHYROIDISM TYPE: ICD-10-CM

## 2022-08-30 DIAGNOSIS — E67.3 HIGH VITAMIN D LEVEL: ICD-10-CM

## 2022-08-30 DIAGNOSIS — I10 ESSENTIAL HYPERTENSION: ICD-10-CM

## 2022-08-30 DIAGNOSIS — E66.01 CLASS 2 SEVERE OBESITY WITH SERIOUS COMORBIDITY AND BODY MASS INDEX (BMI) OF 36.0 TO 36.9 IN ADULT, UNSPECIFIED OBESITY TYPE: ICD-10-CM

## 2022-08-30 DIAGNOSIS — E11.8 CONTROLLED TYPE 2 DIABETES MELLITUS WITH COMPLICATION, WITHOUT LONG-TERM CURRENT USE OF INSULIN: ICD-10-CM

## 2022-08-30 DIAGNOSIS — E83.51 HYPOCALCEMIA: ICD-10-CM

## 2022-08-30 DIAGNOSIS — R74.8 ELEVATED LIVER ENZYMES: ICD-10-CM

## 2022-08-30 PROBLEM — E83.19 IRON OVERLOAD: Status: ACTIVE | Noted: 2022-08-30

## 2022-08-30 PROCEDURE — 99214 OFFICE O/P EST MOD 30 MIN: CPT | Performed by: FAMILY MEDICINE

## 2022-08-30 RX ORDER — LISINOPRIL 40 MG/1
40 TABLET ORAL DAILY
Qty: 90 TABLET | Refills: 1 | Status: SHIPPED | OUTPATIENT
Start: 2022-08-30 | End: 2022-12-20 | Stop reason: SDUPTHER

## 2022-08-30 RX ORDER — ORAL SEMAGLUTIDE 3 MG/1
1 TABLET ORAL DAILY
Qty: 90 TABLET | Refills: 1 | Status: SHIPPED | OUTPATIENT
Start: 2022-08-30 | End: 2022-12-20 | Stop reason: SDUPTHER

## 2022-08-30 RX ORDER — ISOPROPYL ALCOHOL 0.75 G/1
SWAB TOPICAL
Qty: 300 EACH | Refills: 3 | Status: SHIPPED | OUTPATIENT
Start: 2022-08-30 | End: 2022-12-20 | Stop reason: SDUPTHER

## 2022-08-30 RX ORDER — LEVOTHYROXINE SODIUM 175 UG/1
175 TABLET ORAL DAILY
Qty: 90 TABLET | Refills: 0 | Status: SHIPPED | OUTPATIENT
Start: 2022-08-30 | End: 2022-08-30

## 2022-08-30 RX ORDER — METOPROLOL SUCCINATE 100 MG/1
100 TABLET, EXTENDED RELEASE ORAL DAILY
Qty: 90 TABLET | Refills: 0 | Status: SHIPPED | OUTPATIENT
Start: 2022-08-30 | End: 2022-11-17

## 2022-08-30 RX ORDER — ERGOCALCIFEROL 1.25 MG/1
50000 CAPSULE ORAL
Qty: 12 CAPSULE | Refills: 0 | Status: SHIPPED | OUTPATIENT
Start: 2022-08-30 | End: 2022-09-12

## 2022-08-30 RX ORDER — POTASSIUM CHLORIDE 1500 MG/1
20 TABLET, FILM COATED, EXTENDED RELEASE ORAL DAILY
Qty: 90 TABLET | Refills: 1 | Status: SHIPPED | OUTPATIENT
Start: 2022-08-30 | End: 2022-12-20 | Stop reason: SDUPTHER

## 2022-08-30 RX ORDER — LEVOTHYROXINE SODIUM 0.15 MG/1
150 TABLET ORAL DAILY
Qty: 30 TABLET | Refills: 3 | Status: SHIPPED | OUTPATIENT
Start: 2022-08-30 | End: 2022-09-26 | Stop reason: DRUGHIGH

## 2022-08-30 RX ORDER — ONDANSETRON HYDROCHLORIDE 8 MG/1
8 TABLET, FILM COATED ORAL EVERY 8 HOURS PRN
Qty: 90 TABLET | Refills: 2 | Status: SHIPPED | OUTPATIENT
Start: 2022-08-30 | End: 2022-12-20 | Stop reason: SDUPTHER

## 2022-08-30 RX ORDER — FERROUS SULFATE 325(65) MG
1 TABLET ORAL
Qty: 270 TABLET | Refills: 0 | Status: CANCELLED | OUTPATIENT
Start: 2022-08-30

## 2022-08-30 RX ORDER — CALCIUM ACETATE 667 MG/1
667 CAPSULE ORAL 3 TIMES DAILY
Qty: 180 CAPSULE | Refills: 3 | Status: SHIPPED | OUTPATIENT
Start: 2022-08-30 | End: 2022-11-13 | Stop reason: SDUPTHER

## 2022-08-30 RX ORDER — AMLODIPINE BESYLATE 5 MG/1
5 TABLET ORAL DAILY
Qty: 90 TABLET | Refills: 0 | Status: SHIPPED | OUTPATIENT
Start: 2022-08-30 | End: 2022-11-17

## 2022-08-30 RX ORDER — CHOLECALCIFEROL (VITAMIN D3) 125 MCG
500 CAPSULE ORAL DAILY
Qty: 30 TABLET | Refills: 3 | Status: SHIPPED | OUTPATIENT
Start: 2022-08-30 | End: 2022-12-20 | Stop reason: SDUPTHER

## 2022-08-30 RX ORDER — CLONIDINE HYDROCHLORIDE 0.3 MG/1
0.3 TABLET ORAL EVERY 12 HOURS
Qty: 180 TABLET | Refills: 0 | Status: SHIPPED | OUTPATIENT
Start: 2022-08-30 | End: 2022-11-15

## 2022-08-30 RX ORDER — ATORVASTATIN CALCIUM 20 MG/1
20 TABLET, FILM COATED ORAL DAILY
Qty: 90 TABLET | Refills: 1 | Status: SHIPPED | OUTPATIENT
Start: 2022-08-30 | End: 2022-12-20 | Stop reason: SDUPTHER

## 2022-08-30 NOTE — PATIENT INSTRUCTIONS
Please stop vitamin D supplement    Please stop iron supplement       Will refer to Endocrinologist Dr. Anderson for your low calcium and hypoparathyroidism     Cut back on synthroid/levothyroxine from 175 to 150 mcg daily    Will get Ultrasound of liver     Get labwork in 2 months     -recheck in 2 months

## 2022-09-07 ENCOUNTER — TELEPHONE (OUTPATIENT)
Dept: FAMILY MEDICINE CLINIC | Facility: CLINIC | Age: 57
End: 2022-09-07

## 2022-09-12 ENCOUNTER — OFFICE VISIT (OUTPATIENT)
Dept: ENDOCRINOLOGY | Facility: CLINIC | Age: 57
End: 2022-09-12

## 2022-09-12 VITALS
WEIGHT: 189 LBS | BODY MASS INDEX: 37.11 KG/M2 | DIASTOLIC BLOOD PRESSURE: 80 MMHG | SYSTOLIC BLOOD PRESSURE: 120 MMHG | HEART RATE: 68 BPM | HEIGHT: 60 IN

## 2022-09-12 DIAGNOSIS — E11.9 WELL CONTROLLED TYPE 2 DIABETES MELLITUS: ICD-10-CM

## 2022-09-12 DIAGNOSIS — E89.0 POSTOPERATIVE HYPOTHYROIDISM: ICD-10-CM

## 2022-09-12 DIAGNOSIS — E20.8 OTHER HYPOPARATHYROIDISM: Primary | ICD-10-CM

## 2022-09-12 PROCEDURE — 99204 OFFICE O/P NEW MOD 45 MIN: CPT | Performed by: INTERNAL MEDICINE

## 2022-09-12 RX ORDER — CALCITRIOL 0.25 UG/1
CAPSULE, LIQUID FILLED ORAL
Qty: 30 CAPSULE | Refills: 11 | Status: SHIPPED | OUTPATIENT
Start: 2022-09-12 | End: 2022-10-13 | Stop reason: SDUPTHER

## 2022-09-12 NOTE — PROGRESS NOTES
"Chief Complaint   Patient presents with   • Hypocalcemia         History of Present Illness    57 y.o. female     Hypocalcemia sec to complication for total thryoidectomy in 2006.   Reason for tot thyroidectomy was toxic goiter    Present treatment for hypoparathyroidism     Tums , 10 of them crushed in water and drink them once per day , first thing in am   phoslo 3 capsules bid   Vit D 50 th weekly     She can identify when calcium drops - feels tingly and crampy     Has had calcium as low as 5 and required admission    Never nephrolithasis.       ==========================================  Physical Exam  /80   Pulse 68   Ht 152.4 cm (60\")   Wt 85.7 kg (189 lb)   BMI 36.91 kg/m²   AOx3  No Goiter , no carotid bruit  RRR  CTA  No Edema     ==========================================    Laboratory Workup    Lab Results   Component Value Date    WBC 6.19 08/29/2022    HGB 11.5 (L) 08/29/2022    HCT 34.0 08/29/2022    MCV 81.3 08/29/2022     08/29/2022       Lab Results   Component Value Date    GLUCOSE 134 (H) 08/29/2022    BUN 7 08/29/2022    CREATININE 0.56 (L) 08/29/2022    EGFR 105.9 08/29/2022    EGFRIFAFRI 90 08/25/2021    BCR 12.5 08/29/2022     08/29/2022    K 4.3 08/29/2022     08/29/2022    CO2 25.2 08/29/2022    CALCIUM 8.2 (L) 08/29/2022    CALCIUM 8.1 (L) 08/29/2022    PHOS 5.8 (H) 06/01/2020    ALBUMIN 3.90 08/29/2022    GLOB 2.8 08/29/2022    BILITOT 0.3 08/29/2022    ALKPHOS 114 08/29/2022    AST 39 (H) 08/29/2022    ALT 52 (H) 08/29/2022    AGRATIO 1.4 08/29/2022     Lab Results   Component Value Date    PTH 7.4 (L) 08/29/2022    CALCIUM 8.2 (L) 08/29/2022    CALCIUM 8.1 (L) 08/29/2022    PHOS 5.8 (H) 06/01/2020         ==========================================      ICD-10-CM ICD-9-CM   1. Postoperative Hypoparathyroidism  E20.8 252.1   2. Postoperative hypothyroidism  E89.0 244.0   3. Well controlled type 2 diabetes mellitus (HCC)  E11.9 250.00   -    Tums Mixture of 10 " tabs milkshake,---- cut the tabs to 5    Stop vitamin D 50 th units weekly    Start calictiriol 0.25 mcg daily ( super vitamin D )    Keep phoslo 3 capsules twice daily     Keep mg oxide 250 mg daily     Collect 24 h urine and labs at [facility] every week, if calcium in urine is high we will add  A water pill called hydrochlorothiazide 25 mg daily that holds on to the calcium     Breath into a bag to raise your acidity which is hydrogen which is a positive charge.  This will displace calcium from the protein in your blood and raise your calcium from the inside.     Phone appts every 2 weeks but my chart communication at least weekly     ==    Hypothyroidism     On levothyroxine 175 , decreased to 150 daily     Takes levothyroxine w other tabs, advised on correct way of taking    ===    Dm, well controlled on jardiance, rybelsus , metformin   Albert Bartholomew MD        Orders Placed This Encounter   Procedures   • Calcium, Urine, 24 Hour - Urine, Clean Catch   • Creatinine, Urine, 24 Hour - Urine, Clean Catch   • Renal Function Panel     Standing Status:   Standing     Number of Occurrences:   12     Standing Expiration Date:   9/12/2023   • Magnesium     Standing Status:   Standing     Number of Occurrences:   12     Standing Expiration Date:   9/12/2023     Order Specific Question:   Release to patient     Answer:   Routine Release                This document has been electronically signed by Albert Bartholomew MD on September 12, 2022 13:59 CDT

## 2022-09-19 ENCOUNTER — LAB (OUTPATIENT)
Dept: LAB | Facility: HOSPITAL | Age: 57
End: 2022-09-19

## 2022-09-19 DIAGNOSIS — E78.2 MIXED HYPERLIPIDEMIA: ICD-10-CM

## 2022-09-19 DIAGNOSIS — E20.9 HYPOPARATHYROIDISM, UNSPECIFIED HYPOPARATHYROIDISM TYPE: ICD-10-CM

## 2022-09-19 DIAGNOSIS — E20.8 OTHER HYPOPARATHYROIDISM: ICD-10-CM

## 2022-09-19 DIAGNOSIS — R74.8 ELEVATED LIVER ENZYMES: ICD-10-CM

## 2022-09-19 DIAGNOSIS — E11.8 CONTROLLED TYPE 2 DIABETES MELLITUS WITH COMPLICATION, WITHOUT LONG-TERM CURRENT USE OF INSULIN: ICD-10-CM

## 2022-09-19 DIAGNOSIS — E55.9 VITAMIN D DEFICIENCY: ICD-10-CM

## 2022-09-19 DIAGNOSIS — E67.3 HIGH VITAMIN D LEVEL: ICD-10-CM

## 2022-09-19 DIAGNOSIS — E66.01 CLASS 2 SEVERE OBESITY WITH SERIOUS COMORBIDITY AND BODY MASS INDEX (BMI) OF 36.0 TO 36.9 IN ADULT, UNSPECIFIED OBESITY TYPE: ICD-10-CM

## 2022-09-19 DIAGNOSIS — E83.51 HYPOCALCEMIA: ICD-10-CM

## 2022-09-19 DIAGNOSIS — E89.0 POSTOPERATIVE HYPOTHYROIDISM: ICD-10-CM

## 2022-09-19 DIAGNOSIS — I10 ESSENTIAL HYPERTENSION: ICD-10-CM

## 2022-09-19 LAB
25(OH)D3 SERPL-MCNC: 89.4 NG/ML (ref 30–100)
ALBUMIN SERPL-MCNC: 4 G/DL (ref 3.5–5.2)
ALBUMIN/GLOB SERPL: 1.2 G/DL
ALP SERPL-CCNC: 109 U/L (ref 39–117)
ALT SERPL W P-5'-P-CCNC: 32 U/L (ref 1–33)
ANION GAP SERPL CALCULATED.3IONS-SCNC: 10 MMOL/L (ref 5–15)
AST SERPL-CCNC: 29 U/L (ref 1–32)
BILIRUB SERPL-MCNC: 0.2 MG/DL (ref 0–1.2)
BUN SERPL-MCNC: 8 MG/DL (ref 6–20)
BUN/CREAT SERPL: 10.4 (ref 7–25)
CALCIUM SPEC-SCNC: 7.9 MG/DL (ref 8.6–10.5)
CHLORIDE SERPL-SCNC: 100 MMOL/L (ref 98–107)
CO2 SERPL-SCNC: 29 MMOL/L (ref 22–29)
CREAT SERPL-MCNC: 0.77 MG/DL (ref 0.57–1)
EGFRCR SERPLBLD CKD-EPI 2021: 89.5 ML/MIN/1.73
GLOBULIN UR ELPH-MCNC: 3.4 GM/DL
GLUCOSE SERPL-MCNC: 186 MG/DL (ref 65–99)
MAGNESIUM SERPL-MCNC: 1.7 MG/DL (ref 1.6–2.6)
PHOSPHATE SERPL-MCNC: 5.1 MG/DL (ref 2.5–4.5)
POTASSIUM SERPL-SCNC: 4.4 MMOL/L (ref 3.5–5.2)
PROT SERPL-MCNC: 7.4 G/DL (ref 6–8.5)
SODIUM SERPL-SCNC: 139 MMOL/L (ref 136–145)
T3FREE SERPL-MCNC: 3.39 PG/ML (ref 2–4.4)
T4 FREE SERPL-MCNC: 1.81 NG/DL (ref 0.93–1.7)
TSH SERPL DL<=0.05 MIU/L-ACNC: <0.005 UIU/ML (ref 0.27–4.2)

## 2022-09-19 PROCEDURE — 84481 FREE ASSAY (FT-3): CPT

## 2022-09-19 PROCEDURE — 85007 BL SMEAR W/DIFF WBC COUNT: CPT

## 2022-09-19 PROCEDURE — 84439 ASSAY OF FREE THYROXINE: CPT

## 2022-09-19 PROCEDURE — 84443 ASSAY THYROID STIM HORMONE: CPT

## 2022-09-19 PROCEDURE — 83735 ASSAY OF MAGNESIUM: CPT

## 2022-09-19 PROCEDURE — 80053 COMPREHEN METABOLIC PANEL: CPT

## 2022-09-19 PROCEDURE — 84100 ASSAY OF PHOSPHORUS: CPT

## 2022-09-19 PROCEDURE — 85025 COMPLETE CBC W/AUTO DIFF WBC: CPT

## 2022-09-19 PROCEDURE — 82306 VITAMIN D 25 HYDROXY: CPT

## 2022-09-20 ENCOUNTER — LAB (OUTPATIENT)
Dept: LAB | Facility: HOSPITAL | Age: 57
End: 2022-09-20

## 2022-09-20 LAB
BASOPHILS # BLD AUTO: 0.03 10*3/MM3 (ref 0–0.2)
BASOPHILS NFR BLD AUTO: 0.6 % (ref 0–1.5)
DEPRECATED RDW RBC AUTO: 44.9 FL (ref 37–54)
EOSINOPHIL # BLD AUTO: 0.12 10*3/MM3 (ref 0–0.4)
EOSINOPHIL NFR BLD AUTO: 2.3 % (ref 0.3–6.2)
ERYTHROCYTE [DISTWIDTH] IN BLOOD BY AUTOMATED COUNT: 14.4 % (ref 12.3–15.4)
HCT VFR BLD AUTO: 36.1 % (ref 34–46.6)
HGB BLD-MCNC: 11.6 G/DL (ref 12–15.9)
IMM GRANULOCYTES # BLD AUTO: 0.02 10*3/MM3 (ref 0–0.05)
IMM GRANULOCYTES NFR BLD AUTO: 0.4 % (ref 0–0.5)
LYMPHOCYTES # BLD AUTO: 1.99 10*3/MM3 (ref 0.7–3.1)
LYMPHOCYTES NFR BLD AUTO: 38.5 % (ref 19.6–45.3)
MCH RBC QN AUTO: 27.6 PG (ref 26.6–33)
MCHC RBC AUTO-ENTMCNC: 32.1 G/DL (ref 31.5–35.7)
MCV RBC AUTO: 86 FL (ref 79–97)
MONOCYTES # BLD AUTO: 0.33 10*3/MM3 (ref 0.1–0.9)
MONOCYTES NFR BLD AUTO: 6.4 % (ref 5–12)
NEUTROPHILS NFR BLD AUTO: 2.68 10*3/MM3 (ref 1.7–7)
NEUTROPHILS NFR BLD AUTO: 51.8 % (ref 42.7–76)
NRBC BLD AUTO-RTO: 0 /100 WBC (ref 0–0.2)
PLAT MORPH BLD: NORMAL
PLATELET # BLD AUTO: 283 10*3/MM3 (ref 140–450)
PMV BLD AUTO: 10.9 FL (ref 6–12)
RBC # BLD AUTO: 4.2 10*6/MM3 (ref 3.77–5.28)
RBC MORPH BLD: NORMAL
WBC MORPH BLD: NORMAL
WBC NRBC COR # BLD: 5.17 10*3/MM3 (ref 3.4–10.8)

## 2022-09-20 PROCEDURE — 82340 ASSAY OF CALCIUM IN URINE: CPT | Performed by: INTERNAL MEDICINE

## 2022-09-20 PROCEDURE — 82570 ASSAY OF URINE CREATININE: CPT | Performed by: INTERNAL MEDICINE

## 2022-09-20 PROCEDURE — 81050 URINALYSIS VOLUME MEASURE: CPT | Performed by: INTERNAL MEDICINE

## 2022-09-21 LAB
CALCIUM 24H UR-MCNC: 8.9 MG/DL
CALCIUM 24H UR-MRATE: 120.2 MG/24 HR (ref 100–300)
COLLECT DURATION TIME UR: 24 HRS
COLLECT DURATION TIME UR: 24 HRS
CREAT UR-MCNC: 63.9 MG/DL
CREATINE 24H UR-MRATE: 0.86 G/24 HR (ref 0.7–1.6)
SPECIMEN VOL 24H UR: 1350 ML
SPECIMEN VOL 24H UR: 1350 ML

## 2022-09-23 DIAGNOSIS — R74.8 ELEVATED LIVER ENZYMES: ICD-10-CM

## 2022-09-26 ENCOUNTER — TELEPHONE (OUTPATIENT)
Dept: FAMILY MEDICINE CLINIC | Facility: CLINIC | Age: 57
End: 2022-09-26

## 2022-09-26 ENCOUNTER — LAB (OUTPATIENT)
Dept: LAB | Facility: HOSPITAL | Age: 57
End: 2022-09-26

## 2022-09-26 DIAGNOSIS — E20.8 OTHER HYPOPARATHYROIDISM: ICD-10-CM

## 2022-09-26 DIAGNOSIS — E89.0 POSTOPERATIVE HYPOTHYROIDISM: ICD-10-CM

## 2022-09-26 PROCEDURE — 80069 RENAL FUNCTION PANEL: CPT | Performed by: INTERNAL MEDICINE

## 2022-09-26 PROCEDURE — 83735 ASSAY OF MAGNESIUM: CPT | Performed by: INTERNAL MEDICINE

## 2022-09-27 LAB
ALBUMIN SERPL-MCNC: 4.1 G/DL (ref 3.5–5.2)
ANION GAP SERPL CALCULATED.3IONS-SCNC: 14 MMOL/L (ref 5–15)
BUN SERPL-MCNC: 6 MG/DL (ref 6–20)
BUN/CREAT SERPL: 8 (ref 7–25)
CALCIUM SPEC-SCNC: 7.9 MG/DL (ref 8.6–10.5)
CHLORIDE SERPL-SCNC: 102 MMOL/L (ref 98–107)
CO2 SERPL-SCNC: 27 MMOL/L (ref 22–29)
CREAT SERPL-MCNC: 0.75 MG/DL (ref 0.57–1)
EGFRCR SERPLBLD CKD-EPI 2021: 92.4 ML/MIN/1.73
GLUCOSE SERPL-MCNC: 210 MG/DL (ref 65–99)
MAGNESIUM SERPL-MCNC: 1.9 MG/DL (ref 1.6–2.6)
PHOSPHATE SERPL-MCNC: 4.8 MG/DL (ref 2.5–4.5)
POTASSIUM SERPL-SCNC: 4.3 MMOL/L (ref 3.5–5.2)
SODIUM SERPL-SCNC: 143 MMOL/L (ref 136–145)

## 2022-10-01 RX ORDER — LEVOTHYROXINE SODIUM 137 UG/1
137 TABLET ORAL DAILY
Qty: 30 TABLET | Refills: 3 | Status: SHIPPED | OUTPATIENT
Start: 2022-10-01 | End: 2022-12-20 | Stop reason: SDUPTHER

## 2022-10-03 ENCOUNTER — LAB (OUTPATIENT)
Dept: LAB | Facility: HOSPITAL | Age: 57
End: 2022-10-03

## 2022-10-03 DIAGNOSIS — E20.8 OTHER HYPOPARATHYROIDISM: ICD-10-CM

## 2022-10-03 DIAGNOSIS — E89.0 POSTOPERATIVE HYPOTHYROIDISM: ICD-10-CM

## 2022-10-03 PROCEDURE — 80069 RENAL FUNCTION PANEL: CPT

## 2022-10-03 PROCEDURE — 83735 ASSAY OF MAGNESIUM: CPT

## 2022-10-04 ENCOUNTER — APPOINTMENT (OUTPATIENT)
Dept: GENERAL RADIOLOGY | Facility: HOSPITAL | Age: 57
End: 2022-10-04

## 2022-10-04 ENCOUNTER — APPOINTMENT (OUTPATIENT)
Dept: CT IMAGING | Facility: HOSPITAL | Age: 57
End: 2022-10-04

## 2022-10-04 ENCOUNTER — HOSPITAL ENCOUNTER (EMERGENCY)
Facility: HOSPITAL | Age: 57
Discharge: HOME | End: 2022-10-04
Attending: EMERGENCY MEDICINE | Admitting: EMERGENCY MEDICINE

## 2022-10-04 VITALS
RESPIRATION RATE: 18 BRPM | SYSTOLIC BLOOD PRESSURE: 163 MMHG | WEIGHT: 198 LBS | OXYGEN SATURATION: 96 % | TEMPERATURE: 98.5 F | DIASTOLIC BLOOD PRESSURE: 73 MMHG | HEART RATE: 90 BPM | BODY MASS INDEX: 38.87 KG/M2 | HEIGHT: 60 IN

## 2022-10-04 DIAGNOSIS — K59.03 THERAPEUTIC OPIOID INDUCED CONSTIPATION: ICD-10-CM

## 2022-10-04 DIAGNOSIS — R10.9 ABDOMINAL PAIN, UNSPECIFIED ABDOMINAL LOCATION: ICD-10-CM

## 2022-10-04 DIAGNOSIS — T40.2X5A THERAPEUTIC OPIOID INDUCED CONSTIPATION: ICD-10-CM

## 2022-10-04 DIAGNOSIS — I88.0 NONSPECIFIC MESENTERIC LYMPHADENITIS: Primary | ICD-10-CM

## 2022-10-04 LAB
ALBUMIN SERPL-MCNC: 4.3 G/DL (ref 3.5–5.2)
ALBUMIN SERPL-MCNC: 4.5 G/DL (ref 3.5–5.2)
ALBUMIN/GLOB SERPL: 1.5 G/DL
ALP SERPL-CCNC: 112 U/L (ref 39–117)
ALT SERPL W P-5'-P-CCNC: 29 U/L (ref 1–33)
ANION GAP SERPL CALCULATED.3IONS-SCNC: 13 MMOL/L (ref 5–15)
ANION GAP SERPL CALCULATED.3IONS-SCNC: 18 MMOL/L (ref 5–15)
AST SERPL-CCNC: 20 U/L (ref 1–32)
BASOPHILS # BLD AUTO: 0.01 10*3/MM3 (ref 0–0.2)
BASOPHILS NFR BLD AUTO: 0.1 % (ref 0–1.5)
BILIRUB SERPL-MCNC: 0.5 MG/DL (ref 0–1.2)
BUN SERPL-MCNC: 8 MG/DL (ref 6–20)
BUN SERPL-MCNC: 8 MG/DL (ref 6–20)
BUN/CREAT SERPL: 10.1 (ref 7–25)
BUN/CREAT SERPL: 10.7 (ref 7–25)
CALCIUM SPEC-SCNC: 8 MG/DL (ref 8.6–10.5)
CALCIUM SPEC-SCNC: 8 MG/DL (ref 8.6–10.5)
CHLORIDE SERPL-SCNC: 97 MMOL/L (ref 98–107)
CHLORIDE SERPL-SCNC: 97 MMOL/L (ref 98–107)
CO2 SERPL-SCNC: 25 MMOL/L (ref 22–29)
CO2 SERPL-SCNC: 29 MMOL/L (ref 22–29)
CREAT SERPL-MCNC: 0.75 MG/DL (ref 0.57–1)
CREAT SERPL-MCNC: 0.79 MG/DL (ref 0.57–1)
DEPRECATED RDW RBC AUTO: 41.3 FL (ref 37–54)
EGFRCR SERPLBLD CKD-EPI 2021: 86.8 ML/MIN/1.73
EGFRCR SERPLBLD CKD-EPI 2021: 92.4 ML/MIN/1.73
EOSINOPHIL # BLD AUTO: 0.06 10*3/MM3 (ref 0–0.4)
EOSINOPHIL NFR BLD AUTO: 0.7 % (ref 0.3–6.2)
ERYTHROCYTE [DISTWIDTH] IN BLOOD BY AUTOMATED COUNT: 13.8 % (ref 12.3–15.4)
GLOBULIN UR ELPH-MCNC: 3.1 GM/DL
GLUCOSE SERPL-MCNC: 175 MG/DL (ref 65–99)
GLUCOSE SERPL-MCNC: 211 MG/DL (ref 65–99)
HCT VFR BLD AUTO: 36.7 % (ref 34–46.6)
HGB BLD-MCNC: 12.1 G/DL (ref 12–15.9)
HOLD SPECIMEN: NORMAL
IMM GRANULOCYTES # BLD AUTO: 0.04 10*3/MM3 (ref 0–0.05)
IMM GRANULOCYTES NFR BLD AUTO: 0.4 % (ref 0–0.5)
LIPASE SERPL-CCNC: 62 U/L (ref 13–60)
LYMPHOCYTES # BLD AUTO: 2.34 10*3/MM3 (ref 0.7–3.1)
LYMPHOCYTES NFR BLD AUTO: 26.2 % (ref 19.6–45.3)
MAGNESIUM SERPL-MCNC: 2.3 MG/DL (ref 1.6–2.6)
MCH RBC QN AUTO: 27.4 PG (ref 26.6–33)
MCHC RBC AUTO-ENTMCNC: 33 G/DL (ref 31.5–35.7)
MCV RBC AUTO: 83 FL (ref 79–97)
MONOCYTES # BLD AUTO: 0.75 10*3/MM3 (ref 0.1–0.9)
MONOCYTES NFR BLD AUTO: 8.4 % (ref 5–12)
NEUTROPHILS NFR BLD AUTO: 5.73 10*3/MM3 (ref 1.7–7)
NEUTROPHILS NFR BLD AUTO: 64.2 % (ref 42.7–76)
NRBC BLD AUTO-RTO: 0 /100 WBC (ref 0–0.2)
NT-PROBNP SERPL-MCNC: 89.4 PG/ML (ref 0–900)
PHOSPHATE SERPL-MCNC: 5.3 MG/DL (ref 2.5–4.5)
PLATELET # BLD AUTO: 264 10*3/MM3 (ref 140–450)
PMV BLD AUTO: 9.9 FL (ref 6–12)
POTASSIUM SERPL-SCNC: 3.6 MMOL/L (ref 3.5–5.2)
POTASSIUM SERPL-SCNC: 4 MMOL/L (ref 3.5–5.2)
PROT SERPL-MCNC: 7.6 G/DL (ref 6–8.5)
QT INTERVAL: 376 MS
QTC INTERVAL: 449 MS
RBC # BLD AUTO: 4.42 10*6/MM3 (ref 3.77–5.28)
SODIUM SERPL-SCNC: 139 MMOL/L (ref 136–145)
SODIUM SERPL-SCNC: 140 MMOL/L (ref 136–145)
TROPONIN T SERPL-MCNC: <0.01 NG/ML (ref 0–0.03)
WBC NRBC COR # BLD: 8.93 10*3/MM3 (ref 3.4–10.8)
WHOLE BLOOD HOLD COAG: NORMAL
WHOLE BLOOD HOLD SPECIMEN: NORMAL

## 2022-10-04 PROCEDURE — 93005 ELECTROCARDIOGRAM TRACING: CPT | Performed by: EMERGENCY MEDICINE

## 2022-10-04 PROCEDURE — 25010000002 ORPHENADRINE CITRATE PER 60 MG: Performed by: EMERGENCY MEDICINE

## 2022-10-04 PROCEDURE — 71046 X-RAY EXAM CHEST 2 VIEWS: CPT

## 2022-10-04 PROCEDURE — 96375 TX/PRO/DX INJ NEW DRUG ADDON: CPT

## 2022-10-04 PROCEDURE — 74176 CT ABD & PELVIS W/O CONTRAST: CPT

## 2022-10-04 PROCEDURE — 93005 ELECTROCARDIOGRAM TRACING: CPT

## 2022-10-04 PROCEDURE — 99283 EMERGENCY DEPT VISIT LOW MDM: CPT

## 2022-10-04 PROCEDURE — 25010000002 ONDANSETRON PER 1 MG: Performed by: EMERGENCY MEDICINE

## 2022-10-04 PROCEDURE — 84484 ASSAY OF TROPONIN QUANT: CPT

## 2022-10-04 PROCEDURE — 93010 ELECTROCARDIOGRAM REPORT: CPT | Performed by: INTERNAL MEDICINE

## 2022-10-04 PROCEDURE — 85025 COMPLETE CBC W/AUTO DIFF WBC: CPT

## 2022-10-04 PROCEDURE — 80053 COMPREHEN METABOLIC PANEL: CPT

## 2022-10-04 PROCEDURE — 83880 ASSAY OF NATRIURETIC PEPTIDE: CPT

## 2022-10-04 PROCEDURE — 96374 THER/PROPH/DIAG INJ IV PUSH: CPT

## 2022-10-04 PROCEDURE — 25010000002 FENTANYL CITRATE (PF) 50 MCG/ML SOLUTION: Performed by: EMERGENCY MEDICINE

## 2022-10-04 PROCEDURE — 83690 ASSAY OF LIPASE: CPT | Performed by: EMERGENCY MEDICINE

## 2022-10-04 RX ORDER — ONDANSETRON 2 MG/ML
4 INJECTION INTRAMUSCULAR; INTRAVENOUS ONCE
Status: COMPLETED | OUTPATIENT
Start: 2022-10-04 | End: 2022-10-04

## 2022-10-04 RX ORDER — ORPHENADRINE CITRATE 30 MG/ML
60 INJECTION INTRAMUSCULAR; INTRAVENOUS ONCE
Status: COMPLETED | OUTPATIENT
Start: 2022-10-04 | End: 2022-10-04

## 2022-10-04 RX ORDER — SODIUM CHLORIDE 0.9 % (FLUSH) 0.9 %
10 SYRINGE (ML) INJECTION AS NEEDED
Status: DISCONTINUED | OUTPATIENT
Start: 2022-10-04 | End: 2022-10-04 | Stop reason: HOSPADM

## 2022-10-04 RX ORDER — FENTANYL CITRATE 50 UG/ML
25 INJECTION, SOLUTION INTRAMUSCULAR; INTRAVENOUS ONCE
Status: COMPLETED | OUTPATIENT
Start: 2022-10-04 | End: 2022-10-04

## 2022-10-04 RX ADMIN — ONDANSETRON 4 MG: 2 INJECTION INTRAMUSCULAR; INTRAVENOUS at 09:09

## 2022-10-04 RX ADMIN — ORPHENADRINE CITRATE 60 MG: 30 INJECTION INTRAMUSCULAR; INTRAVENOUS at 09:04

## 2022-10-04 RX ADMIN — FENTANYL CITRATE 25 MCG: 50 INJECTION INTRAMUSCULAR; INTRAVENOUS at 09:20

## 2022-10-04 NOTE — ED PROVIDER NOTES
Subjective     Abdominal Pain  Pain location:  LUQ  Pain quality: aching    Pain radiates to:  L flank  Pain severity:  Moderate  Onset quality:  Gradual  Duration:  3 days  Timing:  Constant  Progression:  Waxing and waning  Chronicity:  New  Context: awakening from sleep    Context: not diet changes, not eating and not trauma    Relieved by:  Nothing  Worsened by:  Nothing  Ineffective treatments: Home medications.  Associated symptoms: constipation and nausea    Associated symptoms: no chest pain, no diarrhea, no dysuria, no fever, no hematemesis, no hematochezia, no hematuria, no shortness of breath, no vaginal bleeding, no vaginal discharge and no vomiting    Risk factors: obesity        Review of Systems   Constitutional: Negative for fever.   Respiratory: Negative for shortness of breath.    Cardiovascular: Negative for chest pain.   Gastrointestinal: Positive for abdominal pain, constipation and nausea. Negative for diarrhea, hematemesis, hematochezia and vomiting.   Genitourinary: Negative for dysuria, hematuria, vaginal bleeding and vaginal discharge.   All other systems reviewed and are negative.      Past Medical History:   Diagnosis Date   • Abdominal pain     suspect Kidney Stone      • Acquired hypothyroidism    • Acute bronchitis    • Acute maxillary sinusitis    • Acute pharyngitis    • Allergic    • Anxiety    • Asthma    • Axillary lymphadenopathy    • Blood in urine    • Bronchitis     with an asthmatic reaction      • Colitis    • Cough    • Cyst of Bartholin's gland duct     History of    • Degenerative joint disease involving multiple joints    • Depression    • Diabetes (HCC)    • Encounter for gynecological examination with abnormal finding    • Essential (primary) hypertension    • Essential hypertension    • GERD (gastroesophageal reflux disease)    • Goiter    • Hemorrhoids    • Hordeolum     right lower eyelid      • Injury of back    • Low back pain    • Lung nodule, solitary    •  Osteoporosis    • Polyp of vagina     possible      • Postsurgical hypoparathyroidism (HCC)    • Shortness of breath    • Tobacco dependence syndrome     Past history   • Urinary tract infectious disease    • Vulvovaginitis        Allergies   Allergen Reactions   • Naproxen Nausea And Vomiting and Swelling   • Other Anaphylaxis and Swelling     Blueberries       Past Surgical History:   Procedure Laterality Date   •  SECTION  01/14/1993    x 3, 84, 82,   • EXPLORATORY LAPAROTOMY Left 1998    Left cystectomy. Partial resection of vthe left ovary. Left ovary- oversewn   • INJECTION OF MEDICATION  2014    Depo Medrol (Methylprednisone) (1)    • INJECTION OF MEDICATION  2015    Kenalog (1)     • LAPAROSCOPIC HYSTERECTOMY  1995    Surgical, lysis of adhesions.Laparoscopic Assisted vaginal Hysterectomy (Tyler/ Doderlein Technique) marsupialization of right Bartholin's Gland Cyst   • LASER ABLATION OF THE CERVIX  1985    Laser vaporization, cervical cone   • OVARIAN CYST REMOVAL     • PAP SMEAR     • SHOULDER SURGERY     • THYROIDECTOMY  2006    with partial parathyroidectomy   • TOOTH EXTRACTION         Family History   Problem Relation Age of Onset   • Diabetes Mother    • Hypertension Mother    • Diabetes Father    • Arthritis Father        Social History     Socioeconomic History   • Marital status:    Tobacco Use   • Smoking status: Former Smoker   • Smokeless tobacco: Never Used   Vaping Use   • Vaping Use: Never used   Substance and Sexual Activity   • Alcohol use: No   • Drug use: No   • Sexual activity: Defer           Objective   Physical Exam  Vitals and nursing note reviewed.   Constitutional:       Appearance: She is not ill-appearing.      Comments: The patient's therapy dog is laying on her abdomen.  The patient also has approximately 6 inch long fingernails.   HENT:      Head: Normocephalic and atraumatic.   Neck:      Vascular: No JVD.    Cardiovascular:      Rate and Rhythm: Normal rate and regular rhythm.   Pulmonary:      Effort: Pulmonary effort is normal.      Breath sounds: Normal breath sounds.   Chest:      Chest wall: No tenderness.   Abdominal:      Palpations: Abdomen is soft.      Tenderness: There is abdominal tenderness.      Comments: LUQ/L flank   Musculoskeletal:      Right lower leg: No tenderness. No edema.      Left lower leg: No tenderness. No edema.   Skin:     General: Skin is warm and dry.   Neurological:      Mental Status: She is alert and oriented to person, place, and time.   Psychiatric:         Behavior: Behavior normal.         Procedures           ED Course                                           MDM  Number of Diagnoses or Management Options     Amount and/or Complexity of Data Reviewed  Clinical lab tests: reviewed  Tests in the radiology section of CPT®: reviewed  Tests in the medicine section of CPT®: reviewed  Decide to obtain previous medical records or to obtain history from someone other than the patient: yes  Review and summarize past medical records: yes      EKG interpretation: Interpreted by myself.  Normal sinus rhythm.  Rate 86.  Normal P wave and LA interval.  Left ventricular hypertrophy and left axis deviation.  Normal QTc interval.  ST segments are nonspecific.  Final diagnoses:   Nonspecific mesenteric lymphadenitis   Therapeutic opioid induced constipation   Abdominal pain, unspecified abdominal location       ED Disposition  ED Disposition     ED Disposition   Discharge    Condition   Stable    Comment   --             Brad Arnold MD  [address] [place] [address] [place] [id number] [phone]    Call today  To schedule an appointment for reevaluation of your abdominal pain and constipation.         Medication List      New Prescriptions    methylnaltrexone 12 MG/0.6ML solution  Commonly known as: RELISTOR  Inject 0.6 mL under the skin into the appropriate area as directed Every Other Day  for 3 doses.           Where to Get Your Medications      These medications were sent to [facility] [id number] - [place], [place] - [facility] [address] AT [facility] [address] - [phone]  - [phone] [facility], [facility] [address]    Phone: [phone]   · methylnaltrexone 12 MG/0.6ML solution          Jass Hu DO  10/04/22 [id number]

## 2022-10-04 NOTE — ED NOTES
Pt states she has felt soa and c/o rib pain on the left side that radiates to the back x3 days. Pt states it is now going on the third day as well that she hasn't had a bowel movement, pt does report she takes norco and was not informed of constipating affects. Pt states she has taken multiple different medications for constipation.

## 2022-10-05 DIAGNOSIS — K82.9 GALLBLADDER ATTACK: ICD-10-CM

## 2022-10-05 DIAGNOSIS — R10.11 RUQ PAIN: Primary | ICD-10-CM

## 2022-10-05 DIAGNOSIS — K76.0 FATTY LIVER: ICD-10-CM

## 2022-10-06 RX ORDER — LUBIPROSTONE 8 UG/1
8 CAPSULE ORAL 2 TIMES DAILY WITH MEALS
Qty: 14 CAPSULE | Refills: 0 | Status: SHIPPED | OUTPATIENT
Start: 2022-10-06 | End: 2023-01-06

## 2022-10-10 ENCOUNTER — TELEMEDICINE (OUTPATIENT)
Dept: ENDOCRINOLOGY | Facility: CLINIC | Age: 57
End: 2022-10-10

## 2022-10-10 DIAGNOSIS — E20.8 OTHER HYPOPARATHYROIDISM: Primary | ICD-10-CM

## 2022-10-10 DIAGNOSIS — E89.0 POSTOPERATIVE HYPOTHYROIDISM: ICD-10-CM

## 2022-10-10 DIAGNOSIS — E11.9 WELL CONTROLLED TYPE 2 DIABETES MELLITUS: ICD-10-CM

## 2022-10-10 PROCEDURE — 99214 OFFICE O/P EST MOD 30 MIN: CPT | Performed by: INTERNAL MEDICINE

## 2022-10-13 ENCOUNTER — OFFICE VISIT (OUTPATIENT)
Dept: SURGERY | Facility: CLINIC | Age: 57
End: 2022-10-13

## 2022-10-13 VITALS
HEIGHT: 60 IN | HEART RATE: 78 BPM | BODY MASS INDEX: 37.3 KG/M2 | WEIGHT: 190 LBS | DIASTOLIC BLOOD PRESSURE: 96 MMHG | TEMPERATURE: 97 F | SYSTOLIC BLOOD PRESSURE: 174 MMHG

## 2022-10-13 DIAGNOSIS — K82.9 GALLBLADDER DISEASE: Primary | ICD-10-CM

## 2022-10-13 PROCEDURE — 99204 OFFICE O/P NEW MOD 45 MIN: CPT | Performed by: SURGERY

## 2022-10-13 RX ORDER — CALCITRIOL 0.25 UG/1
CAPSULE, LIQUID FILLED ORAL
Qty: 60 CAPSULE | Refills: 11 | Status: SHIPPED | OUTPATIENT
Start: 2022-10-13 | End: 2022-12-14 | Stop reason: SDUPTHER

## 2022-10-13 RX ORDER — ESCITALOPRAM OXALATE 20 MG/1
TABLET ORAL
COMMUNITY
Start: 2022-09-14 | End: 2022-10-18

## 2022-10-13 RX ORDER — LEVOTHYROXINE SODIUM 175 UG/1
TABLET ORAL
COMMUNITY
Start: 2022-08-31 | End: 2022-10-13 | Stop reason: DRUGHIGH

## 2022-10-13 NOTE — PROGRESS NOTES
Subjective   Joan Blackwell is a 57 y.o. female     Chief Complaint: Contracted gallbladder and history of elevated LFTs    History of Present Illness referred by primary care provider from [place] for evaluation of her gallbladder.  Patient was recently seen in the emergency room at [facility] for constipation.  She had some bloated discomfort in the left upper quadrant and left side of the abdomen and CT scan suggested that she was constipated.  She had an ultrasound which demonstrated her gallbladder to be contracted.  Patient said that she was told that her LFTs were abnormal but review of the epic chart demonstrates the last 2 checks LFTs were all normal.  Patient denies any pain in the upper abdomen or the right upper quadrant.  She has no issues with eating and has clearly no pain with eating.  She has had a colonoscopy to been evaluated by GI.  No history of pancreatitis no history of jaundice.  No family history of any issues with the gallbladder    Review of Systems   Constitutional: Negative.    HENT: Negative.    Eyes: Negative.    Respiratory: Negative.    Cardiovascular: Negative.    Gastrointestinal: Negative.    Endocrine: Negative.    Genitourinary: Negative.    Musculoskeletal: Positive for arthralgias and back pain.   Skin: Negative.    Allergic/Immunologic: Negative.    Neurological: Negative.    Hematological: Negative.    Psychiatric/Behavioral: Negative.      Past Medical History:   Diagnosis Date   • Abdominal pain     suspect Kidney Stone      • Acquired hypothyroidism    • Acute bronchitis    • Acute maxillary sinusitis    • Acute pharyngitis    • Allergic    • Anxiety    • Asthma    • Axillary lymphadenopathy    • Blood in urine    • Bronchitis     with an asthmatic reaction      • Colitis    • Cough    • Cyst of Bartholin's gland duct     History of    • Degenerative joint disease involving multiple joints    • Depression    • Diabetes (HCC)    • Encounter for gynecological  examination with abnormal finding    • Essential (primary) hypertension    • Essential hypertension    • GERD (gastroesophageal reflux disease)    • Goiter    • Hemorrhoids    • Hordeolum     right lower eyelid      • Injury of back    • Low back pain    • Lung nodule, solitary    • Osteoporosis    • Polyp of vagina     possible      • Postsurgical hypoparathyroidism (HCC)    • Shortness of breath    • Tobacco dependence syndrome     Past history   • Urinary tract infectious disease    • Vulvovaginitis      Past Surgical History:   Procedure Laterality Date   •  SECTION  01/14/1993    x 3, 84, 82,   • EXPLORATORY LAPAROTOMY Left 1998    Left cystectomy. Partial resection of vthe left ovary. Left ovary- oversewn   • INJECTION OF MEDICATION  2014    Depo Medrol (Methylprednisone) (1)    • INJECTION OF MEDICATION  2015    Kenalog (1)     • LAPAROSCOPIC HYSTERECTOMY  1995    Surgical, lysis of adhesions.Laparoscopic Assisted vaginal Hysterectomy (Tyler/ Doderlein Technique) marsupialization of right Bartholin's Gland Cyst   • LASER ABLATION OF THE CERVIX  1985    Laser vaporization, cervical cone   • OVARIAN CYST REMOVAL     • PAP SMEAR     • SHOULDER SURGERY     • THYROIDECTOMY  2006    with partial parathyroidectomy   • TOOTH EXTRACTION       Family History   Problem Relation Age of Onset   • Diabetes Mother    • Hypertension Mother    • Diabetes Father    • Arthritis Father      Social History     Socioeconomic History   • Marital status:    Tobacco Use   • Smoking status: Former   • Smokeless tobacco: Never   Vaping Use   • Vaping Use: Never used   Substance and Sexual Activity   • Alcohol use: No   • Drug use: No   • Sexual activity: Defer     Allergies   Allergen Reactions   • Naproxen Nausea And Vomiting and Swelling   • Other Anaphylaxis and Swelling     Blueberries     Vitals:    10/13/22 1437   BP: 174/96   Pulse: 78   Temp: 97 °F (36.1 °C)        Home Medications:  Prior to Admission medications    Medication Sig Start Date End Date Taking? Authorizing Provider   Accu-Chek Yany Plus test strip USE ONE STRIP TO TEST BLOOD SUGAR THREE TIMES A DAY FOR DIABETES 3/4/22  Yes Biju Do MD   Accu-Chek Softclix Lancets lancets TEST BLOOD SUGAR THREE TIMES DAILY AS DIRECTED 12/6/21  Yes Brad Arnold MD   Alcohol Swabs (B-D SINGLE USE SWABS REGULAR) pads USE AS DIRECTED AS NEEDED 8/30/22  Yes Brad Arnold MD   ALPRAZolam (XANAX) 1 MG tablet  2/20/20  Yes Trever Padilla MD   amLODIPine (NORVASC) 5 MG tablet Take 1 tablet by mouth Daily. 8/30/22  Yes Brad Arnold MD   ARIPiprazole (ABILIFY) 30 MG tablet  9/29/20  Yes Trever Padilla MD   atorvastatin (LIPITOR) 20 MG tablet Take 1 tablet by mouth Daily. 8/30/22  Yes Brad Arnold MD   Blood Glucose Calibration (Accu-Chek Guide Control) liquid USE AS DIRECTED 12/6/21  Yes Brad Arnold MD   Blood Glucose Monitoring Suppl (Accu-Chek Guide) w/Device kit USE AS DIRECTED 9/9/21  Yes Brad Arnold MD   buPROPion XL (WELLBUTRIN XL) 300 MG 24 hr tablet  9/29/20  Yes Trever Padilla MD   calcitriol (ROCALTROL) 0.25 MCG capsule One tab daily 9/12/22  Yes Albert Khoury MD   calcium acetate (PHOS BINDER,) 667 MG capsule capsule Take 1 capsule by mouth 3 (Three) Times a Day. 8/30/22  Yes Brad Arnold MD   cloNIDine (CATAPRES) 0.3 MG tablet Take 1 tablet by mouth Every 12 (Twelve) Hours. 8/30/22  Yes Brad Arnold MD   empagliflozin (Jardiance) 25 MG tablet tablet Take 1 tablet by mouth Daily. 8/30/22  Yes Brad Arnold MD   escitalopram (LEXAPRO) 20 MG tablet  9/14/22  Yes Trever Padilla MD   ferrous sulfate (FeroSul) 325 (65 FE) MG tablet Take 1 tablet by mouth 3 (Three) Times a Day With Meals. 12/28/21  Yes Brad Arnold MD   HYDROcodone-acetaminophen (NORCO)  MG per tablet  8/24/21  Yes Provider, MD Trever   lamoTRIgine (LaMICtal) 200  MG tablet  9/29/20  Yes Trever Padilla MD   levothyroxine (Synthroid) 137 MCG tablet Take 1 tablet by mouth Daily. 10/1/22  Yes Brad Arnold MD   lisinopril (PRINIVIL,ZESTRIL) 40 MG tablet Take 1 tablet by mouth Daily. 8/30/22  Yes Brad Arnold MD   lubiprostone (AMITIZA) 8 MCG capsule Take 1 capsule by mouth 2 (Two) Times a Day With Meals. 10/6/22  Yes Jass Hu DO   magnesium oxide 250 MG tablet Take 1 tablet by mouth Daily. 8/30/22  Yes Brad Arnold MD   metFORMIN (GLUCOPHAGE) 1000 MG tablet Take 1 tablet by mouth 2 (Two) Times a Day With Meals. 8/30/22  Yes Brad Arnold MD   metoprolol succinate XL (TOPROL-XL) 100 MG 24 hr tablet Take 1 tablet by mouth Daily. 8/30/22  Yes Brad Arnold MD   ondansetron (ZOFRAN) 8 MG tablet Take 1 tablet by mouth Every 8 (Eight) Hours As Needed for Nausea or Vomiting. 8/30/22  Yes Brad Arnold MD   potassium chloride ER (K-TAB) 20 MEQ tablet controlled-release ER tablet Take 1 tablet by mouth Daily. 8/30/22  Yes Brad Arnold MD   Semaglutide (Rybelsus) 3 MG tablet Take 1 tablet by mouth Daily. 8/30/22  Yes Brad Arnold MD   traZODone (DESYREL) 100 MG tablet Take 1 tablet by mouth Every Night. 5/31/22  Yes Brad Arnold MD   vitamin B-12 (CYANOCOBALAMIN) 500 MCG tablet Take 1 tablet by mouth Daily. 8/30/22  Yes Brad Arnold MD   levothyroxine (SYNTHROID, LEVOTHROID) 175 MCG tablet  8/31/22 10/13/22  Provider, MD Trever       Objective   Physical Exam  Constitutional:       General: She is not in acute distress.     Appearance: She is well-developed.   HENT:      Head: Normocephalic and atraumatic.   Eyes:      General: No scleral icterus.     Conjunctiva/sclera: Conjunctivae normal.   Neck:      Thyroid: No thyromegaly.      Trachea: No tracheal deviation.   Cardiovascular:      Rate and Rhythm: Normal rate and regular rhythm.      Heart sounds: Normal heart sounds. No murmur heard.    No friction rub. No gallop.   Pulmonary:       Effort: Pulmonary effort is normal. No respiratory distress.      Breath sounds: Normal breath sounds. No stridor. No wheezing or rales.   Chest:      Chest wall: No tenderness.   Abdominal:      General: Bowel sounds are normal. There is no distension.      Palpations: Abdomen is soft. There is no mass.      Tenderness: There is no abdominal tenderness. There is no guarding or rebound.      Hernia: No hernia is present.   Musculoskeletal:         General: No deformity. Normal range of motion.      Cervical back: Normal range of motion and neck supple.   Lymphadenopathy:      Cervical: No cervical adenopathy.   Skin:     General: Skin is warm and dry.      Coloration: Skin is not pale.      Findings: No erythema or rash.      Nails: There is no clubbing.   Neurological:      Mental Status: She is alert and oriented to person, place, and time.   Psychiatric:         Behavior: Behavior normal.         Thought Content: Thought content normal.         Assessment & Plan Contracted gallbladder likely had eaten prior to her ultrasound study.  Review of the CT scan does not confirm that the gallbladder is contracted and no stones were identified.  LFTs are abnormal per my review of the chart.  Patient is clearly asymptomatic as far as her gallbladder concerned and recommend follow-up abdomen is concerned.  We provided her with a pamphlet regarding gallbladder disease and discussed gallbladder disease just in general.  She does not need to have any further work-up as far as her gallbladder is concerned and would not recommend any surgery at this point.  If she has any symptoms she will follow-up with us at that point.      The encounter diagnosis was Gallbladder disease.                     This document has been electronically signed by Chidi Bhatt MD on October 13, 2022 15:15 CDT

## 2022-10-13 NOTE — PROGRESS NOTES
CC  Hypocalcemia    Mode of Visit: Video  Location of patient: Home  You have chosen to receive care through a telehealth visit.  Does the patient consent to use a video/audio connection for your medical care today? Yes  The visit included audio and video interaction. No technical issues occurred during this visit        History of Present Illness    57 y.o. female     Hypocalcemia sec to complication for total thryoidectomy in 2006.   Reason for tot thyroidectomy was toxic goiter    Present treatment for hypoparathyroidism detailed below but was able to reduce calcium tabs after adding calcitriol      She can identify when calcium drops - feels tingly and crampy     Has had calcium as low as 5 and required admission    Never nephrolithasis.       ==========================================  Physical Exam  There were no vitals taken for this visit.  AOx3  No Goiter , no carotid bruit  RRR  CTA  No Edema     ==========================================    Laboratory Workup    Lab Results   Component Value Date    WBC 8.93 10/04/2022    HGB 12.1 10/04/2022    HCT 36.7 10/04/2022    MCV 83.0 10/04/2022     10/04/2022       Lab Results   Component Value Date    GLUCOSE 211 (H) 10/04/2022    BUN 8 10/04/2022    CREATININE 0.79 10/04/2022    EGFR 86.8 10/04/2022    EGFRIFAFRI 90 08/25/2021    BCR 10.1 10/04/2022     10/04/2022    K 3.6 10/04/2022    CL 97 (L) 10/04/2022    CO2 29.0 10/04/2022    CALCIUM 8.0 (L) 10/04/2022    PHOS 5.3 (H) 10/03/2022    ALBUMIN 4.50 10/04/2022    GLOB 3.1 10/04/2022    BILITOT 0.5 10/04/2022    ALKPHOS 112 10/04/2022    AST 20 10/04/2022    ALT 29 10/04/2022    AGRATIO 1.5 10/04/2022     Lab Results   Component Value Date    PTH 7.4 (L) 08/29/2022    CALCIUM 8.0 (L) 10/04/2022    PHOS 5.3 (H) 10/03/2022     Component      Latest Ref Rng & Units 9/20/2022 9/20/2022           4:14 PM  4:14 PM   Calcium, 24H Urine      100.0 - 300.0 mg/24 hr 120.2    Calcium, Urine      mg/dL 8.9     Urine Volume      mL 1,350 1,350   Time (Hours)      hrs 24 24   Creatinine, 24H       0.70 - 1.60 g/24 hr  0.86   Creatinine, Urine      mg/dL  63.9       ==========================================      ICD-10-CM ICD-9-CM   1. Postoperative Hypoparathyroidism  E20.8 252.1   2. Postoperative hypothyroidism  E89.0 244.0   3. Well controlled type 2 diabetes mellitus (HCC)  E11.9 250.00   -    Tums Mixture of 10 tabs milkshake,---- cut the tabs to 5 - now stop     Stop vitamin D 50 th units weekly    Start calictiriol 0.25 mcg daily ( super vitamin D )-  Now take 2     Keep phoslo 3 capsules twice daily     Keep mg oxide 250 mg daily          Breath into a bag to raise your acidity which is hydrogen which is a positive charge.  This will displace calcium from the protein in your blood and raise your calcium from the inside.     Phone appts every 2 weeks but my chart communication at least weekly     ==    Hypothyroidism   Lab Results   Component Value Date    TSH <0.005 (L) 09/19/2022       On levothyroxine 175 , decreased to 150 daily     Takes levothyroxine w other tabs, advised on correct way of taking    ===    Dm, well controlled on jardiance, rybelsus , metformin   Lab Results   Component Value Date    HGBA1C 7.00 (H) 08/29/2022       Albert Bartholomew MD        No orders of the defined types were placed in this encounter.               This document has been electronically signed by Albert Bartholomew MD on October 13, 2022 18:16 CDT

## 2022-10-14 ENCOUNTER — LAB (OUTPATIENT)
Dept: LAB | Facility: HOSPITAL | Age: 57
End: 2022-10-14

## 2022-10-14 DIAGNOSIS — E20.8 OTHER HYPOPARATHYROIDISM: ICD-10-CM

## 2022-10-14 DIAGNOSIS — E89.0 POSTOPERATIVE HYPOTHYROIDISM: ICD-10-CM

## 2022-10-14 LAB
ALBUMIN SERPL-MCNC: 4.4 G/DL (ref 3.5–5.2)
ANION GAP SERPL CALCULATED.3IONS-SCNC: 14 MMOL/L (ref 5–15)
BUN SERPL-MCNC: 11 MG/DL (ref 6–20)
BUN/CREAT SERPL: 13.9 (ref 7–25)
CALCIUM SPEC-SCNC: 8.7 MG/DL (ref 8.6–10.5)
CHLORIDE SERPL-SCNC: 101 MMOL/L (ref 98–107)
CO2 SERPL-SCNC: 26 MMOL/L (ref 22–29)
CREAT SERPL-MCNC: 0.79 MG/DL (ref 0.57–1)
EGFRCR SERPLBLD CKD-EPI 2021: 86.8 ML/MIN/1.73
GLUCOSE SERPL-MCNC: 135 MG/DL (ref 65–99)
MAGNESIUM SERPL-MCNC: 1.7 MG/DL (ref 1.6–2.6)
PHOSPHATE SERPL-MCNC: 5.8 MG/DL (ref 2.5–4.5)
POTASSIUM SERPL-SCNC: 3.9 MMOL/L (ref 3.5–5.2)
SODIUM SERPL-SCNC: 141 MMOL/L (ref 136–145)
TSH SERPL DL<=0.05 MIU/L-ACNC: 0.01 UIU/ML (ref 0.27–4.2)

## 2022-10-14 PROCEDURE — 82310 ASSAY OF CALCIUM: CPT | Performed by: INTERNAL MEDICINE

## 2022-10-14 PROCEDURE — 83970 ASSAY OF PARATHORMONE: CPT | Performed by: INTERNAL MEDICINE

## 2022-10-14 PROCEDURE — 83735 ASSAY OF MAGNESIUM: CPT

## 2022-10-14 PROCEDURE — 80069 RENAL FUNCTION PANEL: CPT | Performed by: INTERNAL MEDICINE

## 2022-10-14 PROCEDURE — 82306 VITAMIN D 25 HYDROXY: CPT | Performed by: INTERNAL MEDICINE

## 2022-10-14 PROCEDURE — 84443 ASSAY THYROID STIM HORMONE: CPT | Performed by: INTERNAL MEDICINE

## 2022-10-15 LAB
25(OH)D3 SERPL-MCNC: 80.9 NG/ML (ref 30–100)
CALCIUM SPEC-SCNC: 8.8 MG/DL (ref 8.6–10.5)
PTH-INTACT SERPL-MCNC: 6.8 PG/ML (ref 15–65)

## 2022-10-18 ENCOUNTER — PATIENT ROUNDING (BHMG ONLY) (OUTPATIENT)
Dept: SURGERY | Facility: CLINIC | Age: 57
End: 2022-10-18

## 2022-10-18 RX ORDER — ESCITALOPRAM OXALATE 20 MG/1
TABLET ORAL
Qty: 30 TABLET | Refills: 0 | Status: SHIPPED | OUTPATIENT
Start: 2022-10-18 | End: 2022-11-15

## 2022-10-18 NOTE — PROGRESS NOTES
Spoke w Ms. Blackwell    She is taking 137 daily - change to 6 days per week    --    Calcium regimen    Calicitriol , keep at 0.25 mcgs daily     Tums - decrease from 5 to 2    Keep phoslo , 3 bid

## 2022-10-18 NOTE — PROGRESS NOTES
"October 18, 2022    Hello, may I speak with Joan Blackwell? \"This is her.\"    My name is  Noemi Banks    I am a Medical Assistant with [facility]    Before we get started may I verify your date of birth? 1965 Date Of Birth Verified.    I am calling to officially welcome you to our practice and ask about your recent visit. Is this a good time to talk? Yes.    Tell me about your visit with us. What things went well?  \"My visit went okay.\"     We're always looking for ways to make our patients' experiences even better. Do you have recommendations on ways we may improve?  No.    Overall were you satisfied with your first visit to our practice? Yes.     I appreciate you taking the time to speak with me today. Is there anything else I can do for you? \"No, ma'am.\"      Thank you, and have a great day.    Patient instructed to call the office with any questions or concerns.  "

## 2022-10-19 ENCOUNTER — TELEPHONE (OUTPATIENT)
Dept: ENDOCRINOLOGY | Facility: CLINIC | Age: 57
End: 2022-10-19

## 2022-10-19 NOTE — TELEPHONE ENCOUNTER
LVM FOR PATIENT. HAD TO MOVE HER APPOINTMENT FROM 10/27 TO 10/21 PER [facility] AT 9AM PHONE VISIT INSTEAD OF IN OFFICE.

## 2022-10-21 ENCOUNTER — TELEMEDICINE (OUTPATIENT)
Dept: ENDOCRINOLOGY | Facility: CLINIC | Age: 57
End: 2022-10-21

## 2022-10-21 DIAGNOSIS — E20.8 OTHER HYPOPARATHYROIDISM: Primary | ICD-10-CM

## 2022-10-21 DIAGNOSIS — E11.9 WELL CONTROLLED TYPE 2 DIABETES MELLITUS: ICD-10-CM

## 2022-10-21 DIAGNOSIS — E89.0 POSTOPERATIVE HYPOTHYROIDISM: ICD-10-CM

## 2022-10-21 PROCEDURE — 99214 OFFICE O/P EST MOD 30 MIN: CPT | Performed by: INTERNAL MEDICINE

## 2022-10-21 NOTE — PROGRESS NOTES
CC  Hypocalcemia    Mode of Visit: Video  Location of patient: Home  You have chosen to receive care through a telehealth visit.  Does the patient consent to use a video/audio connection for your medical care today? Yes  The visit included audio and video interaction. No technical issues occurred during this visit        History of Present Illness    57 y.o. female     Hypocalcemia sec to complication for total thryoidectomy in 2006.   Reason for tot thyroidectomy was toxic goiter    Present treatment for hypoparathyroidism detailed below but was able to reduce calcium tabs after adding calcitriol      She can identify when calcium drops - feels tingly and crampy     Has had calcium as low as 5 and required admission    Never nephrolithasis.       ==========================================  Physical Exam  There were no vitals taken for this visit.  AOx3  No Goiter , no carotid bruit  RRR  CTA  No Edema     ==========================================    Laboratory Workup    Lab Results   Component Value Date    WBC 8.93 10/04/2022    HGB 12.1 10/04/2022    HCT 36.7 10/04/2022    MCV 83.0 10/04/2022     10/04/2022       Lab Results   Component Value Date    GLUCOSE 135 (H) 10/14/2022    BUN 11 10/14/2022    CREATININE 0.79 10/14/2022    EGFR 86.8 10/14/2022    EGFRIFAFRI 90 08/25/2021    BCR 13.9 10/14/2022     10/14/2022    K 3.9 10/14/2022     10/14/2022    CO2 26.0 10/14/2022    CALCIUM 8.8 10/14/2022    CALCIUM 8.7 10/14/2022    PHOS 5.8 (H) 10/14/2022    ALBUMIN 4.40 10/14/2022    GLOB 3.1 10/04/2022    BILITOT 0.5 10/04/2022    ALKPHOS 112 10/04/2022    AST 20 10/04/2022    ALT 29 10/04/2022    AGRATIO 1.5 10/04/2022     Lab Results   Component Value Date    PTH 6.8 (L) 10/14/2022    CALCIUM 8.8 10/14/2022    CALCIUM 8.7 10/14/2022    PHOS 5.8 (H) 10/14/2022     Component      Latest Ref Rng & Units 9/20/2022 9/20/2022           4:14 PM  4:14 PM   Calcium, 24H Urine      100.0 - 300.0 mg/24 hr  120.2    Calcium, Urine      mg/dL 8.9    Urine Volume      mL 1,350 1,350   Time (Hours)      hrs 24 24   Creatinine, 24H       0.70 - 1.60 g/24 hr  0.86   Creatinine, Urine      mg/dL  63.9       ==========================================      ICD-10-CM ICD-9-CM   1. Postoperative Hypoparathyroidism  E20.8 252.1   2. Postoperative hypothyroidism  E89.0 244.0   3. Well controlled type 2 diabetes mellitus (HCC)  E11.9 250.00   -    Tums Mixture of 10 tabs milkshake,---- cut the tabs to 5 - now 2     Stop vitamin D 50 th units weekly    Start calictiriol 0.25 mcg daily ( super vitamin D )-  Now take 2 - never did - kept at 1 and calcium alexandra further   Keep at 1     Keep phoslo 3 capsules twice daily     Keep mg oxide 250 mg daily     --         Breath into a bag to raise your acidity which is hydrogen which is a positive charge.  This will displace calcium from the protein in your blood and raise your calcium from the inside.     Phone appts every 2 weeks but my chart communication at least weekly     ==    Hypothyroidism   Lab Results   Component Value Date    TSH 0.011 (L) 10/14/2022       On levothyroxine 175 , decreased to 150 daily - 137  Now 137 six days per week    Takes levothyroxine w other tabs, advised on correct way of taking    ===    Dm, well controlled on jardiance, rybelsus , metformin   Lab Results   Component Value Date    HGBA1C 7.00 (H) 08/29/2022       Albert Bartholomew MD        No orders of the defined types were placed in this encounter.               This document has been electronically signed by Albert Bartholomew MD on October 21, 2022 09:16 CDT

## 2022-10-25 ENCOUNTER — LAB (OUTPATIENT)
Dept: LAB | Facility: HOSPITAL | Age: 57
End: 2022-10-25

## 2022-10-25 DIAGNOSIS — E20.8 OTHER HYPOPARATHYROIDISM: ICD-10-CM

## 2022-10-25 DIAGNOSIS — E89.0 POSTOPERATIVE HYPOTHYROIDISM: ICD-10-CM

## 2022-10-25 PROCEDURE — 83735 ASSAY OF MAGNESIUM: CPT

## 2022-10-25 PROCEDURE — 36415 COLL VENOUS BLD VENIPUNCTURE: CPT

## 2022-10-25 PROCEDURE — 80069 RENAL FUNCTION PANEL: CPT

## 2022-10-26 LAB
ALBUMIN SERPL-MCNC: 4.2 G/DL (ref 3.5–5.2)
ANION GAP SERPL CALCULATED.3IONS-SCNC: 14 MMOL/L (ref 5–15)
BUN SERPL-MCNC: 7 MG/DL (ref 6–20)
BUN/CREAT SERPL: 11.1 (ref 7–25)
CALCIUM SPEC-SCNC: 7.7 MG/DL (ref 8.6–10.5)
CHLORIDE SERPL-SCNC: 103 MMOL/L (ref 98–107)
CO2 SERPL-SCNC: 25 MMOL/L (ref 22–29)
CREAT SERPL-MCNC: 0.63 MG/DL (ref 0.57–1)
EGFRCR SERPLBLD CKD-EPI 2021: 103 ML/MIN/1.73
GLUCOSE SERPL-MCNC: 185 MG/DL (ref 65–99)
MAGNESIUM SERPL-MCNC: 1.9 MG/DL (ref 1.6–2.6)
PHOSPHATE SERPL-MCNC: 5.3 MG/DL (ref 2.5–4.5)
POTASSIUM SERPL-SCNC: 4 MMOL/L (ref 3.5–5.2)
SODIUM SERPL-SCNC: 142 MMOL/L (ref 136–145)

## 2022-10-28 ENCOUNTER — TELEMEDICINE (OUTPATIENT)
Dept: ENDOCRINOLOGY | Facility: CLINIC | Age: 57
End: 2022-10-28

## 2022-10-28 DIAGNOSIS — E20.8 OTHER HYPOPARATHYROIDISM: Primary | ICD-10-CM

## 2022-10-28 DIAGNOSIS — E11.9 WELL CONTROLLED TYPE 2 DIABETES MELLITUS: ICD-10-CM

## 2022-10-28 PROCEDURE — 99214 OFFICE O/P EST MOD 30 MIN: CPT | Performed by: INTERNAL MEDICINE

## 2022-11-08 PROBLEM — R93.5 ABNORMAL CT OF THE ABDOMEN: Status: ACTIVE | Noted: 2022-11-08

## 2022-11-13 RX ORDER — CALCIUM ACETATE 667 MG/1
CAPSULE ORAL
Qty: 210 CAPSULE | Refills: 11 | Status: SHIPPED | OUTPATIENT
Start: 2022-11-13 | End: 2023-03-27 | Stop reason: SDUPTHER

## 2022-11-15 RX ORDER — CLONIDINE HYDROCHLORIDE 0.3 MG/1
TABLET ORAL
Qty: 180 TABLET | Refills: 0 | Status: SHIPPED | OUTPATIENT
Start: 2022-11-15 | End: 2022-12-20 | Stop reason: SDUPTHER

## 2022-11-15 RX ORDER — ESCITALOPRAM OXALATE 20 MG/1
TABLET ORAL
Qty: 30 TABLET | Refills: 0 | Status: SHIPPED | OUTPATIENT
Start: 2022-11-15 | End: 2022-12-12

## 2022-11-17 RX ORDER — METOPROLOL SUCCINATE 100 MG/1
TABLET, EXTENDED RELEASE ORAL
Qty: 90 TABLET | Refills: 0 | Status: SHIPPED | OUTPATIENT
Start: 2022-11-17 | End: 2022-12-20 | Stop reason: SDUPTHER

## 2022-11-17 RX ORDER — AMLODIPINE BESYLATE 5 MG/1
TABLET ORAL
Qty: 90 TABLET | Refills: 0 | Status: SHIPPED | OUTPATIENT
Start: 2022-11-17 | End: 2022-12-20 | Stop reason: SDUPTHER

## 2022-11-22 ENCOUNTER — TELEPHONE (OUTPATIENT)
Dept: FAMILY MEDICINE CLINIC | Facility: CLINIC | Age: 57
End: 2022-11-22

## 2022-11-22 NOTE — TELEPHONE ENCOUNTER
Patient stated she has spoken to Dr. Arnold about being referred to pain management and wants to be referred to both [facility] and [facility].  Call back number: [phone]  Fax # for [facility]: [phone]  Fax # for [facility]: [phone]

## 2022-11-29 RX ORDER — CLONIDINE HYDROCHLORIDE 0.3 MG/1
TABLET ORAL
Qty: 180 TABLET | Refills: 0 | OUTPATIENT
Start: 2022-11-29

## 2022-11-29 NOTE — TELEPHONE ENCOUNTER
Rx Refill Note  Requested Prescriptions     Refused Prescriptions Disp Refills   • cloNIDine (CATAPRES) 0.3 MG tablet [Pharmacy Med Name: cloNIDine HCL 0.3 MG TABLET] 180 tablet 0     Sig: TAKE ONE TABLET BY MOUTH EVERY 12 HOURS      Last office visit with prescribing clinician: 8/30/2022   Last telemedicine visit with prescribing clinician: 12/16/2022   Next office visit with prescribing clinician: 12/16/2022   {TIP  Encounters:    rx sent 11/15/22 #180    {TIP  Please add Last Relevant Lab Date if appropriate             {TIP  Is Refill Pharmacy correct? yes    Would you like a call back once the refill request has been completed: [] Yes [] No    If the office needs to give you a call back, can they leave a voicemail: [] Yes [] No    Gracy Gutierrez LPN  11/29/22, 13:46 CST

## 2022-11-30 RX ORDER — CLONIDINE HYDROCHLORIDE 0.3 MG/1
0.3 TABLET ORAL EVERY 12 HOURS
Qty: 180 TABLET | Refills: 0 | OUTPATIENT
Start: 2022-11-30

## 2022-11-30 NOTE — TELEPHONE ENCOUNTER
Rx Refill Note  Requested Prescriptions     Refused Prescriptions Disp Refills   • cloNIDine (CATAPRES) 0.3 MG tablet 180 tablet 0     Sig: Take 1 tablet by mouth Every 12 (Twelve) Hours.      Last office visit with prescribing clinician: 8/30/2022   Last telemedicine visit with prescribing clinician: 12/16/2022   Next office visit with prescribing clinician: 12/16/2022   {TIP  Encounters:    RX SENT ON 11/15/22 FOR A 90 DAY SUPPLY      {TIP  Please add Last Relevant Lab Date if appropriate              {TIP  Is Refill Pharmacy correct? YES    Would you like a call back once the refill request has been completed: [] Yes [] No    If the office needs to give you a call back, can they leave a voicemail: [] Yes [] No    Gracy Gutierrez LPN  11/30/22, 09:33 CST

## 2022-12-01 ENCOUNTER — OFFICE VISIT (OUTPATIENT)
Dept: GASTROENTEROLOGY | Facility: CLINIC | Age: 57
End: 2022-12-01

## 2022-12-01 VITALS
HEART RATE: 76 BPM | HEIGHT: 60 IN | WEIGHT: 192.8 LBS | DIASTOLIC BLOOD PRESSURE: 81 MMHG | BODY MASS INDEX: 37.85 KG/M2 | SYSTOLIC BLOOD PRESSURE: 144 MMHG

## 2022-12-01 DIAGNOSIS — Z12.11 ENCOUNTER FOR SCREENING FOR MALIGNANT NEOPLASM OF COLON: Primary | ICD-10-CM

## 2022-12-01 PROCEDURE — 99204 OFFICE O/P NEW MOD 45 MIN: CPT | Performed by: INTERNAL MEDICINE

## 2022-12-01 RX ORDER — SODIUM CHLORIDE 9 MG/ML
40 INJECTION, SOLUTION INTRAVENOUS AS NEEDED
Status: CANCELLED | OUTPATIENT
Start: 2022-12-01

## 2022-12-01 RX ORDER — DEXTROSE AND SODIUM CHLORIDE 5; .45 G/100ML; G/100ML
30 INJECTION, SOLUTION INTRAVENOUS CONTINUOUS PRN
Status: CANCELLED | OUTPATIENT
Start: 2023-02-15

## 2022-12-06 ENCOUNTER — LAB (OUTPATIENT)
Dept: LAB | Facility: HOSPITAL | Age: 57
End: 2022-12-06

## 2022-12-06 PROCEDURE — 84443 ASSAY THYROID STIM HORMONE: CPT | Performed by: INTERNAL MEDICINE

## 2022-12-06 PROCEDURE — 80069 RENAL FUNCTION PANEL: CPT | Performed by: INTERNAL MEDICINE

## 2022-12-06 PROCEDURE — 83735 ASSAY OF MAGNESIUM: CPT | Performed by: INTERNAL MEDICINE

## 2022-12-06 PROCEDURE — 85025 COMPLETE CBC W/AUTO DIFF WBC: CPT | Performed by: INTERNAL MEDICINE

## 2022-12-06 PROCEDURE — 82652 VIT D 1 25-DIHYDROXY: CPT | Performed by: INTERNAL MEDICINE

## 2022-12-06 PROCEDURE — 82306 VITAMIN D 25 HYDROXY: CPT | Performed by: INTERNAL MEDICINE

## 2022-12-07 LAB
25(OH)D3 SERPL-MCNC: 49.4 NG/ML (ref 30–100)
ALBUMIN SERPL-MCNC: 4.2 G/DL (ref 3.5–5.2)
ANION GAP SERPL CALCULATED.3IONS-SCNC: 10 MMOL/L (ref 5–15)
BASOPHILS # BLD AUTO: 0.05 10*3/MM3 (ref 0–0.2)
BASOPHILS NFR BLD AUTO: 0.7 % (ref 0–1.5)
BUN SERPL-MCNC: 10 MG/DL (ref 6–20)
BUN/CREAT SERPL: 11.2 (ref 7–25)
CALCIUM SPEC-SCNC: 8.6 MG/DL (ref 8.6–10.5)
CHLORIDE SERPL-SCNC: 102 MMOL/L (ref 98–107)
CO2 SERPL-SCNC: 29 MMOL/L (ref 22–29)
CREAT SERPL-MCNC: 0.89 MG/DL (ref 0.57–1)
DEPRECATED RDW RBC AUTO: 43 FL (ref 37–54)
EGFRCR SERPLBLD CKD-EPI 2021: 75.3 ML/MIN/1.73
EOSINOPHIL # BLD AUTO: 0.11 10*3/MM3 (ref 0–0.4)
EOSINOPHIL NFR BLD AUTO: 1.6 % (ref 0.3–6.2)
ERYTHROCYTE [DISTWIDTH] IN BLOOD BY AUTOMATED COUNT: 13.8 % (ref 12.3–15.4)
GLUCOSE SERPL-MCNC: 195 MG/DL (ref 65–99)
HCT VFR BLD AUTO: 37.4 % (ref 34–46.6)
HGB BLD-MCNC: 12.1 G/DL (ref 12–15.9)
IMM GRANULOCYTES # BLD AUTO: 0.01 10*3/MM3 (ref 0–0.05)
IMM GRANULOCYTES NFR BLD AUTO: 0.1 % (ref 0–0.5)
LYMPHOCYTES # BLD AUTO: 3.51 10*3/MM3 (ref 0.7–3.1)
LYMPHOCYTES NFR BLD AUTO: 50.8 % (ref 19.6–45.3)
MAGNESIUM SERPL-MCNC: 1.9 MG/DL (ref 1.6–2.6)
MCH RBC QN AUTO: 27.6 PG (ref 26.6–33)
MCHC RBC AUTO-ENTMCNC: 32.4 G/DL (ref 31.5–35.7)
MCV RBC AUTO: 85.2 FL (ref 79–97)
MONOCYTES # BLD AUTO: 0.51 10*3/MM3 (ref 0.1–0.9)
MONOCYTES NFR BLD AUTO: 7.4 % (ref 5–12)
NEUTROPHILS NFR BLD AUTO: 2.72 10*3/MM3 (ref 1.7–7)
NEUTROPHILS NFR BLD AUTO: 39.4 % (ref 42.7–76)
NRBC BLD AUTO-RTO: 0 /100 WBC (ref 0–0.2)
PHOSPHATE SERPL-MCNC: 5.7 MG/DL (ref 2.5–4.5)
PLATELET # BLD AUTO: 266 10*3/MM3 (ref 140–450)
PMV BLD AUTO: 11.4 FL (ref 6–12)
POTASSIUM SERPL-SCNC: 4.2 MMOL/L (ref 3.5–5.2)
RBC # BLD AUTO: 4.39 10*6/MM3 (ref 3.77–5.28)
SODIUM SERPL-SCNC: 141 MMOL/L (ref 136–145)
TSH SERPL DL<=0.05 MIU/L-ACNC: 0.59 UIU/ML (ref 0.27–4.2)
WBC NRBC COR # BLD: 6.91 10*3/MM3 (ref 3.4–10.8)

## 2022-12-09 ENCOUNTER — TELEMEDICINE (OUTPATIENT)
Dept: ENDOCRINOLOGY | Facility: CLINIC | Age: 57
End: 2022-12-09

## 2022-12-09 DIAGNOSIS — E89.0 POSTOPERATIVE HYPOTHYROIDISM: ICD-10-CM

## 2022-12-09 DIAGNOSIS — E20.8 OTHER HYPOPARATHYROIDISM: Primary | ICD-10-CM

## 2022-12-09 DIAGNOSIS — E11.9 WELL CONTROLLED TYPE 2 DIABETES MELLITUS: ICD-10-CM

## 2022-12-09 PROCEDURE — 99214 OFFICE O/P EST MOD 30 MIN: CPT | Performed by: INTERNAL MEDICINE

## 2022-12-09 NOTE — PROGRESS NOTES
CC  Hypocalcemia    Mode of Visit: Video  Location of patient: Home  You have chosen to receive care through a telehealth visit.  Does the patient consent to use a video/audio connection for your medical care today? Yes  The visit included audio and video interaction. No technical issues occurred during this visit        History of Present Illness    57 y.o. female     Hypocalcemia sec to complication for total thryoidectomy in 2006.   Reason for tot thyroidectomy was toxic goiter    Present treatment for hypoparathyroidism detailed below but was able to reduce calcium tabs after adding calcitriol      She can identify when calcium drops - feels tingly and crampy     Has had calcium as low as 5 and required admission    Never nephrolithasis.   She feels well      ==========================================  Physical Exam  There were no vitals taken for this visit.  AOx3  No Goiter , no carotid bruit  RRR  CTA  No Edema     ==========================================    Laboratory Workup    Lab Results   Component Value Date    WBC 6.91 12/06/2022    HGB 12.1 12/06/2022    HCT 37.4 12/06/2022    MCV 85.2 12/06/2022     12/06/2022       Lab Results   Component Value Date    GLUCOSE 195 (H) 12/06/2022    BUN 10 12/06/2022    CREATININE 0.89 12/06/2022    EGFR 75.3 12/06/2022    EGFRIFAFRI 90 08/25/2021    BCR 11.2 12/06/2022     12/06/2022    K 4.2 12/06/2022     12/06/2022    CO2 29.0 12/06/2022    CALCIUM 8.6 12/06/2022    PHOS 5.7 (H) 12/06/2022    ALBUMIN 4.20 12/06/2022    GLOB 3.1 10/04/2022    BILITOT 0.5 10/04/2022    ALKPHOS 112 10/04/2022    AST 20 10/04/2022    ALT 29 10/04/2022    AGRATIO 1.5 10/04/2022     Lab Results   Component Value Date    PTH 6.8 (L) 10/14/2022    CALCIUM 8.6 12/06/2022    PHOS 5.7 (H) 12/06/2022     Component      Latest Ref Rng & Units 9/20/2022 9/20/2022           4:14 PM  4:14 PM   Calcium, 24H Urine      100.0 - 300.0 mg/24 hr 120.2    Calcium, Urine      mg/dL  8.9    Urine Volume      mL 1,350 1,350   Time (Hours)      hrs 24 24   Creatinine, 24H       0.70 - 1.60 g/24 hr  0.86   Creatinine, Urine      mg/dL  63.9       ==========================================      ICD-10-CM ICD-9-CM   1. Postoperative Hypoparathyroidism  E20.8 252.1   2. Well controlled type 2 diabetes mellitus (HCC)  E11.9 250.00   3. Postoperative hypothyroidism  E89.0 244.0   -    Tums Mixture of 10 tabs milkshake,---- cut the tabs to 5 - now 2     Stopped vitamin D 50 th units weekly    Start calictiriol 0.25 mcg daily ( super vitamin D )-  Only 1   Keep phoslo 3 capsules twice daily -  Doing 4 in am and 3 in pm     Keep mg oxide 250 mg daily     --         Breath into a bag to raise your acidity which is hydrogen which is a positive charge.  This will displace calcium from the protein in your blood and raise your calcium from the inside.     Phone appts every 2 weeks but my chart communication at least weekly     ==    Hypothyroidism   Lab Results   Component Value Date    TSH 0.594 12/06/2022       On levothyroxine 175 , decreased to 150 daily - 137  Now 137 six days per week    Takes levothyroxine w other tabs, advised on correct way of taking    ===    Dm, well controlled on jardiance, rybelsus , metformin   Lab Results   Component Value Date    HGBA1C 7.00 (H) 08/29/2022       Albert Bartholomew MD        No orders of the defined types were placed in this encounter.               This document has been electronically signed by Albert Bartholomew MD on December 9, 2022 10:40 CST

## 2022-12-10 LAB — 1,25(OH)2D SERPL-MCNC: 33.4 PG/ML (ref 24.8–81.5)

## 2022-12-12 RX ORDER — ESCITALOPRAM OXALATE 20 MG/1
TABLET ORAL
Qty: 30 TABLET | Refills: 0 | Status: SHIPPED | OUTPATIENT
Start: 2022-12-12 | End: 2022-12-20 | Stop reason: SDUPTHER

## 2022-12-14 ENCOUNTER — TELEPHONE (OUTPATIENT)
Dept: FAMILY MEDICINE CLINIC | Facility: CLINIC | Age: 57
End: 2022-12-14

## 2022-12-14 DIAGNOSIS — IMO0002 UNCONTROLLED TYPE 2 DIABETES MELLITUS WITH COMPLICATION, WITHOUT LONG-TERM CURRENT USE OF INSULIN: ICD-10-CM

## 2022-12-14 RX ORDER — BLOOD GLUCOSE CONTROL HIGH,LOW
EACH MISCELLANEOUS SEE ADMIN INSTRUCTIONS
Qty: 1 EACH | Refills: 1 | Status: CANCELLED | OUTPATIENT
Start: 2022-12-14

## 2022-12-14 RX ORDER — ONDANSETRON HYDROCHLORIDE 8 MG/1
8 TABLET, FILM COATED ORAL EVERY 8 HOURS PRN
Qty: 90 TABLET | Refills: 2 | OUTPATIENT
Start: 2022-12-14

## 2022-12-14 RX ORDER — BLOOD-GLUCOSE METER
1 EACH MISCELLANEOUS SEE ADMIN INSTRUCTIONS
Qty: 1 KIT | Refills: 0 | Status: CANCELLED | OUTPATIENT
Start: 2022-12-14

## 2022-12-14 RX ORDER — CLONIDINE HYDROCHLORIDE 0.3 MG/1
0.3 TABLET ORAL EVERY 12 HOURS
Qty: 180 TABLET | Refills: 0 | OUTPATIENT
Start: 2022-12-14

## 2022-12-14 RX ORDER — LANCETS
EACH MISCELLANEOUS
Qty: 300 EACH | Refills: 0 | Status: CANCELLED | OUTPATIENT
Start: 2022-12-14

## 2022-12-14 RX ORDER — LISINOPRIL 40 MG/1
40 TABLET ORAL DAILY
Qty: 90 TABLET | Refills: 1 | OUTPATIENT
Start: 2022-12-14

## 2022-12-14 RX ORDER — ORAL SEMAGLUTIDE 3 MG/1
1 TABLET ORAL DAILY
Qty: 90 TABLET | Refills: 1 | OUTPATIENT
Start: 2022-12-14

## 2022-12-14 RX ORDER — CALCITRIOL 0.25 UG/1
CAPSULE, LIQUID FILLED ORAL
Qty: 60 CAPSULE | Refills: 11 | Status: SHIPPED | OUTPATIENT
Start: 2022-12-14

## 2022-12-14 RX ORDER — LEVOTHYROXINE SODIUM 137 UG/1
137 TABLET ORAL DAILY
Qty: 30 TABLET | Refills: 3 | OUTPATIENT
Start: 2022-12-14

## 2022-12-14 NOTE — PROGRESS NOTES
Subjective:  Joan Blackwell is a 57 y.o. female who presents for       Patient Active Problem List   Diagnosis   • Hypocalcemia   • Hyperlipidemia   • Iron deficiency anemia   • Hypothyroidism   • S/P thyroidectomy   • Uncontrolled type 2 diabetes mellitus with complication, without long-term current use of insulin   • Obesity   • Uncontrolled hypertension   • Hypertensive urgency   • Right wrist pain   • Carpal tunnel syndrome of right wrist   • Controlled type 2 diabetes mellitus with complication, without long-term current use of insulin (HCC)   • Essential hypertension   • Vitamin D deficiency   • Sprain of left ankle   • Grieving   • Rotator cuff syndrome of right shoulder   • Hypotension   • Bilateral low back pain with bilateral sciatica   • Fall   • Right shoulder pain   • Dental abscess   • Body mass index (BMI) of 35.0 to 35.9   • Former smoker   • Iron overload   • High vitamin D level   • Hypoparathyroidism (HCC)   • Abnormal CT of the abdomen   • Encounter for screening for malignant neoplasm of colon   • Right acute serous otitis media   • Swelling of left foot   • Diabetic foot (HCC)           Current Outpatient Medications:   •  Accu-Chek Yany Plus test strip, USE ONE STRIP TO TEST BLOOD SUGAR THREE TIMES A DAY FOR DIABETES, Disp: 200 each, Rfl: 3  •  Accu-Chek Softclix Lancets lancets, 1 each by Other route 3 (Three) Times a Day. Use as instructed, Disp: 300 each, Rfl: 3  •  Alcohol Swabs (B-D SINGLE USE SWABS REGULAR) pads, USE AS DIRECTED AS NEEDED, Disp: 300 each, Rfl: 3  •  ALPRAZolam (XANAX) 1 MG tablet, , Disp: , Rfl:   •  amLODIPine (NORVASC) 5 MG tablet, Take 1 tablet by mouth Daily., Disp: 90 tablet, Rfl: 1  •  ARIPiprazole (ABILIFY) 30 MG tablet, , Disp: , Rfl:   •  atorvastatin (LIPITOR) 20 MG tablet, Take 1 tablet by mouth Daily., Disp: 90 tablet, Rfl: 1  •  Blood Glucose Calibration (Accu-Chek Guide Control) liquid, USE AS DIRECTED, Disp: 1 each, Rfl: 1  •  Blood Glucose  Monitoring Suppl (Accu-Chek Guide) w/Device kit, USE AS DIRECTED, Disp: 1 kit, Rfl: 0  •  buPROPion XL (WELLBUTRIN XL) 300 MG 24 hr tablet, , Disp: , Rfl:   •  calcitriol (ROCALTROL) 0.25 MCG capsule, 2 tabs  daily, Disp: 60 capsule, Rfl: 11  •  calcium acetate (PHOS BINDER,) 667 MG capsule capsule, Take 4 capsules am and 3 capsules pm, Disp: 210 capsule, Rfl: 11  •  cloNIDine (CATAPRES) 0.3 MG tablet, Take 1 tablet by mouth Every 12 (Twelve) Hours., Disp: 180 tablet, Rfl: 1  •  empagliflozin (Jardiance) 25 MG tablet tablet, Take 1 tablet by mouth Daily., Disp: 90 tablet, Rfl: 3  •  escitalopram (LEXAPRO) 20 MG tablet, Take 1 tablet by mouth Daily., Disp: 30 tablet, Rfl: 3  •  ferrous sulfate (FeroSul) 325 (65 FE) MG tablet, Take 1 tablet by mouth 3 (Three) Times a Day With Meals., Disp: 270 tablet, Rfl: 1  •  HYDROcodone-acetaminophen (NORCO)  MG per tablet, , Disp: , Rfl:   •  lamoTRIgine (LaMICtal) 200 MG tablet, , Disp: , Rfl:   •  levothyroxine (Synthroid) 137 MCG tablet, Take 1 tablet by mouth Daily., Disp: 30 tablet, Rfl: 3  •  lisinopril (PRINIVIL,ZESTRIL) 40 MG tablet, Take 1 tablet by mouth Daily., Disp: 90 tablet, Rfl: 1  •  lubiprostone (AMITIZA) 8 MCG capsule, Take 1 capsule by mouth 2 (Two) Times a Day With Meals., Disp: 14 capsule, Rfl: 0  •  magnesium oxide 250 MG tablet, Take 1 tablet by mouth Daily., Disp: 90 tablet, Rfl: 1  •  metFORMIN (GLUCOPHAGE) 1000 MG tablet, Take 1 tablet by mouth 2 (Two) Times a Day With Meals., Disp: 180 tablet, Rfl: 3  •  metoprolol succinate XL (TOPROL-XL) 100 MG 24 hr tablet, Take 1 tablet by mouth Daily., Disp: 90 tablet, Rfl: 1  •  ondansetron (ZOFRAN) 8 MG tablet, Take 1 tablet by mouth Every 8 (Eight) Hours As Needed for Nausea or Vomiting., Disp: 90 tablet, Rfl: 2  •  polyethylene glycol (GoLYTELY) 236 g solution, Please use the instructions given in office, Disp: 4000 mL, Rfl: 0  •  potassium chloride ER (K-TAB) 20 MEQ tablet controlled-release ER tablet,  Take 1 tablet by mouth Daily., Disp: 90 tablet, Rfl: 1  •  Semaglutide (Rybelsus) 3 MG tablet, Take 1 tablet by mouth Daily., Disp: 90 tablet, Rfl: 1  •  traZODone (DESYREL) 100 MG tablet, Take 1 tablet by mouth Every Night., Disp: 90 tablet, Rfl: 0  •  vitamin B-12 (CYANOCOBALAMIN) 500 MCG tablet, Take 1 tablet by mouth Daily., Disp: 30 tablet, Rfl: 3  •  amoxicillin-clavulanate (Augmentin) 875-125 MG per tablet, Take 1 tablet by mouth 2 (Two) Times a Day for 10 days., Disp: 20 tablet, Rfl: 0  •  fluconazole (Diflucan) 150 MG tablet, Take 1 tablet by mouth 1 (One) Time for 1 dose. Take on onset of symptoms and in 72 hours, Disp: 2 tablet, Rfl: 0  •  furosemide (Lasix) 20 MG tablet, Take 1 tablet by mouth 2 (Two) Times a Day., Disp: 60 tablet, Rfl: 3    HPI          Pt is 58 yo female with management  of obesity, HLP sp thyroidectomy in  , postoperative hypothyroidism,  Vitamin D deficiency, HTN,  DM type 2, history of hypertensive urgency, iron deficiency anemia, sp hysterectomy, sp ovarian cyst removal, Sp  X 3, carpal tunnel syndrome right wrist. Grieving, chronic back pain. currenntly in pain managmment, hypocalcemia, hypoparathyroidism     22 in office visit for recheck. Pt went to ER on 10/4/22 for abdominal pain. Pt had CT of abdomen on 10/422 that showed negative appendix. Fatty and nonspecific mild prominence of mesenteric lymph node that can be seen be seen with mesenteric panniculitis. This could be associated with a lymphoproliferative disorder. followup imaging was recommended. Pt saw General Surgery a on 10/13/22 for her contracted gallbladder and abdominal pain. She is asymptomatic and was not recommended surgery at this time but will continue to followup with General Surgery should problems arise. She did have several Telehealth visits with Endocrinology on 10/21/22 and 10/28/22  for her hypocalcemia and hypoparathyroidism. Pt's vitamin D was stopped and calicitriol was started.   Along with phoslo 3 capsules twice daily and mg oxide 250 mg dialy. Pt had labwork done on 10/25/22 that showed calcium at 7.7. phosphorus at 5.3 GFR at 103.TSH was at 0..011 she was advised to take synthroid 137 mcg 6 days a week. Pt saw GI on 12/1/22 and has scheduled colonoscopy on 1/11/23. Pt saw Endocrinology on 12/9/22 for her postoperative hypoparathyroidism, DM type 2, and postoperative hypothyroidism and is to continue on calcitriol and phoslo tablets. Pt has right ear pain and callus on right foot for past 2 weeks. She would like to see Podiatry. She also has swelling on left foot swelling                Hyperlipidemia  This is a chronic problem. Recent lipid tests were reviewed and are variable. Exacerbating diseases include obesity. She has no history of chronic renal disease, diabetes, hypothyroidism or liver disease. Pertinent negatives include no chest pain, focal sensory loss, focal weakness, leg pain or myalgias. The current treatment provides no improvement of lipids. Compliance problems include adherence to diet.  Risk factors for coronary artery disease include diabetes mellitus, hypertension, obesity, dyslipidemia, a sedentary lifestyle and post-menopausal.   Obesity  This is a chronic problem. The current episode started more than 1 year ago. The problem occurs constantly. The problem has been unchanged. Pertinent negatives include no abdominal pain, anorexia, arthralgias, change in bowel habit, chest pain, chills, congestion, coughing, diaphoresis, fatigue, fever, headaches, joint swelling, myalgias, nausea, neck pain or numbness. Nothing aggravates the symptoms. She has tried nothing for the symptoms. The treatment provided no relief. ms.   Diabetes   She presents for her  followup  diabetic visit. She has type 2 diabetes mellitus. Her disease course has been stable. Hypoglycemia symptoms include dizziness and headaches. Pertinent negatives for hypoglycemia include no confusion, hunger, mood  changes, nervousness/anxiousness, pallor, seizures, sleepiness, speech difficulty, sweats or tremors. Pertinent negatives for diabetes include no blurred vision, no chest pain, no fatigue, no foot paresthesias, no foot ulcerations, no polydipsia, no polyphagia, no polyuria, no visual change, no weakness and no weight loss. Pertinent negatives for hypoglycemia complications include no blackouts, no hospitalization, no nocturnal hypoglycemia, no required assistance and no required glucagon injection. Pertinent negatives for diabetic complications include no autonomic neuropathy, CVA, heart disease, impotence, nephropathy, peripheral neuropathy, PVD or retinopathy. Risk factors for coronary artery disease include diabetes mellitus, sedentary lifestyle and obesity. Current diabetic treatment includes oral agent (monotherapy). Her weight is stable. She is following a generally healthy diet. She has not had a previous visit with a dietitian. She never participates in exercise. An ACE inhibitor/angiotensin II receptor blocker is being taken. She does not see a podiatrist.Eye exam is not current.   Hypertension   This is a chronic problem. The current episode started more than 1 year ago. The problem has been gradually improving since onset. The problem is uncontrolled. Associated symptoms include headaches, neck pain and shortness of breath. Pertinent negatives include no anxiety, blurred vision, chest pain, malaise/fatigue, orthopnea, palpitations, peripheral edema, PND or sweats. There are no associated agents to hypertension. Risk factors for coronary artery disease include diabetes mellitus and dyslipidemia. Current antihypertension treatment includes beta blockers. The current treatment provides no improvement. There are no compliance problems.  There is no history of angina, kidney disease, CAD/MI, CVA, heart failure, left ventricular hypertrophy, PVD, retinopathy or a thyroid problem. There is no history of chronic  renal disease, coarctation of the aorta, hyperaldosteronism, hypercortisolism, hyperparathyroidism, a hypertension causing med, pheochromocytoma or sleep apnea.   Hypothyroidism   This is a chronic problem. The current episode started more than 1 year ago. The problem occurs daily. The problem has been unchanged. Associated symptoms include arthralgias, headaches, myalgias, nausea, neck pain, numbness and vomiting. Pertinent negatives include no abdominal pain, anorexia, change in bowel habit, chest pain, chills, congestion, coughing, diaphoresis, fatigue, fever, joint swelling, rash, sore throat, swollen glands, urinary symptoms, vertigo, visual change or weakness. Nothing aggravates the symptoms. Treatments tried: synthroid. The treatment provided mild relief.       Review of Systems  Review of Systems   Constitutional: Positive for activity change and fatigue. Negative for appetite change, chills, diaphoresis and fever.   HENT: Negative for congestion, postnasal drip, rhinorrhea, sinus pressure, sinus pain, sneezing, sore throat, trouble swallowing and voice change.    Respiratory: Negative for cough, choking, chest tightness, shortness of breath, wheezing and stridor.    Cardiovascular: Negative for chest pain.   Gastrointestinal: Negative for diarrhea, nausea and vomiting.   Musculoskeletal: Positive for arthralgias.   Neurological: Positive for weakness and numbness. Negative for headaches.   Psychiatric/Behavioral: The patient is nervous/anxious.         Depressed mood        Patient Active Problem List   Diagnosis   • Hypocalcemia   • Hyperlipidemia   • Iron deficiency anemia   • Hypothyroidism   • S/P thyroidectomy   • Uncontrolled type 2 diabetes mellitus with complication, without long-term current use of insulin   • Obesity   • Uncontrolled hypertension   • Hypertensive urgency   • Right wrist pain   • Carpal tunnel syndrome of right wrist   • Controlled type 2 diabetes mellitus with complication,  without long-term current use of insulin (HCC)   • Essential hypertension   • Vitamin D deficiency   • Sprain of left ankle   • Grieving   • Rotator cuff syndrome of right shoulder   • Hypotension   • Bilateral low back pain with bilateral sciatica   • Fall   • Right shoulder pain   • Dental abscess   • Body mass index (BMI) of 35.0 to 35.9   • Former smoker   • Iron overload   • High vitamin D level   • Hypoparathyroidism (HCC)   • Abnormal CT of the abdomen   • Encounter for screening for malignant neoplasm of colon   • Right acute serous otitis media   • Swelling of left foot   • Diabetic foot (HCC)     Past Surgical History:   Procedure Laterality Date   •  SECTION  01/14/1993    x 3, 84, 82,   • EXPLORATORY LAPAROTOMY Left 1998    Left cystectomy. Partial resection of vthe left ovary. Left ovary- oversewn   • INJECTION OF MEDICATION  2014    Depo Medrol (Methylprednisone) (1)    • INJECTION OF MEDICATION  2015    Kenalog (1)     • LAPAROSCOPIC HYSTERECTOMY  1995    Surgical, lysis of adhesions.Laparoscopic Assisted vaginal Hysterectomy (Tyler/ Doderlein Technique) marsupialization of right Bartholin's Gland Cyst   • LASER ABLATION OF THE CERVIX  1985    Laser vaporization, cervical cone   • OVARIAN CYST REMOVAL     • PAP SMEAR     • SHOULDER SURGERY     • THYROIDECTOMY  2006    with partial parathyroidectomy   • TOOTH EXTRACTION       Social History     Socioeconomic History   • Marital status:    Tobacco Use   • Smoking status: Former   • Smokeless tobacco: Never   Vaping Use   • Vaping Use: Never used   Substance and Sexual Activity   • Alcohol use: No   • Drug use: No   • Sexual activity: Defer     Family History   Problem Relation Age of Onset   • Diabetes Mother    • Hypertension Mother    • Diabetes Father    • Arthritis Father      Telemedicine on 10/28/2022   Component Date Value Ref Range Status   • TSH 2022 0.594  0.270 -  4.200 uIU/mL Final   • WBC 12/06/2022 6.91  3.40 - 10.80 10*3/mm3 Final   • RBC 12/06/2022 4.39  3.77 - 5.28 10*6/mm3 Final   • Hemoglobin 12/06/2022 12.1  12.0 - 15.9 g/dL Final   • Hematocrit 12/06/2022 37.4  34.0 - 46.6 % Final   • MCV 12/06/2022 85.2  79.0 - 97.0 fL Final   • MCH 12/06/2022 27.6  26.6 - 33.0 pg Final   • MCHC 12/06/2022 32.4  31.5 - 35.7 g/dL Final   • RDW 12/06/2022 13.8  12.3 - 15.4 % Final   • RDW-SD 12/06/2022 43.0  37.0 - 54.0 fl Final   • MPV 12/06/2022 11.4  6.0 - 12.0 fL Final   • Platelets 12/06/2022 266  140 - 450 10*3/mm3 Final   • Neutrophil % 12/06/2022 39.4 (L)  42.7 - 76.0 % Final   • Lymphocyte % 12/06/2022 50.8 (H)  19.6 - 45.3 % Final   • Monocyte % 12/06/2022 7.4  5.0 - 12.0 % Final   • Eosinophil % 12/06/2022 1.6  0.3 - 6.2 % Final   • Basophil % 12/06/2022 0.7  0.0 - 1.5 % Final   • Immature Grans % 12/06/2022 0.1  0.0 - 0.5 % Final   • Neutrophils, Absolute 12/06/2022 2.72  1.70 - 7.00 10*3/mm3 Final   • Lymphocytes, Absolute 12/06/2022 3.51 (H)  0.70 - 3.10 10*3/mm3 Final   • Monocytes, Absolute 12/06/2022 0.51  0.10 - 0.90 10*3/mm3 Final   • Eosinophils, Absolute 12/06/2022 0.11  0.00 - 0.40 10*3/mm3 Final   • Basophils, Absolute 12/06/2022 0.05  0.00 - 0.20 10*3/mm3 Final   • Immature Grans, Absolute 12/06/2022 0.01  0.00 - 0.05 10*3/mm3 Final   • nRBC 12/06/2022 0.0  0.0 - 0.2 /100 WBC Final   • Glucose 12/06/2022 195 (H)  65 - 99 mg/dL Final   • BUN 12/06/2022 10  6 - 20 mg/dL Final   • Creatinine 12/06/2022 0.89  0.57 - 1.00 mg/dL Final   • Sodium 12/06/2022 141  136 - 145 mmol/L Final   • Potassium 12/06/2022 4.2  3.5 - 5.2 mmol/L Final   • Chloride 12/06/2022 102  98 - 107 mmol/L Final   • CO2 12/06/2022 29.0  22.0 - 29.0 mmol/L Final   • Calcium 12/06/2022 8.6  8.6 - 10.5 mg/dL Final   • Albumin 12/06/2022 4.20  3.50 - 5.20 g/dL Final   • Phosphorus 12/06/2022 5.7 (H)  2.5 - 4.5 mg/dL Final   • Anion Gap 12/06/2022 10.0  5.0 - 15.0 mmol/L Final   • BUN/Creatinine  Ratio 12/06/2022 11.2  7.0 - 25.0 Final   • eGFR 12/06/2022 75.3  >60.0 mL/min/1.73 Final    National Kidney Foundation and American Society of Nephrology (ASN) Task Force recommended calculation based on the Chronic Kidney Disease Epidemiology Collaboration (CKD-EPI) equation refit without adjustment for race.   • 1,25-Dihydroxy, Vitamin D 12/06/2022 33.4  24.8 - 81.5 pg/mL Final   • 25 Hydroxy, Vitamin D 12/06/2022 49.4  30.0 - 100.0 ng/ml Final   • Magnesium 12/06/2022 1.9  1.6 - 2.6 mg/dL Final   Lab on 10/25/2022   Component Date Value Ref Range Status   • Glucose 10/25/2022 185 (H)  65 - 99 mg/dL Final   • BUN 10/25/2022 7  6 - 20 mg/dL Final   • Creatinine 10/25/2022 0.63  0.57 - 1.00 mg/dL Final   • Sodium 10/25/2022 142  136 - 145 mmol/L Final   • Potassium 10/25/2022 4.0  3.5 - 5.2 mmol/L Final   • Chloride 10/25/2022 103  98 - 107 mmol/L Final   • CO2 10/25/2022 25.0  22.0 - 29.0 mmol/L Final   • Calcium 10/25/2022 7.7 (L)  8.6 - 10.5 mg/dL Final   • Albumin 10/25/2022 4.20  3.50 - 5.20 g/dL Final   • Phosphorus 10/25/2022 5.3 (H)  2.5 - 4.5 mg/dL Final   • Anion Gap 10/25/2022 14.0  5.0 - 15.0 mmol/L Final   • BUN/Creatinine Ratio 10/25/2022 11.1  7.0 - 25.0 Final   • eGFR 10/25/2022 103.0  >60.0 mL/min/1.73 Final    National Kidney Foundation and American Society of Nephrology (ASN) Task Force recommended calculation based on the Chronic Kidney Disease Epidemiology Collaboration (CKD-EPI) equation refit without adjustment for race.   • Magnesium 10/25/2022 1.9  1.6 - 2.6 mg/dL Final   Lab on 10/14/2022   Component Date Value Ref Range Status   • Magnesium 10/14/2022 1.7  1.6 - 2.6 mg/dL Final   Telemedicine on 10/10/2022   Component Date Value Ref Range Status   • PTH, Intact 10/14/2022 6.8 (L)  15.0 - 65.0 pg/mL Final   • Calcium 10/14/2022 8.8  8.6 - 10.5 mg/dL Final   • Glucose 10/14/2022 135 (H)  65 - 99 mg/dL Final   • BUN 10/14/2022 11  6 - 20 mg/dL Final   • Creatinine 10/14/2022 0.79  0.57 -  1.00 mg/dL Final   • Sodium 10/14/2022 141  136 - 145 mmol/L Final   • Potassium 10/14/2022 3.9  3.5 - 5.2 mmol/L Final   • Chloride 10/14/2022 101  98 - 107 mmol/L Final   • CO2 10/14/2022 26.0  22.0 - 29.0 mmol/L Final   • Calcium 10/14/2022 8.7  8.6 - 10.5 mg/dL Final   • Albumin 10/14/2022 4.40  3.50 - 5.20 g/dL Final   • Phosphorus 10/14/2022 5.8 (H)  2.5 - 4.5 mg/dL Final   • Anion Gap 10/14/2022 14.0  5.0 - 15.0 mmol/L Final   • BUN/Creatinine Ratio 10/14/2022 13.9  7.0 - 25.0 Final   • eGFR 10/14/2022 86.8  >60.0 mL/min/1.73 Final    National Kidney Foundation and American Society of Nephrology (ASN) Task Force recommended calculation based on the Chronic Kidney Disease Epidemiology Collaboration (CKD-EPI) equation refit without adjustment for race.   • 25 Hydroxy, Vitamin D 10/14/2022 80.9  30.0 - 100.0 ng/ml Final   • TSH 10/14/2022 0.011 (L)  0.270 - 4.200 uIU/mL Final   Admission on 10/04/2022, Discharged on 10/04/2022   Component Date Value Ref Range Status   • QT Interval 10/04/2022 376  ms Final   • QTC Interval 10/04/2022 449  ms Final   • Glucose 10/04/2022 211 (H)  65 - 99 mg/dL Final   • BUN 10/04/2022 8  6 - 20 mg/dL Final   • Creatinine 10/04/2022 0.79  0.57 - 1.00 mg/dL Final   • Sodium 10/04/2022 139  136 - 145 mmol/L Final   • Potassium 10/04/2022 3.6  3.5 - 5.2 mmol/L Final   • Chloride 10/04/2022 97 (L)  98 - 107 mmol/L Final   • CO2 10/04/2022 29.0  22.0 - 29.0 mmol/L Final   • Calcium 10/04/2022 8.0 (L)  8.6 - 10.5 mg/dL Final   • Total Protein 10/04/2022 7.6  6.0 - 8.5 g/dL Final   • Albumin 10/04/2022 4.50  3.50 - 5.20 g/dL Final   • ALT (SGPT) 10/04/2022 29  1 - 33 U/L Final   • AST (SGOT) 10/04/2022 20  1 - 32 U/L Final   • Alkaline Phosphatase 10/04/2022 112  39 - 117 U/L Final   • Total Bilirubin 10/04/2022 0.5  0.0 - 1.2 mg/dL Final   • Globulin 10/04/2022 3.1  gm/dL Final   • A/G Ratio 10/04/2022 1.5  g/dL Final   • BUN/Creatinine Ratio 10/04/2022 10.1  7.0 - 25.0 Final   • Anion  Gap 10/04/2022 13.0  5.0 - 15.0 mmol/L Final   • eGFR 10/04/2022 86.8  >60.0 mL/min/1.73 Final    National Kidney Foundation and American Society of Nephrology (ASN) Task Force recommended calculation based on the Chronic Kidney Disease Epidemiology Collaboration (CKD-EPI) equation refit without adjustment for race.   • proBNP 10/04/2022 89.4  0.0 - 900.0 pg/mL Final   • Troponin T 10/04/2022 <0.010  0.000 - 0.030 ng/mL Final   • Extra Tube 10/04/2022 Hold for add-ons.   Final    Auto resulted.   • Extra Tube 10/04/2022 hold for add-on   Final    Auto resulted   • Extra Tube 10/04/2022 Hold for add-ons.   Final    Auto resulted.   • Extra Tube 10/04/2022 Hold for add-ons.   Final    Auto resulted   • WBC 10/04/2022 8.93  3.40 - 10.80 10*3/mm3 Final   • RBC 10/04/2022 4.42  3.77 - 5.28 10*6/mm3 Final   • Hemoglobin 10/04/2022 12.1  12.0 - 15.9 g/dL Final   • Hematocrit 10/04/2022 36.7  34.0 - 46.6 % Final   • MCV 10/04/2022 83.0  79.0 - 97.0 fL Final   • MCH 10/04/2022 27.4  26.6 - 33.0 pg Final   • MCHC 10/04/2022 33.0  31.5 - 35.7 g/dL Final   • RDW 10/04/2022 13.8  12.3 - 15.4 % Final   • RDW-SD 10/04/2022 41.3  37.0 - 54.0 fl Final   • MPV 10/04/2022 9.9  6.0 - 12.0 fL Final   • Platelets 10/04/2022 264  140 - 450 10*3/mm3 Final   • Neutrophil % 10/04/2022 64.2  42.7 - 76.0 % Final   • Lymphocyte % 10/04/2022 26.2  19.6 - 45.3 % Final   • Monocyte % 10/04/2022 8.4  5.0 - 12.0 % Final   • Eosinophil % 10/04/2022 0.7  0.3 - 6.2 % Final   • Basophil % 10/04/2022 0.1  0.0 - 1.5 % Final   • Immature Grans % 10/04/2022 0.4  0.0 - 0.5 % Final   • Neutrophils, Absolute 10/04/2022 5.73  1.70 - 7.00 10*3/mm3 Final   • Lymphocytes, Absolute 10/04/2022 2.34  0.70 - 3.10 10*3/mm3 Final   • Monocytes, Absolute 10/04/2022 0.75  0.10 - 0.90 10*3/mm3 Final   • Eosinophils, Absolute 10/04/2022 0.06  0.00 - 0.40 10*3/mm3 Final   • Basophils, Absolute 10/04/2022 0.01  0.00 - 0.20 10*3/mm3 Final   • Immature Grans, Absolute  10/04/2022 0.04  0.00 - 0.05 10*3/mm3 Final   • nRBC 10/04/2022 0.0  0.0 - 0.2 /100 WBC Final   • Extra Tube 10/04/2022 Hold for add-ons.   Final    Auto resulted.   • Lipase 10/04/2022 62 (H)  13 - 60 U/L Final   Lab on 10/03/2022   Component Date Value Ref Range Status   • Glucose 10/03/2022 175 (H)  65 - 99 mg/dL Final   • BUN 10/03/2022 8  6 - 20 mg/dL Final   • Creatinine 10/03/2022 0.75  0.57 - 1.00 mg/dL Final   • Sodium 10/03/2022 140  136 - 145 mmol/L Final   • Potassium 10/03/2022 4.0  3.5 - 5.2 mmol/L Final    Slight hemolysis detected by analyzer. Results may be affected.   • Chloride 10/03/2022 97 (L)  98 - 107 mmol/L Final   • CO2 10/03/2022 25.0  22.0 - 29.0 mmol/L Final   • Calcium 10/03/2022 8.0 (L)  8.6 - 10.5 mg/dL Final   • Albumin 10/03/2022 4.30  3.50 - 5.20 g/dL Final   • Phosphorus 10/03/2022 5.3 (H)  2.5 - 4.5 mg/dL Final   • Anion Gap 10/03/2022 18.0 (H)  5.0 - 15.0 mmol/L Final   • BUN/Creatinine Ratio 10/03/2022 10.7  7.0 - 25.0 Final   • eGFR 10/03/2022 92.4  >60.0 mL/min/1.73 Final    National Kidney Foundation and American Society of Nephrology (ASN) Task Force recommended calculation based on the Chronic Kidney Disease Epidemiology Collaboration (CKD-EPI) equation refit without adjustment for race.   • Magnesium 10/03/2022 2.3  1.6 - 2.6 mg/dL Final   Orders Only on 09/26/2022   Component Date Value Ref Range Status   • Glucose 09/26/2022 210 (H)  65 - 99 mg/dL Final   • BUN 09/26/2022 6  6 - 20 mg/dL Final   • Creatinine 09/26/2022 0.75  0.57 - 1.00 mg/dL Final   • Sodium 09/26/2022 143  136 - 145 mmol/L Final   • Potassium 09/26/2022 4.3  3.5 - 5.2 mmol/L Final    Slight hemolysis detected by analyzer. Results may be affected.   • Chloride 09/26/2022 102  98 - 107 mmol/L Final   • CO2 09/26/2022 27.0  22.0 - 29.0 mmol/L Final   • Calcium 09/26/2022 7.9 (L)  8.6 - 10.5 mg/dL Final   • Albumin 09/26/2022 4.10  3.50 - 5.20 g/dL Final   • Phosphorus 09/26/2022 4.8 (H)  2.5 - 4.5 mg/dL  Final   • Anion Gap 09/26/2022 14.0  5.0 - 15.0 mmol/L Final   • BUN/Creatinine Ratio 09/26/2022 8.0  7.0 - 25.0 Final   • eGFR 09/26/2022 92.4  >60.0 mL/min/1.73 Final    [employer] and American Society of Nephrology (ASN) Task Force recommended calculation based on the Chronic Kidney Disease Epidemiology Collaboration (CKD-EPI) equation refit without adjustment for race.   • Magnesium 09/26/2022 1.9  1.6 - 2.6 mg/dL Final   Lab on 09/19/2022   Component Date Value Ref Range Status   • WBC 09/19/2022 5.17  3.40 - 10.80 10*3/mm3 Final   • RBC 09/19/2022 4.20  3.77 - 5.28 10*6/mm3 Final   • Hemoglobin 09/19/2022 11.6 (L)  12.0 - 15.9 g/dL Final   • Hematocrit 09/19/2022 36.1  34.0 - 46.6 % Final   • MCV 09/19/2022 86.0  79.0 - 97.0 fL Final   • MCH 09/19/2022 27.6  26.6 - 33.0 pg Final   • MCHC 09/19/2022 32.1  31.5 - 35.7 g/dL Final   • RDW 09/19/2022 14.4  12.3 - 15.4 % Final   • RDW-SD 09/19/2022 44.9  37.0 - 54.0 fl Final   • MPV 09/19/2022 10.9  6.0 - 12.0 fL Final   • Platelets 09/19/2022 283  140 - 450 10*3/mm3 Final   • Neutrophil % 09/19/2022 51.8  42.7 - 76.0 % Final    Appended report. These results have been appended to a previously final verified report.   • Lymphocyte % 09/19/2022 38.5  19.6 - 45.3 % Final    Appended report. These results have been appended to a previously final verified report.   • Monocyte % 09/19/2022 6.4  5.0 - 12.0 % Final    Appended report. These results have been appended to a previously final verified report.   • Eosinophil % 09/19/2022 2.3  0.3 - 6.2 % Final    Appended report. These results have been appended to a previously final verified report.   • Basophil % 09/19/2022 0.6  0.0 - 1.5 % Final    Appended report. These results have been appended to a previously final verified report.   • Immature Grans % 09/19/2022 0.4  0.0 - 0.5 % Final    Appended report. These results have been appended to a previously final verified report.   • Neutrophils, Absolute  09/19/2022 2.68  1.70 - 7.00 10*3/mm3 Final    Appended report. These results have been appended to a previously final verified report.   • Lymphocytes, Absolute 09/19/2022 1.99  0.70 - 3.10 10*3/mm3 Final    Appended report. These results have been appended to a previously final verified report.   • Monocytes, Absolute 09/19/2022 0.33  0.10 - 0.90 10*3/mm3 Final    Appended report. These results have been appended to a previously final verified report.   • Eosinophils, Absolute 09/19/2022 0.12  0.00 - 0.40 10*3/mm3 Final    Appended report. These results have been appended to a previously final verified report.   • Basophils, Absolute 09/19/2022 0.03  0.00 - 0.20 10*3/mm3 Final    Appended report. These results have been appended to a previously final verified report.   • Immature Grans, Absolute 09/19/2022 0.02  0.00 - 0.05 10*3/mm3 Final    Appended report. These results have been appended to a previously final verified report.   • nRBC 09/19/2022 0.0  0.0 - 0.2 /100 WBC Final   • Glucose 09/19/2022 186 (H)  65 - 99 mg/dL Final   • BUN 09/19/2022 8  6 - 20 mg/dL Final   • Creatinine 09/19/2022 0.77  0.57 - 1.00 mg/dL Final   • Sodium 09/19/2022 139  136 - 145 mmol/L Final   • Potassium 09/19/2022 4.4  3.5 - 5.2 mmol/L Final   • Chloride 09/19/2022 100  98 - 107 mmol/L Final   • CO2 09/19/2022 29.0  22.0 - 29.0 mmol/L Final   • Calcium 09/19/2022 7.9 (L)  8.6 - 10.5 mg/dL Final   • Total Protein 09/19/2022 7.4  6.0 - 8.5 g/dL Final   • Albumin 09/19/2022 4.00  3.50 - 5.20 g/dL Final   • ALT (SGPT) 09/19/2022 32  1 - 33 U/L Final   • AST (SGOT) 09/19/2022 29  1 - 32 U/L Final   • Alkaline Phosphatase 09/19/2022 109  39 - 117 U/L Final   • Total Bilirubin 09/19/2022 0.2  0.0 - 1.2 mg/dL Final   • Globulin 09/19/2022 3.4  gm/dL Final   • A/G Ratio 09/19/2022 1.2  g/dL Final   • BUN/Creatinine Ratio 09/19/2022 10.4  7.0 - 25.0 Final   • Anion Gap 09/19/2022 10.0  5.0 - 15.0 mmol/L Final   • eGFR 09/19/2022 89.5  >60.0  mL/min/1.73 Final    National Kidney Foundation and American Society of Nephrology (ASN) Task Force recommended calculation based on the Chronic Kidney Disease Epidemiology Collaboration (CKD-EPI) equation refit without adjustment for race.   • TSH 09/19/2022 <0.005 (L)  0.270 - 4.200 uIU/mL Final   • Free T4 09/19/2022 1.81 (H)  0.93 - 1.70 ng/dL Final   • T3, Free 09/19/2022 3.39  2.00 - 4.40 pg/mL Final   • 25 Hydroxy, Vitamin D 09/19/2022 89.4  30.0 - 100.0 ng/ml Final   • Magnesium 09/19/2022 1.7  1.6 - 2.6 mg/dL Final   • Phosphorus 09/19/2022 5.1 (H)  2.5 - 4.5 mg/dL Final   • RBC Morphology 09/19/2022 Normal  Normal Final   • WBC Morphology 09/19/2022 Normal  Normal Final   • Platelet Morphology 09/19/2022 Normal  Normal Final   Office Visit on 09/12/2022   Component Date Value Ref Range Status   • Calcium, 24H Urine 09/20/2022 120.2  100.0 - 300.0 mg/24 hr Final   • Calcium, Urine 09/20/2022 8.9  mg/dL Final   • 24H Urine Volume 09/20/2022 1,350  mL Final   • Time (Hours) 09/20/2022 24  hrs Final   • Creatinine, 24H 09/20/2022 0.86  0.70 - 1.60 g/24 hr Final   • Creatinine, Urine 09/20/2022 63.9  mg/dL Final   • 24H Urine Volume 09/20/2022 1,350  mL Final   • Time (Hours) 09/20/2022 24  hrs Final   Lab on 08/29/2022   Component Date Value Ref Range Status   • WBC 08/29/2022 6.19  3.40 - 10.80 10*3/mm3 Final   • RBC 08/29/2022 4.18  3.77 - 5.28 10*6/mm3 Final   • Hemoglobin 08/29/2022 11.5 (L)  12.0 - 15.9 g/dL Final   • Hematocrit 08/29/2022 34.0  34.0 - 46.6 % Final   • MCV 08/29/2022 81.3  79.0 - 97.0 fL Final   • MCH 08/29/2022 27.5  26.6 - 33.0 pg Final   • MCHC 08/29/2022 33.8  31.5 - 35.7 g/dL Final   • RDW 08/29/2022 13.8  12.3 - 15.4 % Final   • RDW-SD 08/29/2022 40.6  37.0 - 54.0 fl Final   • MPV 08/29/2022 10.4  6.0 - 12.0 fL Final   • Platelets 08/29/2022 343  140 - 450 10*3/mm3 Final   • Neutrophil % 08/29/2022 53.1  42.7 - 76.0 % Final   • Lymphocyte % 08/29/2022 38.6  19.6 - 45.3 % Final   •  Monocyte % 08/29/2022 5.8  5.0 - 12.0 % Final   • Eosinophil % 08/29/2022 1.8  0.3 - 6.2 % Final   • Basophil % 08/29/2022 0.5  0.0 - 1.5 % Final   • Immature Grans % 08/29/2022 0.2  0.0 - 0.5 % Final   • Neutrophils, Absolute 08/29/2022 3.29  1.70 - 7.00 10*3/mm3 Final   • Lymphocytes, Absolute 08/29/2022 2.39  0.70 - 3.10 10*3/mm3 Final   • Monocytes, Absolute 08/29/2022 0.36  0.10 - 0.90 10*3/mm3 Final   • Eosinophils, Absolute 08/29/2022 0.11  0.00 - 0.40 10*3/mm3 Final   • Basophils, Absolute 08/29/2022 0.03  0.00 - 0.20 10*3/mm3 Final   • Immature Grans, Absolute 08/29/2022 0.01  0.00 - 0.05 10*3/mm3 Final   • nRBC 08/29/2022 0.0  0.0 - 0.2 /100 WBC Final   • Glucose 08/29/2022 134 (H)  65 - 99 mg/dL Final   • BUN 08/29/2022 7  6 - 20 mg/dL Final   • Creatinine 08/29/2022 0.56 (L)  0.57 - 1.00 mg/dL Final   • Sodium 08/29/2022 139  136 - 145 mmol/L Final   • Potassium 08/29/2022 4.3  3.5 - 5.2 mmol/L Final   • Chloride 08/29/2022 102  98 - 107 mmol/L Final   • CO2 08/29/2022 25.2  22.0 - 29.0 mmol/L Final   • Calcium 08/29/2022 8.2 (L)  8.6 - 10.5 mg/dL Final   • Total Protein 08/29/2022 6.7  6.0 - 8.5 g/dL Final   • Albumin 08/29/2022 3.90  3.50 - 5.20 g/dL Final   • ALT (SGPT) 08/29/2022 52 (H)  1 - 33 U/L Final   • AST (SGOT) 08/29/2022 39 (H)  1 - 32 U/L Final   • Alkaline Phosphatase 08/29/2022 114  39 - 117 U/L Final   • Total Bilirubin 08/29/2022 0.3  0.0 - 1.2 mg/dL Final   • Globulin 08/29/2022 2.8  gm/dL Final   • A/G Ratio 08/29/2022 1.4  g/dL Final   • BUN/Creatinine Ratio 08/29/2022 12.5  7.0 - 25.0 Final   • Anion Gap 08/29/2022 11.8  5.0 - 15.0 mmol/L Final   • eGFR 08/29/2022 105.9  >60.0 mL/min/1.73 Final    National Kidney Foundation and American Society of Nephrology (ASN) Task Force recommended calculation based on the Chronic Kidney Disease Epidemiology Collaboration (CKD-EPI) equation refit without adjustment for race.   • Hemoglobin A1C 08/29/2022 7.00 (H)  4.80 - 5.60 % Final   • Total  Cholesterol 08/29/2022 133  0 - 200 mg/dL Final   • Triglycerides 08/29/2022 228 (H)  0 - 150 mg/dL Final   • HDL Cholesterol 08/29/2022 40  40 - 60 mg/dL Final   • LDL Cholesterol  08/29/2022 56  0 - 100 mg/dL Final   • VLDL Cholesterol 08/29/2022 37  5 - 40 mg/dL Final   • LDL/HDL Ratio 08/29/2022 1.19   Final   • TSH 08/29/2022 <0.005 (L)  0.270 - 4.200 uIU/mL Final   • Free T4 08/29/2022 1.78 (H)  0.93 - 1.70 ng/dL Final   • 25 Hydroxy, Vitamin D 08/29/2022 161.0 (H)  30.0 - 100.0 ng/ml Final   • Vitamin B-12 08/29/2022 316  211 - 946 pg/mL Final   • Microalbumin/Creatinine Ratio 08/29/2022    Final    Unable to calculate   • Creatinine, Urine 08/29/2022 106.5  mg/dL Final   • Microalbumin, Urine 08/29/2022 <1.2  mg/dL Final   • PTH, Intact 08/29/2022 7.4 (L)  15.0 - 65.0 pg/mL Final   • Calcium 08/29/2022 8.1 (L)  8.6 - 10.5 mg/dL Final   • Iron 08/29/2022 67  37 - 145 mcg/dL Final   • Iron Saturation 08/29/2022 22  20 - 50 % Final   • Transferrin 08/29/2022 207  200 - 360 mg/dL Final   • TIBC 08/29/2022 308  298 - 536 mcg/dL Final   • Ferritin 08/29/2022 254.00 (H)  13.00 - 150.00 ng/mL Final   There may be more visits with results that are not included.      CT Abdomen Pelvis Without Contrast  Narrative: EXAM: CT ABDOMEN AND PELVIS WITHOUT INTRAVENOUS CONTRAST     INDICATION: 57 years year old Female with left flank pain    COMPARISON: None    TECHNIQUE: This examination was performed according to our  departmental dose optimization program which includes automated  exposure control, adjustment of the MA and kV according to  patient size, and/or use of iterative reconstruction technique.     FINDINGS:   Negative appendix. The small bowel is normal in caliber without  evidence for mechanical small bowel obstruction. There is mild  nonspecific prominence of mesenteric lymph nodes with adjacent  ill-defined inflammatory stranding. Specifically, the largest  mesenteric lymph node measures up to 1 cm in short  axis.    There is relatively large amount of stool in the colon.    Diffuse fatty infiltration of the liver. Negative adrenal glands.  The spleen and pancreas have unremarkable unenhanced appearances.  No urinary calcifications or hydronephrosis.    Atelectasis in the visualized lung bases.    No acute or suspicious osseous abnormality.  Impression: 1. Negative appendix.  2. Fatty infiltration of the liver.  3. Nonspecific mild prominence of mesenteric lymph nodes with  adjacent ill-defined inflammatory stranding. This can be seen  with a mesenteric panniculitis, though this can also be  associated with a lymphoproliferative disorder. The spleen  measures within normal limits. Follow-up imaging in approximately  six months is recommended to assess for persistence or interval  change. #4 relatively large amount of stool in the colon.    Electronically signed by:  Carlos Garcia MD  10/4/2022 9:58 AM CDT  Workstation: [id number]  XR Chest 2 View  Narrative: EXAM DESCRIPTION:  XR CHEST 2 VIEWS    CLINICAL INDICATION: SOA Triage Protocol     COMPARISON: None    FINDINGS: Mild cardiomegaly. Pulmonary vascularity is within  normal limits. Likely atelectasis in both lung bases. No pleural  effusion or pneumothorax.  Impression: 1. Cardiomegaly.  2. Likely atelectasis in both lung bases.    Electronically signed by:  Carlos Garcia MD  10/4/2022 9:55 AM CDT  Workstation: [id number]    @[email or web address]@  Immunization History   Administered Date(s) Administered   • Pneumococcal Polysaccharide (PPSV23) 03/27/2018   • Tdap 03/27/2018       The following portions of the patient's history were reviewed and updated as appropriate: allergies, current medications, past family history, past medical history, past social history, past surgical history and problem list.        Physical Exam  Physical Exam  Vitals and nursing note reviewed.   Constitutional:       Appearance: She is well-developed. She is not diaphoretic.   HENT:      Head:  Normocephalic and atraumatic.      Right Ear: External ear normal.   Eyes:      Conjunctiva/sclera: Conjunctivae normal.      Pupils: Pupils are equal, round, and reactive to light.   Cardiovascular:      Rate and Rhythm: Normal rate and regular rhythm.      Heart sounds: Normal heart sounds. No murmur heard.  Pulmonary:      Effort: Pulmonary effort is normal. No respiratory distress.      Breath sounds: Normal breath sounds.   Abdominal:      General: Bowel sounds are normal. There is no distension.      Palpations: Abdomen is soft.      Tenderness: There is no abdominal tenderness.      Comments: Obese abdomen    Musculoskeletal:         General: Tenderness present. No deformity. Normal range of motion.      Cervical back: Normal range of motion and neck supple.   Skin:     General: Skin is warm.      Coloration: Skin is not pale.      Findings: No erythema or rash.   Neurological:      Mental Status: She is alert and oriented to person, place, and time.      Cranial Nerves: No cranial nerve deficit.   Psychiatric:         Behavior: Behavior normal.         [unfilled]   Diagnosis Plan   1. Essential hypertension  atorvastatin (LIPITOR) 20 MG tablet      2. Mixed hyperlipidemia        3. Controlled type 2 diabetes mellitus with complication, without long-term current use of insulin ([facility])        4. Vitamin D deficiency        5. Postoperative hypothyroidism        6. Hypoparathyroidism, unspecified hypoparathyroidism type ([facility])        7. Iron deficiency anemia, unspecified iron deficiency anemia type        8. Generalized abdominal pain        9. Class 2 severe obesity due to excess calories with serious comorbidity and body mass index (BMI) of 38.0 to 38.9 in adult ([facility])        10. Screening mammogram, encounter for  Mammo Screening Bilateral With CAD      11. Callus of foot  Ambulatory Referral to Podiatry      12. Diabetic foot ([facility])  Ambulatory Referral to Podiatry      13. Swelling of left foot         14. Right acute serous otitis media, recurrence not specified               -went over labwork  -recommend COVID-19 vaccination   -recommend mammogram screening - schedule at imaging center   -recommend cologuard test -will order   -recommend DEXA scan - schedule at imaging center   -recommend diabetic eye exam   -recommend influenza vaccination   -fatty liver/abnormal CT of abdomen/IBS  - referred to GI. Recommended repeat CT in 6 months for possible mesenteric panniculitis  On amitiza 8 mg PO BID   -hypocalcemia/hypoparathyroidism   Endocrinology following on calcitriol 0.25 mg daily on phoslo 3 tablets BID on Magnesium Oxide   -iron deficiency on ferrous  sulfate 325 mg PO  TID   -DM type 2-  on metformin 1000 mg PO BID, on jardiance 25 mg daily. on rybelsus 3 mg PO q daily.   -insomnia - on trazodone  100 mg PO Q daily.   -diabetic neuropathy - on neurontin 300 mg PO TID   -hypertension - on clonidine 0.3 mg PO BID on  lisionpril 40 mg daily. On toprol  mg daily.  On norvasc 10 mg daily.   -Obesity BMI >30 . - recommended weight loss information and DASH diet. Counseled weight loss >5 minuutes  BMI at 36.64    -hyperlipidemia - HDL low. REcommended high healthy fat diet stop simvastatin and start on lipitor 20 mg PO qhs. Drug information provided   - hypothyroidism/sp thyroidectomy --on synthroid 137 mcg 6 days a week   -depression/grieving - on lexapro 20 mg daily. On abilify 300 mg daily on lamictal 200 mg daily.   Was on wellbutrin  mg daily.   -iron deficiency anemia - on ferrous sulfate 325 mg PO TID. Will stop this due to high iron levls   -advised to go to ER or call 911 if symptoms or severe  -advised to be safe and call with questions and concerns   -advised to followup with specialist and referrals   -adivsed pt to be safe during COVID-19 pandemic   I spent 45  minutes caring for Joan on this date of service. This time includes time spent by me in the following activities: preparing for  the visit, reviewing tests, obtaining and/or reviewing a separately obtained history, performing a medically appropriate examination and/or evaluation, counseling and educating the patient/family/caregiver, ordering medications, tests, or procedures, referring and communicating with other health care professionals, documenting information in the medical record, independently interpreting results and communicating that information with the patient/family/caregiver and care coordination.         This document has been electronically signed by Brad Arnold MD on December 20, 2022 09:27 CST        Answers for HPI/ROS submitted by the patient on 11/15/2022  Please describe your symptoms.: Blood work  Have you had these symptoms before?: No  How long have you been having these symptoms?: 1-4 days  Please list any medications you are currently taking for this condition.: None  Please describe any probable cause for these symptoms. : None  What is the primary reason for your visit?: Other       Subjective:  Joan Blackwell is a 57 y.o. female who presents for       Patient Active Problem List   Diagnosis   • Hypocalcemia   • Hyperlipidemia   • Iron deficiency anemia   • Hypothyroidism   • S/P thyroidectomy   • Uncontrolled type 2 diabetes mellitus with complication, without long-term current use of insulin   • Obesity   • Uncontrolled hypertension   • Hypertensive urgency   • Right wrist pain   • Carpal tunnel syndrome of right wrist   • Controlled type 2 diabetes mellitus with complication, without long-term current use of insulin (HCC)   • Essential hypertension   • Vitamin D deficiency   • Sprain of left ankle   • Grieving   • Rotator cuff syndrome of right shoulder   • Hypotension   • Bilateral low back pain with bilateral sciatica   • Fall   • Right shoulder pain   • Dental abscess   • Body mass index (BMI) of 35.0 to 35.9   • Former smoker   • Iron overload   • High vitamin D level   • Hypoparathyroidism  (HCC)   • Abnormal CT of the abdomen   • Encounter for screening for malignant neoplasm of colon   • Right acute serous otitis media   • Swelling of left foot   • Diabetic foot (HCC)           Current Outpatient Medications:   •  Accu-Chek Yany Plus test strip, USE ONE STRIP TO TEST BLOOD SUGAR THREE TIMES A DAY FOR DIABETES, Disp: 200 each, Rfl: 3  •  Accu-Chek Softclix Lancets lancets, 1 each by Other route 3 (Three) Times a Day. Use as instructed, Disp: 300 each, Rfl: 3  •  Alcohol Swabs (B-D SINGLE USE SWABS REGULAR) pads, USE AS DIRECTED AS NEEDED, Disp: 300 each, Rfl: 3  •  ALPRAZolam (XANAX) 1 MG tablet, , Disp: , Rfl:   •  amLODIPine (NORVASC) 5 MG tablet, Take 1 tablet by mouth Daily., Disp: 90 tablet, Rfl: 1  •  ARIPiprazole (ABILIFY) 30 MG tablet, , Disp: , Rfl:   •  atorvastatin (LIPITOR) 20 MG tablet, Take 1 tablet by mouth Daily., Disp: 90 tablet, Rfl: 1  •  Blood Glucose Calibration (Accu-Chek Guide Control) liquid, USE AS DIRECTED, Disp: 1 each, Rfl: 1  •  Blood Glucose Monitoring Suppl (Accu-Chek Guide) w/Device kit, USE AS DIRECTED, Disp: 1 kit, Rfl: 0  •  buPROPion XL (WELLBUTRIN XL) 300 MG 24 hr tablet, , Disp: , Rfl:   •  calcitriol (ROCALTROL) 0.25 MCG capsule, 2 tabs  daily, Disp: 60 capsule, Rfl: 11  •  calcium acetate (PHOS BINDER,) 667 MG capsule capsule, Take 4 capsules am and 3 capsules pm, Disp: 210 capsule, Rfl: 11  •  cloNIDine (CATAPRES) 0.3 MG tablet, Take 1 tablet by mouth Every 12 (Twelve) Hours., Disp: 180 tablet, Rfl: 1  •  empagliflozin (Jardiance) 25 MG tablet tablet, Take 1 tablet by mouth Daily., Disp: 90 tablet, Rfl: 3  •  escitalopram (LEXAPRO) 20 MG tablet, Take 1 tablet by mouth Daily., Disp: 30 tablet, Rfl: 3  •  ferrous sulfate (FeroSul) 325 (65 FE) MG tablet, Take 1 tablet by mouth 3 (Three) Times a Day With Meals., Disp: 270 tablet, Rfl: 1  •  HYDROcodone-acetaminophen (NORCO)  MG per tablet, , Disp: , Rfl:   •  lamoTRIgine (LaMICtal) 200 MG tablet, , Disp: ,  Rfl:   •  levothyroxine (Synthroid) 137 MCG tablet, Take 1 tablet by mouth Daily., Disp: 30 tablet, Rfl: 3  •  lisinopril (PRINIVIL,ZESTRIL) 40 MG tablet, Take 1 tablet by mouth Daily., Disp: 90 tablet, Rfl: 1  •  lubiprostone (AMITIZA) 8 MCG capsule, Take 1 capsule by mouth 2 (Two) Times a Day With Meals., Disp: 14 capsule, Rfl: 0  •  magnesium oxide 250 MG tablet, Take 1 tablet by mouth Daily., Disp: 90 tablet, Rfl: 1  •  metFORMIN (GLUCOPHAGE) 1000 MG tablet, Take 1 tablet by mouth 2 (Two) Times a Day With Meals., Disp: 180 tablet, Rfl: 3  •  metoprolol succinate XL (TOPROL-XL) 100 MG 24 hr tablet, Take 1 tablet by mouth Daily., Disp: 90 tablet, Rfl: 1  •  ondansetron (ZOFRAN) 8 MG tablet, Take 1 tablet by mouth Every 8 (Eight) Hours As Needed for Nausea or Vomiting., Disp: 90 tablet, Rfl: 2  •  polyethylene glycol (GoLYTELY) 236 g solution, Please use the instructions given in office, Disp: 4000 mL, Rfl: 0  •  potassium chloride ER (K-TAB) 20 MEQ tablet controlled-release ER tablet, Take 1 tablet by mouth Daily., Disp: 90 tablet, Rfl: 1  •  Semaglutide (Rybelsus) 3 MG tablet, Take 1 tablet by mouth Daily., Disp: 90 tablet, Rfl: 1  •  traZODone (DESYREL) 100 MG tablet, Take 1 tablet by mouth Every Night., Disp: 90 tablet, Rfl: 0  •  vitamin B-12 (CYANOCOBALAMIN) 500 MCG tablet, Take 1 tablet by mouth Daily., Disp: 30 tablet, Rfl: 3  •  amoxicillin-clavulanate (Augmentin) 875-125 MG per tablet, Take 1 tablet by mouth 2 (Two) Times a Day for 10 days., Disp: 20 tablet, Rfl: 0  •  fluconazole (Diflucan) 150 MG tablet, Take 1 tablet by mouth 1 (One) Time for 1 dose. Take on onset of symptoms and in 72 hours, Disp: 2 tablet, Rfl: 0  •  furosemide (Lasix) 20 MG tablet, Take 1 tablet by mouth 2 (Two) Times a Day., Disp: 60 tablet, Rfl: 3    HPI    Review of Systems  Review of Systems   Constitutional: Positive for activity change and fatigue. Negative for appetite change, chills, diaphoresis and fever.   HENT: Positive  for ear pain. Negative for congestion, postnasal drip, rhinorrhea, sinus pressure, sinus pain, sneezing, sore throat, trouble swallowing and voice change.    Respiratory: Negative for cough, choking, chest tightness, shortness of breath, wheezing and stridor.    Cardiovascular: Negative for chest pain.   Gastrointestinal: Negative for diarrhea, nausea and vomiting.   Musculoskeletal: Positive for arthralgias.   Neurological: Positive for weakness and numbness. Negative for headaches.   Psychiatric/Behavioral: The patient is nervous/anxious.        Patient Active Problem List   Diagnosis   • Hypocalcemia   • Hyperlipidemia   • Iron deficiency anemia   • Hypothyroidism   • S/P thyroidectomy   • Uncontrolled type 2 diabetes mellitus with complication, without long-term current use of insulin   • Obesity   • Uncontrolled hypertension   • Hypertensive urgency   • Right wrist pain   • Carpal tunnel syndrome of right wrist   • Controlled type 2 diabetes mellitus with complication, without long-term current use of insulin (HCC)   • Essential hypertension   • Vitamin D deficiency   • Sprain of left ankle   • Grieving   • Rotator cuff syndrome of right shoulder   • Hypotension   • Bilateral low back pain with bilateral sciatica   • Fall   • Right shoulder pain   • Dental abscess   • Body mass index (BMI) of 35.0 to 35.9   • Former smoker   • Iron overload   • High vitamin D level   • Hypoparathyroidism (HCC)   • Abnormal CT of the abdomen   • Encounter for screening for malignant neoplasm of colon   • Right acute serous otitis media   • Swelling of left foot   • Diabetic foot (HCC)     Past Surgical History:   Procedure Laterality Date   •  SECTION  01/14/1993    x 3, 84, 82,   • EXPLORATORY LAPAROTOMY Left 1998    Left cystectomy. Partial resection of vthe left ovary. Left ovary- oversewn   • INJECTION OF MEDICATION  2014    Depo Medrol (Methylprednisone) (1)    • INJECTION OF MEDICATION   09/27/2015    Kenalog (1)     • LAPAROSCOPIC HYSTERECTOMY  01/30/1995    Surgical, lysis of adhesions.Laparoscopic Assisted vaginal Hysterectomy (Tyler/ Doderlein Technique) marsupialization of right Bartholin's Gland Cyst   • LASER ABLATION OF THE CERVIX  03/25/1985    Laser vaporization, cervical cone   • OVARIAN CYST REMOVAL  1996   • PAP SMEAR  2008   • SHOULDER SURGERY     • THYROIDECTOMY  02/28/2006    with partial parathyroidectomy   • TOOTH EXTRACTION       Social History     Socioeconomic History   • Marital status:    Tobacco Use   • Smoking status: Former   • Smokeless tobacco: Never   Vaping Use   • Vaping Use: Never used   Substance and Sexual Activity   • Alcohol use: No   • Drug use: No   • Sexual activity: Defer     Family History   Problem Relation Age of Onset   • Diabetes Mother    • Hypertension Mother    • Diabetes Father    • Arthritis Father      Telemedicine on 10/28/2022   Component Date Value Ref Range Status   • TSH 12/06/2022 0.594  0.270 - 4.200 uIU/mL Final   • WBC 12/06/2022 6.91  3.40 - 10.80 10*3/mm3 Final   • RBC 12/06/2022 4.39  3.77 - 5.28 10*6/mm3 Final   • Hemoglobin 12/06/2022 12.1  12.0 - 15.9 g/dL Final   • Hematocrit 12/06/2022 37.4  34.0 - 46.6 % Final   • MCV 12/06/2022 85.2  79.0 - 97.0 fL Final   • MCH 12/06/2022 27.6  26.6 - 33.0 pg Final   • MCHC 12/06/2022 32.4  31.5 - 35.7 g/dL Final   • RDW 12/06/2022 13.8  12.3 - 15.4 % Final   • RDW-SD 12/06/2022 43.0  37.0 - 54.0 fl Final   • MPV 12/06/2022 11.4  6.0 - 12.0 fL Final   • Platelets 12/06/2022 266  140 - 450 10*3/mm3 Final   • Neutrophil % 12/06/2022 39.4 (L)  42.7 - 76.0 % Final   • Lymphocyte % 12/06/2022 50.8 (H)  19.6 - 45.3 % Final   • Monocyte % 12/06/2022 7.4  5.0 - 12.0 % Final   • Eosinophil % 12/06/2022 1.6  0.3 - 6.2 % Final   • Basophil % 12/06/2022 0.7  0.0 - 1.5 % Final   • Immature Grans % 12/06/2022 0.1  0.0 - 0.5 % Final   • Neutrophils, Absolute 12/06/2022 2.72  1.70 - 7.00 10*3/mm3 Final    • Lymphocytes, Absolute 12/06/2022 3.51 (H)  0.70 - 3.10 10*3/mm3 Final   • Monocytes, Absolute 12/06/2022 0.51  0.10 - 0.90 10*3/mm3 Final   • Eosinophils, Absolute 12/06/2022 0.11  0.00 - 0.40 10*3/mm3 Final   • Basophils, Absolute 12/06/2022 0.05  0.00 - 0.20 10*3/mm3 Final   • Immature Grans, Absolute 12/06/2022 0.01  0.00 - 0.05 10*3/mm3 Final   • nRBC 12/06/2022 0.0  0.0 - 0.2 /100 WBC Final   • Glucose 12/06/2022 195 (H)  65 - 99 mg/dL Final   • BUN 12/06/2022 10  6 - 20 mg/dL Final   • Creatinine 12/06/2022 0.89  0.57 - 1.00 mg/dL Final   • Sodium 12/06/2022 141  136 - 145 mmol/L Final   • Potassium 12/06/2022 4.2  3.5 - 5.2 mmol/L Final   • Chloride 12/06/2022 102  98 - 107 mmol/L Final   • CO2 12/06/2022 29.0  22.0 - 29.0 mmol/L Final   • Calcium 12/06/2022 8.6  8.6 - 10.5 mg/dL Final   • Albumin 12/06/2022 4.20  3.50 - 5.20 g/dL Final   • Phosphorus 12/06/2022 5.7 (H)  2.5 - 4.5 mg/dL Final   • Anion Gap 12/06/2022 10.0  5.0 - 15.0 mmol/L Final   • BUN/Creatinine Ratio 12/06/2022 11.2  7.0 - 25.0 Final   • eGFR 12/06/2022 75.3  >60.0 mL/min/1.73 Final    National Kidney Foundation and American Society of Nephrology (ASN) Task Force recommended calculation based on the Chronic Kidney Disease Epidemiology Collaboration (CKD-EPI) equation refit without adjustment for race.   • 1,25-Dihydroxy, Vitamin D 12/06/2022 33.4  24.8 - 81.5 pg/mL Final   • 25 Hydroxy, Vitamin D 12/06/2022 49.4  30.0 - 100.0 ng/ml Final   • Magnesium 12/06/2022 1.9  1.6 - 2.6 mg/dL Final   Lab on 10/25/2022   Component Date Value Ref Range Status   • Glucose 10/25/2022 185 (H)  65 - 99 mg/dL Final   • BUN 10/25/2022 7  6 - 20 mg/dL Final   • Creatinine 10/25/2022 0.63  0.57 - 1.00 mg/dL Final   • Sodium 10/25/2022 142  136 - 145 mmol/L Final   • Potassium 10/25/2022 4.0  3.5 - 5.2 mmol/L Final   • Chloride 10/25/2022 103  98 - 107 mmol/L Final   • CO2 10/25/2022 25.0  22.0 - 29.0 mmol/L Final   • Calcium 10/25/2022 7.7 (L)  8.6 -  10.5 mg/dL Final   • Albumin 10/25/2022 4.20  3.50 - 5.20 g/dL Final   • Phosphorus 10/25/2022 5.3 (H)  2.5 - 4.5 mg/dL Final   • Anion Gap 10/25/2022 14.0  5.0 - 15.0 mmol/L Final   • BUN/Creatinine Ratio 10/25/2022 11.1  7.0 - 25.0 Final   • eGFR 10/25/2022 103.0  >60.0 mL/min/1.73 Final    National Kidney Foundation and American Society of Nephrology (ASN) Task Force recommended calculation based on the Chronic Kidney Disease Epidemiology Collaboration (CKD-EPI) equation refit without adjustment for race.   • Magnesium 10/25/2022 1.9  1.6 - 2.6 mg/dL Final   Lab on 10/14/2022   Component Date Value Ref Range Status   • Magnesium 10/14/2022 1.7  1.6 - 2.6 mg/dL Final   Telemedicine on 10/10/2022   Component Date Value Ref Range Status   • PTH, Intact 10/14/2022 6.8 (L)  15.0 - 65.0 pg/mL Final   • Calcium 10/14/2022 8.8  8.6 - 10.5 mg/dL Final   • Glucose 10/14/2022 135 (H)  65 - 99 mg/dL Final   • BUN 10/14/2022 11  6 - 20 mg/dL Final   • Creatinine 10/14/2022 0.79  0.57 - 1.00 mg/dL Final   • Sodium 10/14/2022 141  136 - 145 mmol/L Final   • Potassium 10/14/2022 3.9  3.5 - 5.2 mmol/L Final   • Chloride 10/14/2022 101  98 - 107 mmol/L Final   • CO2 10/14/2022 26.0  22.0 - 29.0 mmol/L Final   • Calcium 10/14/2022 8.7  8.6 - 10.5 mg/dL Final   • Albumin 10/14/2022 4.40  3.50 - 5.20 g/dL Final   • Phosphorus 10/14/2022 5.8 (H)  2.5 - 4.5 mg/dL Final   • Anion Gap 10/14/2022 14.0  5.0 - 15.0 mmol/L Final   • BUN/Creatinine Ratio 10/14/2022 13.9  7.0 - 25.0 Final   • eGFR 10/14/2022 86.8  >60.0 mL/min/1.73 Final    National Kidney Foundation and American Society of Nephrology (ASN) Task Force recommended calculation based on the Chronic Kidney Disease Epidemiology Collaboration (CKD-EPI) equation refit without adjustment for race.   • 25 Hydroxy, Vitamin D 10/14/2022 80.9  30.0 - 100.0 ng/ml Final   • TSH 10/14/2022 0.011 (L)  0.270 - 4.200 uIU/mL Final   Admission on 10/04/2022, Discharged on 10/04/2022   Component  Date Value Ref Range Status   • QT Interval 10/04/2022 376  ms Final   • QTC Interval 10/04/2022 449  ms Final   • Glucose 10/04/2022 211 (H)  65 - 99 mg/dL Final   • BUN 10/04/2022 8  6 - 20 mg/dL Final   • Creatinine 10/04/2022 0.79  0.57 - 1.00 mg/dL Final   • Sodium 10/04/2022 139  136 - 145 mmol/L Final   • Potassium 10/04/2022 3.6  3.5 - 5.2 mmol/L Final   • Chloride 10/04/2022 97 (L)  98 - 107 mmol/L Final   • CO2 10/04/2022 29.0  22.0 - 29.0 mmol/L Final   • Calcium 10/04/2022 8.0 (L)  8.6 - 10.5 mg/dL Final   • Total Protein 10/04/2022 7.6  6.0 - 8.5 g/dL Final   • Albumin 10/04/2022 4.50  3.50 - 5.20 g/dL Final   • ALT (SGPT) 10/04/2022 29  1 - 33 U/L Final   • AST (SGOT) 10/04/2022 20  1 - 32 U/L Final   • Alkaline Phosphatase 10/04/2022 112  39 - 117 U/L Final   • Total Bilirubin 10/04/2022 0.5  0.0 - 1.2 mg/dL Final   • Globulin 10/04/2022 3.1  gm/dL Final   • A/G Ratio 10/04/2022 1.5  g/dL Final   • BUN/Creatinine Ratio 10/04/2022 10.1  7.0 - 25.0 Final   • Anion Gap 10/04/2022 13.0  5.0 - 15.0 mmol/L Final   • eGFR 10/04/2022 86.8  >60.0 mL/min/1.73 Final    National Kidney Foundation and American Society of Nephrology (ASN) Task Force recommended calculation based on the Chronic Kidney Disease Epidemiology Collaboration (CKD-EPI) equation refit without adjustment for race.   • proBNP 10/04/2022 89.4  0.0 - 900.0 pg/mL Final   • Troponin T 10/04/2022 <0.010  0.000 - 0.030 ng/mL Final   • Extra Tube 10/04/2022 Hold for add-ons.   Final    Auto resulted.   • Extra Tube 10/04/2022 hold for add-on   Final    Auto resulted   • Extra Tube 10/04/2022 Hold for add-ons.   Final    Auto resulted.   • Extra Tube 10/04/2022 Hold for add-ons.   Final    Auto resulted   • WBC 10/04/2022 8.93  3.40 - 10.80 10*3/mm3 Final   • RBC 10/04/2022 4.42  3.77 - 5.28 10*6/mm3 Final   • Hemoglobin 10/04/2022 12.1  12.0 - 15.9 g/dL Final   • Hematocrit 10/04/2022 36.7  34.0 - 46.6 % Final   • MCV 10/04/2022 83.0  79.0 - 97.0 fL  Final   • MCH 10/04/2022 27.4  26.6 - 33.0 pg Final   • MCHC 10/04/2022 33.0  31.5 - 35.7 g/dL Final   • RDW 10/04/2022 13.8  12.3 - 15.4 % Final   • RDW-SD 10/04/2022 41.3  37.0 - 54.0 fl Final   • MPV 10/04/2022 9.9  6.0 - 12.0 fL Final   • Platelets 10/04/2022 264  140 - 450 10*3/mm3 Final   • Neutrophil % 10/04/2022 64.2  42.7 - 76.0 % Final   • Lymphocyte % 10/04/2022 26.2  19.6 - 45.3 % Final   • Monocyte % 10/04/2022 8.4  5.0 - 12.0 % Final   • Eosinophil % 10/04/2022 0.7  0.3 - 6.2 % Final   • Basophil % 10/04/2022 0.1  0.0 - 1.5 % Final   • Immature Grans % 10/04/2022 0.4  0.0 - 0.5 % Final   • Neutrophils, Absolute 10/04/2022 5.73  1.70 - 7.00 10*3/mm3 Final   • Lymphocytes, Absolute 10/04/2022 2.34  0.70 - 3.10 10*3/mm3 Final   • Monocytes, Absolute 10/04/2022 0.75  0.10 - 0.90 10*3/mm3 Final   • Eosinophils, Absolute 10/04/2022 0.06  0.00 - 0.40 10*3/mm3 Final   • Basophils, Absolute 10/04/2022 0.01  0.00 - 0.20 10*3/mm3 Final   • Immature Grans, Absolute 10/04/2022 0.04  0.00 - 0.05 10*3/mm3 Final   • nRBC 10/04/2022 0.0  0.0 - 0.2 /100 WBC Final   • Extra Tube 10/04/2022 Hold for add-ons.   Final    Auto resulted.   • Lipase 10/04/2022 62 (H)  13 - 60 U/L Final   Lab on 10/03/2022   Component Date Value Ref Range Status   • Glucose 10/03/2022 175 (H)  65 - 99 mg/dL Final   • BUN 10/03/2022 8  6 - 20 mg/dL Final   • Creatinine 10/03/2022 0.75  0.57 - 1.00 mg/dL Final   • Sodium 10/03/2022 140  136 - 145 mmol/L Final   • Potassium 10/03/2022 4.0  3.5 - 5.2 mmol/L Final    Slight hemolysis detected by analyzer. Results may be affected.   • Chloride 10/03/2022 97 (L)  98 - 107 mmol/L Final   • CO2 10/03/2022 25.0  22.0 - 29.0 mmol/L Final   • Calcium 10/03/2022 8.0 (L)  8.6 - 10.5 mg/dL Final   • Albumin 10/03/2022 4.30  3.50 - 5.20 g/dL Final   • Phosphorus 10/03/2022 5.3 (H)  2.5 - 4.5 mg/dL Final   • Anion Gap 10/03/2022 18.0 (H)  5.0 - 15.0 mmol/L Final   • BUN/Creatinine Ratio 10/03/2022 10.7  7.0 -  25.0 Final   • eGFR 10/03/2022 92.4  >60.0 mL/min/1.73 Final    National Kidney Foundation and American Society of Nephrology (ASN) Task Force recommended calculation based on the Chronic Kidney Disease Epidemiology Collaboration (CKD-EPI) equation refit without adjustment for race.   • Magnesium 10/03/2022 2.3  1.6 - 2.6 mg/dL Final   Orders Only on 09/26/2022   Component Date Value Ref Range Status   • Glucose 09/26/2022 210 (H)  65 - 99 mg/dL Final   • BUN 09/26/2022 6  6 - 20 mg/dL Final   • Creatinine 09/26/2022 0.75  0.57 - 1.00 mg/dL Final   • Sodium 09/26/2022 143  136 - 145 mmol/L Final   • Potassium 09/26/2022 4.3  3.5 - 5.2 mmol/L Final    Slight hemolysis detected by analyzer. Results may be affected.   • Chloride 09/26/2022 102  98 - 107 mmol/L Final   • CO2 09/26/2022 27.0  22.0 - 29.0 mmol/L Final   • Calcium 09/26/2022 7.9 (L)  8.6 - 10.5 mg/dL Final   • Albumin 09/26/2022 4.10  3.50 - 5.20 g/dL Final   • Phosphorus 09/26/2022 4.8 (H)  2.5 - 4.5 mg/dL Final   • Anion Gap 09/26/2022 14.0  5.0 - 15.0 mmol/L Final   • BUN/Creatinine Ratio 09/26/2022 8.0  7.0 - 25.0 Final   • eGFR 09/26/2022 92.4  >60.0 mL/min/1.73 Final    National Kidney Foundation and American Society of Nephrology (ASN) Task Force recommended calculation based on the Chronic Kidney Disease Epidemiology Collaboration (CKD-EPI) equation refit without adjustment for race.   • Magnesium 09/26/2022 1.9  1.6 - 2.6 mg/dL Final   Lab on 09/19/2022   Component Date Value Ref Range Status   • WBC 09/19/2022 5.17  3.40 - 10.80 10*3/mm3 Final   • RBC 09/19/2022 4.20  3.77 - 5.28 10*6/mm3 Final   • Hemoglobin 09/19/2022 11.6 (L)  12.0 - 15.9 g/dL Final   • Hematocrit 09/19/2022 36.1  34.0 - 46.6 % Final   • MCV 09/19/2022 86.0  79.0 - 97.0 fL Final   • MCH 09/19/2022 27.6  26.6 - 33.0 pg Final   • MCHC 09/19/2022 32.1  31.5 - 35.7 g/dL Final   • RDW 09/19/2022 14.4  12.3 - 15.4 % Final   • RDW-SD 09/19/2022 44.9  37.0 - 54.0 fl Final   • MPV  09/19/2022 10.9  6.0 - 12.0 fL Final   • Platelets 09/19/2022 283  140 - 450 10*3/mm3 Final   • Neutrophil % 09/19/2022 51.8  42.7 - 76.0 % Final    Appended report. These results have been appended to a previously final verified report.   • Lymphocyte % 09/19/2022 38.5  19.6 - 45.3 % Final    Appended report. These results have been appended to a previously final verified report.   • Monocyte % 09/19/2022 6.4  5.0 - 12.0 % Final    Appended report. These results have been appended to a previously final verified report.   • Eosinophil % 09/19/2022 2.3  0.3 - 6.2 % Final    Appended report. These results have been appended to a previously final verified report.   • Basophil % 09/19/2022 0.6  0.0 - 1.5 % Final    Appended report. These results have been appended to a previously final verified report.   • Immature Grans % 09/19/2022 0.4  0.0 - 0.5 % Final    Appended report. These results have been appended to a previously final verified report.   • Neutrophils, Absolute 09/19/2022 2.68  1.70 - 7.00 10*3/mm3 Final    Appended report. These results have been appended to a previously final verified report.   • Lymphocytes, Absolute 09/19/2022 1.99  0.70 - 3.10 10*3/mm3 Final    Appended report. These results have been appended to a previously final verified report.   • Monocytes, Absolute 09/19/2022 0.33  0.10 - 0.90 10*3/mm3 Final    Appended report. These results have been appended to a previously final verified report.   • Eosinophils, Absolute 09/19/2022 0.12  0.00 - 0.40 10*3/mm3 Final    Appended report. These results have been appended to a previously final verified report.   • Basophils, Absolute 09/19/2022 0.03  0.00 - 0.20 10*3/mm3 Final    Appended report. These results have been appended to a previously final verified report.   • Immature Grans, Absolute 09/19/2022 0.02  0.00 - 0.05 10*3/mm3 Final    Appended report. These results have been appended to a previously final verified report.   • [facility] 09/19/2022  0.0  0.0 - 0.2 /100 WBC Final   • Glucose 09/19/2022 186 (H)  65 - 99 mg/dL Final   • BUN 09/19/2022 8  6 - 20 mg/dL Final   • Creatinine 09/19/2022 0.77  0.57 - 1.00 mg/dL Final   • Sodium 09/19/2022 139  136 - 145 mmol/L Final   • Potassium 09/19/2022 4.4  3.5 - 5.2 mmol/L Final   • Chloride 09/19/2022 100  98 - 107 mmol/L Final   • CO2 09/19/2022 29.0  22.0 - 29.0 mmol/L Final   • Calcium 09/19/2022 7.9 (L)  8.6 - 10.5 mg/dL Final   • Total Protein 09/19/2022 7.4  6.0 - 8.5 g/dL Final   • Albumin 09/19/2022 4.00  3.50 - 5.20 g/dL Final   • ALT (SGPT) 09/19/2022 32  1 - 33 U/L Final   • AST (SGOT) 09/19/2022 29  1 - 32 U/L Final   • Alkaline Phosphatase 09/19/2022 109  39 - 117 U/L Final   • Total Bilirubin 09/19/2022 0.2  0.0 - 1.2 mg/dL Final   • Globulin 09/19/2022 3.4  gm/dL Final   • A/G Ratio 09/19/2022 1.2  g/dL Final   • BUN/Creatinine Ratio 09/19/2022 10.4  7.0 - 25.0 Final   • Anion Gap 09/19/2022 10.0  5.0 - 15.0 mmol/L Final   • eGFR 09/19/2022 89.5  >60.0 mL/min/1.73 Final    National Kidney Foundation and American Society of Nephrology (ASN) Task Force recommended calculation based on the Chronic Kidney Disease Epidemiology Collaboration (CKD-EPI) equation refit without adjustment for race.   • TSH 09/19/2022 <0.005 (L)  0.270 - 4.200 uIU/mL Final   • Free T4 09/19/2022 1.81 (H)  0.93 - 1.70 ng/dL Final   • T3, Free 09/19/2022 3.39  2.00 - 4.40 pg/mL Final   • 25 Hydroxy, Vitamin D 09/19/2022 89.4  30.0 - 100.0 ng/ml Final   • Magnesium 09/19/2022 1.7  1.6 - 2.6 mg/dL Final   • Phosphorus 09/19/2022 5.1 (H)  2.5 - 4.5 mg/dL Final   • RBC Morphology 09/19/2022 Normal  Normal Final   • WBC Morphology 09/19/2022 Normal  Normal Final   • Platelet Morphology 09/19/2022 Normal  Normal Final   Office Visit on 09/12/2022   Component Date Value Ref Range Status   • Calcium, 24H Urine 09/20/2022 120.2  100.0 - 300.0 mg/24 hr Final   • Calcium, Urine 09/20/2022 8.9  mg/dL Final   • 24H Urine Volume 09/20/2022  1,350  mL Final   • Time (Hours) 09/20/2022 24  hrs Final   • Creatinine, 24H 09/20/2022 0.86  0.70 - 1.60 g/24 hr Final   • Creatinine, Urine 09/20/2022 63.9  mg/dL Final   • 24H Urine Volume 09/20/2022 1,350  mL Final   • Time (Hours) 09/20/2022 24  hrs Final   Lab on 08/29/2022   Component Date Value Ref Range Status   • WBC 08/29/2022 6.19  3.40 - 10.80 10*3/mm3 Final   • RBC 08/29/2022 4.18  3.77 - 5.28 10*6/mm3 Final   • Hemoglobin 08/29/2022 11.5 (L)  12.0 - 15.9 g/dL Final   • Hematocrit 08/29/2022 34.0  34.0 - 46.6 % Final   • MCV 08/29/2022 81.3  79.0 - 97.0 fL Final   • MCH 08/29/2022 27.5  26.6 - 33.0 pg Final   • MCHC 08/29/2022 33.8  31.5 - 35.7 g/dL Final   • RDW 08/29/2022 13.8  12.3 - 15.4 % Final   • RDW-SD 08/29/2022 40.6  37.0 - 54.0 fl Final   • MPV 08/29/2022 10.4  6.0 - 12.0 fL Final   • Platelets 08/29/2022 343  140 - 450 10*3/mm3 Final   • Neutrophil % 08/29/2022 53.1  42.7 - 76.0 % Final   • Lymphocyte % 08/29/2022 38.6  19.6 - 45.3 % Final   • Monocyte % 08/29/2022 5.8  5.0 - 12.0 % Final   • Eosinophil % 08/29/2022 1.8  0.3 - 6.2 % Final   • Basophil % 08/29/2022 0.5  0.0 - 1.5 % Final   • Immature Grans % 08/29/2022 0.2  0.0 - 0.5 % Final   • Neutrophils, Absolute 08/29/2022 3.29  1.70 - 7.00 10*3/mm3 Final   • Lymphocytes, Absolute 08/29/2022 2.39  0.70 - 3.10 10*3/mm3 Final   • Monocytes, Absolute 08/29/2022 0.36  0.10 - 0.90 10*3/mm3 Final   • Eosinophils, Absolute 08/29/2022 0.11  0.00 - 0.40 10*3/mm3 Final   • Basophils, Absolute 08/29/2022 0.03  0.00 - 0.20 10*3/mm3 Final   • Immature Grans, Absolute 08/29/2022 0.01  0.00 - 0.05 10*3/mm3 Final   • nRBC 08/29/2022 0.0  0.0 - 0.2 /100 WBC Final   • Glucose 08/29/2022 134 (H)  65 - 99 mg/dL Final   • BUN 08/29/2022 7  6 - 20 mg/dL Final   • Creatinine 08/29/2022 0.56 (L)  0.57 - 1.00 mg/dL Final   • Sodium 08/29/2022 139  136 - 145 mmol/L Final   • Potassium 08/29/2022 4.3  3.5 - 5.2 mmol/L Final   • Chloride 08/29/2022 102  98 - 107  mmol/L Final   • CO2 08/29/2022 25.2  22.0 - 29.0 mmol/L Final   • Calcium 08/29/2022 8.2 (L)  8.6 - 10.5 mg/dL Final   • Total Protein 08/29/2022 6.7  6.0 - 8.5 g/dL Final   • Albumin 08/29/2022 3.90  3.50 - 5.20 g/dL Final   • ALT (SGPT) 08/29/2022 52 (H)  1 - 33 U/L Final   • AST (SGOT) 08/29/2022 39 (H)  1 - 32 U/L Final   • Alkaline Phosphatase 08/29/2022 114  39 - 117 U/L Final   • Total Bilirubin 08/29/2022 0.3  0.0 - 1.2 mg/dL Final   • Globulin 08/29/2022 2.8  gm/dL Final   • A/G Ratio 08/29/2022 1.4  g/dL Final   • BUN/Creatinine Ratio 08/29/2022 12.5  7.0 - 25.0 Final   • Anion Gap 08/29/2022 11.8  5.0 - 15.0 mmol/L Final   • eGFR 08/29/2022 105.9  >60.0 mL/min/1.73 Final    National Kidney Foundation and American Society of Nephrology (ASN) Task Force recommended calculation based on the Chronic Kidney Disease Epidemiology Collaboration (CKD-EPI) equation refit without adjustment for race.   • Hemoglobin A1C 08/29/2022 7.00 (H)  4.80 - 5.60 % Final   • Total Cholesterol 08/29/2022 133  0 - 200 mg/dL Final   • Triglycerides 08/29/2022 228 (H)  0 - 150 mg/dL Final   • HDL Cholesterol 08/29/2022 40  40 - 60 mg/dL Final   • LDL Cholesterol  08/29/2022 56  0 - 100 mg/dL Final   • VLDL Cholesterol 08/29/2022 37  5 - 40 mg/dL Final   • LDL/HDL Ratio 08/29/2022 1.19   Final   • TSH 08/29/2022 <0.005 (L)  0.270 - 4.200 uIU/mL Final   • Free T4 08/29/2022 1.78 (H)  0.93 - 1.70 ng/dL Final   • 25 Hydroxy, Vitamin D 08/29/2022 161.0 (H)  30.0 - 100.0 ng/ml Final   • Vitamin B-12 08/29/2022 316  211 - 946 pg/mL Final   • Microalbumin/Creatinine Ratio 08/29/2022    Final    Unable to calculate   • Creatinine, Urine 08/29/2022 106.5  mg/dL Final   • Microalbumin, Urine 08/29/2022 <1.2  mg/dL Final   • PTH, Intact 08/29/2022 7.4 (L)  15.0 - 65.0 pg/mL Final   • Calcium 08/29/2022 8.1 (L)  8.6 - 10.5 mg/dL Final   • Iron 08/29/2022 67  37 - 145 mcg/dL Final   • Iron Saturation 08/29/2022 22  20 - 50 % Final   •  Transferrin 08/29/2022 207  200 - 360 mg/dL Final   • TIBC 08/29/2022 308  298 - 536 mcg/dL Final   • Ferritin 08/29/2022 254.00 (H)  13.00 - 150.00 ng/mL Final   There may be more visits with results that are not included.      CT Abdomen Pelvis Without Contrast  Narrative: EXAM: CT ABDOMEN AND PELVIS WITHOUT INTRAVENOUS CONTRAST     INDICATION: 57 years year old Female with left flank pain    COMPARISON: None    TECHNIQUE: This examination was performed according to our  departmental dose optimization program which includes automated  exposure control, adjustment of the MA and kV according to  patient size, and/or use of iterative reconstruction technique.     FINDINGS:   Negative appendix. The small bowel is normal in caliber without  evidence for mechanical small bowel obstruction. There is mild  nonspecific prominence of mesenteric lymph nodes with adjacent  ill-defined inflammatory stranding. Specifically, the largest  mesenteric lymph node measures up to 1 cm in short axis.    There is relatively large amount of stool in the colon.    Diffuse fatty infiltration of the liver. Negative adrenal glands.  The spleen and pancreas have unremarkable unenhanced appearances.  No urinary calcifications or hydronephrosis.    Atelectasis in the visualized lung bases.    No acute or suspicious osseous abnormality.  Impression: 1. Negative appendix.  2. Fatty infiltration of the liver.  3. Nonspecific mild prominence of mesenteric lymph nodes with  adjacent ill-defined inflammatory stranding. This can be seen  with a mesenteric panniculitis, though this can also be  associated with a lymphoproliferative disorder. The spleen  measures within normal limits. Follow-up imaging in approximately  six months is recommended to assess for persistence or interval  change. #4 relatively large amount of stool in the colon.    Electronically signed by:  Carlos Garcia MD  10/4/2022 9:58 AM CDT  Workstation: [id number]  XR Chest 2  "View  Narrative: EXAM DESCRIPTION:  XR CHEST 2 VIEWS    CLINICAL INDICATION: SOA Triage Protocol     COMPARISON: None    FINDINGS: Mild cardiomegaly. Pulmonary vascularity is within  normal limits. Likely atelectasis in both lung bases. No pleural  effusion or pneumothorax.  Impression: 1. Cardiomegaly.  2. Likely atelectasis in both lung bases.    Electronically signed by:  Carlos Garcia MD  10/4/2022 9:55 AM CDT  Workstation: [id number]    [email or web address]  Immunization History   Administered Date(s) Administered   • Pneumococcal Polysaccharide (PPSV23) 03/27/2018   • Tdap 03/27/2018       The following portions of the patient's history were reviewed and updated as appropriate: allergies, current medications, past family history, past medical history, past social history, past surgical history and problem list.        Physical Exam  /78 (BP Location: Left arm, Patient Position: Sitting, Cuff Size: Large Adult)   Temp 98.9 °F (37.2 °C)   Ht 152.4 cm (60\")   Wt 89.1 kg (196 lb 6.4 oz)   BMI 38.36 kg/m²     Physical Exam  Vitals and nursing note reviewed.   Constitutional:       Appearance: She is well-developed. She is not diaphoretic.   HENT:      Head: Normocephalic and atraumatic.      Right Ear: External ear normal.      Left Ear: Tenderness present. Tympanic membrane is injected and scarred.   Eyes:      Conjunctiva/sclera: Conjunctivae normal.      Pupils: Pupils are equal, round, and reactive to light.   Cardiovascular:      Rate and Rhythm: Normal rate and regular rhythm.      Heart sounds: Normal heart sounds. No murmur heard.  Pulmonary:      Effort: Pulmonary effort is normal. No respiratory distress.      Breath sounds: Normal breath sounds.   Abdominal:      General: Bowel sounds are normal. There is no distension.      Palpations: Abdomen is soft.      Tenderness: There is no abdominal tenderness.      Comments: Obese abdomen    Musculoskeletal:         General: Tenderness present. No deformity. " Normal range of motion.      Cervical back: Normal range of motion and neck supple.   Feet:      Left foot:      Skin integrity: Callus and dry skin present.   Skin:     General: Skin is warm.      Coloration: Skin is not pale.      Findings: No erythema or rash.   Neurological:      Mental Status: She is alert and oriented to person, place, and time.      Cranial Nerves: No cranial nerve deficit.   Psychiatric:         Behavior: Behavior normal.         [unfilled]   Diagnosis Plan   1. Essential hypertension  atorvastatin (LIPITOR) 20 MG tablet      2. Mixed hyperlipidemia        3. Controlled type 2 diabetes mellitus with complication, without long-term current use of insulin ([facility])        4. Vitamin D deficiency        5. Postoperative hypothyroidism        6. Hypoparathyroidism, unspecified hypoparathyroidism type ([facility])        7. Iron deficiency anemia, unspecified iron deficiency anemia type        8. Generalized abdominal pain        9. Class 2 severe obesity due to excess calories with serious comorbidity and body mass index (BMI) of 38.0 to 38.9 in adult ([facility])        10. Screening mammogram, encounter for  Mammo Screening Bilateral With CAD      11. Callus of foot  Ambulatory Referral to Podiatry      12. Diabetic foot ([facility])  Ambulatory Referral to Podiatry      13. Swelling of left foot        14. Right acute serous otitis media, recurrence not specified                 -recommend labwork   -recommend influenza vaccination   -recommend COVID-19 vaccination   -recommend mammogram screening - schedule at imaging center   -recommend cologuard test   -recommend DEXA scan - schedule at imaging center   -recommend diabetic eye exam   -otitis media on left ear - augementin 875-125 mg every 12 hours for 10 days along with dilfucan  -refer to podiatry for diabetic foot care and callus on right foot   -left foot swelling start on lasix 20 mg PO BID   -fatty liver/abdominal pain/GI following - has endoscopy  scheduled on amitiza 8 mg PO BID    -hypocalcemia/hypoparathyroidism   -on calcium carbonate 600 mg PO BID. Endocrinology following on calcitriol 0.25 mcg daily on phoslo tablets 4 in am and 3 in pm   -postoperative hypothyroidism - on synthroid 137 mcg 6 days a week. Endocrinolgoy following   -DM type 2-  on metformin 1000 mg PO BID, on jardiance 25 mg daily. on rybelsus 3 mg PO q daily.   -insomnia - on trazodone  100 mg PO Q daily.   -diabetic neuropathy - on neurontin 300 mg PO TID   -hypertension - on clonidine 0.3 mg PO BID on  lisionpril 40 mg daily. On toprol  mg daily.  On norvasc 10 mg daily.   -Obesity BMI >30 . - recommended weight loss information and DASH diet. Counseled weight loss >5 minuutes  BMI at 38.36   -hyperlipidemia - HDL low. REcommended high healthy fat diet stop simvastatin and start on lipitor 20 mg PO qhs. Drug information provided   -high vitamin D - stop vitamin D supplement   -depression/grieving - on lexapro 20 mg daily. On abilify 300 mg daily  on wellbutrin  mg daily. on lamictal 200 mg PO q daily.    -iron deficiency anemia - on ferrous sulfate 325 mg PO TID. Will stop this due to high iron levls   -advised to go to ER or call 911 if symptoms or severe  -advised to be safe and call with questions and concerns   -advised to followup with specialist and referrals   -adivsed pt to be safe during COVID-19 pandemic   I spent 45  minutes caring for Joan on this date of service. This time includes time spent by me in the following activities: preparing for the visit, reviewing tests, obtaining and/or reviewing a separately obtained history, performing a medically appropriate examination and/or evaluation, counseling and educating the patient/family/caregiver, ordering medications, tests, or procedures, referring and communicating with other health care professionals, documenting information in the medical record, independently interpreting results and communicating that  information with the patient/family/caregiver and care coordination.   -recheck in 2 weeks         This document has been electronically signed by Brad Arnold MD on December 20, 2022 09:27 CST                        This document has been electronically signed by Brad Arnold MD on December 20, 2022 09:27 CST     Answers for HPI/ROS submitted by the patient on 12/14/2022  Please describe your symptoms.: Blood work  Have you had these symptoms before?: No  How long have you been having these symptoms?: 1-4 days  Please list any medications you are currently taking for this condition.: None  Please describe any probable cause for these symptoms. : None  What is the primary reason for your visit?: Other

## 2022-12-14 NOTE — TELEPHONE ENCOUNTER
Rx Refill Note  Requested Prescriptions     Refused Prescriptions Disp Refills   • Blood Glucose Monitoring Suppl (Accu-Chek Guide) w/Device kit 1 kit 0     Si kit by Other route See Admin Instructions.   • Accu-Chek Softclix Lancets lancets 300 each 0     Sig: Use as instructed   • Blood Glucose Calibration (Accu-Chek Guide Control) liquid 1 each 1     Sig: See Admin Instructions.   • lisinopril (PRINIVIL,ZESTRIL) 40 MG tablet 90 tablet 1     Sig: Take 1 tablet by mouth Daily.   • ondansetron (ZOFRAN) 8 MG tablet 90 tablet 2     Sig: Take 1 tablet by mouth Every 8 (Eight) Hours As Needed for Nausea or Vomiting.   • Semaglutide (Rybelsus) 3 MG tablet 90 tablet 1     Sig: Take 1 tablet by mouth Daily.   • levothyroxine (Synthroid) 137 MCG tablet 30 tablet 3     Sig: Take 1 tablet by mouth Daily.   • cloNIDine (CATAPRES) 0.3 MG tablet 180 tablet 0     Sig: Take 1 tablet by mouth Every 12 (Twelve) Hours.      Last office visit with prescribing clinician: 2022   Last telemedicine visit with prescribing clinician: 2022   Next office visit with prescribing clinician: 2022   {TIP  Encounters:    Med refills on file are good until 2023.                       Would you like a call back once the refill request has been completed: [] Yes [] No    If the office needs to give you a call back, can they leave a voicemail: [] Yes [] No    Gracy Gutierrez LPN  22, 08:24 CST

## 2022-12-19 NOTE — PROGRESS NOTES
[facility]      Chief Complaint:   Chief Complaint   Patient presents with   • fatty liver     Ref. Dr. Clayton Pineda     HPI:   Ms. Blackwell is a 57-year-old -American female with past medical history of hypothyroidism, bronchitis, sinusitis, pharyngitis, seasonal allergies, degenerative joint disease, depression, diabetes mellitus, hypertension, GERD, osteoporosis, vulvovaginitis presenting for evaluation for fatty liver disease.  She apparently had CT abdomen pelvis for left flank pain and was noted to have hepatomegaly with fatty liver disease.  She denied nausea, vomiting, diarrhea, constipation, rectal bleeding or weight loss.  Her last colonoscopy was several years ago.  She denied alcohol usage.  LFTs are normal.  Ultrasound abdomen was consistent with Contracted gallbladder, coarsening of the hepatic architecture consistent with fatty infiltration and mild hepatic enlargement.  Patient denied alcohol usage.    Past Medical History:   Past Medical History:   Diagnosis Date   • Abdominal pain     suspect Kidney Stone      • Acquired hypothyroidism    • Acute bronchitis    • Acute maxillary sinusitis    • Acute pharyngitis    • Allergic    • Anxiety    • Asthma    • Axillary lymphadenopathy    • Blood in urine    • Bronchitis     with an asthmatic reaction      • Colitis    • Cough    • Cyst of Bartholin's gland duct     History of    • Degenerative joint disease involving multiple joints    • Depression    • Diabetes (HCC)    • Encounter for gynecological examination with abnormal finding    • Essential (primary) hypertension    • Essential hypertension    • GERD (gastroesophageal reflux disease)    • Goiter    • Hemorrhoids    • Hordeolum     right lower eyelid      • Injury of back    • Low back pain    • Lung nodule, solitary    • Osteoporosis    • Polyp of vagina     possible      • Postsurgical hypoparathyroidism (HCC)    • Shortness of breath    • Tobacco  dependence syndrome     Past history   • Urinary tract infectious disease    • Vulvovaginitis        Past Surgical History:  Past Surgical History:   Procedure Laterality Date   •  SECTION  01/14/1993    x 3, 84, 82,   • EXPLORATORY LAPAROTOMY Left 1998    Left cystectomy. Partial resection of vthe left ovary. Left ovary- oversewn   • INJECTION OF MEDICATION  2014    Depo Medrol (Methylprednisone) (1)    • INJECTION OF MEDICATION  2015    Kenalog (1)     • LAPAROSCOPIC HYSTERECTOMY  1995    Surgical, lysis of adhesions.Laparoscopic Assisted vaginal Hysterectomy (Tyler/ Doderlein Technique) marsupialization of right Bartholin's Gland Cyst   • LASER ABLATION OF THE CERVIX  1985    Laser vaporization, cervical cone   • OVARIAN CYST REMOVAL     • PAP SMEAR     • SHOULDER SURGERY     • THYROIDECTOMY  2006    with partial parathyroidectomy   • TOOTH EXTRACTION         Family History:  Family History   Problem Relation Age of Onset   • Diabetes Mother    • Hypertension Mother    • Diabetes Father    • Arthritis Father        Social History:   reports that she has quit smoking. She has never used smokeless tobacco. She reports that she does not drink alcohol and does not use drugs.    Medications:   Prior to Admission medications    Medication Sig Start Date End Date Taking? Authorizing Provider   Accu-Chek Yany Plus test strip USE ONE STRIP TO TEST BLOOD SUGAR THREE TIMES A DAY FOR DIABETES 3/4/22  Yes Biju Do MD   Accu-Chek Softclix Lancets lancets TEST BLOOD SUGAR THREE TIMES DAILY AS DIRECTED 21  Yes Brad Arnold MD   Alcohol Swabs (B-D SINGLE USE SWABS REGULAR) pads USE AS DIRECTED AS NEEDED 22  Yes Brad Arnold MD   ALPRAZolam (XANAX) 1 MG tablet  20  Yes ProviderTrever MD   amLODIPine (NORVASC) 5 MG tablet TAKE ONE TABLET BY MOUTH DAILY 22  Yes Brad Arnold MD   ARIPiprazole (ABILIFY) 30 MG tablet   9/29/20  Yes Trever Padilla MD   atorvastatin (LIPITOR) 20 MG tablet Take 1 tablet by mouth Daily. 8/30/22  Yes Brad Arnold MD   Blood Glucose Calibration (Accu-Chek Guide Control) liquid USE AS DIRECTED 12/6/21  Yes Brad Arnold MD   Blood Glucose Monitoring Suppl (Accu-Chek Guide) w/Device kit USE AS DIRECTED 9/9/21  Yes Brad Arnold MD   buPROPion XL (WELLBUTRIN XL) 300 MG 24 hr tablet  9/29/20  Yes Trever Padilla MD   calcium acetate (PHOS BINDER,) 667 MG capsule capsule Take 4 capsules am and 3 capsules pm 11/13/22  Yes Albert Khoury MD   cloNIDine (CATAPRES) 0.3 MG tablet TAKE ONE TABLET BY MOUTH EVERY 12 HOURS 11/15/22  Yes Brad Arnold MD   empagliflozin (Jardiance) 25 MG tablet tablet Take 1 tablet by mouth Daily. 8/30/22  Yes Brad Arnold MD   ferrous sulfate (FeroSul) 325 (65 FE) MG tablet Take 1 tablet by mouth 3 (Three) Times a Day With Meals. 12/28/21  Yes Brad Arnold MD   HYDROcodone-acetaminophen (NORCO)  MG per tablet  8/24/21  Yes Trever Padilla MD   lamoTRIgine (LaMICtal) 200 MG tablet  9/29/20  Yes Trever Padilla MD   levothyroxine (Synthroid) 137 MCG tablet Take 1 tablet by mouth Daily. 10/1/22  Yes Brad Arnold MD   lisinopril (PRINIVIL,ZESTRIL) 40 MG tablet Take 1 tablet by mouth Daily. 8/30/22  Yes Brad Arnold MD   lubiprostone (AMITIZA) 8 MCG capsule Take 1 capsule by mouth 2 (Two) Times a Day With Meals. 10/6/22  Yes Jass Hu DO   magnesium oxide 250 MG tablet Take 1 tablet by mouth Daily. 8/30/22  Yes Brad Arnold MD   metFORMIN (GLUCOPHAGE) 1000 MG tablet Take 1 tablet by mouth 2 (Two) Times a Day With Meals. 8/30/22  Yes Brad Arnold MD   metoprolol succinate XL (TOPROL-XL) 100 MG 24 hr tablet TAKE ONE TABLET BY MOUTH DAILY 11/17/22  Yes Brad Arnold MD   ondansetron (ZOFRAN) 8 MG tablet Take 1 tablet by mouth Every 8 (Eight) Hours As Needed for Nausea or Vomiting. 8/30/22  Yes Clayton,  Brad XAVIER MD   potassium chloride ER (K-TAB) 20 MEQ tablet controlled-release ER tablet Take 1 tablet by mouth Daily. 8/30/22  Yes Brad Arnold MD   Semaglutide (Rybelsus) 3 MG tablet Take 1 tablet by mouth Daily. 8/30/22  Yes Brad Arnold MD   traZODone (DESYREL) 100 MG tablet Take 1 tablet by mouth Every Night. 5/31/22  Yes Brad Arnold MD   vitamin B-12 (CYANOCOBALAMIN) 500 MCG tablet Take 1 tablet by mouth Daily. 8/30/22  Yes Brad Arnold MD   calcitriol (ROCALTROL) 0.25 MCG capsule 2 tabs  daily 12/14/22   Albert Khoury MD   escitalopram (LEXAPRO) 20 MG tablet TAKE ONE TABLET BY MOUTH DAILY 12/12/22   Brad Arnold MD   polyethylene glycol (GoLYTELY) 236 g solution Please use the instructions given in office 12/1/22   Cyndi Grace MD       Allergies:  Naproxen and Other    ROS:    Review of Systems   Constitutional: Negative for chills, fatigue, fever and unexpected weight change.   HENT: Negative for congestion, ear discharge, hearing loss, nosebleeds and sore throat.    Eyes: Negative for pain, discharge and redness.   Respiratory: Negative for cough, chest tightness, shortness of breath and wheezing.    Cardiovascular: Negative for chest pain and palpitations.   Gastrointestinal: Positive for abdominal pain. Negative for abdominal distention, blood in stool, constipation, diarrhea, nausea and vomiting.   Endocrine: Negative for cold intolerance, polydipsia, polyphagia and polyuria.   Genitourinary: Negative for dysuria, flank pain, frequency, hematuria and urgency.   Musculoskeletal: Negative for arthralgias, back pain, joint swelling and myalgias.   Skin: Negative for color change, pallor and rash.   Neurological: Negative for tremors, seizures, syncope, weakness and headaches.   Hematological: Negative for adenopathy. Does not bruise/bleed easily.   Psychiatric/Behavioral: Negative for behavioral problems, confusion, dysphoric mood, hallucinations and suicidal ideas. The  "patient is not nervous/anxious.      Objective     /81 (BP Location: Right arm)   Pulse 76   Ht 152.4 cm (60\")   Wt 87.5 kg (192 lb 12.8 oz)   BMI 37.65 kg/m²     Physical Exam  Constitutional:       Appearance: She is well-developed.   HENT:      Head: Normocephalic and atraumatic.   Eyes:      Conjunctiva/sclera: Conjunctivae normal.      Pupils: Pupils are equal, round, and reactive to light.   Neck:      Thyroid: No thyromegaly.   Cardiovascular:      Rate and Rhythm: Normal rate and regular rhythm.      Heart sounds: Normal heart sounds. No murmur heard.  Pulmonary:      Effort: Pulmonary effort is normal.      Breath sounds: Normal breath sounds. No wheezing.   Abdominal:      General: Bowel sounds are normal. There is no distension.      Palpations: Abdomen is soft. There is no mass.      Tenderness: There is no abdominal tenderness.      Hernia: No hernia is present.   Genitourinary:     Comments: No lesions noted  Musculoskeletal:         General: No tenderness. Normal range of motion.      Cervical back: Normal range of motion and neck supple.   Lymphadenopathy:      Cervical: No cervical adenopathy.   Skin:     General: Skin is warm and dry.      Findings: No rash.   Neurological:      Mental Status: She is alert and oriented to person, place, and time.      Cranial Nerves: No cranial nerve deficit.   Psychiatric:         Thought Content: Thought content normal.        Extremities: No edema, cyanosis or clubbing.    Assessment & Plan    1.  Left flank pain, likely multifactorial.  Work-up in progress.  2.  Colorectal cancer screening, schedule colonoscopy.  3.  Fatty liver disease with normal LFTs, recommend exercise and diet control.  4.  Obesity, recommend exercise and diet control.  Diagnoses and all orders for this visit:    1. Encounter for screening for malignant neoplasm of colon (Primary)  -     Case Request; Standing  -     sodium chloride 0.9 % infusion 40 mL  -     dextrose 5 % and " sodium chloride 0.45 % infusion  -     Case Request    Other orders  -     Follow Anesthesia Guidelines / Protocol; Future  -     Obtain Informed Consent; Future  -     Implement Anesthesia Orders Day of Procedure; Standing  -     Obtain Informed Consent; Standing  -     POC Glucose Once; Standing  -     Insert Peripheral IV; Standing  -     polyethylene glycol (GoLYTELY) 236 g solution; Please use the instructions given in office  Dispense: 4000 mL; Refill: 0        COLONOSCOPY (N/A)     Diagnosis Plan   1. Encounter for screening for malignant neoplasm of colon  Case Request    sodium chloride 0.9 % infusion 40 mL    dextrose 5 % and sodium chloride 0.45 % infusion    Case Request          Anticipated Surgical Procedure:  Orders Placed This Encounter   Procedures   • Obtain Informed Consent     Standing Status:   Future     Order Specific Question:   Informed Consent Given For     Answer:   colonoscopy       The risks, benefits, and alternatives of this procedure have been discussed with the patient or the responsible party- the patient understands and agrees to proceed.            This document has been electronically signed by Cyndi Grace MD on December 18, 2022 21:10 CST

## 2022-12-20 ENCOUNTER — OFFICE VISIT (OUTPATIENT)
Dept: FAMILY MEDICINE CLINIC | Facility: CLINIC | Age: 57
End: 2022-12-20

## 2022-12-20 VITALS
HEIGHT: 60 IN | SYSTOLIC BLOOD PRESSURE: 134 MMHG | WEIGHT: 196.4 LBS | BODY MASS INDEX: 38.56 KG/M2 | DIASTOLIC BLOOD PRESSURE: 78 MMHG | TEMPERATURE: 98.9 F

## 2022-12-20 DIAGNOSIS — H65.01 RIGHT ACUTE SEROUS OTITIS MEDIA, RECURRENCE NOT SPECIFIED: ICD-10-CM

## 2022-12-20 DIAGNOSIS — R10.84 GENERALIZED ABDOMINAL PAIN: ICD-10-CM

## 2022-12-20 DIAGNOSIS — E89.0 POSTOPERATIVE HYPOTHYROIDISM: ICD-10-CM

## 2022-12-20 DIAGNOSIS — I10 ESSENTIAL HYPERTENSION: Primary | ICD-10-CM

## 2022-12-20 DIAGNOSIS — Z12.4 ENCOUNTER FOR PAPANICOLAOU SMEAR OF CERVIX: Primary | ICD-10-CM

## 2022-12-20 DIAGNOSIS — Z12.31 SCREENING MAMMOGRAM, ENCOUNTER FOR: ICD-10-CM

## 2022-12-20 DIAGNOSIS — E11.8 DIABETIC FOOT: ICD-10-CM

## 2022-12-20 DIAGNOSIS — E55.9 VITAMIN D DEFICIENCY: ICD-10-CM

## 2022-12-20 DIAGNOSIS — M79.89 SWELLING OF LEFT FOOT: ICD-10-CM

## 2022-12-20 DIAGNOSIS — E78.2 MIXED HYPERLIPIDEMIA: ICD-10-CM

## 2022-12-20 DIAGNOSIS — E66.01 CLASS 2 SEVERE OBESITY DUE TO EXCESS CALORIES WITH SERIOUS COMORBIDITY AND BODY MASS INDEX (BMI) OF 38.0 TO 38.9 IN ADULT: ICD-10-CM

## 2022-12-20 DIAGNOSIS — E11.8 CONTROLLED TYPE 2 DIABETES MELLITUS WITH COMPLICATION, WITHOUT LONG-TERM CURRENT USE OF INSULIN: ICD-10-CM

## 2022-12-20 DIAGNOSIS — E20.9 HYPOPARATHYROIDISM, UNSPECIFIED HYPOPARATHYROIDISM TYPE: ICD-10-CM

## 2022-12-20 DIAGNOSIS — D50.9 IRON DEFICIENCY ANEMIA, UNSPECIFIED IRON DEFICIENCY ANEMIA TYPE: ICD-10-CM

## 2022-12-20 DIAGNOSIS — L84 CALLUS OF FOOT: ICD-10-CM

## 2022-12-20 PROCEDURE — 99214 OFFICE O/P EST MOD 30 MIN: CPT | Performed by: FAMILY MEDICINE

## 2022-12-20 RX ORDER — LANCETS
1 EACH MISCELLANEOUS 3 TIMES DAILY
Qty: 300 EACH | Refills: 3 | Status: SHIPPED | OUTPATIENT
Start: 2022-12-20 | End: 2023-02-09 | Stop reason: SDUPTHER

## 2022-12-20 RX ORDER — LISINOPRIL 40 MG/1
40 TABLET ORAL DAILY
Qty: 90 TABLET | Refills: 1 | Status: SHIPPED | OUTPATIENT
Start: 2022-12-20 | End: 2023-02-09 | Stop reason: SDUPTHER

## 2022-12-20 RX ORDER — ONDANSETRON HYDROCHLORIDE 8 MG/1
8 TABLET, FILM COATED ORAL EVERY 8 HOURS PRN
Qty: 90 TABLET | Refills: 2 | Status: SHIPPED | OUTPATIENT
Start: 2022-12-20 | End: 2023-02-09 | Stop reason: SDUPTHER

## 2022-12-20 RX ORDER — ORAL SEMAGLUTIDE 3 MG/1
1 TABLET ORAL DAILY
Qty: 90 TABLET | Refills: 1 | Status: SHIPPED | OUTPATIENT
Start: 2022-12-20 | End: 2023-02-09 | Stop reason: SDUPTHER

## 2022-12-20 RX ORDER — ISOPROPYL ALCOHOL 0.75 G/1
SWAB TOPICAL
Qty: 300 EACH | Refills: 3 | Status: SHIPPED | OUTPATIENT
Start: 2022-12-20 | End: 2023-02-09 | Stop reason: SDUPTHER

## 2022-12-20 RX ORDER — METOPROLOL SUCCINATE 100 MG/1
100 TABLET, EXTENDED RELEASE ORAL DAILY
Qty: 90 TABLET | Refills: 1 | Status: SHIPPED | OUTPATIENT
Start: 2022-12-20 | End: 2023-02-09 | Stop reason: SDUPTHER

## 2022-12-20 RX ORDER — LEVOTHYROXINE SODIUM 137 UG/1
137 TABLET ORAL DAILY
Qty: 30 TABLET | Refills: 3 | Status: SHIPPED | OUTPATIENT
Start: 2022-12-20 | End: 2023-02-09 | Stop reason: SDUPTHER

## 2022-12-20 RX ORDER — CLONIDINE HYDROCHLORIDE 0.3 MG/1
0.3 TABLET ORAL EVERY 12 HOURS
Qty: 180 TABLET | Refills: 1 | Status: SHIPPED | OUTPATIENT
Start: 2022-12-20 | End: 2023-02-09 | Stop reason: SDUPTHER

## 2022-12-20 RX ORDER — AMLODIPINE BESYLATE 5 MG/1
5 TABLET ORAL DAILY
Qty: 90 TABLET | Refills: 1 | Status: SHIPPED | OUTPATIENT
Start: 2022-12-20 | End: 2023-02-09 | Stop reason: SDUPTHER

## 2022-12-20 RX ORDER — ESCITALOPRAM OXALATE 20 MG/1
20 TABLET ORAL DAILY
Qty: 30 TABLET | Refills: 3 | Status: SHIPPED | OUTPATIENT
Start: 2022-12-20 | End: 2023-02-09 | Stop reason: SDUPTHER

## 2022-12-20 RX ORDER — ATORVASTATIN CALCIUM 20 MG/1
20 TABLET, FILM COATED ORAL DAILY
Qty: 90 TABLET | Refills: 1 | Status: SHIPPED | OUTPATIENT
Start: 2022-12-20 | End: 2023-02-09 | Stop reason: SDUPTHER

## 2022-12-20 RX ORDER — AMOXICILLIN AND CLAVULANATE POTASSIUM 875; 125 MG/1; MG/1
1 TABLET, FILM COATED ORAL 2 TIMES DAILY
Qty: 20 TABLET | Refills: 0 | Status: SHIPPED | OUTPATIENT
Start: 2022-12-20 | End: 2022-12-30

## 2022-12-20 RX ORDER — CHOLECALCIFEROL (VITAMIN D3) 125 MCG
500 CAPSULE ORAL DAILY
Qty: 30 TABLET | Refills: 3 | Status: SHIPPED | OUTPATIENT
Start: 2022-12-20 | End: 2023-03-23

## 2022-12-20 RX ORDER — FUROSEMIDE 20 MG/1
20 TABLET ORAL 2 TIMES DAILY
Qty: 60 TABLET | Refills: 3 | Status: SHIPPED | OUTPATIENT
Start: 2022-12-20 | End: 2023-02-09 | Stop reason: SDUPTHER

## 2022-12-20 RX ORDER — FLUCONAZOLE 150 MG/1
150 TABLET ORAL ONCE
Qty: 2 TABLET | Refills: 0 | Status: SHIPPED | OUTPATIENT
Start: 2022-12-20 | End: 2022-12-20

## 2022-12-20 RX ORDER — POTASSIUM CHLORIDE 1500 MG/1
20 TABLET, FILM COATED, EXTENDED RELEASE ORAL DAILY
Qty: 90 TABLET | Refills: 1 | Status: SHIPPED | OUTPATIENT
Start: 2022-12-20 | End: 2023-02-09 | Stop reason: SDUPTHER

## 2022-12-20 RX ORDER — FERROUS SULFATE 325(65) MG
1 TABLET ORAL
Qty: 270 TABLET | Refills: 1 | Status: SHIPPED | OUTPATIENT
Start: 2022-12-20 | End: 2023-02-09 | Stop reason: SDUPTHER

## 2022-12-20 NOTE — PATIENT INSTRUCTIONS
Get labwork per endocrinology    Mammogram at imaging center     Lasix 20 mg twice a day for left foot swelling    Augmentin 875-125 mg every 12 hours for right ear pain and otitis media    Will refer to Podiatry for foot issues    Recheck in 2 months

## 2022-12-21 RX ORDER — BLOOD SUGAR DIAGNOSTIC
STRIP MISCELLANEOUS
Qty: 200 EACH | Refills: 3 | Status: SHIPPED | OUTPATIENT
Start: 2022-12-21 | End: 2023-02-09 | Stop reason: SDUPTHER

## 2022-12-21 NOTE — PROGRESS NOTES
Subjective:  Joan Blackwell is a 57 y.o. female who presents for       Patient Active Problem List   Diagnosis   • Hypocalcemia   • Hyperlipidemia   • Iron deficiency anemia   • Hypothyroidism   • S/P thyroidectomy   • Uncontrolled type 2 diabetes mellitus with complication, without long-term current use of insulin   • Obesity   • Uncontrolled hypertension   • Hypertensive urgency   • Right wrist pain   • Carpal tunnel syndrome of right wrist   • Controlled type 2 diabetes mellitus with complication, without long-term current use of insulin (HCC)   • Essential hypertension   • Vitamin D deficiency   • Sprain of left ankle   • Grieving   • Rotator cuff syndrome of right shoulder   • Hypotension   • Bilateral low back pain with bilateral sciatica   • Fall   • Right shoulder pain   • Dental abscess   • Body mass index (BMI) of 35.0 to 35.9   • Former smoker   • Iron overload   • High vitamin D level   • Hypoparathyroidism (HCC)   • Abnormal CT of the abdomen   • Encounter for screening for malignant neoplasm of colon   • Right acute serous otitis media   • Swelling of left foot   • Diabetic foot (HCC)           Current Outpatient Medications:   •  Accu-Chek Yany Plus test strip, USE ONE STRIP TO TEST BLOOD SUGAR THREE TIMES A DAY FOR DIABETES, Disp: 200 each, Rfl: 3  •  Accu-Chek Softclix Lancets lancets, 1 each by Other route 3 (Three) Times a Day. Use as instructed, Disp: 300 each, Rfl: 3  •  Alcohol Swabs (B-D SINGLE USE SWABS REGULAR) pads, USE AS DIRECTED AS NEEDED, Disp: 300 each, Rfl: 3  •  ALPRAZolam (XANAX) 1 MG tablet, , Disp: , Rfl:   •  amLODIPine (NORVASC) 5 MG tablet, Take 1 tablet by mouth Daily., Disp: 90 tablet, Rfl: 1  •  amoxicillin-clavulanate (Augmentin) 875-125 MG per tablet, Take 1 tablet by mouth 2 (Two) Times a Day for 10 days., Disp: 20 tablet, Rfl: 0  •  ARIPiprazole (ABILIFY) 30 MG tablet, , Disp: , Rfl:   •  atorvastatin (LIPITOR) 20 MG tablet, Take 1 tablet by mouth Daily.,  Disp: 90 tablet, Rfl: 1  •  Blood Glucose Calibration (Accu-Chek Guide Control) liquid, USE AS DIRECTED, Disp: 1 each, Rfl: 1  •  Blood Glucose Monitoring Suppl (Accu-Chek Guide) w/Device kit, USE AS DIRECTED, Disp: 1 kit, Rfl: 0  •  buPROPion XL (WELLBUTRIN XL) 300 MG 24 hr tablet, , Disp: , Rfl:   •  calcitriol (ROCALTROL) 0.25 MCG capsule, 2 tabs  daily, Disp: 60 capsule, Rfl: 11  •  calcium acetate (PHOS BINDER,) 667 MG capsule capsule, Take 4 capsules am and 3 capsules pm, Disp: 210 capsule, Rfl: 11  •  cloNIDine (CATAPRES) 0.3 MG tablet, Take 1 tablet by mouth Every 12 (Twelve) Hours., Disp: 180 tablet, Rfl: 1  •  empagliflozin (Jardiance) 25 MG tablet tablet, Take 1 tablet by mouth Daily., Disp: 90 tablet, Rfl: 3  •  escitalopram (LEXAPRO) 20 MG tablet, Take 1 tablet by mouth Daily., Disp: 30 tablet, Rfl: 3  •  ferrous sulfate (FeroSul) 325 (65 FE) MG tablet, Take 1 tablet by mouth 3 (Three) Times a Day With Meals., Disp: 270 tablet, Rfl: 1  •  furosemide (Lasix) 20 MG tablet, Take 1 tablet by mouth 2 (Two) Times a Day., Disp: 60 tablet, Rfl: 3  •  HYDROcodone-acetaminophen (NORCO)  MG per tablet, , Disp: , Rfl:   •  lamoTRIgine (LaMICtal) 200 MG tablet, , Disp: , Rfl:   •  levothyroxine (Synthroid) 137 MCG tablet, Take 1 tablet by mouth Daily., Disp: 30 tablet, Rfl: 3  •  lisinopril (PRINIVIL,ZESTRIL) 40 MG tablet, Take 1 tablet by mouth Daily., Disp: 90 tablet, Rfl: 1  •  lubiprostone (AMITIZA) 8 MCG capsule, Take 1 capsule by mouth 2 (Two) Times a Day With Meals., Disp: 14 capsule, Rfl: 0  •  magnesium oxide 250 MG tablet, Take 1 tablet by mouth Daily., Disp: 90 tablet, Rfl: 1  •  metFORMIN (GLUCOPHAGE) 1000 MG tablet, Take 1 tablet by mouth 2 (Two) Times a Day With Meals., Disp: 180 tablet, Rfl: 3  •  metoprolol succinate XL (TOPROL-XL) 100 MG 24 hr tablet, Take 1 tablet by mouth Daily., Disp: 90 tablet, Rfl: 1  •  ondansetron (ZOFRAN) 8 MG tablet, Take 1 tablet by mouth Every 8 (Eight) Hours As  Needed for Nausea or Vomiting., Disp: 90 tablet, Rfl: 2  •  polyethylene glycol (GoLYTELY) 236 g solution, Please use the instructions given in office, Disp: 4000 mL, Rfl: 0  •  potassium chloride ER (K-TAB) 20 MEQ tablet controlled-release ER tablet, Take 1 tablet by mouth Daily., Disp: 90 tablet, Rfl: 1  •  Semaglutide (Rybelsus) 3 MG tablet, Take 1 tablet by mouth Daily., Disp: 90 tablet, Rfl: 1  •  traZODone (DESYREL) 100 MG tablet, Take 1 tablet by mouth Every Night., Disp: 90 tablet, Rfl: 0  •  vitamin B-12 (CYANOCOBALAMIN) 500 MCG tablet, Take 1 tablet by mouth Daily., Disp: 30 tablet, Rfl: 3    HPI          Pt is 58 yo female with management  of obesity, HLP sp thyroidectomy in  , postoperative hypothyroidism,  Vitamin D deficiency, HTN,  DM type 2, history of hypertensive urgency, iron deficiency anemia, sp hysterectomy, sp ovarian cyst removal, Sp  X 3, carpal tunnel syndrome right wrist. Grieving, chronic back pain. currenntly in pain managmment, hypocalcemia, hypoparathyroidism     22 in office visit for recheck. Pt went to ER on 10/4/22 for abdominal pain. Pt had CT of abdomen on 10/422 that showed negative appendix. Fatty and nonspecific mild prominence of mesenteric lymph node that can be seen be seen with mesenteric panniculitis. This could be associated with a lymphoproliferative disorder. followup imaging was recommended. Pt saw General Surgery a on 10/13/22 for her contracted gallbladder and abdominal pain. She is asymptomatic and was not recommended surgery at this time but will continue to followup with General Surgery should problems arise. She did have several Telehealth visits with Endocrinology on 10/21/22 and 10/28/22  for her hypocalcemia and hypoparathyroidism. Pt's vitamin D was stopped and calicitriol was started.  Along with phoslo 3 capsules twice daily and mg oxide 250 mg dialy. Pt had labwork done on 10/25/22 that showed calcium at 7.7. phosphorus at 5.3 GFR at  103.TSH was at 0..011 she was advised to take synthroid 137 mcg 6 days a week. Pt saw GI on 12/1/22 and has scheduled colonoscopy on 1/11/23. Pt saw Endocrinology on 12/9/22 for her postoperative hypoparathyroidism, DM type 2, and postoperative hypothyroidism and is to continue on calcitriol and phoslo tablets. Pt has right ear pain and callus on right foot for past 2 weeks. She would like to see Podiatry. She also has swelling on left foot swelling    1/4/23 In office visit for recheck                 Hyperlipidemia  This is a chronic problem. Recent lipid tests were reviewed and are variable. Exacerbating diseases include obesity. She has no history of chronic renal disease, diabetes, hypothyroidism or liver disease. Pertinent negatives include no chest pain, focal sensory loss, focal weakness, leg pain or myalgias. The current treatment provides no improvement of lipids. Compliance problems include adherence to diet.  Risk factors for coronary artery disease include diabetes mellitus, hypertension, obesity, dyslipidemia, a sedentary lifestyle and post-menopausal.   Obesity  This is a chronic problem. The current episode started more than 1 year ago. The problem occurs constantly. The problem has been unchanged. Pertinent negatives include no abdominal pain, anorexia, arthralgias, change in bowel habit, chest pain, chills, congestion, coughing, diaphoresis, fatigue, fever, headaches, joint swelling, myalgias, nausea, neck pain or numbness. Nothing aggravates the symptoms. She has tried nothing for the symptoms. The treatment provided no relief. ms.   Diabetes   She presents for her  followup  diabetic visit. She has type 2 diabetes mellitus. Her disease course has been stable. Hypoglycemia symptoms include dizziness and headaches. Pertinent negatives for hypoglycemia include no confusion, hunger, mood changes, nervousness/anxiousness, pallor, seizures, sleepiness, speech difficulty, sweats or tremors. Pertinent  negatives for diabetes include no blurred vision, no chest pain, no fatigue, no foot paresthesias, no foot ulcerations, no polydipsia, no polyphagia, no polyuria, no visual change, no weakness and no weight loss. Pertinent negatives for hypoglycemia complications include no blackouts, no hospitalization, no nocturnal hypoglycemia, no required assistance and no required glucagon injection. Pertinent negatives for diabetic complications include no autonomic neuropathy, CVA, heart disease, impotence, nephropathy, peripheral neuropathy, PVD or retinopathy. Risk factors for coronary artery disease include diabetes mellitus, sedentary lifestyle and obesity. Current diabetic treatment includes oral agent (monotherapy). Her weight is stable. She is following a generally healthy diet. She has not had a previous visit with a dietitian. She never participates in exercise. An ACE inhibitor/angiotensin II receptor blocker is being taken. She does not see a podiatrist.Eye exam is not current.   Hypertension   This is a chronic problem. The current episode started more than 1 year ago. The problem has been gradually improving since onset. The problem is uncontrolled. Associated symptoms include headaches, neck pain and shortness of breath. Pertinent negatives include no anxiety, blurred vision, chest pain, malaise/fatigue, orthopnea, palpitations, peripheral edema, PND or sweats. There are no associated agents to hypertension. Risk factors for coronary artery disease include diabetes mellitus and dyslipidemia. Current antihypertension treatment includes beta blockers. The current treatment provides no improvement. There are no compliance problems.  There is no history of angina, kidney disease, CAD/MI, CVA, heart failure, left ventricular hypertrophy, PVD, retinopathy or a thyroid problem. There is no history of chronic renal disease, coarctation of the aorta, hyperaldosteronism, hypercortisolism, hyperparathyroidism, a  hypertension causing med, pheochromocytoma or sleep apnea.   Hypothyroidism   This is a chronic problem. The current episode started more than 1 year ago. The problem occurs daily. The problem has been unchanged. Associated symptoms include arthralgias, headaches, myalgias, nausea, neck pain, numbness and vomiting. Pertinent negatives include no abdominal pain, anorexia, change in bowel habit, chest pain, chills, congestion, coughing, diaphoresis, fatigue, fever, joint swelling, rash, sore throat, swollen glands, urinary symptoms, vertigo, visual change or weakness. Nothing aggravates the symptoms. Treatments tried: synthroid. The treatment provided mild relief.       Review of Systems  Review of Systems   Constitutional: Positive for activity change and fatigue. Negative for appetite change, chills, diaphoresis and fever.   HENT: Negative for congestion, postnasal drip, rhinorrhea, sinus pressure, sinus pain, sneezing, sore throat, trouble swallowing and voice change.    Respiratory: Negative for cough, choking, chest tightness, shortness of breath, wheezing and stridor.    Cardiovascular: Negative for chest pain.   Gastrointestinal: Negative for diarrhea, nausea and vomiting.   Musculoskeletal: Positive for arthralgias.   Neurological: Positive for weakness and numbness. Negative for headaches.   Psychiatric/Behavioral: The patient is nervous/anxious.         Depressed mood        Patient Active Problem List   Diagnosis   • Hypocalcemia   • Hyperlipidemia   • Iron deficiency anemia   • Hypothyroidism   • S/P thyroidectomy   • Uncontrolled type 2 diabetes mellitus with complication, without long-term current use of insulin   • Obesity   • Uncontrolled hypertension   • Hypertensive urgency   • Right wrist pain   • Carpal tunnel syndrome of right wrist   • Controlled type 2 diabetes mellitus with complication, without long-term current use of insulin (HCC)   • Essential hypertension   • Vitamin D deficiency   •  Sprain of left ankle   • Grieving   • Rotator cuff syndrome of right shoulder   • Hypotension   • Bilateral low back pain with bilateral sciatica   • Fall   • Right shoulder pain   • Dental abscess   • Body mass index (BMI) of 35.0 to 35.9   • Former smoker   • Iron overload   • High vitamin D level   • Hypoparathyroidism (HCC)   • Abnormal CT of the abdomen   • Encounter for screening for malignant neoplasm of colon   • Right acute serous otitis media   • Swelling of left foot   • Diabetic foot (HCC)     Past Surgical History:   Procedure Laterality Date   •  SECTION  01/14/1993    x 3, 84, 82,   • EXPLORATORY LAPAROTOMY Left 1998    Left cystectomy. Partial resection of vthe left ovary. Left ovary- oversewn   • INJECTION OF MEDICATION  2014    Depo Medrol (Methylprednisone) (1)    • INJECTION OF MEDICATION  2015    Kenalog (1)     • LAPAROSCOPIC HYSTERECTOMY  1995    Surgical, lysis of adhesions.Laparoscopic Assisted vaginal Hysterectomy (Tyler/ Doderlein Technique) marsupialization of right Bartholin's Gland Cyst   • LASER ABLATION OF THE CERVIX  1985    Laser vaporization, cervical cone   • OVARIAN CYST REMOVAL     • PAP SMEAR     • SHOULDER SURGERY     • THYROIDECTOMY  2006    with partial parathyroidectomy   • TOOTH EXTRACTION       Social History     Socioeconomic History   • Marital status:    Tobacco Use   • Smoking status: Former   • Smokeless tobacco: Never   Vaping Use   • Vaping Use: Never used   Substance and Sexual Activity   • Alcohol use: No   • Drug use: No   • Sexual activity: Defer     Family History   Problem Relation Age of Onset   • Diabetes Mother    • Hypertension Mother    • Diabetes Father    • Arthritis Father      Telemedicine on 10/28/2022   Component Date Value Ref Range Status   • TSH 2022 0.594  0.270 - 4.200 uIU/mL Final   • WBC 2022 6.91  3.40 - 10.80 10*3/mm3 Final   • RBC 2022 4.39  3.77 -  5.28 10*6/mm3 Final   • Hemoglobin 12/06/2022 12.1  12.0 - 15.9 g/dL Final   • Hematocrit 12/06/2022 37.4  34.0 - 46.6 % Final   • MCV 12/06/2022 85.2  79.0 - 97.0 fL Final   • MCH 12/06/2022 27.6  26.6 - 33.0 pg Final   • MCHC 12/06/2022 32.4  31.5 - 35.7 g/dL Final   • RDW 12/06/2022 13.8  12.3 - 15.4 % Final   • RDW-SD 12/06/2022 43.0  37.0 - 54.0 fl Final   • MPV 12/06/2022 11.4  6.0 - 12.0 fL Final   • Platelets 12/06/2022 266  140 - 450 10*3/mm3 Final   • Neutrophil % 12/06/2022 39.4 (L)  42.7 - 76.0 % Final   • Lymphocyte % 12/06/2022 50.8 (H)  19.6 - 45.3 % Final   • Monocyte % 12/06/2022 7.4  5.0 - 12.0 % Final   • Eosinophil % 12/06/2022 1.6  0.3 - 6.2 % Final   • Basophil % 12/06/2022 0.7  0.0 - 1.5 % Final   • Immature Grans % 12/06/2022 0.1  0.0 - 0.5 % Final   • Neutrophils, Absolute 12/06/2022 2.72  1.70 - 7.00 10*3/mm3 Final   • Lymphocytes, Absolute 12/06/2022 3.51 (H)  0.70 - 3.10 10*3/mm3 Final   • Monocytes, Absolute 12/06/2022 0.51  0.10 - 0.90 10*3/mm3 Final   • Eosinophils, Absolute 12/06/2022 0.11  0.00 - 0.40 10*3/mm3 Final   • Basophils, Absolute 12/06/2022 0.05  0.00 - 0.20 10*3/mm3 Final   • Immature Grans, Absolute 12/06/2022 0.01  0.00 - 0.05 10*3/mm3 Final   • nRBC 12/06/2022 0.0  0.0 - 0.2 /100 WBC Final   • Glucose 12/06/2022 195 (H)  65 - 99 mg/dL Final   • BUN 12/06/2022 10  6 - 20 mg/dL Final   • Creatinine 12/06/2022 0.89  0.57 - 1.00 mg/dL Final   • Sodium 12/06/2022 141  136 - 145 mmol/L Final   • Potassium 12/06/2022 4.2  3.5 - 5.2 mmol/L Final   • Chloride 12/06/2022 102  98 - 107 mmol/L Final   • CO2 12/06/2022 29.0  22.0 - 29.0 mmol/L Final   • Calcium 12/06/2022 8.6  8.6 - 10.5 mg/dL Final   • Albumin 12/06/2022 4.20  3.50 - 5.20 g/dL Final   • Phosphorus 12/06/2022 5.7 (H)  2.5 - 4.5 mg/dL Final   • Anion Gap 12/06/2022 10.0  5.0 - 15.0 mmol/L Final   • BUN/Creatinine Ratio 12/06/2022 11.2  7.0 - 25.0 Final   • eGFR 12/06/2022 75.3  >60.0 mL/min/1.73 Final    National  Kidney Foundation and American Society of Nephrology (ASN) Task Force recommended calculation based on the Chronic Kidney Disease Epidemiology Collaboration (CKD-EPI) equation refit without adjustment for race.   • 1,25-Dihydroxy, Vitamin D 12/06/2022 33.4  24.8 - 81.5 pg/mL Final   • 25 Hydroxy, Vitamin D 12/06/2022 49.4  30.0 - 100.0 ng/ml Final   • Magnesium 12/06/2022 1.9  1.6 - 2.6 mg/dL Final   Lab on 10/25/2022   Component Date Value Ref Range Status   • Glucose 10/25/2022 185 (H)  65 - 99 mg/dL Final   • BUN 10/25/2022 7  6 - 20 mg/dL Final   • Creatinine 10/25/2022 0.63  0.57 - 1.00 mg/dL Final   • Sodium 10/25/2022 142  136 - 145 mmol/L Final   • Potassium 10/25/2022 4.0  3.5 - 5.2 mmol/L Final   • Chloride 10/25/2022 103  98 - 107 mmol/L Final   • CO2 10/25/2022 25.0  22.0 - 29.0 mmol/L Final   • Calcium 10/25/2022 7.7 (L)  8.6 - 10.5 mg/dL Final   • Albumin 10/25/2022 4.20  3.50 - 5.20 g/dL Final   • Phosphorus 10/25/2022 5.3 (H)  2.5 - 4.5 mg/dL Final   • Anion Gap 10/25/2022 14.0  5.0 - 15.0 mmol/L Final   • BUN/Creatinine Ratio 10/25/2022 11.1  7.0 - 25.0 Final   • eGFR 10/25/2022 103.0  >60.0 mL/min/1.73 Final    National Kidney Foundation and American Society of Nephrology (ASN) Task Force recommended calculation based on the Chronic Kidney Disease Epidemiology Collaboration (CKD-EPI) equation refit without adjustment for race.   • Magnesium 10/25/2022 1.9  1.6 - 2.6 mg/dL Final   Lab on 10/14/2022   Component Date Value Ref Range Status   • Magnesium 10/14/2022 1.7  1.6 - 2.6 mg/dL Final   Telemedicine on 10/10/2022   Component Date Value Ref Range Status   • PTH, Intact 10/14/2022 6.8 (L)  15.0 - 65.0 pg/mL Final   • Calcium 10/14/2022 8.8  8.6 - 10.5 mg/dL Final   • Glucose 10/14/2022 135 (H)  65 - 99 mg/dL Final   • BUN 10/14/2022 11  6 - 20 mg/dL Final   • Creatinine 10/14/2022 0.79  0.57 - 1.00 mg/dL Final   • Sodium 10/14/2022 141  136 - 145 mmol/L Final   • Potassium 10/14/2022 3.9  3.5 -  5.2 mmol/L Final   • Chloride 10/14/2022 101  98 - 107 mmol/L Final   • CO2 10/14/2022 26.0  22.0 - 29.0 mmol/L Final   • Calcium 10/14/2022 8.7  8.6 - 10.5 mg/dL Final   • Albumin 10/14/2022 4.40  3.50 - 5.20 g/dL Final   • Phosphorus 10/14/2022 5.8 (H)  2.5 - 4.5 mg/dL Final   • Anion Gap 10/14/2022 14.0  5.0 - 15.0 mmol/L Final   • BUN/Creatinine Ratio 10/14/2022 13.9  7.0 - 25.0 Final   • eGFR 10/14/2022 86.8  >60.0 mL/min/1.73 Final    National Kidney Foundation and American Society of Nephrology (ASN) Task Force recommended calculation based on the Chronic Kidney Disease Epidemiology Collaboration (CKD-EPI) equation refit without adjustment for race.   • 25 Hydroxy, Vitamin D 10/14/2022 80.9  30.0 - 100.0 ng/ml Final   • TSH 10/14/2022 0.011 (L)  0.270 - 4.200 uIU/mL Final   Admission on 10/04/2022, Discharged on 10/04/2022   Component Date Value Ref Range Status   • QT Interval 10/04/2022 376  ms Final   • QTC Interval 10/04/2022 449  ms Final   • Glucose 10/04/2022 211 (H)  65 - 99 mg/dL Final   • BUN 10/04/2022 8  6 - 20 mg/dL Final   • Creatinine 10/04/2022 0.79  0.57 - 1.00 mg/dL Final   • Sodium 10/04/2022 139  136 - 145 mmol/L Final   • Potassium 10/04/2022 3.6  3.5 - 5.2 mmol/L Final   • Chloride 10/04/2022 97 (L)  98 - 107 mmol/L Final   • CO2 10/04/2022 29.0  22.0 - 29.0 mmol/L Final   • Calcium 10/04/2022 8.0 (L)  8.6 - 10.5 mg/dL Final   • Total Protein 10/04/2022 7.6  6.0 - 8.5 g/dL Final   • Albumin 10/04/2022 4.50  3.50 - 5.20 g/dL Final   • ALT (SGPT) 10/04/2022 29  1 - 33 U/L Final   • AST (SGOT) 10/04/2022 20  1 - 32 U/L Final   • Alkaline Phosphatase 10/04/2022 112  39 - 117 U/L Final   • Total Bilirubin 10/04/2022 0.5  0.0 - 1.2 mg/dL Final   • Globulin 10/04/2022 3.1  gm/dL Final   • A/G Ratio 10/04/2022 1.5  g/dL Final   • BUN/Creatinine Ratio 10/04/2022 10.1  7.0 - 25.0 Final   • Anion Gap 10/04/2022 13.0  5.0 - 15.0 mmol/L Final   • eGFR 10/04/2022 86.8  >60.0 mL/min/1.73 Final     National Kidney Foundation and American Society of Nephrology (ASN) Task Force recommended calculation based on the Chronic Kidney Disease Epidemiology Collaboration (CKD-EPI) equation refit without adjustment for race.   • proBNP 10/04/2022 89.4  0.0 - 900.0 pg/mL Final   • Troponin T 10/04/2022 <0.010  0.000 - 0.030 ng/mL Final   • Extra Tube 10/04/2022 Hold for add-ons.   Final    Auto resulted.   • Extra Tube 10/04/2022 hold for add-on   Final    Auto resulted   • Extra Tube 10/04/2022 Hold for add-ons.   Final    Auto resulted.   • Extra Tube 10/04/2022 Hold for add-ons.   Final    Auto resulted   • WBC 10/04/2022 8.93  3.40 - 10.80 10*3/mm3 Final   • RBC 10/04/2022 4.42  3.77 - 5.28 10*6/mm3 Final   • Hemoglobin 10/04/2022 12.1  12.0 - 15.9 g/dL Final   • Hematocrit 10/04/2022 36.7  34.0 - 46.6 % Final   • MCV 10/04/2022 83.0  79.0 - 97.0 fL Final   • MCH 10/04/2022 27.4  26.6 - 33.0 pg Final   • MCHC 10/04/2022 33.0  31.5 - 35.7 g/dL Final   • RDW 10/04/2022 13.8  12.3 - 15.4 % Final   • RDW-SD 10/04/2022 41.3  37.0 - 54.0 fl Final   • MPV 10/04/2022 9.9  6.0 - 12.0 fL Final   • Platelets 10/04/2022 264  140 - 450 10*3/mm3 Final   • Neutrophil % 10/04/2022 64.2  42.7 - 76.0 % Final   • Lymphocyte % 10/04/2022 26.2  19.6 - 45.3 % Final   • Monocyte % 10/04/2022 8.4  5.0 - 12.0 % Final   • Eosinophil % 10/04/2022 0.7  0.3 - 6.2 % Final   • Basophil % 10/04/2022 0.1  0.0 - 1.5 % Final   • Immature Grans % 10/04/2022 0.4  0.0 - 0.5 % Final   • Neutrophils, Absolute 10/04/2022 5.73  1.70 - 7.00 10*3/mm3 Final   • Lymphocytes, Absolute 10/04/2022 2.34  0.70 - 3.10 10*3/mm3 Final   • Monocytes, Absolute 10/04/2022 0.75  0.10 - 0.90 10*3/mm3 Final   • Eosinophils, Absolute 10/04/2022 0.06  0.00 - 0.40 10*3/mm3 Final   • Basophils, Absolute 10/04/2022 0.01  0.00 - 0.20 10*3/mm3 Final   • Immature Grans, Absolute 10/04/2022 0.04  0.00 - 0.05 10*3/mm3 Final   • nRBC 10/04/2022 0.0  0.0 - 0.2 /100 WBC Final   • Extra  Tube 10/04/2022 Hold for add-ons.   Final    Auto resulted.   • Lipase 10/04/2022 62 (H)  13 - 60 U/L Final   Lab on 10/03/2022   Component Date Value Ref Range Status   • Glucose 10/03/2022 175 (H)  65 - 99 mg/dL Final   • BUN 10/03/2022 8  6 - 20 mg/dL Final   • Creatinine 10/03/2022 0.75  0.57 - 1.00 mg/dL Final   • Sodium 10/03/2022 140  136 - 145 mmol/L Final   • Potassium 10/03/2022 4.0  3.5 - 5.2 mmol/L Final    Slight hemolysis detected by analyzer. Results may be affected.   • Chloride 10/03/2022 97 (L)  98 - 107 mmol/L Final   • CO2 10/03/2022 25.0  22.0 - 29.0 mmol/L Final   • Calcium 10/03/2022 8.0 (L)  8.6 - 10.5 mg/dL Final   • Albumin 10/03/2022 4.30  3.50 - 5.20 g/dL Final   • Phosphorus 10/03/2022 5.3 (H)  2.5 - 4.5 mg/dL Final   • Anion Gap 10/03/2022 18.0 (H)  5.0 - 15.0 mmol/L Final   • BUN/Creatinine Ratio 10/03/2022 10.7  7.0 - 25.0 Final   • eGFR 10/03/2022 92.4  >60.0 mL/min/1.73 Final    National Kidney Foundation and American Society of Nephrology (ASN) Task Force recommended calculation based on the Chronic Kidney Disease Epidemiology Collaboration (CKD-EPI) equation refit without adjustment for race.   • Magnesium 10/03/2022 2.3  1.6 - 2.6 mg/dL Final   Orders Only on 09/26/2022   Component Date Value Ref Range Status   • Glucose 09/26/2022 210 (H)  65 - 99 mg/dL Final   • BUN 09/26/2022 6  6 - 20 mg/dL Final   • Creatinine 09/26/2022 0.75  0.57 - 1.00 mg/dL Final   • Sodium 09/26/2022 143  136 - 145 mmol/L Final   • Potassium 09/26/2022 4.3  3.5 - 5.2 mmol/L Final    Slight hemolysis detected by analyzer. Results may be affected.   • Chloride 09/26/2022 102  98 - 107 mmol/L Final   • CO2 09/26/2022 27.0  22.0 - 29.0 mmol/L Final   • Calcium 09/26/2022 7.9 (L)  8.6 - 10.5 mg/dL Final   • Albumin 09/26/2022 4.10  3.50 - 5.20 g/dL Final   • Phosphorus 09/26/2022 4.8 (H)  2.5 - 4.5 mg/dL Final   • Anion Gap 09/26/2022 14.0  5.0 - 15.0 mmol/L Final   • BUN/Creatinine Ratio 09/26/2022 8.0  7.0  - 25.0 Final   • eGFR 09/26/2022 92.4  >60.0 mL/min/1.73 Final    National Kidney Foundation and American Society of Nephrology (ASN) Task Force recommended calculation based on the Chronic Kidney Disease Epidemiology Collaboration (CKD-EPI) equation refit without adjustment for race.   • Magnesium 09/26/2022 1.9  1.6 - 2.6 mg/dL Final   Lab on 09/19/2022   Component Date Value Ref Range Status   • WBC 09/19/2022 5.17  3.40 - 10.80 10*3/mm3 Final   • RBC 09/19/2022 4.20  3.77 - 5.28 10*6/mm3 Final   • Hemoglobin 09/19/2022 11.6 (L)  12.0 - 15.9 g/dL Final   • Hematocrit 09/19/2022 36.1  34.0 - 46.6 % Final   • MCV 09/19/2022 86.0  79.0 - 97.0 fL Final   • MCH 09/19/2022 27.6  26.6 - 33.0 pg Final   • MCHC 09/19/2022 32.1  31.5 - 35.7 g/dL Final   • RDW 09/19/2022 14.4  12.3 - 15.4 % Final   • RDW-SD 09/19/2022 44.9  37.0 - 54.0 fl Final   • MPV 09/19/2022 10.9  6.0 - 12.0 fL Final   • Platelets 09/19/2022 283  140 - 450 10*3/mm3 Final   • Neutrophil % 09/19/2022 51.8  42.7 - 76.0 % Final    Appended report. These results have been appended to a previously final verified report.   • Lymphocyte % 09/19/2022 38.5  19.6 - 45.3 % Final    Appended report. These results have been appended to a previously final verified report.   • Monocyte % 09/19/2022 6.4  5.0 - 12.0 % Final    Appended report. These results have been appended to a previously final verified report.   • Eosinophil % 09/19/2022 2.3  0.3 - 6.2 % Final    Appended report. These results have been appended to a previously final verified report.   • Basophil % 09/19/2022 0.6  0.0 - 1.5 % Final    Appended report. These results have been appended to a previously final verified report.   • Immature Grans % 09/19/2022 0.4  0.0 - 0.5 % Final    Appended report. These results have been appended to a previously final verified report.   • Neutrophils, Absolute 09/19/2022 2.68  1.70 - 7.00 10*3/mm3 Final    Appended report. These results have been appended to a  previously final verified report.   • Lymphocytes, Absolute 09/19/2022 1.99  0.70 - 3.10 10*3/mm3 Final    Appended report. These results have been appended to a previously final verified report.   • Monocytes, Absolute 09/19/2022 0.33  0.10 - 0.90 10*3/mm3 Final    Appended report. These results have been appended to a previously final verified report.   • Eosinophils, Absolute 09/19/2022 0.12  0.00 - 0.40 10*3/mm3 Final    Appended report. These results have been appended to a previously final verified report.   • Basophils, Absolute 09/19/2022 0.03  0.00 - 0.20 10*3/mm3 Final    Appended report. These results have been appended to a previously final verified report.   • Immature Grans, Absolute 09/19/2022 0.02  0.00 - 0.05 10*3/mm3 Final    Appended report. These results have been appended to a previously final verified report.   • nRBC 09/19/2022 0.0  0.0 - 0.2 /100 WBC Final   • Glucose 09/19/2022 186 (H)  65 - 99 mg/dL Final   • BUN 09/19/2022 8  6 - 20 mg/dL Final   • Creatinine 09/19/2022 0.77  0.57 - 1.00 mg/dL Final   • Sodium 09/19/2022 139  136 - 145 mmol/L Final   • Potassium 09/19/2022 4.4  3.5 - 5.2 mmol/L Final   • Chloride 09/19/2022 100  98 - 107 mmol/L Final   • CO2 09/19/2022 29.0  22.0 - 29.0 mmol/L Final   • Calcium 09/19/2022 7.9 (L)  8.6 - 10.5 mg/dL Final   • Total Protein 09/19/2022 7.4  6.0 - 8.5 g/dL Final   • Albumin 09/19/2022 4.00  3.50 - 5.20 g/dL Final   • ALT (SGPT) 09/19/2022 32  1 - 33 U/L Final   • AST (SGOT) 09/19/2022 29  1 - 32 U/L Final   • Alkaline Phosphatase 09/19/2022 109  39 - 117 U/L Final   • Total Bilirubin 09/19/2022 0.2  0.0 - 1.2 mg/dL Final   • Globulin 09/19/2022 3.4  gm/dL Final   • A/G Ratio 09/19/2022 1.2  g/dL Final   • BUN/Creatinine Ratio 09/19/2022 10.4  7.0 - 25.0 Final   • Anion Gap 09/19/2022 10.0  5.0 - 15.0 mmol/L Final   • eGFR 09/19/2022 89.5  >60.0 mL/min/1.73 Final    National Kidney Foundation and American Society of Nephrology (ASN) Task Force  recommended calculation based on the Chronic Kidney Disease Epidemiology Collaboration (CKD-EPI) equation refit without adjustment for race.   • TSH 09/19/2022 <0.005 (L)  0.270 - 4.200 uIU/mL Final   • Free T4 09/19/2022 1.81 (H)  0.93 - 1.70 ng/dL Final   • T3, Free 09/19/2022 3.39  2.00 - 4.40 pg/mL Final   • 25 Hydroxy, Vitamin D 09/19/2022 89.4  30.0 - 100.0 ng/ml Final   • Magnesium 09/19/2022 1.7  1.6 - 2.6 mg/dL Final   • Phosphorus 09/19/2022 5.1 (H)  2.5 - 4.5 mg/dL Final   • RBC Morphology 09/19/2022 Normal  Normal Final   • WBC Morphology 09/19/2022 Normal  Normal Final   • Platelet Morphology 09/19/2022 Normal  Normal Final   Office Visit on 09/12/2022   Component Date Value Ref Range Status   • Calcium, 24H Urine 09/20/2022 120.2  100.0 - 300.0 mg/24 hr Final   • Calcium, Urine 09/20/2022 8.9  mg/dL Final   • 24H Urine Volume 09/20/2022 1,350  mL Final   • Time (Hours) 09/20/2022 24  hrs Final   • Creatinine, 24H 09/20/2022 0.86  0.70 - 1.60 g/24 hr Final   • Creatinine, Urine 09/20/2022 63.9  mg/dL Final   • 24H Urine Volume 09/20/2022 1,350  mL Final   • Time (Hours) 09/20/2022 24  hrs Final   Lab on 08/29/2022   Component Date Value Ref Range Status   • WBC 08/29/2022 6.19  3.40 - 10.80 10*3/mm3 Final   • RBC 08/29/2022 4.18  3.77 - 5.28 10*6/mm3 Final   • Hemoglobin 08/29/2022 11.5 (L)  12.0 - 15.9 g/dL Final   • Hematocrit 08/29/2022 34.0  34.0 - 46.6 % Final   • MCV 08/29/2022 81.3  79.0 - 97.0 fL Final   • MCH 08/29/2022 27.5  26.6 - 33.0 pg Final   • MCHC 08/29/2022 33.8  31.5 - 35.7 g/dL Final   • RDW 08/29/2022 13.8  12.3 - 15.4 % Final   • RDW-SD 08/29/2022 40.6  37.0 - 54.0 fl Final   • MPV 08/29/2022 10.4  6.0 - 12.0 fL Final   • Platelets 08/29/2022 343  140 - 450 10*3/mm3 Final   • Neutrophil % 08/29/2022 53.1  42.7 - 76.0 % Final   • Lymphocyte % 08/29/2022 38.6  19.6 - 45.3 % Final   • Monocyte % 08/29/2022 5.8  5.0 - 12.0 % Final   • Eosinophil % 08/29/2022 1.8  0.3 - 6.2 % Final   •  Basophil % 08/29/2022 0.5  0.0 - 1.5 % Final   • Immature Grans % 08/29/2022 0.2  0.0 - 0.5 % Final   • Neutrophils, Absolute 08/29/2022 3.29  1.70 - 7.00 10*3/mm3 Final   • Lymphocytes, Absolute 08/29/2022 2.39  0.70 - 3.10 10*3/mm3 Final   • Monocytes, Absolute 08/29/2022 0.36  0.10 - 0.90 10*3/mm3 Final   • Eosinophils, Absolute 08/29/2022 0.11  0.00 - 0.40 10*3/mm3 Final   • Basophils, Absolute 08/29/2022 0.03  0.00 - 0.20 10*3/mm3 Final   • Immature Grans, Absolute 08/29/2022 0.01  0.00 - 0.05 10*3/mm3 Final   • nRBC 08/29/2022 0.0  0.0 - 0.2 /100 WBC Final   • Glucose 08/29/2022 134 (H)  65 - 99 mg/dL Final   • BUN 08/29/2022 7  6 - 20 mg/dL Final   • Creatinine 08/29/2022 0.56 (L)  0.57 - 1.00 mg/dL Final   • Sodium 08/29/2022 139  136 - 145 mmol/L Final   • Potassium 08/29/2022 4.3  3.5 - 5.2 mmol/L Final   • Chloride 08/29/2022 102  98 - 107 mmol/L Final   • CO2 08/29/2022 25.2  22.0 - 29.0 mmol/L Final   • Calcium 08/29/2022 8.2 (L)  8.6 - 10.5 mg/dL Final   • Total Protein 08/29/2022 6.7  6.0 - 8.5 g/dL Final   • Albumin 08/29/2022 3.90  3.50 - 5.20 g/dL Final   • ALT (SGPT) 08/29/2022 52 (H)  1 - 33 U/L Final   • AST (SGOT) 08/29/2022 39 (H)  1 - 32 U/L Final   • Alkaline Phosphatase 08/29/2022 114  39 - 117 U/L Final   • Total Bilirubin 08/29/2022 0.3  0.0 - 1.2 mg/dL Final   • Globulin 08/29/2022 2.8  gm/dL Final   • A/G Ratio 08/29/2022 1.4  g/dL Final   • BUN/Creatinine Ratio 08/29/2022 12.5  7.0 - 25.0 Final   • Anion Gap 08/29/2022 11.8  5.0 - 15.0 mmol/L Final   • eGFR 08/29/2022 105.9  >60.0 mL/min/1.73 Final    National Kidney Foundation and American Society of Nephrology (ASN) Task Force recommended calculation based on the Chronic Kidney Disease Epidemiology Collaboration (CKD-EPI) equation refit without adjustment for race.   • Hemoglobin A1C 08/29/2022 7.00 (H)  4.80 - 5.60 % Final   • Total Cholesterol 08/29/2022 133  0 - 200 mg/dL Final   • Triglycerides 08/29/2022 228 (H)  0 - 150 mg/dL  Final   • HDL Cholesterol 08/29/2022 40  40 - 60 mg/dL Final   • LDL Cholesterol  08/29/2022 56  0 - 100 mg/dL Final   • VLDL Cholesterol 08/29/2022 37  5 - 40 mg/dL Final   • LDL/HDL Ratio 08/29/2022 1.19   Final   • TSH 08/29/2022 <0.005 (L)  0.270 - 4.200 uIU/mL Final   • Free T4 08/29/2022 1.78 (H)  0.93 - 1.70 ng/dL Final   • 25 Hydroxy, Vitamin D 08/29/2022 161.0 (H)  30.0 - 100.0 ng/ml Final   • Vitamin B-12 08/29/2022 316  211 - 946 pg/mL Final   • Microalbumin/Creatinine Ratio 08/29/2022    Final    Unable to calculate   • Creatinine, Urine 08/29/2022 106.5  mg/dL Final   • Microalbumin, Urine 08/29/2022 <1.2  mg/dL Final   • PTH, Intact 08/29/2022 7.4 (L)  15.0 - 65.0 pg/mL Final   • Calcium 08/29/2022 8.1 (L)  8.6 - 10.5 mg/dL Final   • Iron 08/29/2022 67  37 - 145 mcg/dL Final   • Iron Saturation 08/29/2022 22  20 - 50 % Final   • Transferrin 08/29/2022 207  200 - 360 mg/dL Final   • TIBC 08/29/2022 308  298 - 536 mcg/dL Final   • Ferritin 08/29/2022 254.00 (H)  13.00 - 150.00 ng/mL Final   There may be more visits with results that are not included.      CT Abdomen Pelvis Without Contrast  Narrative: EXAM: CT ABDOMEN AND PELVIS WITHOUT INTRAVENOUS CONTRAST     INDICATION: 57 years year old Female with left flank pain    COMPARISON: None    TECHNIQUE: This examination was performed according to our  departmental dose optimization program which includes automated  exposure control, adjustment of the MA and kV according to  patient size, and/or use of iterative reconstruction technique.     FINDINGS:   Negative appendix. The small bowel is normal in caliber without  evidence for mechanical small bowel obstruction. There is mild  nonspecific prominence of mesenteric lymph nodes with adjacent  ill-defined inflammatory stranding. Specifically, the largest  mesenteric lymph node measures up to 1 cm in short axis.    There is relatively large amount of stool in the colon.    Diffuse fatty infiltration of the  liver. Negative adrenal glands.  The spleen and pancreas have unremarkable unenhanced appearances.  No urinary calcifications or hydronephrosis.    Atelectasis in the visualized lung bases.    No acute or suspicious osseous abnormality.  Impression: 1. Negative appendix.  2. Fatty infiltration of the liver.  3. Nonspecific mild prominence of mesenteric lymph nodes with  adjacent ill-defined inflammatory stranding. This can be seen  with a mesenteric panniculitis, though this can also be  associated with a lymphoproliferative disorder. The spleen  measures within normal limits. Follow-up imaging in approximately  six months is recommended to assess for persistence or interval  change. #4 relatively large amount of stool in the colon.    Electronically signed by:  Carlos Garcia MD  10/4/2022 9:58 AM CDT  Workstation: [id number]  XR Chest 2 View  Narrative: EXAM DESCRIPTION:  XR CHEST 2 VIEWS    CLINICAL INDICATION: SOA Triage Protocol     COMPARISON: None    FINDINGS: Mild cardiomegaly. Pulmonary vascularity is within  normal limits. Likely atelectasis in both lung bases. No pleural  effusion or pneumothorax.  Impression: 1. Cardiomegaly.  2. Likely atelectasis in both lung bases.    Electronically signed by:  Carlos Garcia MD  10/4/2022 9:55 AM CDT  Workstation: [id number]    [unfilled]  Immunization History   Administered Date(s) Administered   • Pneumococcal Polysaccharide (PPSV23) 03/27/2018   • Tdap 03/27/2018       The following portions of the patient's history were reviewed and updated as appropriate: allergies, current medications, past family history, past medical history, past social history, past surgical history and problem list.        Physical Exam  Physical Exam  Vitals and nursing note reviewed.   Constitutional:       Appearance: She is well-developed. She is not diaphoretic.   HENT:      Head: Normocephalic and atraumatic.      Right Ear: External ear normal.   Eyes:      Conjunctiva/sclera:  Conjunctivae normal.      Pupils: Pupils are equal, round, and reactive to light.   Cardiovascular:      Rate and Rhythm: Normal rate and regular rhythm.      Heart sounds: Normal heart sounds. No murmur heard.  Pulmonary:      Effort: Pulmonary effort is normal. No respiratory distress.      Breath sounds: Normal breath sounds.   Abdominal:      General: Bowel sounds are normal. There is no distension.      Palpations: Abdomen is soft.      Tenderness: There is no abdominal tenderness.      Comments: Obese abdomen    Musculoskeletal:         General: Tenderness present. No deformity. Normal range of motion.      Cervical back: Normal range of motion and neck supple.   Skin:     General: Skin is warm.      Coloration: Skin is not pale.      Findings: No erythema or rash.   Neurological:      Mental Status: She is alert and oriented to person, place, and time.      Cranial Nerves: No cranial nerve deficit.   Psychiatric:         Behavior: Behavior normal.         [unfilled]  No diagnosis found.       -went over labwork  -recommend COVID-19 vaccination   -recommend mammogram screening - schedule at imaging center   -recommend cologuard test -will order   -recommend DEXA scan - schedule at imaging center   -recommend diabetic eye exam   -recommend influenza vaccination   -fatty liver/abnormal CT of abdomen/IBS  - referred to GI. Recommended repeat CT in 6 months for possible mesenteric panniculitis  On amitiza 8 mg PO BID   -hypocalcemia/hypoparathyroidism   Endocrinology following on calcitriol 0.25 mg daily on phoslo 3 tablets BID on Magnesium Oxide   -iron deficiency on ferrous  sulfate 325 mg PO  TID   -DM type 2-  on metformin 1000 mg PO BID, on jardiance 25 mg daily. on rybelsus 3 mg PO q daily.   -insomnia - on trazodone  100 mg PO Q daily.   -diabetic neuropathy - on neurontin 300 mg PO TID   -hypertension - on clonidine 0.3 mg PO BID on  lisionpril 40 mg daily. On toprol  mg daily.  On norvasc 10  mg daily.   -Obesity BMI >30 . - recommended weight loss information and DASH diet. Counseled weight loss >5 minuutes  BMI at 36.64    -hyperlipidemia - HDL low. REcommended high healthy fat diet stop simvastatin and start on lipitor 20 mg PO qhs. Drug information provided   - hypothyroidism/sp thyroidectomy --on synthroid 137 mcg 6 days a week   -depression/grieving - on lexapro 20 mg daily. On abilify 300 mg daily on lamictal 200 mg daily.   Was on wellbutrin  mg daily.   -iron deficiency anemia - on ferrous sulfate 325 mg PO TID. Will stop this due to high iron levls   -advised to go to ER or call 911 if symptoms or severe  -advised to be safe and call with questions and concerns   -advised to followup with specialist and referrals   -adivsed pt to be safe during COVID-19 pandemic   I spent 45  minutes caring for Joan on this date of service. This time includes time spent by me in the following activities: preparing for the visit, reviewing tests, obtaining and/or reviewing a separately obtained history, performing a medically appropriate examination and/or evaluation, counseling and educating the patient/family/caregiver, ordering medications, tests, or procedures, referring and communicating with other health care professionals, documenting information in the medical record, independently interpreting results and communicating that information with the patient/family/caregiver and care coordination.         This document has been electronically signed by Brad Arnold MD on December 21, 2022 10:23 CST        Answers for HPI/ROS submitted by the patient on 11/15/2022  Please describe your symptoms.: Blood work  Have you had these symptoms before?: No  How long have you been having these symptoms?: 1-4 days  Please list any medications you are currently taking for this condition.: None  Please describe any probable cause for these symptoms. : None  What is the primary reason for your visit?: Other        Subjective:  Joan Blackwell is a 57 y.o. female who presents for       Patient Active Problem List   Diagnosis   • Hypocalcemia   • Hyperlipidemia   • Iron deficiency anemia   • Hypothyroidism   • S/P thyroidectomy   • Uncontrolled type 2 diabetes mellitus with complication, without long-term current use of insulin   • Obesity   • Uncontrolled hypertension   • Hypertensive urgency   • Right wrist pain   • Carpal tunnel syndrome of right wrist   • Controlled type 2 diabetes mellitus with complication, without long-term current use of insulin (HCC)   • Essential hypertension   • Vitamin D deficiency   • Sprain of left ankle   • Grieving   • Rotator cuff syndrome of right shoulder   • Hypotension   • Bilateral low back pain with bilateral sciatica   • Fall   • Right shoulder pain   • Dental abscess   • Body mass index (BMI) of 35.0 to 35.9   • Former smoker   • Iron overload   • High vitamin D level   • Hypoparathyroidism (HCC)   • Abnormal CT of the abdomen   • Encounter for screening for malignant neoplasm of colon   • Right acute serous otitis media   • Swelling of left foot   • Diabetic foot (HCC)           Current Outpatient Medications:   •  Accu-Chek Yany Plus test strip, USE ONE STRIP TO TEST BLOOD SUGAR THREE TIMES A DAY FOR DIABETES, Disp: 200 each, Rfl: 3  •  Accu-Chek Softclix Lancets lancets, 1 each by Other route 3 (Three) Times a Day. Use as instructed, Disp: 300 each, Rfl: 3  •  Alcohol Swabs (B-D SINGLE USE SWABS REGULAR) pads, USE AS DIRECTED AS NEEDED, Disp: 300 each, Rfl: 3  •  ALPRAZolam (XANAX) 1 MG tablet, , Disp: , Rfl:   •  amLODIPine (NORVASC) 5 MG tablet, Take 1 tablet by mouth Daily., Disp: 90 tablet, Rfl: 1  •  amoxicillin-clavulanate (Augmentin) 875-125 MG per tablet, Take 1 tablet by mouth 2 (Two) Times a Day for 10 days., Disp: 20 tablet, Rfl: 0  •  ARIPiprazole (ABILIFY) 30 MG tablet, , Disp: , Rfl:   •  atorvastatin (LIPITOR) 20 MG tablet, Take 1 tablet by mouth Daily., Disp:  90 tablet, Rfl: 1  •  Blood Glucose Calibration (Accu-Chek Guide Control) liquid, USE AS DIRECTED, Disp: 1 each, Rfl: 1  •  Blood Glucose Monitoring Suppl (Accu-Chek Guide) w/Device kit, USE AS DIRECTED, Disp: 1 kit, Rfl: 0  •  buPROPion XL (WELLBUTRIN XL) 300 MG 24 hr tablet, , Disp: , Rfl:   •  calcitriol (ROCALTROL) 0.25 MCG capsule, 2 tabs  daily, Disp: 60 capsule, Rfl: 11  •  calcium acetate (PHOS BINDER,) 667 MG capsule capsule, Take 4 capsules am and 3 capsules pm, Disp: 210 capsule, Rfl: 11  •  cloNIDine (CATAPRES) 0.3 MG tablet, Take 1 tablet by mouth Every 12 (Twelve) Hours., Disp: 180 tablet, Rfl: 1  •  empagliflozin (Jardiance) 25 MG tablet tablet, Take 1 tablet by mouth Daily., Disp: 90 tablet, Rfl: 3  •  escitalopram (LEXAPRO) 20 MG tablet, Take 1 tablet by mouth Daily., Disp: 30 tablet, Rfl: 3  •  ferrous sulfate (FeroSul) 325 (65 FE) MG tablet, Take 1 tablet by mouth 3 (Three) Times a Day With Meals., Disp: 270 tablet, Rfl: 1  •  furosemide (Lasix) 20 MG tablet, Take 1 tablet by mouth 2 (Two) Times a Day., Disp: 60 tablet, Rfl: 3  •  HYDROcodone-acetaminophen (NORCO)  MG per tablet, , Disp: , Rfl:   •  lamoTRIgine (LaMICtal) 200 MG tablet, , Disp: , Rfl:   •  levothyroxine (Synthroid) 137 MCG tablet, Take 1 tablet by mouth Daily., Disp: 30 tablet, Rfl: 3  •  lisinopril (PRINIVIL,ZESTRIL) 40 MG tablet, Take 1 tablet by mouth Daily., Disp: 90 tablet, Rfl: 1  •  lubiprostone (AMITIZA) 8 MCG capsule, Take 1 capsule by mouth 2 (Two) Times a Day With Meals., Disp: 14 capsule, Rfl: 0  •  magnesium oxide 250 MG tablet, Take 1 tablet by mouth Daily., Disp: 90 tablet, Rfl: 1  •  metFORMIN (GLUCOPHAGE) 1000 MG tablet, Take 1 tablet by mouth 2 (Two) Times a Day With Meals., Disp: 180 tablet, Rfl: 3  •  metoprolol succinate XL (TOPROL-XL) 100 MG 24 hr tablet, Take 1 tablet by mouth Daily., Disp: 90 tablet, Rfl: 1  •  ondansetron (ZOFRAN) 8 MG tablet, Take 1 tablet by mouth Every 8 (Eight) Hours As Needed  for Nausea or Vomiting., Disp: 90 tablet, Rfl: 2  •  polyethylene glycol (GoLYTELY) 236 g solution, Please use the instructions given in office, Disp: 4000 mL, Rfl: 0  •  potassium chloride ER (K-TAB) 20 MEQ tablet controlled-release ER tablet, Take 1 tablet by mouth Daily., Disp: 90 tablet, Rfl: 1  •  Semaglutide (Rybelsus) 3 MG tablet, Take 1 tablet by mouth Daily., Disp: 90 tablet, Rfl: 1  •  traZODone (DESYREL) 100 MG tablet, Take 1 tablet by mouth Every Night., Disp: 90 tablet, Rfl: 0  •  vitamin B-12 (CYANOCOBALAMIN) 500 MCG tablet, Take 1 tablet by mouth Daily., Disp: 30 tablet, Rfl: 3    HPI      Review of Systems  Review of Systems   Constitutional: Positive for activity change and fatigue. Negative for appetite change, chills, diaphoresis and fever.   HENT: Positive for ear pain. Negative for congestion, postnasal drip, rhinorrhea, sinus pressure, sinus pain, sneezing, sore throat, trouble swallowing and voice change.    Respiratory: Negative for cough, choking, chest tightness, shortness of breath, wheezing and stridor.    Cardiovascular: Negative for chest pain.   Gastrointestinal: Negative for diarrhea, nausea and vomiting.   Musculoskeletal: Positive for arthralgias.   Neurological: Positive for weakness and numbness. Negative for headaches.   Psychiatric/Behavioral: The patient is nervous/anxious.        Patient Active Problem List   Diagnosis   • Hypocalcemia   • Hyperlipidemia   • Iron deficiency anemia   • Hypothyroidism   • S/P thyroidectomy   • Uncontrolled type 2 diabetes mellitus with complication, without long-term current use of insulin   • Obesity   • Uncontrolled hypertension   • Hypertensive urgency   • Right wrist pain   • Carpal tunnel syndrome of right wrist   • Controlled type 2 diabetes mellitus with complication, without long-term current use of insulin (HCC)   • Essential hypertension   • Vitamin D deficiency   • Sprain of left ankle   • Grieving   • Rotator cuff syndrome of right  shoulder   • Hypotension   • Bilateral low back pain with bilateral sciatica   • Fall   • Right shoulder pain   • Dental abscess   • Body mass index (BMI) of 35.0 to 35.9   • Former smoker   • Iron overload   • High vitamin D level   • Hypoparathyroidism (HCC)   • Abnormal CT of the abdomen   • Encounter for screening for malignant neoplasm of colon   • Right acute serous otitis media   • Swelling of left foot   • Diabetic foot (HCC)     Past Surgical History:   Procedure Laterality Date   •  SECTION  01/14/1993    x 3, 84, 82,   • EXPLORATORY LAPAROTOMY Left 1998    Left cystectomy. Partial resection of vthe left ovary. Left ovary- oversewn   • INJECTION OF MEDICATION  2014    Depo Medrol (Methylprednisone) (1)    • INJECTION OF MEDICATION  2015    Kenalog (1)     • LAPAROSCOPIC HYSTERECTOMY  1995    Surgical, lysis of adhesions.Laparoscopic Assisted vaginal Hysterectomy (Tyler/ Doderlein Technique) marsupialization of right Bartholin's Gland Cyst   • LASER ABLATION OF THE CERVIX  1985    Laser vaporization, cervical cone   • OVARIAN CYST REMOVAL     • PAP SMEAR     • SHOULDER SURGERY     • THYROIDECTOMY  2006    with partial parathyroidectomy   • TOOTH EXTRACTION       Social History     Socioeconomic History   • Marital status:    Tobacco Use   • Smoking status: Former   • Smokeless tobacco: Never   Vaping Use   • Vaping Use: Never used   Substance and Sexual Activity   • Alcohol use: No   • Drug use: No   • Sexual activity: Defer     Family History   Problem Relation Age of Onset   • Diabetes Mother    • Hypertension Mother    • Diabetes Father    • Arthritis Father      Telemedicine on 10/28/2022   Component Date Value Ref Range Status   • TSH 2022 0.594  0.270 - 4.200 uIU/mL Final   • WBC 2022 6.91  3.40 - 10.80 10*3/mm3 Final   • RBC 2022 4.39  3.77 - 5.28 10*6/mm3 Final   • Hemoglobin 2022 12.1  12.0 - 15.9 g/dL  Final   • Hematocrit 12/06/2022 37.4  34.0 - 46.6 % Final   • MCV 12/06/2022 85.2  79.0 - 97.0 fL Final   • MCH 12/06/2022 27.6  26.6 - 33.0 pg Final   • MCHC 12/06/2022 32.4  31.5 - 35.7 g/dL Final   • RDW 12/06/2022 13.8  12.3 - 15.4 % Final   • RDW-SD 12/06/2022 43.0  37.0 - 54.0 fl Final   • MPV 12/06/2022 11.4  6.0 - 12.0 fL Final   • Platelets 12/06/2022 266  140 - 450 10*3/mm3 Final   • Neutrophil % 12/06/2022 39.4 (L)  42.7 - 76.0 % Final   • Lymphocyte % 12/06/2022 50.8 (H)  19.6 - 45.3 % Final   • Monocyte % 12/06/2022 7.4  5.0 - 12.0 % Final   • Eosinophil % 12/06/2022 1.6  0.3 - 6.2 % Final   • Basophil % 12/06/2022 0.7  0.0 - 1.5 % Final   • Immature Grans % 12/06/2022 0.1  0.0 - 0.5 % Final   • Neutrophils, Absolute 12/06/2022 2.72  1.70 - 7.00 10*3/mm3 Final   • Lymphocytes, Absolute 12/06/2022 3.51 (H)  0.70 - 3.10 10*3/mm3 Final   • Monocytes, Absolute 12/06/2022 0.51  0.10 - 0.90 10*3/mm3 Final   • Eosinophils, Absolute 12/06/2022 0.11  0.00 - 0.40 10*3/mm3 Final   • Basophils, Absolute 12/06/2022 0.05  0.00 - 0.20 10*3/mm3 Final   • Immature Grans, Absolute 12/06/2022 0.01  0.00 - 0.05 10*3/mm3 Final   • nRBC 12/06/2022 0.0  0.0 - 0.2 /100 WBC Final   • Glucose 12/06/2022 195 (H)  65 - 99 mg/dL Final   • BUN 12/06/2022 10  6 - 20 mg/dL Final   • Creatinine 12/06/2022 0.89  0.57 - 1.00 mg/dL Final   • Sodium 12/06/2022 141  136 - 145 mmol/L Final   • Potassium 12/06/2022 4.2  3.5 - 5.2 mmol/L Final   • Chloride 12/06/2022 102  98 - 107 mmol/L Final   • CO2 12/06/2022 29.0  22.0 - 29.0 mmol/L Final   • Calcium 12/06/2022 8.6  8.6 - 10.5 mg/dL Final   • Albumin 12/06/2022 4.20  3.50 - 5.20 g/dL Final   • Phosphorus 12/06/2022 5.7 (H)  2.5 - 4.5 mg/dL Final   • Anion Gap 12/06/2022 10.0  5.0 - 15.0 mmol/L Final   • BUN/Creatinine Ratio 12/06/2022 11.2  7.0 - 25.0 Final   • eGFR 12/06/2022 75.3  >60.0 mL/min/1.73 Final    National Kidney Foundation and American Society of Nephrology (ASN) Task Force  recommended calculation based on the Chronic Kidney Disease Epidemiology Collaboration (CKD-EPI) equation refit without adjustment for race.   • 1,25-Dihydroxy, Vitamin D 12/06/2022 33.4  24.8 - 81.5 pg/mL Final   • 25 Hydroxy, Vitamin D 12/06/2022 49.4  30.0 - 100.0 ng/ml Final   • Magnesium 12/06/2022 1.9  1.6 - 2.6 mg/dL Final   Lab on 10/25/2022   Component Date Value Ref Range Status   • Glucose 10/25/2022 185 (H)  65 - 99 mg/dL Final   • BUN 10/25/2022 7  6 - 20 mg/dL Final   • Creatinine 10/25/2022 0.63  0.57 - 1.00 mg/dL Final   • Sodium 10/25/2022 142  136 - 145 mmol/L Final   • Potassium 10/25/2022 4.0  3.5 - 5.2 mmol/L Final   • Chloride 10/25/2022 103  98 - 107 mmol/L Final   • CO2 10/25/2022 25.0  22.0 - 29.0 mmol/L Final   • Calcium 10/25/2022 7.7 (L)  8.6 - 10.5 mg/dL Final   • Albumin 10/25/2022 4.20  3.50 - 5.20 g/dL Final   • Phosphorus 10/25/2022 5.3 (H)  2.5 - 4.5 mg/dL Final   • Anion Gap 10/25/2022 14.0  5.0 - 15.0 mmol/L Final   • BUN/Creatinine Ratio 10/25/2022 11.1  7.0 - 25.0 Final   • eGFR 10/25/2022 103.0  >60.0 mL/min/1.73 Final    National Kidney Foundation and American Society of Nephrology (ASN) Task Force recommended calculation based on the Chronic Kidney Disease Epidemiology Collaboration (CKD-EPI) equation refit without adjustment for race.   • Magnesium 10/25/2022 1.9  1.6 - 2.6 mg/dL Final   Lab on 10/14/2022   Component Date Value Ref Range Status   • Magnesium 10/14/2022 1.7  1.6 - 2.6 mg/dL Final   Telemedicine on 10/10/2022   Component Date Value Ref Range Status   • PTH, Intact 10/14/2022 6.8 (L)  15.0 - 65.0 pg/mL Final   • Calcium 10/14/2022 8.8  8.6 - 10.5 mg/dL Final   • Glucose 10/14/2022 135 (H)  65 - 99 mg/dL Final   • BUN 10/14/2022 11  6 - 20 mg/dL Final   • Creatinine 10/14/2022 0.79  0.57 - 1.00 mg/dL Final   • Sodium 10/14/2022 141  136 - 145 mmol/L Final   • Potassium 10/14/2022 3.9  3.5 - 5.2 mmol/L Final   • Chloride 10/14/2022 101  98 - 107 mmol/L Final   •  CO2 10/14/2022 26.0  22.0 - 29.0 mmol/L Final   • Calcium 10/14/2022 8.7  8.6 - 10.5 mg/dL Final   • Albumin 10/14/2022 4.40  3.50 - 5.20 g/dL Final   • Phosphorus 10/14/2022 5.8 (H)  2.5 - 4.5 mg/dL Final   • Anion Gap 10/14/2022 14.0  5.0 - 15.0 mmol/L Final   • BUN/Creatinine Ratio 10/14/2022 13.9  7.0 - 25.0 Final   • eGFR 10/14/2022 86.8  >60.0 mL/min/1.73 Final    National Kidney Foundation and American Society of Nephrology (ASN) Task Force recommended calculation based on the Chronic Kidney Disease Epidemiology Collaboration (CKD-EPI) equation refit without adjustment for race.   • 25 Hydroxy, Vitamin D 10/14/2022 80.9  30.0 - 100.0 ng/ml Final   • TSH 10/14/2022 0.011 (L)  0.270 - 4.200 uIU/mL Final   Admission on 10/04/2022, Discharged on 10/04/2022   Component Date Value Ref Range Status   • QT Interval 10/04/2022 376  ms Final   • QTC Interval 10/04/2022 449  ms Final   • Glucose 10/04/2022 211 (H)  65 - 99 mg/dL Final   • BUN 10/04/2022 8  6 - 20 mg/dL Final   • Creatinine 10/04/2022 0.79  0.57 - 1.00 mg/dL Final   • Sodium 10/04/2022 139  136 - 145 mmol/L Final   • Potassium 10/04/2022 3.6  3.5 - 5.2 mmol/L Final   • Chloride 10/04/2022 97 (L)  98 - 107 mmol/L Final   • CO2 10/04/2022 29.0  22.0 - 29.0 mmol/L Final   • Calcium 10/04/2022 8.0 (L)  8.6 - 10.5 mg/dL Final   • Total Protein 10/04/2022 7.6  6.0 - 8.5 g/dL Final   • Albumin 10/04/2022 4.50  3.50 - 5.20 g/dL Final   • ALT (SGPT) 10/04/2022 29  1 - 33 U/L Final   • AST (SGOT) 10/04/2022 20  1 - 32 U/L Final   • Alkaline Phosphatase 10/04/2022 112  39 - 117 U/L Final   • Total Bilirubin 10/04/2022 0.5  0.0 - 1.2 mg/dL Final   • Globulin 10/04/2022 3.1  gm/dL Final   • A/G Ratio 10/04/2022 1.5  g/dL Final   • BUN/Creatinine Ratio 10/04/2022 10.1  7.0 - 25.0 Final   • Anion Gap 10/04/2022 13.0  5.0 - 15.0 mmol/L Final   • eGFR 10/04/2022 86.8  >60.0 mL/min/1.73 Final    National Kidney Foundation and American Society of Nephrology (ASN) Task  Force recommended calculation based on the Chronic Kidney Disease Epidemiology Collaboration (CKD-EPI) equation refit without adjustment for race.   • proBNP 10/04/2022 89.4  0.0 - 900.0 pg/mL Final   • Troponin T 10/04/2022 <0.010  0.000 - 0.030 ng/mL Final   • Extra Tube 10/04/2022 Hold for add-ons.   Final    Auto resulted.   • Extra Tube 10/04/2022 hold for add-on   Final    Auto resulted   • Extra Tube 10/04/2022 Hold for add-ons.   Final    Auto resulted.   • Extra Tube 10/04/2022 Hold for add-ons.   Final    Auto resulted   • WBC 10/04/2022 8.93  3.40 - 10.80 10*3/mm3 Final   • RBC 10/04/2022 4.42  3.77 - 5.28 10*6/mm3 Final   • Hemoglobin 10/04/2022 12.1  12.0 - 15.9 g/dL Final   • Hematocrit 10/04/2022 36.7  34.0 - 46.6 % Final   • MCV 10/04/2022 83.0  79.0 - 97.0 fL Final   • MCH 10/04/2022 27.4  26.6 - 33.0 pg Final   • MCHC 10/04/2022 33.0  31.5 - 35.7 g/dL Final   • RDW 10/04/2022 13.8  12.3 - 15.4 % Final   • RDW-SD 10/04/2022 41.3  37.0 - 54.0 fl Final   • MPV 10/04/2022 9.9  6.0 - 12.0 fL Final   • Platelets 10/04/2022 264  140 - 450 10*3/mm3 Final   • Neutrophil % 10/04/2022 64.2  42.7 - 76.0 % Final   • Lymphocyte % 10/04/2022 26.2  19.6 - 45.3 % Final   • Monocyte % 10/04/2022 8.4  5.0 - 12.0 % Final   • Eosinophil % 10/04/2022 0.7  0.3 - 6.2 % Final   • Basophil % 10/04/2022 0.1  0.0 - 1.5 % Final   • Immature Grans % 10/04/2022 0.4  0.0 - 0.5 % Final   • Neutrophils, Absolute 10/04/2022 5.73  1.70 - 7.00 10*3/mm3 Final   • Lymphocytes, Absolute 10/04/2022 2.34  0.70 - 3.10 10*3/mm3 Final   • Monocytes, Absolute 10/04/2022 0.75  0.10 - 0.90 10*3/mm3 Final   • Eosinophils, Absolute 10/04/2022 0.06  0.00 - 0.40 10*3/mm3 Final   • Basophils, Absolute 10/04/2022 0.01  0.00 - 0.20 10*3/mm3 Final   • Immature Grans, Absolute 10/04/2022 0.04  0.00 - 0.05 10*3/mm3 Final   • nRBC 10/04/2022 0.0  0.0 - 0.2 /100 WBC Final   • Extra Tube 10/04/2022 Hold for add-ons.   Final    Auto resulted.   • Lipase  10/04/2022 62 (H)  13 - 60 U/L Final   Lab on 10/03/2022   Component Date Value Ref Range Status   • Glucose 10/03/2022 175 (H)  65 - 99 mg/dL Final   • BUN 10/03/2022 8  6 - 20 mg/dL Final   • Creatinine 10/03/2022 0.75  0.57 - 1.00 mg/dL Final   • Sodium 10/03/2022 140  136 - 145 mmol/L Final   • Potassium 10/03/2022 4.0  3.5 - 5.2 mmol/L Final    Slight hemolysis detected by analyzer. Results may be affected.   • Chloride 10/03/2022 97 (L)  98 - 107 mmol/L Final   • CO2 10/03/2022 25.0  22.0 - 29.0 mmol/L Final   • Calcium 10/03/2022 8.0 (L)  8.6 - 10.5 mg/dL Final   • Albumin 10/03/2022 4.30  3.50 - 5.20 g/dL Final   • Phosphorus 10/03/2022 5.3 (H)  2.5 - 4.5 mg/dL Final   • Anion Gap 10/03/2022 18.0 (H)  5.0 - 15.0 mmol/L Final   • BUN/Creatinine Ratio 10/03/2022 10.7  7.0 - 25.0 Final   • eGFR 10/03/2022 92.4  >60.0 mL/min/1.73 Final    National Kidney Foundation and American Society of Nephrology (ASN) Task Force recommended calculation based on the Chronic Kidney Disease Epidemiology Collaboration (CKD-EPI) equation refit without adjustment for race.   • Magnesium 10/03/2022 2.3  1.6 - 2.6 mg/dL Final   Orders Only on 09/26/2022   Component Date Value Ref Range Status   • Glucose 09/26/2022 210 (H)  65 - 99 mg/dL Final   • BUN 09/26/2022 6  6 - 20 mg/dL Final   • Creatinine 09/26/2022 0.75  0.57 - 1.00 mg/dL Final   • Sodium 09/26/2022 143  136 - 145 mmol/L Final   • Potassium 09/26/2022 4.3  3.5 - 5.2 mmol/L Final    Slight hemolysis detected by analyzer. Results may be affected.   • Chloride 09/26/2022 102  98 - 107 mmol/L Final   • CO2 09/26/2022 27.0  22.0 - 29.0 mmol/L Final   • Calcium 09/26/2022 7.9 (L)  8.6 - 10.5 mg/dL Final   • Albumin 09/26/2022 4.10  3.50 - 5.20 g/dL Final   • Phosphorus 09/26/2022 4.8 (H)  2.5 - 4.5 mg/dL Final   • Anion Gap 09/26/2022 14.0  5.0 - 15.0 mmol/L Final   • BUN/Creatinine Ratio 09/26/2022 8.0  7.0 - 25.0 Final   • eGFR 09/26/2022 92.4  >60.0 mL/min/1.73 Final     National Kidney Foundation and American Society of Nephrology (ASN) Task Force recommended calculation based on the Chronic Kidney Disease Epidemiology Collaboration (CKD-EPI) equation refit without adjustment for race.   • Magnesium 09/26/2022 1.9  1.6 - 2.6 mg/dL Final   Lab on 09/19/2022   Component Date Value Ref Range Status   • WBC 09/19/2022 5.17  3.40 - 10.80 10*3/mm3 Final   • RBC 09/19/2022 4.20  3.77 - 5.28 10*6/mm3 Final   • Hemoglobin 09/19/2022 11.6 (L)  12.0 - 15.9 g/dL Final   • Hematocrit 09/19/2022 36.1  34.0 - 46.6 % Final   • MCV 09/19/2022 86.0  79.0 - 97.0 fL Final   • MCH 09/19/2022 27.6  26.6 - 33.0 pg Final   • MCHC 09/19/2022 32.1  31.5 - 35.7 g/dL Final   • RDW 09/19/2022 14.4  12.3 - 15.4 % Final   • RDW-SD 09/19/2022 44.9  37.0 - 54.0 fl Final   • MPV 09/19/2022 10.9  6.0 - 12.0 fL Final   • Platelets 09/19/2022 283  140 - 450 10*3/mm3 Final   • Neutrophil % 09/19/2022 51.8  42.7 - 76.0 % Final    Appended report. These results have been appended to a previously final verified report.   • Lymphocyte % 09/19/2022 38.5  19.6 - 45.3 % Final    Appended report. These results have been appended to a previously final verified report.   • Monocyte % 09/19/2022 6.4  5.0 - 12.0 % Final    Appended report. These results have been appended to a previously final verified report.   • Eosinophil % 09/19/2022 2.3  0.3 - 6.2 % Final    Appended report. These results have been appended to a previously final verified report.   • Basophil % 09/19/2022 0.6  0.0 - 1.5 % Final    Appended report. These results have been appended to a previously final verified report.   • Immature Grans % 09/19/2022 0.4  0.0 - 0.5 % Final    Appended report. These results have been appended to a previously final verified report.   • Neutrophils, Absolute 09/19/2022 2.68  1.70 - 7.00 10*3/mm3 Final    Appended report. These results have been appended to a previously final verified report.   • Lymphocytes, Absolute 09/19/2022  1.99  0.70 - 3.10 10*3/mm3 Final    Appended report. These results have been appended to a previously final verified report.   • Monocytes, Absolute 09/19/2022 0.33  0.10 - 0.90 10*3/mm3 Final    Appended report. These results have been appended to a previously final verified report.   • Eosinophils, Absolute 09/19/2022 0.12  0.00 - 0.40 10*3/mm3 Final    Appended report. These results have been appended to a previously final verified report.   • Basophils, Absolute 09/19/2022 0.03  0.00 - 0.20 10*3/mm3 Final    Appended report. These results have been appended to a previously final verified report.   • Immature Grans, Absolute 09/19/2022 0.02  0.00 - 0.05 10*3/mm3 Final    Appended report. These results have been appended to a previously final verified report.   • nRBC 09/19/2022 0.0  0.0 - 0.2 /100 WBC Final   • Glucose 09/19/2022 186 (H)  65 - 99 mg/dL Final   • BUN 09/19/2022 8  6 - 20 mg/dL Final   • Creatinine 09/19/2022 0.77  0.57 - 1.00 mg/dL Final   • Sodium 09/19/2022 139  136 - 145 mmol/L Final   • Potassium 09/19/2022 4.4  3.5 - 5.2 mmol/L Final   • Chloride 09/19/2022 100  98 - 107 mmol/L Final   • CO2 09/19/2022 29.0  22.0 - 29.0 mmol/L Final   • Calcium 09/19/2022 7.9 (L)  8.6 - 10.5 mg/dL Final   • Total Protein 09/19/2022 7.4  6.0 - 8.5 g/dL Final   • Albumin 09/19/2022 4.00  3.50 - 5.20 g/dL Final   • ALT (SGPT) 09/19/2022 32  1 - 33 U/L Final   • AST (SGOT) 09/19/2022 29  1 - 32 U/L Final   • Alkaline Phosphatase 09/19/2022 109  39 - 117 U/L Final   • Total Bilirubin 09/19/2022 0.2  0.0 - 1.2 mg/dL Final   • Globulin 09/19/2022 3.4  gm/dL Final   • A/G Ratio 09/19/2022 1.2  g/dL Final   • BUN/Creatinine Ratio 09/19/2022 10.4  7.0 - 25.0 Final   • Anion Gap 09/19/2022 10.0  5.0 - 15.0 mmol/L Final   • eGFR 09/19/2022 89.5  >60.0 mL/min/1.73 Final    National Kidney Foundation and American Society of Nephrology (ASN) Task Force recommended calculation based on the Chronic Kidney Disease Epidemiology  Collaboration (CKD-EPI) equation refit without adjustment for race.   • TSH 09/19/2022 <0.005 (L)  0.270 - 4.200 uIU/mL Final   • Free T4 09/19/2022 1.81 (H)  0.93 - 1.70 ng/dL Final   • T3, Free 09/19/2022 3.39  2.00 - 4.40 pg/mL Final   • 25 Hydroxy, Vitamin D 09/19/2022 89.4  30.0 - 100.0 ng/ml Final   • Magnesium 09/19/2022 1.7  1.6 - 2.6 mg/dL Final   • Phosphorus 09/19/2022 5.1 (H)  2.5 - 4.5 mg/dL Final   • RBC Morphology 09/19/2022 Normal  Normal Final   • WBC Morphology 09/19/2022 Normal  Normal Final   • Platelet Morphology 09/19/2022 Normal  Normal Final   Office Visit on 09/12/2022   Component Date Value Ref Range Status   • Calcium, 24H Urine 09/20/2022 120.2  100.0 - 300.0 mg/24 hr Final   • Calcium, Urine 09/20/2022 8.9  mg/dL Final   • 24H Urine Volume 09/20/2022 1,350  mL Final   • Time (Hours) 09/20/2022 24  hrs Final   • Creatinine, 24H 09/20/2022 0.86  0.70 - 1.60 g/24 hr Final   • Creatinine, Urine 09/20/2022 63.9  mg/dL Final   • 24H Urine Volume 09/20/2022 1,350  mL Final   • Time (Hours) 09/20/2022 24  hrs Final   Lab on 08/29/2022   Component Date Value Ref Range Status   • WBC 08/29/2022 6.19  3.40 - 10.80 10*3/mm3 Final   • RBC 08/29/2022 4.18  3.77 - 5.28 10*6/mm3 Final   • Hemoglobin 08/29/2022 11.5 (L)  12.0 - 15.9 g/dL Final   • Hematocrit 08/29/2022 34.0  34.0 - 46.6 % Final   • MCV 08/29/2022 81.3  79.0 - 97.0 fL Final   • MCH 08/29/2022 27.5  26.6 - 33.0 pg Final   • MCHC 08/29/2022 33.8  31.5 - 35.7 g/dL Final   • RDW 08/29/2022 13.8  12.3 - 15.4 % Final   • RDW-SD 08/29/2022 40.6  37.0 - 54.0 fl Final   • MPV 08/29/2022 10.4  6.0 - 12.0 fL Final   • Platelets 08/29/2022 343  140 - 450 10*3/mm3 Final   • Neutrophil % 08/29/2022 53.1  42.7 - 76.0 % Final   • Lymphocyte % 08/29/2022 38.6  19.6 - 45.3 % Final   • Monocyte % 08/29/2022 5.8  5.0 - 12.0 % Final   • Eosinophil % 08/29/2022 1.8  0.3 - 6.2 % Final   • Basophil % 08/29/2022 0.5  0.0 - 1.5 % Final   • Immature Grans %  08/29/2022 0.2  0.0 - 0.5 % Final   • Neutrophils, Absolute 08/29/2022 3.29  1.70 - 7.00 10*3/mm3 Final   • Lymphocytes, Absolute 08/29/2022 2.39  0.70 - 3.10 10*3/mm3 Final   • Monocytes, Absolute 08/29/2022 0.36  0.10 - 0.90 10*3/mm3 Final   • Eosinophils, Absolute 08/29/2022 0.11  0.00 - 0.40 10*3/mm3 Final   • Basophils, Absolute 08/29/2022 0.03  0.00 - 0.20 10*3/mm3 Final   • Immature Grans, Absolute 08/29/2022 0.01  0.00 - 0.05 10*3/mm3 Final   • nRBC 08/29/2022 0.0  0.0 - 0.2 /100 WBC Final   • Glucose 08/29/2022 134 (H)  65 - 99 mg/dL Final   • BUN 08/29/2022 7  6 - 20 mg/dL Final   • Creatinine 08/29/2022 0.56 (L)  0.57 - 1.00 mg/dL Final   • Sodium 08/29/2022 139  136 - 145 mmol/L Final   • Potassium 08/29/2022 4.3  3.5 - 5.2 mmol/L Final   • Chloride 08/29/2022 102  98 - 107 mmol/L Final   • CO2 08/29/2022 25.2  22.0 - 29.0 mmol/L Final   • Calcium 08/29/2022 8.2 (L)  8.6 - 10.5 mg/dL Final   • Total Protein 08/29/2022 6.7  6.0 - 8.5 g/dL Final   • Albumin 08/29/2022 3.90  3.50 - 5.20 g/dL Final   • ALT (SGPT) 08/29/2022 52 (H)  1 - 33 U/L Final   • AST (SGOT) 08/29/2022 39 (H)  1 - 32 U/L Final   • Alkaline Phosphatase 08/29/2022 114  39 - 117 U/L Final   • Total Bilirubin 08/29/2022 0.3  0.0 - 1.2 mg/dL Final   • Globulin 08/29/2022 2.8  gm/dL Final   • A/G Ratio 08/29/2022 1.4  g/dL Final   • BUN/Creatinine Ratio 08/29/2022 12.5  7.0 - 25.0 Final   • Anion Gap 08/29/2022 11.8  5.0 - 15.0 mmol/L Final   • eGFR 08/29/2022 105.9  >60.0 mL/min/1.73 Final    National Kidney Foundation and American Society of Nephrology (ASN) Task Force recommended calculation based on the Chronic Kidney Disease Epidemiology Collaboration (CKD-EPI) equation refit without adjustment for race.   • Hemoglobin A1C 08/29/2022 7.00 (H)  4.80 - 5.60 % Final   • Total Cholesterol 08/29/2022 133  0 - 200 mg/dL Final   • Triglycerides 08/29/2022 228 (H)  0 - 150 mg/dL Final   • HDL Cholesterol 08/29/2022 40  40 - 60 mg/dL Final   •  LDL Cholesterol  08/29/2022 56  0 - 100 mg/dL Final   • VLDL Cholesterol 08/29/2022 37  5 - 40 mg/dL Final   • LDL/HDL Ratio 08/29/2022 1.19   Final   • TSH 08/29/2022 <0.005 (L)  0.270 - 4.200 uIU/mL Final   • Free T4 08/29/2022 1.78 (H)  0.93 - 1.70 ng/dL Final   • 25 Hydroxy, Vitamin D 08/29/2022 161.0 (H)  30.0 - 100.0 ng/ml Final   • Vitamin B-12 08/29/2022 316  211 - 946 pg/mL Final   • Microalbumin/Creatinine Ratio 08/29/2022    Final    Unable to calculate   • Creatinine, Urine 08/29/2022 106.5  mg/dL Final   • Microalbumin, Urine 08/29/2022 <1.2  mg/dL Final   • PTH, Intact 08/29/2022 7.4 (L)  15.0 - 65.0 pg/mL Final   • Calcium 08/29/2022 8.1 (L)  8.6 - 10.5 mg/dL Final   • Iron 08/29/2022 67  37 - 145 mcg/dL Final   • Iron Saturation 08/29/2022 22  20 - 50 % Final   • Transferrin 08/29/2022 207  200 - 360 mg/dL Final   • TIBC 08/29/2022 308  298 - 536 mcg/dL Final   • Ferritin 08/29/2022 254.00 (H)  13.00 - 150.00 ng/mL Final   There may be more visits with results that are not included.      CT Abdomen Pelvis Without Contrast  Narrative: EXAM: CT ABDOMEN AND PELVIS WITHOUT INTRAVENOUS CONTRAST     INDICATION: 57 years year old Female with left flank pain    COMPARISON: None    TECHNIQUE: This examination was performed according to our  departmental dose optimization program which includes automated  exposure control, adjustment of the MA and kV according to  patient size, and/or use of iterative reconstruction technique.     FINDINGS:   Negative appendix. The small bowel is normal in caliber without  evidence for mechanical small bowel obstruction. There is mild  nonspecific prominence of mesenteric lymph nodes with adjacent  ill-defined inflammatory stranding. Specifically, the largest  mesenteric lymph node measures up to 1 cm in short axis.    There is relatively large amount of stool in the colon.    Diffuse fatty infiltration of the liver. Negative adrenal glands.  The spleen and pancreas have  unremarkable unenhanced appearances.  No urinary calcifications or hydronephrosis.    Atelectasis in the visualized lung bases.    No acute or suspicious osseous abnormality.  Impression: 1. Negative appendix.  2. Fatty infiltration of the liver.  3. Nonspecific mild prominence of mesenteric lymph nodes with  adjacent ill-defined inflammatory stranding. This can be seen  with a mesenteric panniculitis, though this can also be  associated with a lymphoproliferative disorder. The spleen  measures within normal limits. Follow-up imaging in approximately  six months is recommended to assess for persistence or interval  change. #4 relatively large amount of stool in the colon.    Electronically signed by:  Carlos Garcia MD  10/4/2022 9:58 AM CDT  Workstation: [id number]  XR Chest 2 View  Narrative: EXAM DESCRIPTION:  XR CHEST 2 VIEWS    CLINICAL INDICATION: SOA Triage Protocol     COMPARISON: None    FINDINGS: Mild cardiomegaly. Pulmonary vascularity is within  normal limits. Likely atelectasis in both lung bases. No pleural  effusion or pneumothorax.  Impression: 1. Cardiomegaly.  2. Likely atelectasis in both lung bases.    Electronically signed by:  Carlos Garcia MD  10/4/2022 9:55 AM NumerousT  Workstation: [id number]    [unfilled]  Immunization History   Administered Date(s) Administered   • Pneumococcal Polysaccharide (PPSV23) 03/27/2018   • Tdap 03/27/2018       The following portions of the patient's history were reviewed and updated as appropriate: allergies, current medications, past family history, past medical history, past social history, past surgical history and problem list.        Physical Exam  There were no vitals taken for this visit.    Physical Exam  Vitals and nursing note reviewed.   Constitutional:       Appearance: She is well-developed. She is not diaphoretic.   HENT:      Head: Normocephalic and atraumatic.      Right Ear: External ear normal.      Left Ear: Tenderness present. Tympanic membrane  is injected and scarred.   Eyes:      Conjunctiva/sclera: Conjunctivae normal.      Pupils: Pupils are equal, round, and reactive to light.   Cardiovascular:      Rate and Rhythm: Normal rate and regular rhythm.      Heart sounds: Normal heart sounds. No murmur heard.  Pulmonary:      Effort: Pulmonary effort is normal. No respiratory distress.      Breath sounds: Normal breath sounds.   Abdominal:      General: Bowel sounds are normal. There is no distension.      Palpations: Abdomen is soft.      Tenderness: There is no abdominal tenderness.      Comments: Obese abdomen    Musculoskeletal:         General: Tenderness present. No deformity. Normal range of motion.      Cervical back: Normal range of motion and neck supple.   Feet:      Left foot:      Skin integrity: Callus and dry skin present.   Skin:     General: Skin is warm.      Coloration: Skin is not pale.      Findings: No erythema or rash.   Neurological:      Mental Status: She is alert and oriented to person, place, and time.      Cranial Nerves: No cranial nerve deficit.   Psychiatric:         Behavior: Behavior normal.         [unfilled]  No diagnosis found.         -recommend labwork   -recommend influenza vaccination   -recommend COVID-19 vaccination   -recommend mammogram screening - schedule at imaging center   -recommend cologuard test   -recommend DEXA scan - schedule at imaging center   -recommend diabetic eye exam   -otitis media on left ear - augementin 875-125 mg every 12 hours for 10 days along with dilfucan  -refer to podiatry for diabetic foot care and callus on right foot   -left foot swelling start on lasix 20 mg PO BID   -fatty liver/abdominal pain/GI following - has endoscopy scheduled on amitiza 8 mg PO BID    -hypocalcemia/hypoparathyroidism   -on calcium carbonate 600 mg PO BID. Endocrinology following on calcitriol 0.25 mcg daily on phoslo tablets 4 in am and 3 in pm   -postoperative hypothyroidism - on synthroid 137 mcg 6  days a week. Endocrinolgoy following   -DM type 2-  on metformin 1000 mg PO BID, on jardiance 25 mg daily. on rybelsus 3 mg PO q daily.   -insomnia - on trazodone  100 mg PO Q daily.   -diabetic neuropathy - on neurontin 300 mg PO TID   -hypertension - on clonidine 0.3 mg PO BID on  lisionpril 40 mg daily. On toprol  mg daily.  On norvasc 10 mg daily.   -Obesity BMI >30 . - recommended weight loss information and DASH diet. Counseled weight loss >5 minuutes  BMI at 38.36   -hyperlipidemia - HDL low. REcommended high healthy fat diet stop simvastatin and start on lipitor 20 mg PO qhs. Drug information provided   -high vitamin D - stop vitamin D supplement   -depression/grieving - on lexapro 20 mg daily. On abilify 300 mg daily  on wellbutrin  mg daily. on lamictal 200 mg PO q daily.    -iron deficiency anemia - on ferrous sulfate 325 mg PO TID. Will stop this due to high iron levls   -advised to go to ER or call 911 if symptoms or severe  -advised to be safe and call with questions and concerns   -advised to followup with specialist and referrals   -adivsed pt to be safe during COVID-19 pandemic   I spent 45  minutes caring for Joan on this date of service. This time includes time spent by me in the following activities: preparing for the visit, reviewing tests, obtaining and/or reviewing a separately obtained history, performing a medically appropriate examination and/or evaluation, counseling and educating the patient/family/caregiver, ordering medications, tests, or procedures, referring and communicating with other health care professionals, documenting information in the medical record, independently interpreting results and communicating that information with the patient/family/caregiver and care coordination.

## 2023-01-03 NOTE — PROGRESS NOTES
Subjective:  Joan Blackwell is a 57 y.o. female who presents for       Patient Active Problem List   Diagnosis   • Hypocalcemia   • Hyperlipidemia   • Iron deficiency anemia   • Hypothyroidism   • S/P thyroidectomy   • Uncontrolled type 2 diabetes mellitus with complication, without long-term current use of insulin   • Obesity   • Uncontrolled hypertension   • Hypertensive urgency   • Right wrist pain   • Carpal tunnel syndrome of right wrist   • Controlled type 2 diabetes mellitus with complication, without long-term current use of insulin (HCC)   • Essential hypertension   • Vitamin D deficiency   • Sprain of left ankle   • Grieving   • Rotator cuff syndrome of right shoulder   • Hypotension   • Bilateral low back pain with bilateral sciatica   • Fall   • Right shoulder pain   • Dental abscess   • Body mass index (BMI) of 35.0 to 35.9   • Former smoker   • Iron overload   • High vitamin D level   • Hypoparathyroidism (HCC)   • Abnormal CT of the abdomen   • Encounter for screening for malignant neoplasm of colon   • Right acute serous otitis media   • Swelling of left foot   • Diabetic foot (HCC)           Current Outpatient Medications:   •  Accu-Chek Yany Plus test strip, USE ONE STRIP TO TEST BLOOD SUGAR THREE TIMES A DAY FOR DIABETES, Disp: 200 each, Rfl: 3  •  Accu-Chek Softclix Lancets lancets, 1 each by Other route 3 (Three) Times a Day. Use as instructed, Disp: 300 each, Rfl: 3  •  Alcohol Swabs (B-D SINGLE USE SWABS REGULAR) pads, USE AS DIRECTED AS NEEDED, Disp: 300 each, Rfl: 3  •  ALPRAZolam (XANAX) 1 MG tablet, , Disp: , Rfl:   •  amLODIPine (NORVASC) 5 MG tablet, Take 1 tablet by mouth Daily., Disp: 90 tablet, Rfl: 1  •  ARIPiprazole (ABILIFY) 30 MG tablet, , Disp: , Rfl:   •  atorvastatin (LIPITOR) 20 MG tablet, Take 1 tablet by mouth Daily., Disp: 90 tablet, Rfl: 1  •  Blood Glucose Calibration (Accu-Chek Guide Control)  liquid, USE AS DIRECTED, Disp: 1 each, Rfl: 1  •  Blood Glucose Monitoring Suppl (Accu-Chek Guide) w/Device kit, USE AS DIRECTED, Disp: 1 kit, Rfl: 0  •  buPROPion XL (WELLBUTRIN XL) 300 MG 24 hr tablet, , Disp: , Rfl:   •  calcitriol (ROCALTROL) 0.25 MCG capsule, 2 tabs  daily, Disp: 60 capsule, Rfl: 11  •  calcium acetate (PHOS BINDER,) 667 MG capsule capsule, Take 4 capsules am and 3 capsules pm, Disp: 210 capsule, Rfl: 11  •  cloNIDine (CATAPRES) 0.3 MG tablet, Take 1 tablet by mouth Every 12 (Twelve) Hours., Disp: 180 tablet, Rfl: 1  •  empagliflozin (Jardiance) 25 MG tablet tablet, Take 1 tablet by mouth Daily., Disp: 90 tablet, Rfl: 3  •  escitalopram (LEXAPRO) 20 MG tablet, Take 1 tablet by mouth Daily., Disp: 30 tablet, Rfl: 3  •  ferrous sulfate (FeroSul) 325 (65 FE) MG tablet, Take 1 tablet by mouth 3 (Three) Times a Day With Meals., Disp: 270 tablet, Rfl: 1  •  furosemide (Lasix) 20 MG tablet, Take 1 tablet by mouth 2 (Two) Times a Day., Disp: 60 tablet, Rfl: 3  •  HYDROcodone-acetaminophen (NORCO)  MG per tablet, , Disp: , Rfl:   •  lamoTRIgine (LaMICtal) 200 MG tablet, , Disp: , Rfl:   •  levothyroxine (Synthroid) 137 MCG tablet, Take 1 tablet by mouth Daily., Disp: 30 tablet, Rfl: 3  •  lisinopril (PRINIVIL,ZESTRIL) 40 MG tablet, Take 1 tablet by mouth Daily., Disp: 90 tablet, Rfl: 1  •  lubiprostone (AMITIZA) 8 MCG capsule, Take 1 capsule by mouth 2 (Two) Times a Day With Meals., Disp: 14 capsule, Rfl: 0  •  magnesium oxide 250 MG tablet, Take 1 tablet by mouth Daily., Disp: 90 tablet, Rfl: 1  •  metFORMIN (GLUCOPHAGE) 1000 MG tablet, Take 1 tablet by mouth 2 (Two) Times a Day With Meals., Disp: 180 tablet, Rfl: 3  •  metoprolol succinate XL (TOPROL-XL) 100 MG 24 hr tablet, Take 1 tablet by mouth Daily., Disp: 90 tablet, Rfl: 1  •  ondansetron (ZOFRAN) 8 MG tablet, Take 1 tablet by mouth Every 8 (Eight) Hours As Needed for Nausea or Vomiting., Disp: 90 tablet, Rfl: 2  •  polyethylene glycol  (GoLYTELY) 236 g solution, Please use the instructions given in office, Disp: 4000 mL, Rfl: 0  •  potassium chloride ER (K-TAB) 20 MEQ tablet controlled-release ER tablet, Take 1 tablet by mouth Daily., Disp: 90 tablet, Rfl: 1  •  Semaglutide (Rybelsus) 3 MG tablet, Take 1 tablet by mouth Daily., Disp: 90 tablet, Rfl: 1  •  traZODone (DESYREL) 100 MG tablet, Take 1 tablet by mouth Every Night., Disp: 90 tablet, Rfl: 0  •  vitamin B-12 (CYANOCOBALAMIN) 500 MCG tablet, Take 1 tablet by mouth Daily., Disp: 30 tablet, Rfl: 3  •  clindamycin (Cleocin) 300 MG capsule, Take 1 capsule by mouth 3 (Three) Times a Day for 7 days., Disp: 21 capsule, Rfl: 0       Pt is 56 yo female with management  of obesity, HLP sp thyroidectomy in  , postoperative hypothyroidism,  Vitamin D deficiency, HTN,  DM type 2, history of hypertensive urgency, iron deficiency anemia, sp hysterectomy, sp ovarian cyst removal, Sp  X 3, carpal tunnel syndrome right wrist. Grieving, chronic back pain. currnarayantly in pain managmment, hypocalcemia, hypoparathyroidism      22 in office visit for recheck. Pt went to ER on 10/4/22 for abdominal pain. Pt had CT of abdomen on 10/422 that showed negative appendix. Fatty and nonspecific mild prominence of mesenteric lymph node that can be seen be seen with mesenteric panniculitis. This could be associated with a lymphoproliferative disorder. followup imaging was recommended. Pt saw General Surgery a on 10/13/22 for her contracted gallbladder and abdominal pain. She is asymptomatic and was not recommended surgery at this time but will continue to followup with General Surgery should problems arise. She did have several Telehealth visits with Endocrinology on 10/21/22 and 10/28/22  for her hypocalcemia and hypoparathyroidism. Pt's vitamin D was stopped and calicitriol was started.  Along with phoslo 3 capsules twice daily and mg oxide 250 mg dialy. Pt had labwork done on 10/25/22 that showed  calcium at 7.7. phosphorus at 5.3 GFR at 103.TSH was at 0..011 she was advised to take synthroid 137 mcg 6 days a week. Pt saw GI on 12/1/22 and has scheduled colonoscopy on 1/11/23. Pt saw Endocrinology on 12/9/22 for her postoperative hypoparathyroidism, DM type 2, and postoperative hypothyroidism and is to continue on calcitriol and phoslo tablets. Pt has right ear pain and callus on right foot for past 2 weeks. She would like to see Podiatry. She also has swelling on left foot swelling     1/4/23 in office visit for recheck. Pt has upcoming endoscopy on 1/11/23. Pt was referred to Podiatry and OB/GYN last visit . Pt has upcoming appt with Podiatry later this month  She continues to have pain on her right side of jaw. She state her inner ear is still hurting despite taking augmentin.she has wax buildup in her ear as well along with hearing loss. She states pain in her ear radiates down her right jaw.When turning her head to the left her right jaw hurts           Hyperlipidemia  This is a chronic problem. Recent lipid tests were reviewed and are variable. Exacerbating diseases include obesity. She has no history of chronic renal disease, diabetes, hypothyroidism or liver disease. Pertinent negatives include no chest pain, focal sensory loss, focal weakness, leg pain or myalgias. The current treatment provides no improvement of lipids. Compliance problems include adherence to diet.  Risk factors for coronary artery disease include diabetes mellitus, hypertension, obesity, dyslipidemia, a sedentary lifestyle and post-menopausal.   Obesity  This is a chronic problem. The current episode started more than 1 year ago. The problem occurs constantly. The problem has been unchanged. Pertinent negatives include no abdominal pain, anorexia, arthralgias, change in bowel habit, chest pain, chills, congestion, coughing, diaphoresis, fatigue, fever, headaches, joint swelling, myalgias, nausea, neck pain or  numbness. Nothing aggravates the symptoms. She has tried nothing for the symptoms. The treatment provided no relief. ms.   Diabetes   She presents for her  followup  diabetic visit. She has type 2 diabetes mellitus. Her disease course has been stable. Hypoglycemia symptoms include dizziness and headaches. Pertinent negatives for hypoglycemia include no confusion, hunger, mood changes, nervousness/anxiousness, pallor, seizures, sleepiness, speech difficulty, sweats or tremors. Pertinent negatives for diabetes include no blurred vision, no chest pain, no fatigue, no foot paresthesias, no foot ulcerations, no polydipsia, no polyphagia, no polyuria, no visual change, no weakness and no weight loss. Pertinent negatives for hypoglycemia complications include no blackouts, no hospitalization, no nocturnal hypoglycemia, no required assistance and no required glucagon injection. Pertinent negatives for diabetic complications include no autonomic neuropathy, CVA, heart disease, impotence, nephropathy, peripheral neuropathy, PVD or retinopathy. Risk factors for coronary artery disease include diabetes mellitus, sedentary lifestyle and obesity. Current diabetic treatment includes oral agent (monotherapy). Her weight is stable. She is following a generally healthy diet. She has not had a previous visit with a dietitian. She never participates in exercise. An ACE inhibitor/angiotensin II receptor blocker is being taken. She does not see a podiatrist.Eye exam is not current.   Hypertension   This is a chronic problem. The current episode started more than 1 year ago. The problem has been gradually improving since onset. The problem is uncontrolled. Associated symptoms include headaches, neck pain and shortness of breath. Pertinent negatives include no anxiety, blurred vision, chest pain, malaise/fatigue, orthopnea, palpitations, peripheral edema, PND or sweats. There are no associated agents to hypertension. Risk factors for  coronary artery disease include diabetes mellitus and dyslipidemia. Current antihypertension treatment includes beta blockers. The current treatment provides no improvement. There are no compliance problems.  There is no history of angina, kidney disease, CAD/MI, CVA, heart failure, left ventricular hypertrophy, PVD, retinopathy or a thyroid problem. There is no history of chronic renal disease, coarctation of the aorta, hyperaldosteronism, hypercortisolism, hyperparathyroidism, a hypertension causing med, pheochromocytoma or sleep apnea.   Hypothyroidism   This is a chronic problem. The current episode started more than 1 year ago. The problem occurs daily. The problem has been unchanged. Associated symptoms include arthralgias, headaches, myalgias, nausea, neck pain, numbness and vomiting. Pertinent negatives include no abdominal pain, anorexia, change in bowel habit, chest pain, chills, congestion, coughing, diaphoresis, fatigue, fever, joint swelling, rash, sore throat, swollen glands, urinary symptoms, vertigo, visual change or weakness. Nothing aggravates the symptoms. Treatments tried: synthroid. The treatment provided mild relief.      Earache   There is pain in the right ear. This is a chronic problem. The current episode started in the past 7 days. The problem occurs every few minutes. The problem has been rapidly worsening. There has been no fever. The fever has been present for less than 1 day. The pain is at a severity of 10/10. Associated symptoms include headaches and hearing loss. Pertinent negatives include no abdominal pain, coughing, diarrhea, ear discharge, neck pain, rash, rhinorrhea, sore throat or vomiting.       Review of Systems  Review of Systems   Constitutional: Positive for activity change and fatigue.   HENT: Positive for ear pain and hearing loss. Negative for ear discharge, rhinorrhea and sore throat.    Respiratory: Negative for cough.    Gastrointestinal: Negative for abdominal  pain, diarrhea and vomiting.   Musculoskeletal: Positive for arthralgias and back pain. Negative for neck pain.        Right jaw pain    Skin: Negative for rash.   Neurological: Positive for headaches.   Psychiatric/Behavioral: The patient is nervous/anxious.        Patient Active Problem List   Diagnosis   • Hypocalcemia   • Hyperlipidemia   • Iron deficiency anemia   • Hypothyroidism   • S/P thyroidectomy   • Uncontrolled type 2 diabetes mellitus with complication, without long-term current use of insulin   • Obesity   • Uncontrolled hypertension   • Hypertensive urgency   • Right wrist pain   • Carpal tunnel syndrome of right wrist   • Controlled type 2 diabetes mellitus with complication, without long-term current use of insulin (HCC)   • Essential hypertension   • Vitamin D deficiency   • Sprain of left ankle   • Grieving   • Rotator cuff syndrome of right shoulder   • Hypotension   • Bilateral low back pain with bilateral sciatica   • Fall   • Right shoulder pain   • Dental abscess   • Body mass index (BMI) of 35.0 to 35.9   • Former smoker   • Iron overload   • High vitamin D level   • Hypoparathyroidism (HCC)   • Abnormal CT of the abdomen   • Encounter for screening for malignant neoplasm of colon   • Right acute serous otitis media   • Swelling of left foot   • Diabetic foot (HCC)     Past Surgical History:   Procedure Laterality Date   •  SECTION  01/14/1993    x 3, 84, 82,   • EXPLORATORY LAPAROTOMY Left 1998    Left cystectomy. Partial resection of vthe left ovary. Left ovary- oversewn   • INJECTION OF MEDICATION  2014    Depo Medrol (Methylprednisone) (1)    • INJECTION OF MEDICATION  2015    Kenalog (1)     • LAPAROSCOPIC HYSTERECTOMY  1995    Surgical, lysis of adhesions.Laparoscopic Assisted vaginal Hysterectomy (Tyler/ Doderlein Technique) marsupialization of right Bartholin's Gland Cyst   • LASER ABLATION OF THE CERVIX  1985    Laser vaporization,  cervical cone   • OVARIAN CYST REMOVAL  1996   • PAP SMEAR  2008   • SHOULDER SURGERY     • THYROIDECTOMY  02/28/2006    with partial parathyroidectomy   • TOOTH EXTRACTION       Social History     Socioeconomic History   • Marital status:    Tobacco Use   • Smoking status: Former   • Smokeless tobacco: Never   Vaping Use   • Vaping Use: Never used   Substance and Sexual Activity   • Alcohol use: No   • Drug use: No   • Sexual activity: Defer     Family History   Problem Relation Age of Onset   • Diabetes Mother    • Hypertension Mother    • Diabetes Father    • Arthritis Father      Telemedicine on 10/28/2022   Component Date Value Ref Range Status   • TSH 12/06/2022 0.594  0.270 - 4.200 uIU/mL Final   • WBC 12/06/2022 6.91  3.40 - 10.80 10*3/mm3 Final   • RBC 12/06/2022 4.39  3.77 - 5.28 10*6/mm3 Final   • Hemoglobin 12/06/2022 12.1  12.0 - 15.9 g/dL Final   • Hematocrit 12/06/2022 37.4  34.0 - 46.6 % Final   • MCV 12/06/2022 85.2  79.0 - 97.0 fL Final   • MCH 12/06/2022 27.6  26.6 - 33.0 pg Final   • MCHC 12/06/2022 32.4  31.5 - 35.7 g/dL Final   • RDW 12/06/2022 13.8  12.3 - 15.4 % Final   • RDW-SD 12/06/2022 43.0  37.0 - 54.0 fl Final   • MPV 12/06/2022 11.4  6.0 - 12.0 fL Final   • Platelets 12/06/2022 266  140 - 450 10*3/mm3 Final   • Neutrophil % 12/06/2022 39.4 (L)  42.7 - 76.0 % Final   • Lymphocyte % 12/06/2022 50.8 (H)  19.6 - 45.3 % Final   • Monocyte % 12/06/2022 7.4  5.0 - 12.0 % Final   • Eosinophil % 12/06/2022 1.6  0.3 - 6.2 % Final   • Basophil % 12/06/2022 0.7  0.0 - 1.5 % Final   • Immature Grans % 12/06/2022 0.1  0.0 - 0.5 % Final   • Neutrophils, Absolute 12/06/2022 2.72  1.70 - 7.00 10*3/mm3 Final   • Lymphocytes, Absolute 12/06/2022 3.51 (H)  0.70 - 3.10 10*3/mm3 Final   • Monocytes, Absolute 12/06/2022 0.51  0.10 - 0.90 10*3/mm3 Final   • Eosinophils, Absolute 12/06/2022 0.11  0.00 - 0.40 10*3/mm3 Final   • Basophils, Absolute 12/06/2022 0.05  0.00 - 0.20 10*3/mm3 Final   • Immature  Grans, Absolute 12/06/2022 0.01  0.00 - 0.05 10*3/mm3 Final   • nRBC 12/06/2022 0.0  0.0 - 0.2 /100 WBC Final   • Glucose 12/06/2022 195 (H)  65 - 99 mg/dL Final   • BUN 12/06/2022 10  6 - 20 mg/dL Final   • Creatinine 12/06/2022 0.89  0.57 - 1.00 mg/dL Final   • Sodium 12/06/2022 141  136 - 145 mmol/L Final   • Potassium 12/06/2022 4.2  3.5 - 5.2 mmol/L Final   • Chloride 12/06/2022 102  98 - 107 mmol/L Final   • CO2 12/06/2022 29.0  22.0 - 29.0 mmol/L Final   • Calcium 12/06/2022 8.6  8.6 - 10.5 mg/dL Final   • Albumin 12/06/2022 4.20  3.50 - 5.20 g/dL Final   • Phosphorus 12/06/2022 5.7 (H)  2.5 - 4.5 mg/dL Final   • Anion Gap 12/06/2022 10.0  5.0 - 15.0 mmol/L Final   • BUN/Creatinine Ratio 12/06/2022 11.2  7.0 - 25.0 Final   • eGFR 12/06/2022 75.3  >60.0 mL/min/1.73 Final    National Kidney Foundation and American Society of Nephrology (ASN) Task Force recommended calculation based on the Chronic Kidney Disease Epidemiology Collaboration (CKD-EPI) equation refit without adjustment for race.   • 1,25-Dihydroxy, Vitamin D 12/06/2022 33.4  24.8 - 81.5 pg/mL Final   • 25 Hydroxy, Vitamin D 12/06/2022 49.4  30.0 - 100.0 ng/ml Final   • Magnesium 12/06/2022 1.9  1.6 - 2.6 mg/dL Final   Lab on 10/25/2022   Component Date Value Ref Range Status   • Glucose 10/25/2022 185 (H)  65 - 99 mg/dL Final   • BUN 10/25/2022 7  6 - 20 mg/dL Final   • Creatinine 10/25/2022 0.63  0.57 - 1.00 mg/dL Final   • Sodium 10/25/2022 142  136 - 145 mmol/L Final   • Potassium 10/25/2022 4.0  3.5 - 5.2 mmol/L Final   • Chloride 10/25/2022 103  98 - 107 mmol/L Final   • CO2 10/25/2022 25.0  22.0 - 29.0 mmol/L Final   • Calcium 10/25/2022 7.7 (L)  8.6 - 10.5 mg/dL Final   • Albumin 10/25/2022 4.20  3.50 - 5.20 g/dL Final   • Phosphorus 10/25/2022 5.3 (H)  2.5 - 4.5 mg/dL Final   • Anion Gap 10/25/2022 14.0  5.0 - 15.0 mmol/L Final   • BUN/Creatinine Ratio 10/25/2022 11.1  7.0 - 25.0 Final   • eGFR 10/25/2022 103.0  >60.0 mL/min/1.73 Final     National Kidney Foundation and American Society of Nephrology (ASN) Task Force recommended calculation based on the Chronic Kidney Disease Epidemiology Collaboration (CKD-EPI) equation refit without adjustment for race.   • Magnesium 10/25/2022 1.9  1.6 - 2.6 mg/dL Final   Lab on 10/14/2022   Component Date Value Ref Range Status   • Magnesium 10/14/2022 1.7  1.6 - 2.6 mg/dL Final   Telemedicine on 10/10/2022   Component Date Value Ref Range Status   • PTH, Intact 10/14/2022 6.8 (L)  15.0 - 65.0 pg/mL Final   • Calcium 10/14/2022 8.8  8.6 - 10.5 mg/dL Final   • Glucose 10/14/2022 135 (H)  65 - 99 mg/dL Final   • BUN 10/14/2022 11  6 - 20 mg/dL Final   • Creatinine 10/14/2022 0.79  0.57 - 1.00 mg/dL Final   • Sodium 10/14/2022 141  136 - 145 mmol/L Final   • Potassium 10/14/2022 3.9  3.5 - 5.2 mmol/L Final   • Chloride 10/14/2022 101  98 - 107 mmol/L Final   • CO2 10/14/2022 26.0  22.0 - 29.0 mmol/L Final   • Calcium 10/14/2022 8.7  8.6 - 10.5 mg/dL Final   • Albumin 10/14/2022 4.40  3.50 - 5.20 g/dL Final   • Phosphorus 10/14/2022 5.8 (H)  2.5 - 4.5 mg/dL Final   • Anion Gap 10/14/2022 14.0  5.0 - 15.0 mmol/L Final   • BUN/Creatinine Ratio 10/14/2022 13.9  7.0 - 25.0 Final   • eGFR 10/14/2022 86.8  >60.0 mL/min/1.73 Final    National Kidney Foundation and American Society of Nephrology (ASN) Task Force recommended calculation based on the Chronic Kidney Disease Epidemiology Collaboration (CKD-EPI) equation refit without adjustment for race.   • 25 Hydroxy, Vitamin D 10/14/2022 80.9  30.0 - 100.0 ng/ml Final   • TSH 10/14/2022 0.011 (L)  0.270 - 4.200 uIU/mL Final   Admission on 10/04/2022, Discharged on 10/04/2022   Component Date Value Ref Range Status   • QT Interval 10/04/2022 376  ms Final   • QTC Interval 10/04/2022 449  ms Final   • Glucose 10/04/2022 211 (H)  65 - 99 mg/dL Final   • BUN 10/04/2022 8  6 - 20 mg/dL Final   • Creatinine 10/04/2022 0.79  0.57 - 1.00 mg/dL Final   • Sodium 10/04/2022 139  136 -  145 mmol/L Final   • Potassium 10/04/2022 3.6  3.5 - 5.2 mmol/L Final   • Chloride 10/04/2022 97 (L)  98 - 107 mmol/L Final   • CO2 10/04/2022 29.0  22.0 - 29.0 mmol/L Final   • Calcium 10/04/2022 8.0 (L)  8.6 - 10.5 mg/dL Final   • Total Protein 10/04/2022 7.6  6.0 - 8.5 g/dL Final   • Albumin 10/04/2022 4.50  3.50 - 5.20 g/dL Final   • ALT (SGPT) 10/04/2022 29  1 - 33 U/L Final   • AST (SGOT) 10/04/2022 20  1 - 32 U/L Final   • Alkaline Phosphatase 10/04/2022 112  39 - 117 U/L Final   • Total Bilirubin 10/04/2022 0.5  0.0 - 1.2 mg/dL Final   • Globulin 10/04/2022 3.1  gm/dL Final   • A/G Ratio 10/04/2022 1.5  g/dL Final   • BUN/Creatinine Ratio 10/04/2022 10.1  7.0 - 25.0 Final   • Anion Gap 10/04/2022 13.0  5.0 - 15.0 mmol/L Final   • eGFR 10/04/2022 86.8  >60.0 mL/min/1.73 Final    National Kidney Foundation and American Society of Nephrology (ASN) Task Force recommended calculation based on the Chronic Kidney Disease Epidemiology Collaboration (CKD-EPI) equation refit without adjustment for race.   • proBNP 10/04/2022 89.4  0.0 - 900.0 pg/mL Final   • Troponin T 10/04/2022 <0.010  0.000 - 0.030 ng/mL Final   • Extra Tube 10/04/2022 Hold for add-ons.   Final    Auto resulted.   • Extra Tube 10/04/2022 hold for add-on   Final    Auto resulted   • Extra Tube 10/04/2022 Hold for add-ons.   Final    Auto resulted.   • Extra Tube 10/04/2022 Hold for add-ons.   Final    Auto resulted   • WBC 10/04/2022 8.93  3.40 - 10.80 10*3/mm3 Final   • RBC 10/04/2022 4.42  3.77 - 5.28 10*6/mm3 Final   • Hemoglobin 10/04/2022 12.1  12.0 - 15.9 g/dL Final   • Hematocrit 10/04/2022 36.7  34.0 - 46.6 % Final   • MCV 10/04/2022 83.0  79.0 - 97.0 fL Final   • MCH 10/04/2022 27.4  26.6 - 33.0 pg Final   • MCHC 10/04/2022 33.0  31.5 - 35.7 g/dL Final   • RDW 10/04/2022 13.8  12.3 - 15.4 % Final   • RDW-SD 10/04/2022 41.3  37.0 - 54.0 fl Final   • MPV 10/04/2022 9.9  6.0 - 12.0 fL Final   • Platelets 10/04/2022 264  140 - 450 10*3/mm3  Final   • Neutrophil % 10/04/2022 64.2  42.7 - 76.0 % Final   • Lymphocyte % 10/04/2022 26.2  19.6 - 45.3 % Final   • Monocyte % 10/04/2022 8.4  5.0 - 12.0 % Final   • Eosinophil % 10/04/2022 0.7  0.3 - 6.2 % Final   • Basophil % 10/04/2022 0.1  0.0 - 1.5 % Final   • Immature Grans % 10/04/2022 0.4  0.0 - 0.5 % Final   • Neutrophils, Absolute 10/04/2022 5.73  1.70 - 7.00 10*3/mm3 Final   • Lymphocytes, Absolute 10/04/2022 2.34  0.70 - 3.10 10*3/mm3 Final   • Monocytes, Absolute 10/04/2022 0.75  0.10 - 0.90 10*3/mm3 Final   • Eosinophils, Absolute 10/04/2022 0.06  0.00 - 0.40 10*3/mm3 Final   • Basophils, Absolute 10/04/2022 0.01  0.00 - 0.20 10*3/mm3 Final   • Immature Grans, Absolute 10/04/2022 0.04  0.00 - 0.05 10*3/mm3 Final   • nRBC 10/04/2022 0.0  0.0 - 0.2 /100 WBC Final   • Extra Tube 10/04/2022 Hold for add-ons.   Final    Auto resulted.   • Lipase 10/04/2022 62 (H)  13 - 60 U/L Final   Lab on 10/03/2022   Component Date Value Ref Range Status   • Glucose 10/03/2022 175 (H)  65 - 99 mg/dL Final   • BUN 10/03/2022 8  6 - 20 mg/dL Final   • Creatinine 10/03/2022 0.75  0.57 - 1.00 mg/dL Final   • Sodium 10/03/2022 140  136 - 145 mmol/L Final   • Potassium 10/03/2022 4.0  3.5 - 5.2 mmol/L Final    Slight hemolysis detected by analyzer. Results may be affected.   • Chloride 10/03/2022 97 (L)  98 - 107 mmol/L Final   • CO2 10/03/2022 25.0  22.0 - 29.0 mmol/L Final   • Calcium 10/03/2022 8.0 (L)  8.6 - 10.5 mg/dL Final   • Albumin 10/03/2022 4.30  3.50 - 5.20 g/dL Final   • Phosphorus 10/03/2022 5.3 (H)  2.5 - 4.5 mg/dL Final   • Anion Gap 10/03/2022 18.0 (H)  5.0 - 15.0 mmol/L Final   • BUN/Creatinine Ratio 10/03/2022 10.7  7.0 - 25.0 Final   • eGFR 10/03/2022 92.4  >60.0 mL/min/1.73 Final    National Kidney Foundation and American Society of Nephrology (ASN) Task Force recommended calculation based on the Chronic Kidney Disease Epidemiology Collaboration (CKD-EPI) equation refit without adjustment for race.   •  Magnesium 10/03/2022 2.3  1.6 - 2.6 mg/dL Final   Orders Only on 09/26/2022   Component Date Value Ref Range Status   • Glucose 09/26/2022 210 (H)  65 - 99 mg/dL Final   • BUN 09/26/2022 6  6 - 20 mg/dL Final   • Creatinine 09/26/2022 0.75  0.57 - 1.00 mg/dL Final   • Sodium 09/26/2022 143  136 - 145 mmol/L Final   • Potassium 09/26/2022 4.3  3.5 - 5.2 mmol/L Final    Slight hemolysis detected by analyzer. Results may be affected.   • Chloride 09/26/2022 102  98 - 107 mmol/L Final   • CO2 09/26/2022 27.0  22.0 - 29.0 mmol/L Final   • Calcium 09/26/2022 7.9 (L)  8.6 - 10.5 mg/dL Final   • Albumin 09/26/2022 4.10  3.50 - 5.20 g/dL Final   • Phosphorus 09/26/2022 4.8 (H)  2.5 - 4.5 mg/dL Final   • Anion Gap 09/26/2022 14.0  5.0 - 15.0 mmol/L Final   • BUN/Creatinine Ratio 09/26/2022 8.0  7.0 - 25.0 Final   • eGFR 09/26/2022 92.4  >60.0 mL/min/1.73 Final    National Kidney Foundation and American Society of Nephrology (ASN) Task Force recommended calculation based on the Chronic Kidney Disease Epidemiology Collaboration (CKD-EPI) equation refit without adjustment for race.   • Magnesium 09/26/2022 1.9  1.6 - 2.6 mg/dL Final   Lab on 09/19/2022   Component Date Value Ref Range Status   • WBC 09/19/2022 5.17  3.40 - 10.80 10*3/mm3 Final   • RBC 09/19/2022 4.20  3.77 - 5.28 10*6/mm3 Final   • Hemoglobin 09/19/2022 11.6 (L)  12.0 - 15.9 g/dL Final   • Hematocrit 09/19/2022 36.1  34.0 - 46.6 % Final   • MCV 09/19/2022 86.0  79.0 - 97.0 fL Final   • MCH 09/19/2022 27.6  26.6 - 33.0 pg Final   • MCHC 09/19/2022 32.1  31.5 - 35.7 g/dL Final   • RDW 09/19/2022 14.4  12.3 - 15.4 % Final   • RDW-SD 09/19/2022 44.9  37.0 - 54.0 fl Final   • MPV 09/19/2022 10.9  6.0 - 12.0 fL Final   • Platelets 09/19/2022 283  140 - 450 10*3/mm3 Final   • Neutrophil % 09/19/2022 51.8  42.7 - 76.0 % Final    Appended report. These results have been appended to a previously final verified report.   • Lymphocyte % 09/19/2022 38.5  19.6 - 45.3 % Final     Appended report. These results have been appended to a previously final verified report.   • Monocyte % 09/19/2022 6.4  5.0 - 12.0 % Final    Appended report. These results have been appended to a previously final verified report.   • Eosinophil % 09/19/2022 2.3  0.3 - 6.2 % Final    Appended report. These results have been appended to a previously final verified report.   • Basophil % 09/19/2022 0.6  0.0 - 1.5 % Final    Appended report. These results have been appended to a previously final verified report.   • Immature Grans % 09/19/2022 0.4  0.0 - 0.5 % Final    Appended report. These results have been appended to a previously final verified report.   • Neutrophils, Absolute 09/19/2022 2.68  1.70 - 7.00 10*3/mm3 Final    Appended report. These results have been appended to a previously final verified report.   • Lymphocytes, Absolute 09/19/2022 1.99  0.70 - 3.10 10*3/mm3 Final    Appended report. These results have been appended to a previously final verified report.   • Monocytes, Absolute 09/19/2022 0.33  0.10 - 0.90 10*3/mm3 Final    Appended report. These results have been appended to a previously final verified report.   • Eosinophils, Absolute 09/19/2022 0.12  0.00 - 0.40 10*3/mm3 Final    Appended report. These results have been appended to a previously final verified report.   • Basophils, Absolute 09/19/2022 0.03  0.00 - 0.20 10*3/mm3 Final    Appended report. These results have been appended to a previously final verified report.   • Immature Grans, Absolute 09/19/2022 0.02  0.00 - 0.05 10*3/mm3 Final    Appended report. These results have been appended to a previously final verified report.   • nRBC 09/19/2022 0.0  0.0 - 0.2 /100 WBC Final   • Glucose 09/19/2022 186 (H)  65 - 99 mg/dL Final   • BUN 09/19/2022 8  6 - 20 mg/dL Final   • Creatinine 09/19/2022 0.77  0.57 - 1.00 mg/dL Final   • Sodium 09/19/2022 139  136 - 145 mmol/L Final   • Potassium 09/19/2022 4.4  3.5 - 5.2 mmol/L Final   •  Chloride 09/19/2022 100  98 - 107 mmol/L Final   • CO2 09/19/2022 29.0  22.0 - 29.0 mmol/L Final   • Calcium 09/19/2022 7.9 (L)  8.6 - 10.5 mg/dL Final   • Total Protein 09/19/2022 7.4  6.0 - 8.5 g/dL Final   • Albumin 09/19/2022 4.00  3.50 - 5.20 g/dL Final   • ALT (SGPT) 09/19/2022 32  1 - 33 U/L Final   • AST (SGOT) 09/19/2022 29  1 - 32 U/L Final   • Alkaline Phosphatase 09/19/2022 109  39 - 117 U/L Final   • Total Bilirubin 09/19/2022 0.2  0.0 - 1.2 mg/dL Final   • Globulin 09/19/2022 3.4  gm/dL Final   • A/G Ratio 09/19/2022 1.2  g/dL Final   • BUN/Creatinine Ratio 09/19/2022 10.4  7.0 - 25.0 Final   • Anion Gap 09/19/2022 10.0  5.0 - 15.0 mmol/L Final   • eGFR 09/19/2022 89.5  >60.0 mL/min/1.73 Final    National Kidney Foundation and American Society of Nephrology (ASN) Task Force recommended calculation based on the Chronic Kidney Disease Epidemiology Collaboration (CKD-EPI) equation refit without adjustment for race.   • TSH 09/19/2022 <0.005 (L)  0.270 - 4.200 uIU/mL Final   • Free T4 09/19/2022 1.81 (H)  0.93 - 1.70 ng/dL Final   • T3, Free 09/19/2022 3.39  2.00 - 4.40 pg/mL Final   • 25 Hydroxy, Vitamin D 09/19/2022 89.4  30.0 - 100.0 ng/ml Final   • Magnesium 09/19/2022 1.7  1.6 - 2.6 mg/dL Final   • Phosphorus 09/19/2022 5.1 (H)  2.5 - 4.5 mg/dL Final   • RBC Morphology 09/19/2022 Normal  Normal Final   • WBC Morphology 09/19/2022 Normal  Normal Final   • Platelet Morphology 09/19/2022 Normal  Normal Final   Office Visit on 09/12/2022   Component Date Value Ref Range Status   • Calcium, 24H Urine 09/20/2022 120.2  100.0 - 300.0 mg/24 hr Final   • Calcium, Urine 09/20/2022 8.9  mg/dL Final   • 24H Urine Volume 09/20/2022 1,350  mL Final   • Time (Hours) 09/20/2022 24  hrs Final   • Creatinine, 24H 09/20/2022 0.86  0.70 - 1.60 g/24 hr Final   • Creatinine, Urine 09/20/2022 63.9  mg/dL Final   • 24H Urine Volume 09/20/2022 1,350  mL Final   • Time (Hours) 09/20/2022 24  hrs Final   Lab on 08/29/2022    Component Date Value Ref Range Status   • WBC 08/29/2022 6.19  3.40 - 10.80 10*3/mm3 Final   • RBC 08/29/2022 4.18  3.77 - 5.28 10*6/mm3 Final   • Hemoglobin 08/29/2022 11.5 (L)  12.0 - 15.9 g/dL Final   • Hematocrit 08/29/2022 34.0  34.0 - 46.6 % Final   • MCV 08/29/2022 81.3  79.0 - 97.0 fL Final   • MCH 08/29/2022 27.5  26.6 - 33.0 pg Final   • MCHC 08/29/2022 33.8  31.5 - 35.7 g/dL Final   • RDW 08/29/2022 13.8  12.3 - 15.4 % Final   • RDW-SD 08/29/2022 40.6  37.0 - 54.0 fl Final   • MPV 08/29/2022 10.4  6.0 - 12.0 fL Final   • Platelets 08/29/2022 343  140 - 450 10*3/mm3 Final   • Neutrophil % 08/29/2022 53.1  42.7 - 76.0 % Final   • Lymphocyte % 08/29/2022 38.6  19.6 - 45.3 % Final   • Monocyte % 08/29/2022 5.8  5.0 - 12.0 % Final   • Eosinophil % 08/29/2022 1.8  0.3 - 6.2 % Final   • Basophil % 08/29/2022 0.5  0.0 - 1.5 % Final   • Immature Grans % 08/29/2022 0.2  0.0 - 0.5 % Final   • Neutrophils, Absolute 08/29/2022 3.29  1.70 - 7.00 10*3/mm3 Final   • Lymphocytes, Absolute 08/29/2022 2.39  0.70 - 3.10 10*3/mm3 Final   • Monocytes, Absolute 08/29/2022 0.36  0.10 - 0.90 10*3/mm3 Final   • Eosinophils, Absolute 08/29/2022 0.11  0.00 - 0.40 10*3/mm3 Final   • Basophils, Absolute 08/29/2022 0.03  0.00 - 0.20 10*3/mm3 Final   • Immature Grans, Absolute 08/29/2022 0.01  0.00 - 0.05 10*3/mm3 Final   • nRBC 08/29/2022 0.0  0.0 - 0.2 /100 WBC Final   • Glucose 08/29/2022 134 (H)  65 - 99 mg/dL Final   • BUN 08/29/2022 7  6 - 20 mg/dL Final   • Creatinine 08/29/2022 0.56 (L)  0.57 - 1.00 mg/dL Final   • Sodium 08/29/2022 139  136 - 145 mmol/L Final   • Potassium 08/29/2022 4.3  3.5 - 5.2 mmol/L Final   • Chloride 08/29/2022 102  98 - 107 mmol/L Final   • CO2 08/29/2022 25.2  22.0 - 29.0 mmol/L Final   • Calcium 08/29/2022 8.2 (L)  8.6 - 10.5 mg/dL Final   • Total Protein 08/29/2022 6.7  6.0 - 8.5 g/dL Final   • Albumin 08/29/2022 3.90  3.50 - 5.20 g/dL Final   • ALT (SGPT) 08/29/2022 52 (H)  1 - 33 U/L Final   •  AST (SGOT) 08/29/2022 39 (H)  1 - 32 U/L Final   • Alkaline Phosphatase 08/29/2022 114  39 - 117 U/L Final   • Total Bilirubin 08/29/2022 0.3  0.0 - 1.2 mg/dL Final   • Globulin 08/29/2022 2.8  gm/dL Final   • A/G Ratio 08/29/2022 1.4  g/dL Final   • BUN/Creatinine Ratio 08/29/2022 12.5  7.0 - 25.0 Final   • Anion Gap 08/29/2022 11.8  5.0 - 15.0 mmol/L Final   • eGFR 08/29/2022 105.9  >60.0 mL/min/1.73 Final    National Kidney Foundation and American Society of Nephrology (ASN) Task Force recommended calculation based on the Chronic Kidney Disease Epidemiology Collaboration (CKD-EPI) equation refit without adjustment for race.   • Hemoglobin A1C 08/29/2022 7.00 (H)  4.80 - 5.60 % Final   • Total Cholesterol 08/29/2022 133  0 - 200 mg/dL Final   • Triglycerides 08/29/2022 228 (H)  0 - 150 mg/dL Final   • HDL Cholesterol 08/29/2022 40  40 - 60 mg/dL Final   • LDL Cholesterol  08/29/2022 56  0 - 100 mg/dL Final   • VLDL Cholesterol 08/29/2022 37  5 - 40 mg/dL Final   • LDL/HDL Ratio 08/29/2022 1.19   Final   • TSH 08/29/2022 <0.005 (L)  0.270 - 4.200 uIU/mL Final   • Free T4 08/29/2022 1.78 (H)  0.93 - 1.70 ng/dL Final   • 25 Hydroxy, Vitamin D 08/29/2022 161.0 (H)  30.0 - 100.0 ng/ml Final   • Vitamin B-12 08/29/2022 316  211 - 946 pg/mL Final   • Microalbumin/Creatinine Ratio 08/29/2022    Final    Unable to calculate   • Creatinine, Urine 08/29/2022 106.5  mg/dL Final   • Microalbumin, Urine 08/29/2022 <1.2  mg/dL Final   • PTH, Intact 08/29/2022 7.4 (L)  15.0 - 65.0 pg/mL Final   • Calcium 08/29/2022 8.1 (L)  8.6 - 10.5 mg/dL Final   • Iron 08/29/2022 67  37 - 145 mcg/dL Final   • Iron Saturation 08/29/2022 22  20 - 50 % Final   • Transferrin 08/29/2022 207  200 - 360 mg/dL Final   • TIBC 08/29/2022 308  298 - 536 mcg/dL Final   • Ferritin 08/29/2022 254.00 (H)  13.00 - 150.00 ng/mL Final   There may be more visits with results that are not included.      CT Abdomen Pelvis Without Contrast  Narrative: EXAM: CT  ABDOMEN AND PELVIS WITHOUT INTRAVENOUS CONTRAST     INDICATION: 57 years year old Female with left flank pain    COMPARISON: None    TECHNIQUE: This examination was performed according to our  departmental dose optimization program which includes automated  exposure control, adjustment of the MA and kV according to  patient size, and/or use of iterative reconstruction technique.     FINDINGS:   Negative appendix. The small bowel is normal in caliber without  evidence for mechanical small bowel obstruction. There is mild  nonspecific prominence of mesenteric lymph nodes with adjacent  ill-defined inflammatory stranding. Specifically, the largest  mesenteric lymph node measures up to 1 cm in short axis.    There is relatively large amount of stool in the colon.    Diffuse fatty infiltration of the liver. Negative adrenal glands.  The spleen and pancreas have unremarkable unenhanced appearances.  No urinary calcifications or hydronephrosis.    Atelectasis in the visualized lung bases.    No acute or suspicious osseous abnormality.  Impression: 1. Negative appendix.  2. Fatty infiltration of the liver.  3. Nonspecific mild prominence of mesenteric lymph nodes with  adjacent ill-defined inflammatory stranding. This can be seen  with a mesenteric panniculitis, though this can also be  associated with a lymphoproliferative disorder. The spleen  measures within normal limits. Follow-up imaging in approximately  six months is recommended to assess for persistence or interval  change. #4 relatively large amount of stool in the colon.    Electronically signed by:  Carlos Garcia MD  10/4/2022 9:58 AM CDT  Workstation: [id number]  XR Chest 2 View  Narrative: EXAM DESCRIPTION:  XR CHEST 2 VIEWS    CLINICAL INDICATION: SOA Triage Protocol     COMPARISON: None    FINDINGS: Mild cardiomegaly. Pulmonary vascularity is within  normal limits. Likely atelectasis in both lung bases. No pleural  effusion or pneumothorax.  Impression: 1.  Cardiomegaly.  2. Likely atelectasis in both lung bases.    Electronically signed by:  Carlos Garcia MD  10/4/2022 9:55 AM CDT  Workstation: [id number]    @[facility]@  Immunization History   Administered Date(s) Administered   • Pneumococcal Polysaccharide (PPSV23) 03/27/2018   • Tdap 03/27/2018       The following portions of the patient's history were reviewed and updated as appropriate: allergies, current medications, past family history, past medical history, past social history, past surgical history and problem list.        Physical Exam  /72 (BP Location: Left arm, Patient Position: Sitting, Cuff Size: Adult)   Temp 98.8 °F (37.1 °C)   Ht 152.4 cm (60\")   Wt 89.7 kg (197 lb 12.8 oz)   BMI 38.63 kg/m²     Physical Exam  Vitals and nursing note reviewed.   Constitutional:       Appearance: She is well-developed. She is not diaphoretic.   HENT:      Head: Normocephalic and atraumatic.      Jaw: Tenderness and pain on movement present. No swelling or malocclusion.      Comments: Pain on right TMJ joint. On palpation. No clicking sound      Right Ear: External ear normal.      Ears:      Comments: Cerumen impaction in right ear    Unable to visualize TM        Eyes:      Conjunctiva/sclera: Conjunctivae normal.      Pupils: Pupils are equal, round, and reactive to light.   Cardiovascular:      Rate and Rhythm: Normal rate and regular rhythm.      Heart sounds: Normal heart sounds. No murmur heard.  Pulmonary:      Effort: Pulmonary effort is normal. No respiratory distress.      Breath sounds: Normal breath sounds.   Abdominal:      General: Bowel sounds are normal. There is no distension.      Palpations: Abdomen is soft.      Tenderness: There is no abdominal tenderness.      Comments: Obese abdomen    Musculoskeletal:         General: Tenderness present. No deformity. Normal range of motion.      Cervical back: Normal range of motion and neck supple.   Skin:     General: Skin is warm.       Coloration: Skin is not pale.      Findings: No erythema or rash.   Neurological:      Mental Status: She is alert and oriented to person, place, and time.      Cranial Nerves: No cranial nerve deficit.   Psychiatric:         Behavior: Behavior normal.         [unfilled]   Diagnosis Plan   1. Right ear pain  Ambulatory Referral to ENT (Otolaryngology)      2. Essential hypertension        3. Mixed hyperlipidemia        4. Controlled type 2 diabetes mellitus with complication, without long-term current use of insulin (HCC)        5. Vitamin D deficiency        6. Postoperative hypothyroidism        7. Hypocalcemia        8. Iron deficiency anemia, unspecified iron deficiency anemia type        9. Class 2 severe obesity with serious comorbidity and body mass index (BMI) of 38.0 to 38.9 in adult, unspecified obesity type (HCC)        10. Jaw pain  XR Mandible 4+ View    Ambulatory Referral to ENT (Otolaryngology)      11. Dislocation of temporomandibular joint, sequela  XR Mandible 4+ View      12. Impacted cerumen of right ear  Ambulatory Referral to ENT (Otolaryngology)                 -recommend labwork   -recommend influenza vaccination   -recommend COVID-19 vaccination   -recommend mammogram screening - schedule at imaging center   -recommend cologuard test   -recommend DEXA scan - schedule at imaging center   -recommend diabetic eye exam   -ear pain/cerumen impaction/right jaw pain.  Unsure if this due to ear infection due to cerumen impaction or possiblly TMJ syndrome. Will get x-ray of jaw today.  - was augementin 875-125 mg every 12 hours for 10 days along with dilfucan. Will refer to ENT for further evaluation. Also start on clindamycin 300 mg PO TID for 7 days. Will refer to ENT for further evaluation   -refer to podiatry for diabetic foot care and callus on right foot   -left foot swelling start on lasix 20 mg PO BID   -fatty liver/abdominal pain/GI following - has endoscopy scheduled on amitiza 8 mg  PO BID    -hypocalcemia/hypoparathyroidism   -on calcium carbonate 600 mg PO BID. Endocrinology following on calcitriol 0.25 mcg daily on phoslo tablets 4 in am and 3 in pm   -postoperative hypothyroidism - on synthroid 137 mcg 6 days a week. Endocrinolgoy following   -DM type 2-  on metformin 1000 mg PO BID, on jardiance 25 mg daily. on rybelsus 3 mg PO q daily.   -insomnia - on trazodone  100 mg PO Q daily.   -diabetic neuropathy - on neurontin 300 mg PO TID   -hypertension - on clonidine 0.3 mg PO BID on  lisionpril 40 mg daily. On toprol  mg daily.  On norvasc 10 mg daily.   -Obesity BMI >30 . - recommended weight loss information and DASH diet. Counseled weight loss >5 minuutes  BMI at 38.63   -hyperlipidemia - HDL low. REcommended high healthy fat diet stop simvastatin and start on lipitor 20 mg PO qhs. Drug information provided   -high vitamin D - stop vitamin D supplement   -depression/grieving - on lexapro 20 mg daily. On abilify 300 mg daily  on wellbutrin  mg daily. on lamictal 200 mg PO q daily.    -iron deficiency anemia - on ferrous sulfate 325 mg PO TID. Will stop this due to high iron levls   -advised to go to ER or call 911 if symptoms or severe  -advised to be safe and call with questions and concerns   -advised to followup with specialist and referrals   -adivsed pt to be safe during COVID-19 pandemic   I spent 45  minutes caring for Joan on this date of service. This time includes time spent by me in the following activities: preparing for the visit, reviewing tests, obtaining and/or reviewing a separately obtained history, performing a medically appropriate examination and/or evaluation, counseling and educating the patient/family/caregiver, ordering medications, tests, or procedures, referring and communicating with other health care professionals, documenting information in the medical record, independently interpreting results and communicating that information with the  patient/family/caregiver and care coordination.         This document has been electronically signed by Brad Arnold MD on January 4, 2023 11:41 CST                      This document has been electronically signed by Brad Arnold MD on January 4, 2023 11:41 CST     Answers for HPI/ROS submitted by the patient on 12/28/2022  What is the primary reason for your visit?: Ear Pain

## 2023-01-04 ENCOUNTER — OFFICE VISIT (OUTPATIENT)
Dept: FAMILY MEDICINE CLINIC | Facility: CLINIC | Age: 58
End: 2023-01-04
Payer: MEDICAID

## 2023-01-04 VITALS
SYSTOLIC BLOOD PRESSURE: 108 MMHG | HEIGHT: 60 IN | DIASTOLIC BLOOD PRESSURE: 72 MMHG | TEMPERATURE: 98.8 F | BODY MASS INDEX: 38.83 KG/M2 | WEIGHT: 197.8 LBS

## 2023-01-04 DIAGNOSIS — E11.8 CONTROLLED TYPE 2 DIABETES MELLITUS WITH COMPLICATION, WITHOUT LONG-TERM CURRENT USE OF INSULIN: ICD-10-CM

## 2023-01-04 DIAGNOSIS — E83.51 HYPOCALCEMIA: ICD-10-CM

## 2023-01-04 DIAGNOSIS — E55.9 VITAMIN D DEFICIENCY: ICD-10-CM

## 2023-01-04 DIAGNOSIS — H61.21 IMPACTED CERUMEN OF RIGHT EAR: ICD-10-CM

## 2023-01-04 DIAGNOSIS — E78.2 MIXED HYPERLIPIDEMIA: ICD-10-CM

## 2023-01-04 DIAGNOSIS — E89.0 POSTOPERATIVE HYPOTHYROIDISM: ICD-10-CM

## 2023-01-04 DIAGNOSIS — E66.01 CLASS 2 SEVERE OBESITY WITH SERIOUS COMORBIDITY AND BODY MASS INDEX (BMI) OF 38.0 TO 38.9 IN ADULT, UNSPECIFIED OBESITY TYPE: ICD-10-CM

## 2023-01-04 DIAGNOSIS — I10 ESSENTIAL HYPERTENSION: ICD-10-CM

## 2023-01-04 DIAGNOSIS — S03.00XS DISLOCATION OF TEMPOROMANDIBULAR JOINT, SEQUELA: ICD-10-CM

## 2023-01-04 DIAGNOSIS — D50.9 IRON DEFICIENCY ANEMIA, UNSPECIFIED IRON DEFICIENCY ANEMIA TYPE: ICD-10-CM

## 2023-01-04 DIAGNOSIS — R68.84 JAW PAIN: ICD-10-CM

## 2023-01-04 DIAGNOSIS — H92.01 RIGHT EAR PAIN: Primary | ICD-10-CM

## 2023-01-04 PROCEDURE — 1159F MED LIST DOCD IN RCRD: CPT | Performed by: FAMILY MEDICINE

## 2023-01-04 PROCEDURE — 99214 OFFICE O/P EST MOD 30 MIN: CPT | Performed by: FAMILY MEDICINE

## 2023-01-04 PROCEDURE — 1160F RVW MEDS BY RX/DR IN RCRD: CPT | Performed by: FAMILY MEDICINE

## 2023-01-04 RX ORDER — CLINDAMYCIN HYDROCHLORIDE 300 MG/1
300 CAPSULE ORAL 3 TIMES DAILY
Qty: 21 CAPSULE | Refills: 0 | Status: SHIPPED | OUTPATIENT
Start: 2023-01-04 | End: 2023-01-06

## 2023-01-04 NOTE — PATIENT INSTRUCTIONS
Clindamycin 300 mg every 8 hours for 7 days    Will refer to ENT Dr Potter for further evaluation    Recheck in 4 weeks    Also get x-ray of jaw today

## 2023-01-12 ENCOUNTER — TELEPHONE (OUTPATIENT)
Dept: FAMILY MEDICINE CLINIC | Facility: CLINIC | Age: 58
End: 2023-01-12
Payer: MEDICARE

## 2023-01-12 NOTE — TELEPHONE ENCOUNTER
Patient states that provider gave her a new medication and was told if she had any issues with it to call and let him know.  Patient states she now has diarrhea

## 2023-01-17 ENCOUNTER — TELEPHONE (OUTPATIENT)
Dept: FAMILY MEDICINE CLINIC | Facility: CLINIC | Age: 58
End: 2023-01-17
Payer: MEDICARE

## 2023-01-17 DIAGNOSIS — E11.8 DIABETIC FOOT: ICD-10-CM

## 2023-01-17 DIAGNOSIS — M79.671 BILATERAL FOOT PAIN: Primary | ICD-10-CM

## 2023-01-17 DIAGNOSIS — M79.672 BILATERAL FOOT PAIN: Primary | ICD-10-CM

## 2023-01-17 NOTE — TELEPHONE ENCOUNTER
Patient called and stated that the podiatrist in [place] no longer does what she needs and she would like a referral to see Jan Alcazar in [place]

## 2023-01-30 ENCOUNTER — OFFICE VISIT (OUTPATIENT)
Dept: OBSTETRICS AND GYNECOLOGY | Facility: CLINIC | Age: 58
End: 2023-01-30
Payer: MEDICARE

## 2023-01-30 VITALS
WEIGHT: 196 LBS | BODY MASS INDEX: 38.48 KG/M2 | SYSTOLIC BLOOD PRESSURE: 112 MMHG | HEIGHT: 60 IN | DIASTOLIC BLOOD PRESSURE: 72 MMHG

## 2023-01-30 DIAGNOSIS — Z90.710 H/O HYSTERECTOMY FOR BENIGN DISEASE: ICD-10-CM

## 2023-01-30 DIAGNOSIS — Z01.419 ENCOUNTER FOR GYNECOLOGICAL EXAMINATION WITHOUT ABNORMAL FINDING: Primary | ICD-10-CM

## 2023-01-30 PROBLEM — M47.817 LUMBOSACRAL SPONDYLOSIS WITHOUT MYELOPATHY: Status: ACTIVE | Noted: 2021-07-20

## 2023-01-30 PROBLEM — F41.9 ANXIETY: Status: ACTIVE | Noted: 2023-01-30

## 2023-01-30 PROBLEM — K21.9 GASTROESOPHAGEAL REFLUX DISEASE: Status: ACTIVE | Noted: 2023-01-30

## 2023-01-30 PROBLEM — J45.909 ASTHMA: Status: ACTIVE | Noted: 2023-01-30

## 2023-01-30 PROCEDURE — G0101 CA SCREEN;PELVIC/BREAST EXAM: HCPCS | Performed by: NURSE PRACTITIONER

## 2023-01-30 NOTE — PROGRESS NOTES
Subjective   Joan Blackwell is a 57 y.o. here for annual exam. No concerns at this time.      History of Present Illness  LMP- [facility] in 1997 2/2 uterine prolapse; ovaries intact  Last pap- 2009; no known hx of abnormal  Last mammo- states that she had one in November of 2022 at [facility]; no record of final report documented and Dr. Arnold didn't order until 12/20. Will having his assistant f/u on results.   Gynecologic Exam  The patient's pertinent negatives include no genital itching, genital lesions, genital odor, genital rash, pelvic pain, vaginal bleeding or vaginal discharge. Pertinent negatives include no abdominal pain, constipation, diarrhea, dysuria or urgency. She is sexually active. No, her partner does not have an STD. She uses hysterectomy for contraception. She is postmenopausal.       The following portions of the patient's history were reviewed and updated as appropriate: allergies, current medications, past family history, past medical history, past social history, past surgical history and problem list.    Review of Systems   Constitutional: Negative for activity change, appetite change, diaphoresis, fatigue, unexpected weight gain and unexpected weight loss.   Respiratory: Negative for chest tightness and shortness of breath.    Cardiovascular: Negative for chest pain and palpitations.   Gastrointestinal: Negative for abdominal distention, abdominal pain, constipation and diarrhea.   Genitourinary: Negative for breast discharge, breast lump, breast pain, decreased libido, decreased urine volume, difficulty urinating, dyspareunia, dysuria, pelvic pain, pelvic pressure, urgency, urinary incontinence, vaginal bleeding, vaginal discharge and vaginal pain.   Skin: Negative for color change, dry skin and skin lesions.   Neurological: Negative for light-headedness and headache.   Psychiatric/Behavioral: Negative for agitation, dysphoric mood, sleep disturbance, depressed mood and stress. The patient is  not nervous/anxious.        Objective   Physical Exam  Vitals and nursing note reviewed. Exam conducted with a chaperone present.   Constitutional:       General: She is awake. She is not in acute distress.     Appearance: Normal appearance. She is well-developed and well-groomed. She is obese. She is not ill-appearing, toxic-appearing or diaphoretic.   Neck:      Thyroid: No thyromegaly.   Cardiovascular:      Rate and Rhythm: Normal rate and regular rhythm.      Heart sounds: Normal heart sounds.   Pulmonary:      Effort: Pulmonary effort is normal.      Breath sounds: Normal breath sounds.   Chest:   Breasts:     Krishna Score is 5.      Breasts are symmetrical.      Right: Normal. No swelling, bleeding, inverted nipple, mass, nipple discharge, skin change or tenderness.      Left: Normal. No swelling, bleeding, inverted nipple, mass, nipple discharge, skin change or tenderness.   Abdominal:      General: Bowel sounds are normal. There is no distension.      Palpations: Abdomen is soft.      Tenderness: There is no abdominal tenderness.   Genitourinary:     General: Normal vulva.      Exam position: Lithotomy position.      Krishna stage (genital): 5.      Labia:         Right: No rash, tenderness, lesion or injury.         Left: No rash, tenderness, lesion or injury.       Urethra: No prolapse, urethral pain, urethral swelling or urethral lesion.      Vagina: Normal.      Comments: Uterus and cervix absent. Well healed vaginal cuff. Pap not indicated.  Lymphadenopathy:      Upper Body:      Right upper body: No supraclavicular, axillary or pectoral adenopathy.      Left upper body: No supraclavicular, axillary or pectoral adenopathy.      Lower Body: No right inguinal adenopathy. No left inguinal adenopathy.   Skin:     General: Skin is warm and dry.   Neurological:      Mental Status: She is alert and oriented to person, place, and time.   Psychiatric:         Attention and Perception: Attention and perception  normal.         Mood and Affect: Mood and affect normal.         Speech: Speech normal.         Behavior: Behavior normal. Behavior is cooperative.           Assessment & Plan   Diagnoses and all orders for this visit:    1. Encounter for gynecological examination without abnormal finding (Primary)    2. H/O hysterectomy for benign disease      Reviewed cervical and breast cancer screening recommendations. Spoke with Dr. Arnold and he does not have record of her having a mammogram since 2021. Will check with [facility] on date of last mammo and have her schedule if it's time.

## 2023-01-31 ENCOUNTER — TELEPHONE (OUTPATIENT)
Dept: OBSTETRICS AND GYNECOLOGY | Facility: CLINIC | Age: 58
End: 2023-01-31
Payer: MEDICARE

## 2023-01-31 NOTE — TELEPHONE ENCOUNTER
Message  Received: Yesterday  Page Puri, APRN sent to Arlette Arthur LPN  Please check with [facility] on the date of her last mammogram. She is certain she had one in November but Dr. Arnold didn't even order it for her until December. If she is due please have her schedule. If she had it then please get them to send Clayton a report.     Thanks.           Comments    Khloe Francisco MA 1/31/2023 01:07 PM CST        ------------------------------------    I CALLED [facility] AND THEY STATED THAT THE PT HAS NOT HAD A MAMMOGRAM IN 2 YEARS.  I THEN CALLED THE PATIENT AND TOLD HER THIS AND SHE ARGUED THAT SHE HAD ONE LAST November.  ACCORDING TO JS, PT HAD AN U/S NOT RELATED TO THE BREAST, BUT NOT A MAMMOGRAM.  I ADVISED THE PT TO CALL [facility] AND SCHEDULE A MAMMOGRAM AND GAVE HER THEIR NUMBER.

## 2023-02-09 ENCOUNTER — TELEPHONE (OUTPATIENT)
Dept: FAMILY MEDICINE CLINIC | Facility: CLINIC | Age: 58
End: 2023-02-09

## 2023-02-09 ENCOUNTER — OFFICE VISIT (OUTPATIENT)
Dept: FAMILY MEDICINE CLINIC | Facility: CLINIC | Age: 58
End: 2023-02-09
Payer: MEDICARE

## 2023-02-09 VITALS
BODY MASS INDEX: 38.76 KG/M2 | DIASTOLIC BLOOD PRESSURE: 66 MMHG | HEIGHT: 60 IN | WEIGHT: 197.4 LBS | TEMPERATURE: 98.8 F | SYSTOLIC BLOOD PRESSURE: 120 MMHG

## 2023-02-09 DIAGNOSIS — E78.2 MIXED HYPERLIPIDEMIA: ICD-10-CM

## 2023-02-09 DIAGNOSIS — R68.84 JAW PAIN: ICD-10-CM

## 2023-02-09 DIAGNOSIS — K21.9 GASTROESOPHAGEAL REFLUX DISEASE, UNSPECIFIED WHETHER ESOPHAGITIS PRESENT: ICD-10-CM

## 2023-02-09 DIAGNOSIS — E20.9 HYPOPARATHYROIDISM, UNSPECIFIED HYPOPARATHYROIDISM TYPE: ICD-10-CM

## 2023-02-09 DIAGNOSIS — E89.0 S/P THYROIDECTOMY: ICD-10-CM

## 2023-02-09 DIAGNOSIS — D50.9 IRON DEFICIENCY ANEMIA, UNSPECIFIED IRON DEFICIENCY ANEMIA TYPE: ICD-10-CM

## 2023-02-09 DIAGNOSIS — E83.51 HYPOCALCEMIA: ICD-10-CM

## 2023-02-09 DIAGNOSIS — E11.9 CONTROLLED TYPE 2 DIABETES MELLITUS WITHOUT COMPLICATION, WITHOUT LONG-TERM CURRENT USE OF INSULIN: Primary | ICD-10-CM

## 2023-02-09 DIAGNOSIS — E66.01 CLASS 2 SEVERE OBESITY WITH SERIOUS COMORBIDITY AND BODY MASS INDEX (BMI) OF 38.0 TO 38.9 IN ADULT, UNSPECIFIED OBESITY TYPE: ICD-10-CM

## 2023-02-09 DIAGNOSIS — I10 ESSENTIAL HYPERTENSION: ICD-10-CM

## 2023-02-09 DIAGNOSIS — H92.01 RIGHT EAR PAIN: ICD-10-CM

## 2023-02-09 DIAGNOSIS — E89.0 POSTOPERATIVE HYPOTHYROIDISM: ICD-10-CM

## 2023-02-09 DIAGNOSIS — G47.00 INSOMNIA, UNSPECIFIED TYPE: ICD-10-CM

## 2023-02-09 PROCEDURE — 99214 OFFICE O/P EST MOD 30 MIN: CPT | Performed by: FAMILY MEDICINE

## 2023-02-09 RX ORDER — AMLODIPINE BESYLATE 5 MG/1
5 TABLET ORAL DAILY
Qty: 90 TABLET | Refills: 1 | Status: SHIPPED | OUTPATIENT
Start: 2023-02-09 | End: 2023-03-23

## 2023-02-09 RX ORDER — LEVOTHYROXINE SODIUM 137 UG/1
137 TABLET ORAL DAILY
Qty: 30 TABLET | Refills: 3 | Status: SHIPPED | OUTPATIENT
Start: 2023-02-09

## 2023-02-09 RX ORDER — ISOPROPYL ALCOHOL 0.75 G/1
SWAB TOPICAL
Qty: 300 EACH | Refills: 3 | Status: SHIPPED | OUTPATIENT
Start: 2023-02-09

## 2023-02-09 RX ORDER — ONDANSETRON HYDROCHLORIDE 8 MG/1
8 TABLET, FILM COATED ORAL EVERY 8 HOURS PRN
Qty: 90 TABLET | Refills: 2 | Status: SHIPPED | OUTPATIENT
Start: 2023-02-09

## 2023-02-09 RX ORDER — POTASSIUM CHLORIDE 1500 MG/1
20 TABLET, FILM COATED, EXTENDED RELEASE ORAL DAILY
Qty: 90 TABLET | Refills: 1 | Status: SHIPPED | OUTPATIENT
Start: 2023-02-09

## 2023-02-09 RX ORDER — CLONIDINE HYDROCHLORIDE 0.3 MG/1
0.3 TABLET ORAL EVERY 12 HOURS
Qty: 180 TABLET | Refills: 1 | Status: SHIPPED | OUTPATIENT
Start: 2023-02-09

## 2023-02-09 RX ORDER — LANCETS
1 EACH MISCELLANEOUS 3 TIMES DAILY
Qty: 300 EACH | Refills: 3 | Status: SHIPPED | OUTPATIENT
Start: 2023-02-09 | End: 2023-02-09 | Stop reason: SDUPTHER

## 2023-02-09 RX ORDER — ESCITALOPRAM OXALATE 20 MG/1
20 TABLET ORAL DAILY
Qty: 30 TABLET | Refills: 3 | Status: SHIPPED | OUTPATIENT
Start: 2023-02-09

## 2023-02-09 RX ORDER — ORAL SEMAGLUTIDE 3 MG/1
1 TABLET ORAL DAILY
Qty: 90 TABLET | Refills: 1 | Status: SHIPPED | OUTPATIENT
Start: 2023-02-09 | End: 2023-02-13

## 2023-02-09 RX ORDER — LANCETS 30 GAUGE
EACH MISCELLANEOUS
Qty: 100 EACH | Refills: 3 | Status: SHIPPED | OUTPATIENT
Start: 2023-02-09

## 2023-02-09 RX ORDER — FUROSEMIDE 20 MG/1
20 TABLET ORAL 2 TIMES DAILY
Qty: 60 TABLET | Refills: 3 | Status: SHIPPED | OUTPATIENT
Start: 2023-02-09

## 2023-02-09 RX ORDER — LISINOPRIL 40 MG/1
40 TABLET ORAL DAILY
Qty: 90 TABLET | Refills: 1 | Status: SHIPPED | OUTPATIENT
Start: 2023-02-09

## 2023-02-09 RX ORDER — METOPROLOL SUCCINATE 100 MG/1
100 TABLET, EXTENDED RELEASE ORAL DAILY
Qty: 90 TABLET | Refills: 1 | Status: SHIPPED | OUTPATIENT
Start: 2023-02-09

## 2023-02-09 RX ORDER — BLOOD GLUCOSE CONTROL HIGH,LOW
1 EACH MISCELLANEOUS SEE ADMIN INSTRUCTIONS
Qty: 1 EACH | Refills: 1 | Status: SHIPPED | OUTPATIENT
Start: 2023-02-09 | End: 2023-02-09 | Stop reason: SDUPTHER

## 2023-02-09 RX ORDER — BLOOD-GLUCOSE METER
1 EACH MISCELLANEOUS SEE ADMIN INSTRUCTIONS
Qty: 1 KIT | Refills: 0 | Status: SHIPPED | OUTPATIENT
Start: 2023-02-09 | End: 2023-02-09 | Stop reason: SDUPTHER

## 2023-02-09 RX ORDER — ATORVASTATIN CALCIUM 20 MG/1
20 TABLET, FILM COATED ORAL DAILY
Qty: 90 TABLET | Refills: 1 | Status: SHIPPED | OUTPATIENT
Start: 2023-02-09

## 2023-02-09 RX ORDER — FERROUS SULFATE 325(65) MG
1 TABLET ORAL
Qty: 270 TABLET | Refills: 1 | Status: SHIPPED | OUTPATIENT
Start: 2023-02-09

## 2023-02-09 NOTE — TELEPHONE ENCOUNTER
The pharmacy called and they are needing the frequency of how many times she need to check daily to be able to fill the lancets and test script prescription. Please call [facility] THEY ARE ALSO NEEDING A GENRIC SCRIPT FOR METER, LANCETS AND TEST STRIPS INSTEAD

## 2023-02-09 NOTE — TELEPHONE ENCOUNTER
Submitted PA for [facility] via cover my meds and it stated available without authorization. Faxed pharmacy.

## 2023-02-15 ENCOUNTER — HOSPITAL ENCOUNTER (OUTPATIENT)
Facility: HOSPITAL | Age: 58
Setting detail: HOSPITAL OUTPATIENT SURGERY
Discharge: HOME | End: 2023-02-15
Attending: INTERNAL MEDICINE | Admitting: INTERNAL MEDICINE
Payer: MEDICARE

## 2023-02-15 ENCOUNTER — ANESTHESIA EVENT (OUTPATIENT)
Dept: GASTROENTEROLOGY | Facility: HOSPITAL | Age: 58
End: 2023-02-15
Payer: MEDICARE

## 2023-02-15 ENCOUNTER — ANESTHESIA (OUTPATIENT)
Dept: GASTROENTEROLOGY | Facility: HOSPITAL | Age: 58
End: 2023-02-15
Payer: MEDICARE

## 2023-02-15 VITALS
DIASTOLIC BLOOD PRESSURE: 89 MMHG | SYSTOLIC BLOOD PRESSURE: 110 MMHG | WEIGHT: 197 LBS | HEART RATE: 73 BPM | RESPIRATION RATE: 18 BRPM | HEIGHT: 60 IN | TEMPERATURE: 98.6 F | BODY MASS INDEX: 38.68 KG/M2 | OXYGEN SATURATION: 100 %

## 2023-02-15 DIAGNOSIS — Z12.11 ENCOUNTER FOR SCREENING FOR MALIGNANT NEOPLASM OF COLON: ICD-10-CM

## 2023-02-15 LAB — GLUCOSE BLDC GLUCOMTR-MCNC: 190 MG/DL (ref 70–130)

## 2023-02-15 PROCEDURE — 88305 TISSUE EXAM BY PATHOLOGIST: CPT

## 2023-02-15 PROCEDURE — 45385 COLONOSCOPY W/LESION REMOVAL: CPT | Performed by: INTERNAL MEDICINE

## 2023-02-15 PROCEDURE — 25010000002 PROPOFOL 10 MG/ML EMULSION: Performed by: NURSE ANESTHETIST, CERTIFIED REGISTERED

## 2023-02-15 PROCEDURE — 82962 GLUCOSE BLOOD TEST: CPT

## 2023-02-15 RX ORDER — DEXTROSE AND SODIUM CHLORIDE 5; .45 G/100ML; G/100ML
30 INJECTION, SOLUTION INTRAVENOUS CONTINUOUS PRN
Status: DISCONTINUED | OUTPATIENT
Start: 2023-02-15 | End: 2023-02-15 | Stop reason: HOSPADM

## 2023-02-15 RX ORDER — PROPOFOL 10 MG/ML
VIAL (ML) INTRAVENOUS AS NEEDED
Status: DISCONTINUED | OUTPATIENT
Start: 2023-02-15 | End: 2023-02-15 | Stop reason: SURG

## 2023-02-15 RX ADMIN — PROPOFOL 100 MG: 10 INJECTION, EMULSION INTRAVENOUS at 10:30

## 2023-02-15 RX ADMIN — DEXTROSE AND SODIUM CHLORIDE 30 ML/HR: 5; 450 INJECTION, SOLUTION INTRAVENOUS at 09:38

## 2023-02-15 NOTE — H&P
[facility]      Chief Complaint:   No chief complaint on file.      Subjective     HPI:   Ms. Blackwell is a 57-year-old -American female with past medical history of hypothyroidism, bronchitis, sinusitis, pharyngitis, seasonal allergies, degenerative joint disease, depression, diabetes mellitus, hypertension, GERD, osteoporosis, vulvovaginitis presenting for evaluation for fatty liver disease.  She apparently had CT abdomen pelvis for left flank pain and was noted to have hepatomegaly with fatty liver disease.  She denied nausea, vomiting, diarrhea, constipation, rectal bleeding or weight loss.  Her last colonoscopy was several years ago.  She denied alcohol usage.  LFTs are normal.  Ultrasound abdomen was consistent with Contracted gallbladder, coarsening of the hepatic architecture consistent with fatty infiltration and mild hepatic enlargement.  Patient denied alcohol usage.    Past Medical History:   Past Medical History:   Diagnosis Date   • Abdominal pain     suspect Kidney Stone      • Acquired hypothyroidism    • Acute bronchitis    • Acute maxillary sinusitis    • Acute pharyngitis    • Allergic    • Anxiety    • Asthma    • Axillary lymphadenopathy    • Blood in urine    • Bronchitis     with an asthmatic reaction      • Colitis    • Cough    • Cyst of Bartholin's gland duct     History of    • Degenerative joint disease involving multiple joints    • Depression    • Diabetes (HCC)    • Elevated cholesterol    • Encounter for gynecological examination with abnormal finding    • Essential (primary) hypertension    • Essential hypertension    • GERD (gastroesophageal reflux disease)    • Goiter    • Hemorrhoids    • Hordeolum     right lower eyelid      • Injury of back    • Low back pain    • Lung nodule, solitary    • Mild chronic obstructive pulmonary disease (HCC) 08/12/2016   • Multiple gestation 05/1982   • Osteoporosis    • Polyp of vagina     possible      • Postsurgical  hypoparathyroidism (HCC)    • Shortness of breath    • Tobacco dependence syndrome     Past history   • Urinary tract infectious disease    • Vulvovaginitis        Past Surgical History:    Past Surgical History:   Procedure Laterality Date   •  SECTION  01/14/1993    x 3, 84, 82,   • EXPLORATORY LAPAROTOMY Left 1998    Left cystectomy. Partial resection of vthe left ovary. Left ovary- oversewn   • INJECTION OF MEDICATION  2014    Depo Medrol (Methylprednisone) (1)    • INJECTION OF MEDICATION  2015    Kenalog (1)     • LAPAROSCOPIC HYSTERECTOMY  1995    Surgical, lysis of adhesions.Laparoscopic Assisted vaginal Hysterectomy (Tyler/ Doderlein Technique) marsupialization of right Bartholin's Gland Cyst   • LASER ABLATION OF THE CERVIX  1985    Laser vaporization, cervical cone   • OVARIAN CYST REMOVAL     • PAP SMEAR     • SHOULDER SURGERY     • THYROIDECTOMY  2006    with partial parathyroidectomy   • TOOTH EXTRACTION         Family History:  Family History   Problem Relation Age of Onset   • Diabetes Mother    • Hypertension Mother    • Diabetes Father    • Arthritis Father        Social History:   reports that she quit smoking about 7 years ago. Her smoking use included cigarettes. She started smoking about 27 years ago. She has a 7.50 pack-year smoking history. She has never used smokeless tobacco. She reports that she does not drink alcohol and does not use drugs.    Medications:   Prior to Admission medications    Medication Sig Start Date End Date Taking? Authorizing Provider   Accu-Chek Yany Plus test strip USE ONE STRIP TO TEST BLOOD SUGAR THREE TIMES A DAY FOR DIABETES 3/4/22  Yes Biju Do MD   Accu-Chek Softclix Lancets lancets TEST BLOOD SUGAR THREE TIMES DAILY AS DIRECTED 21  Yes Brda Arnold MD   Alcohol Swabs (B-D SINGLE USE SWABS REGULAR) pads USE AS DIRECTED AS NEEDED 22  Yes Brad Arnold MD   ALPRAZolam  (XANAX) 1 MG tablet  2/20/20  Yes Trever Padilla MD   amLODIPine (NORVASC) 5 MG tablet TAKE ONE TABLET BY MOUTH DAILY 11/17/22  Yes Brad Arnold MD   ARIPiprazole (ABILIFY) 30 MG tablet  9/29/20  Yes Trever Padilla MD   atorvastatin (LIPITOR) 20 MG tablet Take 1 tablet by mouth Daily. 8/30/22  Yes Brad Arnold MD   Blood Glucose Calibration (Accu-Chek Guide Control) liquid USE AS DIRECTED 12/6/21  Yes Brad Arnold MD   Blood Glucose Monitoring Suppl (Accu-Chek Guide) w/Device kit USE AS DIRECTED 9/9/21  Yes Brad Arnold MD   buPROPion XL (WELLBUTRIN XL) 300 MG 24 hr tablet  9/29/20  Yes Trever Padilla MD   calcium acetate (PHOS BINDER,) 667 MG capsule capsule Take 4 capsules am and 3 capsules pm 11/13/22  Yes Albert Khoury MD   cloNIDine (CATAPRES) 0.3 MG tablet TAKE ONE TABLET BY MOUTH EVERY 12 HOURS 11/15/22  Yes Brad Arnold MD   empagliflozin (Jardiance) 25 MG tablet tablet Take 1 tablet by mouth Daily. 8/30/22  Yes Brad Arnold MD   ferrous sulfate (FeroSul) 325 (65 FE) MG tablet Take 1 tablet by mouth 3 (Three) Times a Day With Meals. 12/28/21  Yes Brad Arnold MD   HYDROcodone-acetaminophen (NORCO)  MG per tablet  8/24/21  Yes Trever Padilla MD   lamoTRIgine (LaMICtal) 200 MG tablet  9/29/20  Yes Trever Padilla MD   levothyroxine (Synthroid) 137 MCG tablet Take 1 tablet by mouth Daily. 10/1/22  Yes Brad Arnold MD   lisinopril (PRINIVIL,ZESTRIL) 40 MG tablet Take 1 tablet by mouth Daily. 8/30/22  Yes Brad Arnold MD   lubiprostone (AMITIZA) 8 MCG capsule Take 1 capsule by mouth 2 (Two) Times a Day With Meals. 10/6/22  Yes Jass Hu DO   magnesium oxide 250 MG tablet Take 1 tablet by mouth Daily. 8/30/22  Yes Brad Arnold MD   metFORMIN (GLUCOPHAGE) 1000 MG tablet Take 1 tablet by mouth 2 (Two) Times a Day With Meals. 8/30/22  Yes Brad Arnold MD   metoprolol succinate XL (TOPROL-XL) 100 MG 24 hr tablet TAKE  ONE TABLET BY MOUTH DAILY 11/17/22  Yes Brad Arnold MD   ondansetron (ZOFRAN) 8 MG tablet Take 1 tablet by mouth Every 8 (Eight) Hours As Needed for Nausea or Vomiting. 8/30/22  Yes Brad Arnold MD   potassium chloride ER (K-TAB) 20 MEQ tablet controlled-release ER tablet Take 1 tablet by mouth Daily. 8/30/22  Yes Brad Arnold MD   Semaglutide (Rybelsus) 3 MG tablet Take 1 tablet by mouth Daily. 8/30/22  Yes Brad Arnold MD   traZODone (DESYREL) 100 MG tablet Take 1 tablet by mouth Every Night. 5/31/22  Yes Brad Arnold MD   vitamin B-12 (CYANOCOBALAMIN) 500 MCG tablet Take 1 tablet by mouth Daily. 8/30/22  Yes Brad Arnold MD   calcitriol (ROCALTROL) 0.25 MCG capsule 2 tabs  daily 12/14/22   Albert Khoury MD   escitalopram (LEXAPRO) 20 MG tablet TAKE ONE TABLET BY MOUTH DAILY 12/12/22   Brad Arnold MD   polyethylene glycol (GoLYTELY) 236 g solution Please use the instructions given in office 12/1/22   Cyndi Grace MD       Allergies:  Naproxen and Other    ROS:    Review of Systems   Constitutional: Negative for chills, fatigue, fever and unexpected weight change.   HENT: Negative for congestion, ear discharge, hearing loss, nosebleeds and sore throat.    Eyes: Negative for pain, discharge and redness.   Respiratory: Negative for cough, chest tightness, shortness of breath and wheezing.    Cardiovascular: Negative for chest pain and palpitations.   Gastrointestinal: Positive for abdominal pain. Negative for abdominal distention, blood in stool, constipation, diarrhea, nausea and vomiting.   Endocrine: Negative for cold intolerance, polydipsia, polyphagia and polyuria.   Genitourinary: Negative for dysuria, flank pain, frequency, hematuria and urgency.   Musculoskeletal: Negative for arthralgias, back pain, joint swelling and myalgias.   Skin: Negative for color change, pallor and rash.   Neurological: Negative for tremors, seizures, syncope, weakness and headaches.  "  Hematological: Negative for adenopathy. Does not bruise/bleed easily.   Psychiatric/Behavioral: Negative for behavioral problems, confusion, dysphoric mood, hallucinations and suicidal ideas. The patient is not nervous/anxious.      Objective     /74 (BP Location: Left arm, Patient Position: Lying)   Pulse 66   Temp 97.4 °F (36.3 °C) (Temporal)   Resp 18   Ht 152.4 cm (60\")   Wt 89.4 kg (197 lb)   SpO2 99%   BMI 38.47 kg/m²     Physical Exam  Constitutional:       Appearance: She is well-developed.   HENT:      Head: Normocephalic and atraumatic.   Eyes:      Conjunctiva/sclera: Conjunctivae normal.      Pupils: Pupils are equal, round, and reactive to light.   Neck:      Thyroid: No thyromegaly.   Cardiovascular:      Rate and Rhythm: Normal rate and regular rhythm.      Heart sounds: Normal heart sounds. No murmur heard.  Pulmonary:      Effort: Pulmonary effort is normal.      Breath sounds: Normal breath sounds. No wheezing.   Abdominal:      General: Bowel sounds are normal. There is no distension.      Palpations: Abdomen is soft. There is no mass.      Tenderness: There is no abdominal tenderness.      Hernia: No hernia is present.   Genitourinary:     Comments: No lesions noted  Musculoskeletal:         General: No tenderness. Normal range of motion.      Cervical back: Normal range of motion and neck supple.   Lymphadenopathy:      Cervical: No cervical adenopathy.   Skin:     General: Skin is warm and dry.      Findings: No rash.   Neurological:      Mental Status: She is alert and oriented to person, place, and time.      Cranial Nerves: No cranial nerve deficit.   Psychiatric:         Thought Content: Thought content normal.        Extremities: No edema, cyanosis or clubbing.    Assessment & Plan    1.  Left flank pain, likely multifactorial.  Work-up in progress.  2.  Colorectal cancer screening, schedule colonoscopy.  3.  Fatty liver disease with normal LFTs, recommend exercise and diet " control.  4.  Obesity, recommend exercise and diet control.  Diagnoses and all orders for this visit:    1. Encounter for screening for malignant neoplasm of colon  -     dextrose 5 % and sodium chloride 0.45 % infusion    Other orders  -     Implement Anesthesia Orders Day of Procedure; Standing  -     Obtain Informed Consent; Standing  -     POC Glucose Once; Standing  -     Insert Peripheral IV; Standing  -     Implement Anesthesia Orders Day of Procedure  -     Obtain Informed Consent  -     POC Glucose Once  -     Insert Peripheral IV  -     POC Glucose Once; Standing  -     POC Glucose Once  -     Vital Signs; Standing  -     Pulse Oximetry, Continuous; Standing  -     Diet: Regular/House Diet; Texture: Regular Texture (IDDSI 7); Fluid Consistency: Thin (IDDSI 0); Standing        COLONOSCOPY (N/A)     Diagnosis Plan   1. Encounter for screening for malignant neoplasm of colon  dextrose 5 % and sodium chloride 0.45 % infusion          Anticipated Surgical Procedure:  Orders Placed This Encounter   Procedures   • Implement Anesthesia Orders Day of Procedure     Standing Status:   Standing     Number of Occurrences:   1   • Obtain Informed Consent     Standing Status:   Standing     Number of Occurrences:   1     Order Specific Question:   Informed Consent Given For     Answer:   colonoscopy   • POC Glucose Once     Prior to Procedure on ALL Diabetic Patients     Standing Status:   Standing     Number of Occurrences:   1     Order Specific Question:   Release to patient     Answer:   Routine Release   • POC Glucose Once     Standing Status:   Standing     Number of Occurrences:   1     Order Specific Question:   Release to patient     Answer:   Routine Release   • Insert Peripheral IV     Standing Status:   Standing     Number of Occurrences:   1       The risks, benefits, and alternatives of this procedure have been discussed with the patient or the responsible party- the patient understands and agrees to  proceed.            This document has been electronically signed by Cyndi Grace MD on February 15, 2023 10:28 CST

## 2023-02-15 NOTE — ANESTHESIA PREPROCEDURE EVALUATION
Anesthesia Evaluation     Patient summary reviewed   NPO Solid Status: > 8 hours  NPO Liquid Status: > 2 hours           Airway   Mallampati: III  TM distance: >3 FB  Neck ROM: full  No difficulty expected  Dental    (+) upper dentures and poor dentition    Pulmonary - normal exam   Cardiovascular - normal exam    (+) hypertension well controlled, hyperlipidemia,     ROS comment: Test Reason : soa  Blood Pressure :   */*   mmHG  Vent. Rate :  86 BPM     Atrial Rate :  86 BPM     P-R Int : 166 ms          QRS Dur :  86 ms      QT Int : 376 ms       P-R-T Axes :  50 -20 -11 degrees     QTc Int : 449 ms     Normal sinus rhythm  Possible Left atrial enlargement  Left ventricular hypertrophy  Abnormal ECG  No previous ECGs available    Neuro/Psych  GI/Hepatic/Renal/Endo    (+) obesity,  GERD,  diabetes mellitus well controlled, thyroid problem hypothyroidism    Musculoskeletal     Abdominal   (+) obese,    Substance History      OB/GYN          Other                        Anesthesia Plan    ASA 3     general   total IV anesthesia  intravenous induction     Anesthetic plan, risks, benefits, and alternatives have been provided, discussed and informed consent has been obtained with: patient.        CODE STATUS:

## 2023-02-15 NOTE — ANESTHESIA POSTPROCEDURE EVALUATION
Patient: Joan Blackwell    Procedure Summary     Date: 02/15/23 Room / Location: [facility] ENDOSCOPY 2 / [facility] ENDOSCOPY    Anesthesia Start: 1024 Anesthesia Stop: 1040    Procedure: COLONOSCOPY Diagnosis:       Encounter for screening for malignant neoplasm of colon      (Encounter for screening for malignant neoplasm of colon [Z12.11])    Surgeons: Cyndi Grace MD Provider: Destiney Romano CRNA    Anesthesia Type: general ASA Status: 3          Anesthesia Type: general    Vitals  No vitals data found for the desired time range.          Post Anesthesia Care and Evaluation    Patient location during evaluation: bedside  Patient participation: complete - patient participated  Level of consciousness: sleepy but conscious  Pain score: 0  Pain management: adequate    Airway patency: patent  Anesthetic complications: No anesthetic complications  PONV Status: none  Cardiovascular status: acceptable and hemodynamically stable  Respiratory status: acceptable  Hydration status: acceptable    Comments: 130/72   66   100%

## 2023-02-17 LAB — REF LAB TEST METHOD: NORMAL

## 2023-02-22 ENCOUNTER — OFFICE VISIT (OUTPATIENT)
Dept: GASTROENTEROLOGY | Facility: CLINIC | Age: 58
End: 2023-02-22
Payer: MEDICARE

## 2023-02-22 VITALS
HEIGHT: 60 IN | HEART RATE: 76 BPM | DIASTOLIC BLOOD PRESSURE: 72 MMHG | WEIGHT: 193.2 LBS | SYSTOLIC BLOOD PRESSURE: 113 MMHG | BODY MASS INDEX: 37.93 KG/M2

## 2023-02-22 DIAGNOSIS — D12.6 ADENOMATOUS POLYP OF COLON, UNSPECIFIED PART OF COLON: Primary | ICD-10-CM

## 2023-02-22 PROCEDURE — 99213 OFFICE O/P EST LOW 20 MIN: CPT | Performed by: INTERNAL MEDICINE

## 2023-02-22 NOTE — PROGRESS NOTES
Chief Complaint   Patient presents with   • Follow-up     Endo follow up after scope screening       Subjective    Joan Blackwell is a 57 y.o. female.    History of Present Illness  Patient presented to GI clinic for follow-up visit today.  Feels better currently.  No GI complaints at this time.  Colonoscopy was consistent with 1 polyp and hemorrhoids.  Path was consistent with tubular adenoma.       The following portions of the patient's history were reviewed and updated as appropriate:   Past Medical History:   Diagnosis Date   • Abdominal pain     suspect Kidney Stone      • Acquired hypothyroidism    • Acute bronchitis    • Acute maxillary sinusitis    • Acute pharyngitis    • Allergic    • Anxiety    • Asthma    • Axillary lymphadenopathy    • Blood in urine    • Bronchitis     with an asthmatic reaction      • Colitis    • Cough    • Cyst of Bartholin's gland duct     History of    • Degenerative joint disease involving multiple joints    • Depression    • Diabetes (HCC)    • Elevated cholesterol    • Encounter for gynecological examination with abnormal finding    • Essential (primary) hypertension    • Essential hypertension    • GERD (gastroesophageal reflux disease)    • Goiter    • Hemorrhoids    • Hordeolum     right lower eyelid      • Injury of back    • Low back pain    • Lung nodule, solitary    • Mild chronic obstructive pulmonary disease (HCC) 2016   • Multiple gestation 1982   • Osteoporosis    • Polyp of vagina     possible      • Postsurgical hypoparathyroidism (HCC)    • Shortness of breath    • Tobacco dependence syndrome     Past history   • Urinary tract infectious disease    • Vulvovaginitis      Past Surgical History:   Procedure Laterality Date   •  SECTION  01/14/1993    x 3, 84, 82,   • EXPLORATORY LAPAROTOMY Left 1998    Left cystectomy. Partial resection of vthe left ovary. Left ovary- oversewn   • INJECTION OF MEDICATION  2014    Depo  Medrol (Methylprednisone) (1)    • INJECTION OF MEDICATION  09/27/2015    Kenalog (1)     • LAPAROSCOPIC HYSTERECTOMY  01/30/1995    Surgical, lysis of adhesions.Laparoscopic Assisted vaginal Hysterectomy (Tyler/ Doderlein Technique) marsupialization of right Bartholin's Gland Cyst   • LASER ABLATION OF THE CERVIX  03/25/1985    Laser vaporization, cervical cone   • OVARIAN CYST REMOVAL  1996   • PAP SMEAR  2008   • SHOULDER SURGERY     • THYROIDECTOMY  02/28/2006    with partial parathyroidectomy   • TOOTH EXTRACTION       Family History   Problem Relation Age of Onset   • Diabetes Mother    • Hypertension Mother    • Diabetes Father    • Arthritis Father      OB History    No obstetric history on file.       Prior to Admission medications    Medication Sig Start Date End Date Taking? Authorizing Provider   Alcohol Swabs (B-D SINGLE USE SWABS REGULAR) pads USE AS DIRECTED AS NEEDED 2/9/23  Yes Brad Arnold MD   ALPRAZolam (XANAX) 1 MG tablet Take 1 mg by mouth As Needed. 2/20/20  Yes Trever Padilla MD   amLODIPine (NORVASC) 5 MG tablet Take 1 tablet by mouth Daily. 2/9/23  Yes Brad Arnold MD   ARIPiprazole (ABILIFY) 30 MG tablet Take 30 mg by mouth Daily As Needed. 9/29/20  Yes Trever Padilla MD   atorvastatin (LIPITOR) 20 MG tablet Take 1 tablet by mouth Daily. 2/9/23  Yes Brad Arnold MD   Blood Glucose Monitoring Suppl w/Device kit Use to check sugars daily for diabetes E11.8 2/9/23  Yes Brad Arnold MD   buPROPion XL (WELLBUTRIN XL) 300 MG 24 hr tablet Take 300 mg by mouth Daily As Needed. 9/29/20  Yes Trever Padilla MD   calcitriol (ROCALTROL) 0.25 MCG capsule 2 tabs  daily 12/14/22  Yes Albert Khoury MD   calcium acetate (PHOS BINDER,) 667 MG capsule capsule Take 4 capsules am and 3 capsules pm 11/13/22  Yes Albert Khoury MD   cloNIDine (CATAPRES) 0.3 MG tablet Take 1 tablet by mouth Every 12 (Twelve) Hours. 2/9/23  Yes Brad Arnold MD    empagliflozin (Jardiance) 25 MG tablet tablet Take 1 tablet by mouth Daily. 2/9/23  Yes Brad Arnold MD   escitalopram (LEXAPRO) 20 MG tablet Take 1 tablet by mouth Daily.  Patient taking differently: Take 20 mg by mouth Daily As Needed. 2/9/23  Yes Brad Arnold MD   ferrous sulfate (FeroSul) 325 (65 FE) MG tablet Take 1 tablet by mouth 3 (Three) Times a Day With Meals. 2/9/23  Yes Brad Arnold MD   furosemide (Lasix) 20 MG tablet Take 1 tablet by mouth 2 (Two) Times a Day. 2/9/23  Yes Brad Arnold MD   glucose blood test strip Use to check sugars daily for diabetes E11.8 2/9/23  Yes Brad Arnold MD   HYDROcodone-acetaminophen (NORCO)  MG per tablet Take 1 tablet by mouth Every 8 (Eight) Hours As Needed. 8/24/21  Yes Trever Padilla MD   lamoTRIgine (LaMICtal) 200 MG tablet Take 100 mg by mouth Daily As Needed. 9/29/20  Yes Trever Padilla MD   Lancets misc Use to check sugars daily for diabetes E11.8 2/9/23  Yes Brad Arnold MD   levothyroxine (Synthroid) 137 MCG tablet Take 1 tablet by mouth Daily. 2/9/23  Yes Brad Arnold MD   lisinopril (PRINIVIL,ZESTRIL) 40 MG tablet Take 1 tablet by mouth Daily. 2/9/23  Yes Brad Arnold MD   magnesium oxide 250 MG tablet Take 1 tablet by mouth Daily. 2/9/23  Yes Brad Arnold MD   metFORMIN (GLUCOPHAGE) 1000 MG tablet Take 1 tablet by mouth 2 (Two) Times a Day With Meals. 2/9/23  Yes Brad Arnold MD   metoprolol succinate XL (TOPROL-XL) 100 MG 24 hr tablet Take 1 tablet by mouth Daily. 2/9/23  Yes Brad Arnold MD   ondansetron (ZOFRAN) 8 MG tablet Take 1 tablet by mouth Every 8 (Eight) Hours As Needed for Nausea or Vomiting. 2/9/23  Yes Brad Arnold MD   potassium chloride ER (K-TAB) 20 MEQ tablet controlled-release ER tablet Take 1 tablet by mouth Daily. 2/9/23  Yes Brad Arnold MD   traZODone (DESYREL) 100 MG tablet Take 1 tablet by mouth Every Night.  Patient taking differently: Take 100 mg by mouth At  Night As Needed. 22  Yes Brad Arnold MD   vitamin B-12 (CYANOCOBALAMIN) 500 MCG tablet Take 1 tablet by mouth Daily. 22  Yes Brad Arnold MD     Allergies   Allergen Reactions   • Naproxen Nausea And Vomiting and Swelling   • Other Anaphylaxis and Swelling     Blueberries     Social History     Socioeconomic History   • Marital status:    Tobacco Use   • Smoking status: Former     Packs/day: 1.50     Years: 5.00     Pack years: 7.50     Types: Cigarettes     Start date: 1995     Quit date: 2015     Years since quittin.6   • Smokeless tobacco: Never   Vaping Use   • Vaping Use: Never used   Substance and Sexual Activity   • Alcohol use: No   • Drug use: No   • Sexual activity: Not Currently     Partners: Male     Birth control/protection: Hysterectomy       Review of Systems  Review of Systems   Constitutional: Negative for chills, fatigue, fever and unexpected weight change.   HENT: Negative for congestion, ear discharge, hearing loss, nosebleeds and sore throat.    Eyes: Negative for pain, discharge and redness.   Respiratory: Negative for cough, chest tightness, shortness of breath and wheezing.    Cardiovascular: Negative for chest pain and palpitations.   Gastrointestinal: Negative for abdominal distention, abdominal pain, blood in stool, constipation, diarrhea, nausea and vomiting.   Endocrine: Negative for cold intolerance, polydipsia, polyphagia and polyuria.   Genitourinary: Negative for dysuria, flank pain, frequency, hematuria and urgency.   Musculoskeletal: Negative for arthralgias, back pain, joint swelling and myalgias.   Skin: Negative for color change, pallor and rash.   Neurological: Negative for tremors, seizures, syncope, weakness and headaches.   Hematological: Negative for adenopathy. Does not bruise/bleed easily.   Psychiatric/Behavioral: Negative for behavioral problems, confusion, dysphoric mood, hallucinations and suicidal ideas. The patient is not  "nervous/anxious.         /72   Pulse 76   Ht 152.4 cm (60\")   Wt 87.6 kg (193 lb 3.2 oz)   BMI 37.73 kg/m²     Objective    Physical Exam  Constitutional:       Appearance: She is well-developed.   HENT:      Head: Normocephalic and atraumatic.   Eyes:      Conjunctiva/sclera: Conjunctivae normal.      Pupils: Pupils are equal, round, and reactive to light.   Neck:      Thyroid: No thyromegaly.   Cardiovascular:      Rate and Rhythm: Normal rate and regular rhythm.      Heart sounds: Normal heart sounds. No murmur heard.  Pulmonary:      Effort: Pulmonary effort is normal.      Breath sounds: Normal breath sounds. No wheezing.   Abdominal:      General: Bowel sounds are normal. There is no distension.      Palpations: Abdomen is soft. There is no mass.      Tenderness: There is no abdominal tenderness.      Hernia: No hernia is present.   Genitourinary:     Comments: No lesions noted  Musculoskeletal:         General: No tenderness. Normal range of motion.      Cervical back: Normal range of motion and neck supple.   Lymphadenopathy:      Cervical: No cervical adenopathy.   Skin:     General: Skin is warm and dry.      Findings: No rash.   Neurological:      Mental Status: She is alert and oriented to person, place, and time.      Cranial Nerves: No cranial nerve deficit.   Psychiatric:         Thought Content: Thought content normal.       Admission on 02/15/2023, Discharged on 02/15/2023   Component Date Value Ref Range Status   • Glucose 02/15/2023 190 (H)  70 - 130 mg/dL Final    : [id number] STARK TRACYMeter ID: [id number]   • Reference Lab Report 02/15/2023    Final                    Value:[facility]  [address] [place] [address]  Phone: [phone] or [phone]  Fax: [phone]  Kurt Barajas M.D., Medical Director    PATIENT NAME                           LABORATORY NO.  TRU MONTES.                [id number]  [id number]   " "                      AGE              SEX  N           CLIENT REF #  [facility]           57      1965  F    [id number]   [id number]    [place]                       REQUESTING M.D.     ATTENDING M.D.     COPY TO.  [address] [place] [address]                 [place] [address]             JAMAL  DATE COLLECTED      DATE RECEIVED      DATE REPORTED  02/15/2023          02/15/2023         2023    DIAGNOSIS:  TRANSVERSE COLON POLYP:  Tubular adenoma    JBS/sm    CLINICAL HISTORY:  Encounter for screening for malignant neoplasm of colon    SPECIMENS RECEIVED:  TRANSVERSE COLON POLYP    MICROSCOPIC DESCRIPTION:  Tissue                           blocks are prepared and slides are examined microscopically on all  specimens. See diagnosis for details.    Professional interpretation rendered by Ayush Soriano M.D. at [facility], [address] [place] [address].    GROSS DESCRIPTION:  The specimen is received in 1 formalin filled container labeled \"large intestine,  transverse colon polyp biopsy\" and consists of multiple pieces of tan soft tissue  and possible vegetative matter measuring 1.7 x 1.1 x 0.2 cm in aggregate.  The  specimen is filtered and submitted entirely in 1 block.  GREG    REVIEWED, DIAGNOSED AND ELECTRONICALLY  SIGNED BY:    Ayush Soriano M.D.  CPT CODES:  40625       Assessment & Plan    No diagnosis found..   1.  Left flank pain, resolved.  2.  Colon polyp, repeat colonoscopy in 3 years.  3.  Fatty liver disease with normal LFTs, recommend exercise and diet control.  4.  Obesity, recommend exercise and diet control.    Orders placed during this encounter include:  No orders of the defined types were placed in this encounter.      * Surgery not found *    Review and/or summary of lab tests, radiology, procedures, medications. Review and summary of old records and obtaining of history. The risks and benefits of my " recommendations, as well as other treatment options were discussed with the patient today. Questions were answered.    No orders of the defined types were placed in this encounter.      Follow-up: Return if symptoms worsen or fail to improve.               Results for orders placed or performed in visit on 23   Cologuard - Stool, Per Rectum    Specimen: Per Rectum; Stool   Result Value Ref Range    Cologuard Negative Negative   Results for orders placed or performed during the hospital encounter of 02/15/23   TISSUE EXAM, [facility] ([facility])    Specimen: Large Intestine, Transverse Colon; Tissue   Result Value Ref Range    Reference Lab Report       [facility]  [address] [place] [address]  Phone: [phone] or [phone]  Fax: [phone]  Kurt Barajas M.D., Medical Director    PATIENT NAME                           LABORATORY NO.  TRU MONTES.                [id number]  [id number]                         AGE              SEX  N           CLIENT REF #  [facility]           57      1965  F    [id number]   [id number]    [place]                       REQUESTING M.D.     ATTENDING M.D.     COPY TO.  [address] [place] [address]                 [address]  DATE COLLECTED      DATE RECEIVED      DATE REPORTED  02/15/2023          02/15/2023         2023    DIAGNOSIS:  TRANSVERSE COLON POLYP:  Tubular adenoma    JBS/sm    CLINICAL HISTORY:  Encounter for screening for malignant neoplasm of colon    SPECIMENS RECEIVED:  TRANSVERSE COLON POLYP    MICROSCOPIC DESCRIPTION:  Tissue  blocks are prepared and slides are examined microscopically on all  specimens. See diagnosis for details.    Professional interpretation rendered by Ayush Soriano M.D. at [facility], [address] [place] [address].    GROSS DESCRIPTION:  The specimen is received  "in 1 formalin filled container labeled \"large intestine,  transverse colon polyp biopsy\" and consists of multiple pieces of tan soft tissue  and possible vegetative matter measuring 1.7 x 1.1 x 0.2 cm in aggregate.  The  specimen is filtered and submitted entirely in 1 block.  GREG    REVIEWED, DIAGNOSED AND ELECTRONICALLY  SIGNED BY:    Ayush Soriano M.D.  CPT CODES:  67154     POC Glucose Once    Specimen: Blood   Result Value Ref Range    Glucose 190 (H) 70 - 130 mg/dL   Results for orders placed or performed in visit on 10/28/22   CBC Auto Differential    Specimen: Blood   Result Value Ref Range    WBC 6.91 3.40 - 10.80 10*3/mm3    RBC 4.39 3.77 - 5.28 10*6/mm3    Hemoglobin 12.1 12.0 - 15.9 g/dL    Hematocrit 37.4 34.0 - 46.6 %    MCV 85.2 79.0 - 97.0 fL    MCH 27.6 26.6 - 33.0 pg    MCHC 32.4 31.5 - 35.7 g/dL    RDW 13.8 12.3 - 15.4 %    RDW-SD 43.0 37.0 - 54.0 fl    MPV 11.4 6.0 - 12.0 fL    Platelets 266 140 - 450 10*3/mm3    Neutrophil % 39.4 (L) 42.7 - 76.0 %    Lymphocyte % 50.8 (H) 19.6 - 45.3 %    Monocyte % 7.4 5.0 - 12.0 %    Eosinophil % 1.6 0.3 - 6.2 %    Basophil % 0.7 0.0 - 1.5 %    Immature Grans % 0.1 0.0 - 0.5 %    Neutrophils, Absolute 2.72 1.70 - 7.00 10*3/mm3    Lymphocytes, Absolute 3.51 (H) 0.70 - 3.10 10*3/mm3    Monocytes, Absolute 0.51 0.10 - 0.90 10*3/mm3    Eosinophils, Absolute 0.11 0.00 - 0.40 10*3/mm3    Basophils, Absolute 0.05 0.00 - 0.20 10*3/mm3    Immature Grans, Absolute 0.01 0.00 - 0.05 10*3/mm3    nRBC 0.0 0.0 - 0.2 /100 WBC   Calcitriol (1,25 di-OH Vitamin D)    Specimen: Blood   Result Value Ref Range    1,25-Dihydroxy, Vitamin D 33.4 24.8 - 81.5 pg/mL   Vitamin D,25-Hydroxy    Specimen: Blood   Result Value Ref Range    25 Hydroxy, Vitamin D 49.4 30.0 - 100.0 ng/ml   TSH    Specimen: Blood   Result Value Ref Range    TSH 0.594 0.270 - 4.200 uIU/mL   Magnesium    Specimen: Blood   Result Value Ref Range    Magnesium 1.9 1.6 - 2.6 mg/dL   Renal Function Panel    " Specimen: Blood   Result Value Ref Range    Glucose 195 (H) 65 - 99 mg/dL    BUN 10 6 - 20 mg/dL    Creatinine 0.89 0.57 - 1.00 mg/dL    Sodium 141 136 - 145 mmol/L    Potassium 4.2 3.5 - 5.2 mmol/L    Chloride 102 98 - 107 mmol/L    CO2 29.0 22.0 - 29.0 mmol/L    Calcium 8.6 8.6 - 10.5 mg/dL    Albumin 4.20 3.50 - 5.20 g/dL    Phosphorus 5.7 (H) 2.5 - 4.5 mg/dL    Anion Gap 10.0 5.0 - 15.0 mmol/L    BUN/Creatinine Ratio 11.2 7.0 - 25.0    eGFR 75.3 >60.0 mL/min/1.73   Results for orders placed or performed in visit on 10/25/22   Magnesium    Specimen: Blood   Result Value Ref Range    Magnesium 1.9 1.6 - 2.6 mg/dL   Renal Function Panel    Specimen: Blood   Result Value Ref Range    Glucose 185 (H) 65 - 99 mg/dL    BUN 7 6 - 20 mg/dL    Creatinine 0.63 0.57 - 1.00 mg/dL    Sodium 142 136 - 145 mmol/L    Potassium 4.0 3.5 - 5.2 mmol/L    Chloride 103 98 - 107 mmol/L    CO2 25.0 22.0 - 29.0 mmol/L    Calcium 7.7 (L) 8.6 - 10.5 mg/dL    Albumin 4.20 3.50 - 5.20 g/dL    Phosphorus 5.3 (H) 2.5 - 4.5 mg/dL    Anion Gap 14.0 5.0 - 15.0 mmol/L    BUN/Creatinine Ratio 11.1 7.0 - 25.0    eGFR 103.0 >60.0 mL/min/1.73   Results for orders placed or performed in visit on 10/14/22   Magnesium    Specimen: Blood   Result Value Ref Range    Magnesium 1.7 1.6 - 2.6 mg/dL   Results for orders placed or performed in visit on 10/10/22   PTH, Intact & Calcium    Specimen: Blood   Result Value Ref Range    PTH, Intact 6.8 (L) 15.0 - 65.0 pg/mL    Calcium 8.8 8.6 - 10.5 mg/dL   Vitamin D 25 Hydroxy    Specimen: Blood   Result Value Ref Range    25 Hydroxy, Vitamin D 80.9 30.0 - 100.0 ng/ml   TSH    Specimen: Blood   Result Value Ref Range    TSH 0.011 (L) 0.270 - 4.200 uIU/mL   Renal Function Panel    Specimen: Blood   Result Value Ref Range    Glucose 135 (H) 65 - 99 mg/dL    BUN 11 6 - 20 mg/dL    Creatinine 0.79 0.57 - 1.00 mg/dL    Sodium 141 136 - 145 mmol/L    Potassium 3.9 3.5 - 5.2 mmol/L    Chloride 101 98 - 107 mmol/L    CO2  26.0 22.0 - 29.0 mmol/L    Calcium 8.7 8.6 - 10.5 mg/dL    Albumin 4.40 3.50 - 5.20 g/dL    Phosphorus 5.8 (H) 2.5 - 4.5 mg/dL    Anion Gap 14.0 5.0 - 15.0 mmol/L    BUN/Creatinine Ratio 13.9 7.0 - 25.0    eGFR 86.8 >60.0 mL/min/1.73   Results for orders placed or performed during the hospital encounter of 10/04/22   Gray Top   Result Value Ref Range    Extra Tube Hold for add-ons.    Gold Top - SST   Result Value Ref Range    Extra Tube Hold for add-ons.    Green Top (Gel)   Result Value Ref Range    Extra Tube Hold for add-ons.    CBC Auto Differential    Specimen: Blood   Result Value Ref Range    WBC 8.93 3.40 - 10.80 10*3/mm3    RBC 4.42 3.77 - 5.28 10*6/mm3    Hemoglobin 12.1 12.0 - 15.9 g/dL    Hematocrit 36.7 34.0 - 46.6 %    MCV 83.0 79.0 - 97.0 fL    MCH 27.4 26.6 - 33.0 pg    MCHC 33.0 31.5 - 35.7 g/dL    RDW 13.8 12.3 - 15.4 %    RDW-SD 41.3 37.0 - 54.0 fl    MPV 9.9 6.0 - 12.0 fL    Platelets 264 140 - 450 10*3/mm3    Neutrophil % 64.2 42.7 - 76.0 %    Lymphocyte % 26.2 19.6 - 45.3 %    Monocyte % 8.4 5.0 - 12.0 %    Eosinophil % 0.7 0.3 - 6.2 %    Basophil % 0.1 0.0 - 1.5 %    Immature Grans % 0.4 0.0 - 0.5 %    Neutrophils, Absolute 5.73 1.70 - 7.00 10*3/mm3    Lymphocytes, Absolute 2.34 0.70 - 3.10 10*3/mm3    Monocytes, Absolute 0.75 0.10 - 0.90 10*3/mm3    Eosinophils, Absolute 0.06 0.00 - 0.40 10*3/mm3    Basophils, Absolute 0.01 0.00 - 0.20 10*3/mm3    Immature Grans, Absolute 0.04 0.00 - 0.05 10*3/mm3    nRBC 0.0 0.0 - 0.2 /100 WBC     *Note: Due to a large number of results and/or encounters for the requested time period, some results have not been displayed. A complete set of results can be found in Results Review.         This document has been electronically signed by Cyndi Grace MD on February 22, 2023 14:04 CST

## 2023-02-24 ENCOUNTER — OFFICE VISIT (OUTPATIENT)
Dept: OTOLARYNGOLOGY | Facility: CLINIC | Age: 58
End: 2023-02-24
Payer: MEDICARE

## 2023-02-24 VITALS — WEIGHT: 191 LBS | HEIGHT: 60 IN | BODY MASS INDEX: 37.5 KG/M2 | TEMPERATURE: 96.8 F

## 2023-02-24 DIAGNOSIS — H92.01 RIGHT EAR PAIN: Primary | ICD-10-CM

## 2023-02-24 DIAGNOSIS — H61.21 IMPACTED CERUMEN OF RIGHT EAR: ICD-10-CM

## 2023-02-24 PROCEDURE — 99203 OFFICE O/P NEW LOW 30 MIN: CPT | Performed by: OTOLARYNGOLOGY

## 2023-02-24 PROCEDURE — 69210 REMOVE IMPACTED EAR WAX UNI: CPT | Performed by: OTOLARYNGOLOGY

## 2023-02-24 NOTE — PROGRESS NOTES
Subjective   Joan Blackwell is a 57 y.o. female.       History of Present Illness   Patient has been having right-sided ear pain and decreased hearing.  No previous otologic surgery.  No otorrhea.      The following portions of the patient's history were reviewed and updated as appropriate: allergies, current medications, past family history, past medical history, past social history, past surgical history and problem list.     reports that she quit smoking about 7 years ago. Her smoking use included cigarettes. She started smoking about 27 years ago. She has a 7.50 pack-year smoking history. She has never used smokeless tobacco. She reports that she does not drink alcohol and does not use drugs.   Patient is not a tobacco user and has not been counseled for use of tobacco products      Review of Systems        Objective   Physical Exam  Left ear no discharge.  Tympanic membrane is intact with variable translucency but no infection or effusion  Right ear is completely occluded with cerumen.Using the binocular microscope for visualization, cerumen impaction was removed from right ear canal(s) using instrumentation. This was personally performed by Jameel Potter MD  Following cerumen removal tympanic membrane is noted to be intact and clear and the patient notes immediate subjective improvement in hearing  Nares no discharge or purulence  Oral cavity: No masses or lesions  Pharynx: 2+ tonsils no erythema exudate or mass  Neck: No adenopathy or mass.  Right TMJ is tender to palpation       Assessment and Plan   Diagnoses and all orders for this visit:    1. Right ear pain (Primary)    2. Impacted cerumen of right ear             Plan: Cerumen removed as described above.  Explained to the patient that her otalgia is related to inflammation of the temporomandibular joint.  Advise soft diet, over-the-counter anti-inflammatory medicine if not medically contraindicated, and dental evaluation.  May follow-up  with me as needed.

## 2023-03-06 ENCOUNTER — LAB (OUTPATIENT)
Dept: LAB | Facility: HOSPITAL | Age: 58
End: 2023-03-06
Payer: MEDICARE

## 2023-03-06 DIAGNOSIS — E11.9 WELL CONTROLLED TYPE 2 DIABETES MELLITUS: ICD-10-CM

## 2023-03-06 DIAGNOSIS — E20.8 OTHER HYPOPARATHYROIDISM: ICD-10-CM

## 2023-03-06 DIAGNOSIS — E89.0 POSTOPERATIVE HYPOTHYROIDISM: ICD-10-CM

## 2023-03-06 PROCEDURE — 83970 ASSAY OF PARATHORMONE: CPT

## 2023-03-06 PROCEDURE — 85025 COMPLETE CBC W/AUTO DIFF WBC: CPT

## 2023-03-06 PROCEDURE — 82310 ASSAY OF CALCIUM: CPT

## 2023-03-06 PROCEDURE — 80069 RENAL FUNCTION PANEL: CPT

## 2023-03-06 PROCEDURE — 82306 VITAMIN D 25 HYDROXY: CPT

## 2023-03-06 PROCEDURE — 83036 HEMOGLOBIN GLYCOSYLATED A1C: CPT

## 2023-03-06 PROCEDURE — 83735 ASSAY OF MAGNESIUM: CPT

## 2023-03-07 LAB
25(OH)D3 SERPL-MCNC: 43.4 NG/ML (ref 30–100)
ALBUMIN SERPL-MCNC: 4 G/DL (ref 3.5–5.2)
ANION GAP SERPL CALCULATED.3IONS-SCNC: 12 MMOL/L (ref 5–15)
BASOPHILS # BLD AUTO: 0.05 10*3/MM3 (ref 0–0.2)
BASOPHILS NFR BLD AUTO: 0.8 % (ref 0–1.5)
BUN SERPL-MCNC: 6 MG/DL (ref 6–20)
BUN/CREAT SERPL: 8.2 (ref 7–25)
CALCIUM SPEC-SCNC: 7.3 MG/DL (ref 8.6–10.5)
CALCIUM SPEC-SCNC: 7.3 MG/DL (ref 8.6–10.5)
CHLORIDE SERPL-SCNC: 101 MMOL/L (ref 98–107)
CO2 SERPL-SCNC: 25 MMOL/L (ref 22–29)
CREAT SERPL-MCNC: 0.73 MG/DL (ref 0.57–1)
DEPRECATED RDW RBC AUTO: 40.1 FL (ref 37–54)
EGFRCR SERPLBLD CKD-EPI 2021: 95.5 ML/MIN/1.73
EOSINOPHIL # BLD AUTO: 0.21 10*3/MM3 (ref 0–0.4)
EOSINOPHIL NFR BLD AUTO: 3.3 % (ref 0.3–6.2)
ERYTHROCYTE [DISTWIDTH] IN BLOOD BY AUTOMATED COUNT: 13 % (ref 12.3–15.4)
GLUCOSE SERPL-MCNC: 212 MG/DL (ref 65–99)
HBA1C MFR BLD: 9.3 % (ref 4.8–5.6)
HCT VFR BLD AUTO: 34.3 % (ref 34–46.6)
HGB BLD-MCNC: 11.6 G/DL (ref 12–15.9)
IMM GRANULOCYTES # BLD AUTO: 0.03 10*3/MM3 (ref 0–0.05)
IMM GRANULOCYTES NFR BLD AUTO: 0.5 % (ref 0–0.5)
LYMPHOCYTES # BLD AUTO: 3.14 10*3/MM3 (ref 0.7–3.1)
LYMPHOCYTES NFR BLD AUTO: 49 % (ref 19.6–45.3)
MAGNESIUM SERPL-MCNC: 1.9 MG/DL (ref 1.6–2.6)
MCH RBC QN AUTO: 28.9 PG (ref 26.6–33)
MCHC RBC AUTO-ENTMCNC: 33.8 G/DL (ref 31.5–35.7)
MCV RBC AUTO: 85.3 FL (ref 79–97)
MONOCYTES # BLD AUTO: 0.48 10*3/MM3 (ref 0.1–0.9)
MONOCYTES NFR BLD AUTO: 7.5 % (ref 5–12)
NEUTROPHILS NFR BLD AUTO: 2.5 10*3/MM3 (ref 1.7–7)
NEUTROPHILS NFR BLD AUTO: 38.9 % (ref 42.7–76)
NRBC BLD AUTO-RTO: 0 /100 WBC (ref 0–0.2)
PHOSPHATE SERPL-MCNC: 4.7 MG/DL (ref 2.5–4.5)
PLATELET # BLD AUTO: 284 10*3/MM3 (ref 140–450)
PMV BLD AUTO: 10.7 FL (ref 6–12)
POTASSIUM SERPL-SCNC: 4.1 MMOL/L (ref 3.5–5.2)
PTH-INTACT SERPL-MCNC: 8.1 PG/ML (ref 15–65)
RBC # BLD AUTO: 4.02 10*6/MM3 (ref 3.77–5.28)
SODIUM SERPL-SCNC: 138 MMOL/L (ref 136–145)
WBC NRBC COR # BLD: 6.41 10*3/MM3 (ref 3.4–10.8)

## 2023-03-23 ENCOUNTER — OFFICE VISIT (OUTPATIENT)
Dept: FAMILY MEDICINE CLINIC | Facility: CLINIC | Age: 58
End: 2023-03-23
Payer: MEDICARE

## 2023-03-23 VITALS
DIASTOLIC BLOOD PRESSURE: 50 MMHG | BODY MASS INDEX: 37.62 KG/M2 | SYSTOLIC BLOOD PRESSURE: 98 MMHG | HEIGHT: 60 IN | WEIGHT: 191.6 LBS

## 2023-03-23 DIAGNOSIS — E11.8 CONTROLLED TYPE 2 DIABETES MELLITUS WITH COMPLICATION, WITHOUT LONG-TERM CURRENT USE OF INSULIN: ICD-10-CM
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
## 2023-03-23 DIAGNOSIS — I10 ESSENTIAL HYPERTENSION: ICD-10-CM

## 2023-03-23 DIAGNOSIS — E89.0 S/P THYROIDECTOMY: ICD-10-CM

## 2023-03-23 DIAGNOSIS — E89.0 POSTOPERATIVE HYPOTHYROIDISM: ICD-10-CM

## 2023-03-23 DIAGNOSIS — F33.1 MODERATE EPISODE OF RECURRENT MAJOR DEPRESSIVE DISORDER: ICD-10-CM

## 2023-03-23 DIAGNOSIS — Z79.899 HIGH RISK MEDICATION USE: ICD-10-CM

## 2023-03-23 DIAGNOSIS — E55.9 VITAMIN D DEFICIENCY: ICD-10-CM

## 2023-03-23 DIAGNOSIS — E83.51 HYPOCALCEMIA: ICD-10-CM

## 2023-03-23 DIAGNOSIS — J44.9 MILD CHRONIC OBSTRUCTIVE PULMONARY DISEASE: ICD-10-CM

## 2023-03-23 DIAGNOSIS — Z87.891 FORMER SMOKER: ICD-10-CM

## 2023-03-23 DIAGNOSIS — E20.9 HYPOPARATHYROIDISM, UNSPECIFIED HYPOPARATHYROIDISM TYPE: ICD-10-CM

## 2023-03-23 DIAGNOSIS — D50.9 IRON DEFICIENCY ANEMIA, UNSPECIFIED IRON DEFICIENCY ANEMIA TYPE: ICD-10-CM

## 2023-03-23 DIAGNOSIS — M25.561 ACUTE PAIN OF RIGHT KNEE: Primary | ICD-10-CM

## 2023-03-23 DIAGNOSIS — E66.01 CLASS 2 SEVERE OBESITY WITH SERIOUS COMORBIDITY IN ADULT, UNSPECIFIED BMI, UNSPECIFIED OBESITY TYPE: ICD-10-CM

## 2023-03-23 DIAGNOSIS — E78.2 MIXED HYPERLIPIDEMIA: ICD-10-CM

## 2023-03-23 RX ORDER — AMLODIPINE BESYLATE 2.5 MG/1
2.5 TABLET ORAL DAILY
Qty: 30 TABLET | Refills: 3 | Status: SHIPPED | OUTPATIENT
Start: 2023-03-23

## 2023-03-23 NOTE — PATIENT INSTRUCTIONS
Please call and reschedule your mammogram    X-ray of knee today. May need to see your Orthopedic doctor  Continue tylenol and Norco    Dr. Anderson with sugars on Monday    Cut back on norvasc from 5 to 2.5 mg daily continue on toprol XL and lisionpril   Monitor your blood pressure at home and bring readings on paper    Call Dr. Anderson with Orthopedic regarding knee issues     Recheck in 2 months

## 2023-03-27 ENCOUNTER — TELEMEDICINE (OUTPATIENT)
Dept: ENDOCRINOLOGY | Facility: CLINIC | Age: 58
End: 2023-03-27
Payer: MEDICARE

## 2023-03-27 DIAGNOSIS — E89.0 POSTOPERATIVE HYPOTHYROIDISM: ICD-10-CM

## 2023-03-27 DIAGNOSIS — E11.65 TYPE 2 DIABETES MELLITUS WITH HYPERGLYCEMIA, WITHOUT LONG-TERM CURRENT USE OF INSULIN: Primary | ICD-10-CM

## 2023-03-27 DIAGNOSIS — E20.8 OTHER HYPOPARATHYROIDISM: ICD-10-CM

## 2023-03-27 RX ORDER — CALCIUM ACETATE 667 MG/1
CAPSULE ORAL
Qty: 210 CAPSULE | Refills: 11 | Status: SHIPPED | OUTPATIENT
Start: 2023-03-27

## 2023-03-27 RX ORDER — ORAL SEMAGLUTIDE 7 MG/1
7 TABLET ORAL DAILY
Qty: 30 TABLET | Refills: 11 | Status: SHIPPED | OUTPATIENT
Start: 2023-03-27

## 2023-03-27 NOTE — PROGRESS NOTES
CC  Hypocalcemia    Mode of Visit: Video  Location of patient: Home  You have chosen to receive care through a telehealth visit.  Does the patient consent to use a video/audio connection for your medical care today? Yes  The visit included audio and video interaction. No technical issues occurred during this visit        History of Present Illness    57 y.o. female     Hypocalcemia sec to complication for total thryoidectomy in 2006.   Reason for tot thyroidectomy was toxic goiter    Present treatment for hypoparathyroidism detailed below but was able to reduce calcium tabs after adding calcitriol      She can identify when calcium drops - feels tingly and crampy     Has had calcium as low as 5 and required admission    Never nephrolithasis.   She feels well      ==========================================  Physical Exam  There were no vitals taken for this visit.  AOx3  No Goiter , no carotid bruit  RRR  CTA  No Edema     ==========================================    Laboratory Workup    Lab Results   Component Value Date    WBC 6.41 03/06/2023    HGB 11.6 (L) 03/06/2023    HCT 34.3 03/06/2023    MCV 85.3 03/06/2023     03/06/2023       Lab Results   Component Value Date    GLUCOSE 212 (H) 03/06/2023    BUN 6 03/06/2023    CREATININE 0.73 03/06/2023    EGFR 95.5 03/06/2023    EGFRIFAFRI 90 08/25/2021    BCR 8.2 03/06/2023     03/06/2023    K 4.1 03/06/2023     03/06/2023    CO2 25.0 03/06/2023    CALCIUM 7.3 (L) 03/06/2023    CALCIUM 7.3 (L) 03/06/2023    PHOS 4.7 (H) 03/06/2023    ALBUMIN 4.0 03/06/2023    GLOB 3.1 10/04/2022    BILITOT 0.5 10/04/2022    ALKPHOS 112 10/04/2022    AST 20 10/04/2022    ALT 29 10/04/2022    AGRATIO 1.5 10/04/2022     Lab Results   Component Value Date    PTH 8.1 (L) 03/06/2023    CALCIUM 7.3 (L) 03/06/2023    CALCIUM 7.3 (L) 03/06/2023    PHOS 4.7 (H) 03/06/2023     Component      Latest Ref Rng & Units 9/20/2022 9/20/2022           4:14 PM  4:14 PM   Calcium, 24H  Urine      100.0 - 300.0 mg/24 hr 120.2    Calcium, Urine      mg/dL 8.9    Urine Volume      mL 1,350 1,350   Time (Hours)      hrs 24 24   Creatinine, 24H       0.70 - 1.60 g/24 hr  0.86   Creatinine, Urine      mg/dL  63.9       ==========================================      ICD-10-CM ICD-9-CM   1. Type 2 diabetes mellitus with hyperglycemia, without long-term current use of insulin (HCC)  E11.65 250.00     790.29   2. Postoperative hypothyroidism  E89.0 244.0   3. Postoperative Hypoparathyroidism  E20.8 252.1   -    Tums Mixture of 10 tabs milkshake,---- cut the tabs to 5 - now 2     Stopped vitamin D 50 th units weekly    Start calictiriol 0.25 mcg daily ( super vitamin D )-  Only 1   Keep phoslo 3 capsules twice daily -  Doing 4 in am and 3 in pm , still was doing 3 bid  Increase back to 4 and 3     Keep mg oxide 250 mg daily     --         Breath into a bag to raise your acidity which is hydrogen which is a positive charge.  This will displace calcium from the protein in your blood and raise your calcium from the inside.     Phone appts every 2 weeks but my chart communication at least weekly     ==    Hypothyroidism   Lab Results   Component Value Date    TSH 0.594 12/06/2022       On levothyroxine 175 , decreased to 150 daily - 137  Now 137 six days per week    Takes levothyroxine w other tabs, advised on correct way of taking  Now doing it right   ===    Dm,     Lab Results   Component Value Date    HGBA1C 9.30 (H) 03/06/2023     Jardiance 25 mg  Metformin , had to stop , GI intolerance    Rybelsus, increase from 3 to 7 mg    Eyes, 2023, no retinopathy  No neuropathy    Lab Results   Component Value Date    MALBCRERATIO  08/29/2022      Comment:      Unable to calculate    MALBCRERATIO 92.5 08/25/2021    MALBCRERATIO  09/25/2019      Comment:      Unable to calculate     Lab Results   Component Value Date    EGFR 95.5 03/06/2023    EGFR 75.3 12/06/2022         New Medications Ordered This Visit    Medications   • calcium acetate (PHOS BINDER,) 667 MG capsule capsule     Sig: Take 4 capsules am and 3 capsules pm     Dispense:  210 capsule     Refill:  11   • Semaglutide (Rybelsus) 7 MG tablet     Sig: Take 7 mg by mouth Daily. 30-60 minutes before bkfast with no more than 4 ounces of water     Dispense:  30 tablet     Refill:  11       No orders of the defined types were placed in this encounter.            This document has been electronically signed by Albert Bartholomew MD on March 27, 2023 10:03 CDT

## 2023-06-02 NOTE — TELEPHONE ENCOUNTER
Patient called stating that insurance will only cover the Onetouch meter and test strips. She is needing a new prescription for this.    Return call at [phone]

## 2023-07-06 ENCOUNTER — TELEPHONE (OUTPATIENT)
Dept: FAMILY MEDICINE CLINIC | Facility: CLINIC | Age: 58
End: 2023-07-06

## 2023-07-06 NOTE — TELEPHONE ENCOUNTER
Called pt. No answer. Left voicemail stating the requested medication has been sent to the pharmacy.

## 2023-07-06 NOTE — TELEPHONE ENCOUNTER
Patient called asking if Dr. Arnold would send her in a script for Diflucan to [facility].  Call back number: [phone]

## 2023-07-21 DIAGNOSIS — R10.9 RIGHT FLANK PAIN: ICD-10-CM

## 2023-07-21 DIAGNOSIS — R31.29 OTHER MICROSCOPIC HEMATURIA: ICD-10-CM

## 2023-07-26 ENCOUNTER — LAB (OUTPATIENT)
Dept: LAB | Facility: HOSPITAL | Age: 58
End: 2023-07-26
Payer: MEDICARE

## 2023-07-26 DIAGNOSIS — E20.8 OTHER HYPOPARATHYROIDISM: ICD-10-CM

## 2023-07-26 DIAGNOSIS — J44.9 MILD CHRONIC OBSTRUCTIVE PULMONARY DISEASE: ICD-10-CM

## 2023-07-26 DIAGNOSIS — Z01.818 PREOPERATIVE CLEARANCE: ICD-10-CM

## 2023-07-26 DIAGNOSIS — E89.0 S/P THYROIDECTOMY: ICD-10-CM

## 2023-07-26 DIAGNOSIS — K21.9 GASTROESOPHAGEAL REFLUX DISEASE, UNSPECIFIED WHETHER ESOPHAGITIS PRESENT: ICD-10-CM

## 2023-07-26 DIAGNOSIS — E66.01 CLASS 2 SEVERE OBESITY DUE TO EXCESS CALORIES WITH SERIOUS COMORBIDITY AND BODY MASS INDEX (BMI) OF 37.0 TO 37.9 IN ADULT: ICD-10-CM

## 2023-07-26 DIAGNOSIS — E55.9 VITAMIN D DEFICIENCY: ICD-10-CM

## 2023-07-26 DIAGNOSIS — E78.2 MIXED HYPERLIPIDEMIA: ICD-10-CM

## 2023-07-26 DIAGNOSIS — E83.51 HYPOCALCEMIA: ICD-10-CM

## 2023-07-26 DIAGNOSIS — E20.9 HYPOPARATHYROIDISM, UNSPECIFIED HYPOPARATHYROIDISM TYPE: ICD-10-CM

## 2023-07-26 DIAGNOSIS — E11.8 CONTROLLED TYPE 2 DIABETES MELLITUS WITH COMPLICATION, WITHOUT LONG-TERM CURRENT USE OF INSULIN: ICD-10-CM

## 2023-07-26 DIAGNOSIS — G47.00 INSOMNIA, UNSPECIFIED TYPE: ICD-10-CM

## 2023-07-26 DIAGNOSIS — I10 ESSENTIAL HYPERTENSION: ICD-10-CM

## 2023-07-26 DIAGNOSIS — E89.0 POSTOPERATIVE HYPOTHYROIDISM: ICD-10-CM

## 2023-07-26 DIAGNOSIS — E11.65 TYPE 2 DIABETES MELLITUS WITH HYPERGLYCEMIA, WITHOUT LONG-TERM CURRENT USE OF INSULIN: ICD-10-CM

## 2023-07-26 LAB
ALBUMIN SERPL-MCNC: 4.5 G/DL (ref 3.5–5.2)
ALBUMIN UR-MCNC: <1.2 MG/DL
ALBUMIN/GLOB SERPL: 1.2 G/DL
ALP SERPL-CCNC: 101 U/L (ref 39–117)
ALT SERPL W P-5'-P-CCNC: 22 U/L (ref 1–33)
ANION GAP SERPL CALCULATED.3IONS-SCNC: 15 MMOL/L (ref 5–15)
AST SERPL-CCNC: 19 U/L (ref 1–32)
BASOPHILS # BLD AUTO: 0.06 10*3/MM3 (ref 0–0.2)
BASOPHILS NFR BLD AUTO: 0.9 % (ref 0–1.5)
BILIRUB SERPL-MCNC: 0.4 MG/DL (ref 0–1.2)
BUN SERPL-MCNC: 8 MG/DL (ref 6–20)
BUN/CREAT SERPL: 8.5 (ref 7–25)
CALCIUM SPEC-SCNC: 10.5 MG/DL (ref 8.6–10.5)
CHLORIDE SERPL-SCNC: 98 MMOL/L (ref 98–107)
CHOLEST SERPL-MCNC: 152 MG/DL (ref 0–200)
CO2 SERPL-SCNC: 27 MMOL/L (ref 22–29)
CREAT SERPL-MCNC: 0.94 MG/DL (ref 0.57–1)
CREAT UR-MCNC: 87.3 MG/DL
DEPRECATED RDW RBC AUTO: 42.1 FL (ref 37–54)
EGFRCR SERPLBLD CKD-EPI 2021: 70.5 ML/MIN/1.73
EOSINOPHIL # BLD AUTO: 0.09 10*3/MM3 (ref 0–0.4)
EOSINOPHIL NFR BLD AUTO: 1.4 % (ref 0.3–6.2)
ERYTHROCYTE [DISTWIDTH] IN BLOOD BY AUTOMATED COUNT: 13.9 % (ref 12.3–15.4)
GLOBULIN UR ELPH-MCNC: 3.7 GM/DL
GLUCOSE SERPL-MCNC: 126 MG/DL (ref 65–99)
HBA1C MFR BLD: 7.5 % (ref 4.8–5.6)
HCT VFR BLD AUTO: 37.7 % (ref 34–46.6)
HDLC SERPL-MCNC: 40 MG/DL (ref 40–60)
HGB BLD-MCNC: 12.8 G/DL (ref 12–15.9)
IMM GRANULOCYTES # BLD AUTO: 0.02 10*3/MM3 (ref 0–0.05)
IMM GRANULOCYTES NFR BLD AUTO: 0.3 % (ref 0–0.5)
LDLC SERPL CALC-MCNC: 83 MG/DL (ref 0–100)
LDLC/HDLC SERPL: 1.95 {RATIO}
LYMPHOCYTES # BLD AUTO: 2.59 10*3/MM3 (ref 0.7–3.1)
LYMPHOCYTES NFR BLD AUTO: 39.5 % (ref 19.6–45.3)
MAGNESIUM SERPL-MCNC: 2.1 MG/DL (ref 1.6–2.6)
MCH RBC QN AUTO: 28.4 PG (ref 26.6–33)
MCHC RBC AUTO-ENTMCNC: 34 G/DL (ref 31.5–35.7)
MCV RBC AUTO: 83.6 FL (ref 79–97)
MICROALBUMIN/CREAT UR: NORMAL MG/G{CREAT}
MONOCYTES # BLD AUTO: 0.43 10*3/MM3 (ref 0.1–0.9)
MONOCYTES NFR BLD AUTO: 6.6 % (ref 5–12)
NEUTROPHILS NFR BLD AUTO: 3.36 10*3/MM3 (ref 1.7–7)
NEUTROPHILS NFR BLD AUTO: 51.3 % (ref 42.7–76)
NRBC BLD AUTO-RTO: 0 /100 WBC (ref 0–0.2)
PHOSPHATE SERPL-MCNC: 6.1 MG/DL (ref 2.5–4.5)
PLATELET # BLD AUTO: 318 10*3/MM3 (ref 140–450)
PMV BLD AUTO: 10 FL (ref 6–12)
POTASSIUM SERPL-SCNC: 3.9 MMOL/L (ref 3.5–5.2)
PROT SERPL-MCNC: 8.2 G/DL (ref 6–8.5)
RBC # BLD AUTO: 4.51 10*6/MM3 (ref 3.77–5.28)
SODIUM SERPL-SCNC: 140 MMOL/L (ref 136–145)
TRIGL SERPL-MCNC: 170 MG/DL (ref 0–150)
TSH SERPL DL<=0.05 MIU/L-ACNC: 1.22 UIU/ML (ref 0.27–4.2)
VIT B12 BLD-MCNC: 1581 PG/ML (ref 211–946)
VLDLC SERPL-MCNC: 29 MG/DL (ref 5–40)
WBC NRBC COR # BLD: 6.55 10*3/MM3 (ref 3.4–10.8)

## 2023-07-26 PROCEDURE — 80053 COMPREHEN METABOLIC PANEL: CPT

## 2023-07-26 PROCEDURE — 84443 ASSAY THYROID STIM HORMONE: CPT

## 2023-07-26 PROCEDURE — 83036 HEMOGLOBIN GLYCOSYLATED A1C: CPT

## 2023-07-26 PROCEDURE — 84100 ASSAY OF PHOSPHORUS: CPT

## 2023-07-26 PROCEDURE — 80061 LIPID PANEL: CPT

## 2023-07-26 PROCEDURE — 83735 ASSAY OF MAGNESIUM: CPT

## 2023-07-26 PROCEDURE — 36415 COLL VENOUS BLD VENIPUNCTURE: CPT

## 2023-07-26 PROCEDURE — 82043 UR ALBUMIN QUANTITATIVE: CPT

## 2023-07-26 PROCEDURE — 82570 ASSAY OF URINE CREATININE: CPT

## 2023-07-26 PROCEDURE — 82607 VITAMIN B-12: CPT

## 2023-07-26 PROCEDURE — 85025 COMPLETE CBC W/AUTO DIFF WBC: CPT

## 2023-07-27 RX ORDER — ONDANSETRON HYDROCHLORIDE 8 MG/1
TABLET, FILM COATED ORAL
Qty: 90 TABLET | Refills: 0 | Status: SHIPPED | OUTPATIENT
Start: 2023-07-27

## 2023-07-29 PROBLEM — Z79.899 HIGH RISK MEDICATION USE: Status: ACTIVE | Noted: 2023-07-29

## 2023-08-03 ENCOUNTER — OFFICE VISIT (OUTPATIENT)
Dept: FAMILY MEDICINE CLINIC | Facility: CLINIC | Age: 58
End: 2023-08-03
Payer: MEDICARE

## 2023-08-03 VITALS
BODY MASS INDEX: 37.3 KG/M2 | HEART RATE: 73 BPM | HEIGHT: 60 IN | WEIGHT: 190 LBS | SYSTOLIC BLOOD PRESSURE: 124 MMHG | DIASTOLIC BLOOD PRESSURE: 72 MMHG | TEMPERATURE: 98.4 F

## 2023-08-03 DIAGNOSIS — I10 ESSENTIAL HYPERTENSION: ICD-10-CM

## 2023-08-03 DIAGNOSIS — E83.51 HYPOCALCEMIA: ICD-10-CM

## 2023-08-03 DIAGNOSIS — D50.9 IRON DEFICIENCY ANEMIA, UNSPECIFIED IRON DEFICIENCY ANEMIA TYPE: ICD-10-CM

## 2023-08-03 DIAGNOSIS — R59.0 ABDOMINAL LYMPHADENOPATHY: ICD-10-CM

## 2023-08-03 DIAGNOSIS — K76.0 FATTY LIVER: ICD-10-CM

## 2023-08-03 DIAGNOSIS — E78.2 MIXED HYPERLIPIDEMIA: ICD-10-CM

## 2023-08-03 DIAGNOSIS — Z79.899 HIGH RISK MEDICATION USE: ICD-10-CM

## 2023-08-03 DIAGNOSIS — E55.9 VITAMIN D DEFICIENCY: ICD-10-CM

## 2023-08-03 DIAGNOSIS — R93.5 ABNORMAL CT OF THE ABDOMEN: Primary | ICD-10-CM

## 2023-08-03 DIAGNOSIS — E11.8 CONTROLLED TYPE 2 DIABETES MELLITUS WITH COMPLICATION, WITHOUT LONG-TERM CURRENT USE OF INSULIN: ICD-10-CM

## 2023-08-03 DIAGNOSIS — E20.9 HYPOPARATHYROIDISM, UNSPECIFIED HYPOPARATHYROIDISM TYPE: ICD-10-CM

## 2023-08-03 RX ORDER — ORAL SEMAGLUTIDE 7 MG/1
7 TABLET ORAL DAILY
Qty: 30 TABLET | Refills: 11 | Status: SHIPPED | OUTPATIENT
Start: 2023-08-03 | End: 2023-08-04

## 2023-08-03 RX ORDER — ONDANSETRON HYDROCHLORIDE 8 MG/1
8 TABLET, FILM COATED ORAL EVERY 8 HOURS PRN
Qty: 90 TABLET | Refills: 2 | Status: SHIPPED | OUTPATIENT
Start: 2023-08-03

## 2023-08-03 RX ORDER — LEVOTHYROXINE SODIUM 137 UG/1
137 TABLET ORAL DAILY
Qty: 30 TABLET | Refills: 3 | Status: SHIPPED | OUTPATIENT
Start: 2023-08-03

## 2023-08-03 RX ORDER — ATORVASTATIN CALCIUM 20 MG/1
20 TABLET, FILM COATED ORAL DAILY
Qty: 90 TABLET | Refills: 1 | Status: SHIPPED | OUTPATIENT
Start: 2023-08-03

## 2023-08-03 RX ORDER — FUROSEMIDE 20 MG/1
20 TABLET ORAL 2 TIMES DAILY
Qty: 60 TABLET | Refills: 3 | Status: SHIPPED | OUTPATIENT
Start: 2023-08-03

## 2023-08-03 RX ORDER — CALCITRIOL 0.25 UG/1
CAPSULE, LIQUID FILLED ORAL
Qty: 60 CAPSULE | Refills: 11 | Status: SHIPPED | OUTPATIENT
Start: 2023-08-03 | End: 2023-08-04 | Stop reason: SDUPTHER

## 2023-08-03 RX ORDER — METOPROLOL SUCCINATE 100 MG/1
100 TABLET, EXTENDED RELEASE ORAL DAILY
Qty: 90 TABLET | Refills: 1 | Status: SHIPPED | OUTPATIENT
Start: 2023-08-03

## 2023-08-03 RX ORDER — LISINOPRIL 40 MG/1
40 TABLET ORAL DAILY
Qty: 90 TABLET | Refills: 1 | Status: SHIPPED | OUTPATIENT
Start: 2023-08-03

## 2023-08-03 RX ORDER — CALCIUM ACETATE 667 MG/1
CAPSULE ORAL
Qty: 210 CAPSULE | Refills: 11 | Status: SHIPPED | OUTPATIENT
Start: 2023-08-03

## 2023-08-03 RX ORDER — POTASSIUM CHLORIDE 1500 MG/1
20 TABLET, EXTENDED RELEASE ORAL DAILY
Qty: 90 TABLET | Refills: 1 | Status: SHIPPED | OUTPATIENT
Start: 2023-08-03

## 2023-08-03 RX ORDER — ISOPROPYL ALCOHOL 0.75 G/1
SWAB TOPICAL
Qty: 300 EACH | Refills: 3 | Status: SHIPPED | OUTPATIENT
Start: 2023-08-03

## 2023-08-03 RX ORDER — LANCETS 30 GAUGE
EACH MISCELLANEOUS
Qty: 100 EACH | Refills: 3 | Status: SHIPPED | OUTPATIENT
Start: 2023-08-03

## 2023-08-03 RX ORDER — FERROUS SULFATE 325(65) MG
1 TABLET ORAL
Qty: 270 TABLET | Refills: 1 | Status: SHIPPED | OUTPATIENT
Start: 2023-08-03

## 2023-08-03 RX ORDER — ESCITALOPRAM OXALATE 20 MG/1
20 TABLET ORAL DAILY
Qty: 30 TABLET | Refills: 3 | Status: SHIPPED | OUTPATIENT
Start: 2023-08-03 | End: 2023-08-04

## 2023-08-03 RX ORDER — CLONIDINE HYDROCHLORIDE 0.3 MG/1
0.3 TABLET ORAL EVERY 12 HOURS
Qty: 180 TABLET | Refills: 1 | Status: SHIPPED | OUTPATIENT
Start: 2023-08-03

## 2023-08-03 RX ORDER — AMLODIPINE BESYLATE 2.5 MG/1
2.5 TABLET ORAL DAILY
Qty: 30 TABLET | Refills: 3 | Status: SHIPPED | OUTPATIENT
Start: 2023-08-03

## 2023-08-04 ENCOUNTER — TELEMEDICINE (OUTPATIENT)
Dept: ENDOCRINOLOGY | Facility: CLINIC | Age: 58
End: 2023-08-04
Payer: MEDICARE

## 2023-08-04 DIAGNOSIS — E20.8 OTHER HYPOPARATHYROIDISM: ICD-10-CM

## 2023-08-04 DIAGNOSIS — E89.0 POSTOPERATIVE HYPOTHYROIDISM: ICD-10-CM

## 2023-08-04 DIAGNOSIS — E11.65 TYPE 2 DIABETES MELLITUS WITH HYPERGLYCEMIA, WITHOUT LONG-TERM CURRENT USE OF INSULIN: Primary | ICD-10-CM

## 2023-08-04 RX ORDER — CALCITRIOL 0.25 UG/1
CAPSULE, LIQUID FILLED ORAL
Qty: 30 CAPSULE | Refills: 11 | Status: SHIPPED | OUTPATIENT
Start: 2023-08-04

## 2023-08-04 RX ORDER — ORAL SEMAGLUTIDE 14 MG/1
14 TABLET ORAL DAILY
Qty: 30 TABLET | Refills: 11 | Status: SHIPPED | OUTPATIENT
Start: 2023-08-04

## 2023-08-07 ENCOUNTER — OFFICE VISIT (OUTPATIENT)
Dept: GASTROENTEROLOGY | Facility: CLINIC | Age: 58
End: 2023-08-07
Payer: MEDICARE

## 2023-08-07 VITALS
DIASTOLIC BLOOD PRESSURE: 81 MMHG | WEIGHT: 189.4 LBS | SYSTOLIC BLOOD PRESSURE: 133 MMHG | BODY MASS INDEX: 37.18 KG/M2 | HEIGHT: 60 IN | HEART RATE: 80 BPM

## 2023-08-07 DIAGNOSIS — I88.0 MESENTERIC ADENITIS: ICD-10-CM

## 2023-08-07 DIAGNOSIS — D12.6 ADENOMATOUS POLYP OF COLON, UNSPECIFIED PART OF COLON: Primary | ICD-10-CM

## 2023-08-07 RX ORDER — CLONIDINE HYDROCHLORIDE 0.3 MG/1
TABLET ORAL
Qty: 180 TABLET | Refills: 1 | OUTPATIENT
Start: 2023-08-07

## 2023-08-07 RX ORDER — ESCITALOPRAM OXALATE 20 MG/1
1 TABLET ORAL DAILY
COMMUNITY

## 2023-08-08 ENCOUNTER — TELEPHONE (OUTPATIENT)
Dept: FAMILY MEDICINE CLINIC | Facility: CLINIC | Age: 58
End: 2023-08-08
Payer: MEDICARE

## 2023-08-08 NOTE — TELEPHONE ENCOUNTER
Left a message for patient to call our office back so I could let her know that Dr Alcazar is not retiring.

## 2023-08-15 NOTE — PROGRESS NOTES
Subjective:  Joan Blackwell is a 58 y.o. female who presents for       Patient Active Problem List   Diagnosis    Hypocalcemia    Hyperlipidemia    Iron deficiency anemia    Hypothyroidism    S/P thyroidectomy    Uncontrolled type 2 diabetes mellitus with complication, without long-term current use of insulin    Obesity    Uncontrolled hypertension    Hypertensive urgency    Right wrist pain    Carpal tunnel syndrome of right wrist    Controlled type 2 diabetes mellitus with complication, without long-term current use of insulin    Essential hypertension    Vitamin D deficiency    Sprain of left ankle    Grieving    Rotator cuff syndrome of right shoulder    Hypotension    Bilateral low back pain with bilateral sciatica    Fall    Right shoulder pain    Dental abscess    Body mass index (BMI) of 35.0 to 35.9    Former smoker    Iron overload    High vitamin D level    Hypoparathyroidism    Abnormal CT of the abdomen    Encounter for screening for malignant neoplasm of colon    Right acute serous otitis media    Swelling of left foot    Diabetic foot    Anxiety    Asthma    Gastroesophageal reflux disease    Lumbosacral spondylosis without myelopathy    Mild chronic obstructive pulmonary disease    Class 2 severe obesity with serious comorbidity and body mass index (BMI) of 38.0 to 38.9 in adult    Insomnia    Jaw pain    Right ear pain    Class 2 severe obesity with serious comorbidity in adult    High risk medication use    Abdominal lymphadenopathy           Current Outpatient Medications:     Alcohol Swabs (B-D SINGLE USE SWABS REGULAR) pads, USE AS DIRECTED AS NEEDED, Disp: 300 each, Rfl: 3    ALPRAZolam (XANAX) 1 MG tablet, Take 1 tablet by mouth As Needed., Disp: , Rfl:     amLODIPine (Norvasc) 2.5 MG tablet, Take 1 tablet by mouth Daily., Disp: 30 tablet, Rfl: 3    ARIPiprazole (ABILIFY) 30 MG tablet, Take 1 tablet by mouth Daily As  Needed., Disp: , Rfl:     atorvastatin (LIPITOR) 20 MG tablet, Take 1 tablet by mouth Daily., Disp: 90 tablet, Rfl: 1    Blood Glucose Monitoring Suppl w/Device kit, Use to check sugars daily for diabetes E11.8, Disp: 1 each, Rfl: 0    buPROPion XL (WELLBUTRIN XL) 300 MG 24 hr tablet, Take 1 tablet by mouth Daily As Needed., Disp: , Rfl:     calcitriol (ROCALTROL) 0.25 MCG capsule, 1 TAB DAILY, Disp: 30 capsule, Rfl: 11    calcium acetate (PHOS BINDER,) 667 MG capsule capsule, Take 4 capsules am and 3 capsules pm, Disp: 210 capsule, Rfl: 11    cloNIDine (CATAPRES) 0.3 MG tablet, Take 1 tablet by mouth Every 12 (Twelve) Hours., Disp: 180 tablet, Rfl: 1    empagliflozin (Jardiance) 25 MG tablet tablet, Take 1 tablet by mouth Daily., Disp: 90 tablet, Rfl: 3    escitalopram (LEXAPRO) 20 MG tablet, Take 1 tablet by mouth Daily., Disp: , Rfl:     ferrous sulfate (FeroSul) 325 (65 FE) MG tablet, Take 1 tablet by mouth 3 (Three) Times a Day With Meals., Disp: 270 tablet, Rfl: 1    furosemide (Lasix) 20 MG tablet, Take 1 tablet by mouth 2 (Two) Times a Day., Disp: 60 tablet, Rfl: 3    glucose blood test strip, Use to check sugars daily for diabetes E11.8, Disp: 100 each, Rfl: 3    HYDROcodone-acetaminophen (NORCO)  MG per tablet, Take 1 tablet by mouth Every 8 (Eight) Hours As Needed., Disp: , Rfl:     lamoTRIgine (LaMICtal) 200 MG tablet, Take 0.5 tablets by mouth Daily As Needed., Disp: , Rfl:     Lancets misc, Use to check sugars daily for diabetes E11.8, Disp: 100 each, Rfl: 3    levothyroxine (Synthroid) 137 MCG tablet, Take 1 tablet by mouth Daily., Disp: 30 tablet, Rfl: 3    lisinopril (PRINIVIL,ZESTRIL) 40 MG tablet, Take 1 tablet by mouth Daily., Disp: 90 tablet, Rfl: 1    magnesium oxide 250 MG tablet, Take 1 tablet by mouth Daily., Disp: 90 tablet, Rfl: 1    metoprolol succinate XL (TOPROL-XL) 100 MG 24 hr tablet, Take 1 tablet by mouth Daily., Disp: 90 tablet, Rfl: 1    ondansetron (ZOFRAN) 8 MG tablet,  Take 1 tablet by mouth Every 8 (Eight) Hours As Needed for Nausea or Vomiting., Disp: 90 tablet, Rfl: 2    potassium chloride ER (K-TAB) 20 MEQ tablet controlled-release ER tablet, Take 1 tablet by mouth Daily., Disp: 90 tablet, Rfl: 1    Semaglutide (Rybelsus) 14 MG tablet, Take 1 tablet by mouth Daily. 30-60 minutes before bkfast with no more than 4 ounces of water, Disp: 30 tablet, Rfl: 11    traZODone (DESYREL) 100 MG tablet, Take 1 tablet by mouth Every Night. (Patient taking differently: Take 1 tablet by mouth At Night As Needed.), Disp: 90 tablet, Rfl: 0       Pt is 58 yo female with management  of obesity, HLP sp thyroidectomy in  , postoperative hypothyroidism,  Vitamin D deficiency, HTN,  DM type 2, history of hypertensive urgency, iron deficiency anemia, sp hysterectomy, sp ovarian cyst removal, Sp  X 3, carpal tunnel syndrome right wrist. Grieving, chronic back pain. currenntly in pain managmment, hypocalcemia, hypoparathyroidism, ORION, major depression, history of colonic polyps, right sided TMJ pain, fatty liver     23 in office visit for recheck. Pt was recently seen by walk in clinic for back pain. Her recent UA showed RBC in urine. She was ordered CT abdomen stone protocol. Pt has yet to get labwork ordered on 3/27/23.   She is doing well overall pt reports no chest pain no dizziness. BP is stable lately at home. She has pending surgery on her right foot with DR. Alcazar later this month. Pt used to see Cardiology in past.      8/3/23 in office visit for recheck. Pt had labwork on 23 per Endocrinology and hga1c was at 7.50. CMP showed caclium at 10.5 magnesium normal TSH nelida lipid panel showed triglcyerides down from 228 to 170. Phosphorus at 6.1. pt is here for followup on CT of abdomen don on 23 that showed increased number of abnormal lymph nodes near mesenteric fat that may be mesenteric panniculitis or possible lymphoma. Pt also has fatty liver. Pt reports no  abdominal pain. Nausea/vomiting, fever, night sweats or weight loss.. She is currently not having back pain.      9/5/23  in office visit for recheck. Pt was referred to Oncology for her recent CT abdomen that showed mesenteric adenopathy with some lymph nodes about 3 cm in size.  General Surgery consult was recommended for possible biopsy.  Pt saw General Surgery on 8/25/23 and pt was not recommended biopsy at this time and will have CT of abdomen in 3 months to assess stability.  Pt has followup appt with Oncology on 9/14/23.  Pt reports no abdominal pain no fever no chills. No diarrhea or constipation         Hyperlipidemia  This is a chronic problem. Recent lipid tests were reviewed and are variable. Exacerbating diseases include obesity. She has no history of chronic renal disease, diabetes, hypothyroidism or liver disease. Pertinent negatives include no chest pain, focal sensory loss, focal weakness, leg pain or myalgias. The current treatment provides no improvement of lipids. Compliance problems include adherence to diet.  Risk factors for coronary artery disease include diabetes mellitus, hypertension, obesity, dyslipidemia, a sedentary lifestyle and post-menopausal.   Obesity  This is a chronic problem. The current episode started more than 1 year ago. The problem occurs constantly. The problem has been unchanged. Pertinent negatives include no abdominal pain, anorexia, arthralgias, change in bowel habit, chest pain, chills, congestion, coughing, diaphoresis, fatigue, fever, headaches, joint swelling, myalgias, nausea, neck pain or numbness. Nothing aggravates the symptoms. She has tried nothing for the symptoms. The treatment provided no relief. ms.   Diabetes   She presents for her  followup  diabetic visit. She has type 2 diabetes mellitus. Her disease course has been stable. Hypoglycemia symptoms include dizziness and headaches. Pertinent negatives for hypoglycemia include no confusion, hunger, mood  changes, nervousness/anxiousness, pallor, seizures, sleepiness, speech difficulty, sweats or tremors. Pertinent negatives for diabetes include no blurred vision, no chest pain, no fatigue, no foot paresthesias, no foot ulcerations, no polydipsia, no polyphagia, no polyuria, no visual change, no weakness and no weight loss. Pertinent negatives for hypoglycemia complications include no blackouts, no hospitalization, no nocturnal hypoglycemia, no required assistance and no required glucagon injection. Pertinent negatives for diabetic complications include no autonomic neuropathy, CVA, heart disease, impotence, nephropathy, peripheral neuropathy, PVD or retinopathy. Risk factors for coronary artery disease include diabetes mellitus, sedentary lifestyle and obesity. Current diabetic treatment includes oral agent (monotherapy). Her weight is stable. She is following a generally healthy diet. She has not had a previous visit with a dietitian. She never participates in exercise. An ACE inhibitor/angiotensin II receptor blocker is being taken. She does not see a podiatrist.Eye exam is not current.   Hypertension   This is a chronic problem. The current episode started more than 1 year ago. The problem has been gradually improving since onset. The problem is uncontrolled. Associated symptoms include headaches, neck pain and shortness of breath. Pertinent negatives include no anxiety, blurred vision, chest pain, malaise/fatigue, orthopnea, palpitations, peripheral edema, PND or sweats. There are no associated agents to hypertension. Risk factors for coronary artery disease include diabetes mellitus and dyslipidemia. Current antihypertension treatment includes beta blockers. The current treatment provides no improvement. There are no compliance problems.  There is no history of angina, kidney disease, CAD/MI, CVA, heart failure, left ventricular hypertrophy, PVD, retinopathy or a thyroid problem. There is no history of chronic  renal disease, coarctation of the aorta, hyperaldosteronism, hypercortisolism, hyperparathyroidism, a hypertension causing med, pheochromocytoma or sleep apnea.   Hypothyroidism   This is a chronic problem. The current episode started more than 1 year ago. The problem occurs daily. The problem has been unchanged. Associated symptoms include arthralgias, headaches, myalgias, nausea, neck pain, numbness and vomiting. Pertinent negatives include no abdominal pain, anorexia, change in bowel habit, chest pain, chills, congestion, coughing, diaphoresis, fatigue, fever, joint swelling, rash, sore throat, swollen glands, urinary symptoms, vertigo, visual change or weakness. Nothing aggravates the symptoms. Treatments tried: synthroid. The treatment provided mild relief.  HPI    Review of Systems  Review of Systems   Constitutional:  Positive for activity change.   Musculoskeletal:  Positive for back pain.        Right jaw pain    Psychiatric/Behavioral:  The patient is nervous/anxious.      Patient Active Problem List   Diagnosis    Hypocalcemia    Hyperlipidemia    Iron deficiency anemia    Hypothyroidism    S/P thyroidectomy    Uncontrolled type 2 diabetes mellitus with complication, without long-term current use of insulin    Obesity    Uncontrolled hypertension    Hypertensive urgency    Right wrist pain    Carpal tunnel syndrome of right wrist    Controlled type 2 diabetes mellitus with complication, without long-term current use of insulin    Essential hypertension    Vitamin D deficiency    Sprain of left ankle    Grieving    Rotator cuff syndrome of right shoulder    Hypotension    Bilateral low back pain with bilateral sciatica    Fall    Right shoulder pain    Dental abscess    Body mass index (BMI) of 35.0 to 35.9    Former smoker    Iron overload    High vitamin D level    Hypoparathyroidism    Abnormal CT of the abdomen    Encounter for screening for malignant neoplasm of colon    Right acute serous otitis  media    Swelling of left foot    Diabetic foot    Anxiety    Asthma    Gastroesophageal reflux disease    Lumbosacral spondylosis without myelopathy    Mild chronic obstructive pulmonary disease    Class 2 severe obesity with serious comorbidity and body mass index (BMI) of 38.0 to 38.9 in adult    Insomnia    Jaw pain    Right ear pain    Class 2 severe obesity with serious comorbidity in adult    High risk medication use    Abdominal lymphadenopathy     Past Surgical History:   Procedure Laterality Date     SECTION  01/14/1993    x 3, 84, 82,    COLONOSCOPY N/A 02/15/2023    Procedure: COLONOSCOPY;  Surgeon: Cyndi Grace MD;  Location: [facility] ENDOSCOPY;  Service: Gastroenterology;  Laterality: N/A;    EXPLORATORY LAPAROTOMY Left 1998    Left cystectomy. Partial resection of vthe left ovary. Left ovary- oversewn    HEMORRHOIDECTOMY      INJECTION OF MEDICATION  2014    Depo Medrol (Methylprednisone) (1)     INJECTION OF MEDICATION  2015    Kenalog (1)      LAPAROSCOPIC HYSTERECTOMY  1995    Surgical, lysis of adhesions.Laparoscopic Assisted vaginal Hysterectomy (Tyler/ Doderlein Technique) marsupialization of right Bartholin's Gland Cyst    LASER ABLATION OF THE CERVIX  1985    Laser vaporization, cervical cone    OVARIAN CYST REMOVAL      PAP SMEAR      SHOULDER SURGERY      THYROIDECTOMY  2006    with partial parathyroidectomy    TOOTH EXTRACTION       Social History     Socioeconomic History    Marital status:    Tobacco Use    Smoking status: Former     Packs/day: 1.50     Years: 5.00     Pack years: 7.50     Types: Cigarettes     Start date: 1995     Quit date: 2015     Years since quittin.2    Smokeless tobacco: Never   Vaping Use    Vaping Use: Never used   Substance and Sexual Activity    Alcohol use: No    Drug use: No    Sexual activity: Not Currently     Partners: Male     Birth control/protection: Hysterectomy      Family History   Problem Relation Age of Onset    Diabetes Mother     Hypertension Mother     Diabetes Father     Arthritis Father      Results Encounter on 08/30/2023   Component Date Value Ref Range Status    Cologuard 09/30/2022 Negative  Negative Final    Comment:   NEGATIVE TEST RESULT. A negative Cologuard result indicates a low likelihood that a colorectal cancer (CRC) or advanced adenoma (adenomatous polyps with more advanced pre-malignant features)  is present. The chance that a person with a negative Cologuard test has a colorectal cancer is less than 1 in 1500 (negative predictive value >99.9%) or has an  advanced adenoma is less than  5.3% (negative predictive value 94.7%). These data are based on a prospective cross-sectional study of 10,000 individuals at average risk for colorectal cancer who were screened with both Cologuard and colonoscopy. (Joaquín Kang al, N Engl J Med 2014;370(14):5960-5238) The normal value (reference range) for this assay is negative.    COLOGUARD RE-SCREENING RECOMMENDATION: Periodic colorectal cancer screening is an important part of preventive healthcare for asymptomatic individuals at average risk for colorectal cancer.  Following a negative Cologuard result, the American Cancer Society and U.S.                            Multi-Society Task Force screening guidelines recommend a Cologuard re-screening interval of 3 years.   References: American Cancer Society Guideline for Colorectal Cancer Screening: https://www.cancer.org/cancer/colon-rectal-cancer/radmipknp-fpadzovtr-cfufofm/acs-recommendations.html.; Roel SOW, Manjinder ACKERMAN, Beryl CLEARYK, Colorectal Cancer Screening: Recommendations for Physicians and Patients from the U.S. Multi-Society Task Force on Colorectal Cancer Screening , Am J Gastroenterology 2017; 112:5867-2730.    TEST DESCRIPTION: Composite algorithmic analysis of stool DNA-biomarkers with hemoglobin immunoassay.   Quantitative values of individual  biomarkers are not reportable and are not associated with individual biomarker result reference ranges. Cologuard is intended for colorectal cancer screening of adults of either sex, 45 years or older, who are at average-risk for colorectal cancer (CRC). Cologuard has been approved for use by the U.S. FDA. The performance of Cologuard was                            established in a cross sectional study of average-risk adults aged 50-84. Cologuard performance in patients ages 45 to 49 years was estimated by sub-group analysis of near-age groups. Colonoscopies performed for a positive result may find as the most clinically significant lesion: colorectal cancer [4.0%], advanced adenoma (including sessile serrated polyps greater than or equal to 1cm diameter) [20%] or non- advanced adenoma [31%]; or no colorectal neoplasia [45%]. These estimates are derived from a prospective cross-sectional screening study of 10,000 individuals at average risk for colorectal cancer who were screened with both Cologuard and colonoscopy. (Joaquín ORTIZ. et al, N Engl J Med 2014;370(14):5242-9421.) Cologuard may produce a false negative or false positive result (no colorectal cancer or precancerous polyp present at colonoscopy follow up). A negative Cologuard test result does not guarantee the absence of CRC or advanced adenoma (pre-cancer). The current Cologuard                            screening interval is every 3 years. (American Cancer Society and U.S. Multi-Society Task Force). Cologuard performance data in a 10,000 patient pivotal study using colonoscopy as the reference method can be accessed at the following location: [email or web address]. Additional description of the Cologuard test process, warnings and precautions can be found at [email or web address].com.     Consult on 08/17/2023   Component Date Value Ref Range Status    Glucose 08/17/2023 131 (H)  65 - 99 mg/dL Final    BUN 08/17/2023 8  6 - 20 mg/dL Final    Creatinine  08/17/2023 1.07 (H)  0.57 - 1.00 mg/dL Final    Sodium 08/17/2023 141  136 - 145 mmol/L Final    Potassium 08/17/2023 3.7  3.5 - 5.2 mmol/L Final    Chloride 08/17/2023 101  98 - 107 mmol/L Final    CO2 08/17/2023 27.0  22.0 - 29.0 mmol/L Final    Calcium 08/17/2023 9.0  8.6 - 10.5 mg/dL Final    Total Protein 08/17/2023 8.3  6.0 - 8.5 g/dL Final    Albumin 08/17/2023 4.6  3.5 - 5.2 g/dL Final    ALT (SGPT) 08/17/2023 18  1 - 33 U/L Final    AST (SGOT) 08/17/2023 16  1 - 32 U/L Final    Alkaline Phosphatase 08/17/2023 97  39 - 117 U/L Final    Total Bilirubin 08/17/2023 0.3  0.0 - 1.2 mg/dL Final    Globulin 08/17/2023 3.7  gm/dL Final    A/G Ratio 08/17/2023 1.2  g/dL Final    BUN/Creatinine Ratio 08/17/2023 7.5  7.0 - 25.0 Final    Anion Gap 08/17/2023 13.0  5.0 - 15.0 mmol/L Final    eGFR 08/17/2023 60.3  >60.0 mL/min/1.73 Final    Iron 08/17/2023 49  37 - 145 mcg/dL Final    Iron Saturation (TSAT) 08/17/2023 13 (L)  20 - 50 % Final    Transferrin 08/17/2023 247  200 - 360 mg/dL Final    TIBC 08/17/2023 368  298 - 536 mcg/dL Final    Ferritin 08/17/2023 182.60 (H)  13.00 - 150.00 ng/mL Final    LDH 08/17/2023 148  135 - 214 U/L Final    WBC 08/17/2023 6.48  3.40 - 10.80 10*3/mm3 Final    RBC 08/17/2023 4.36  3.77 - 5.28 10*6/mm3 Final    Hemoglobin 08/17/2023 12.5  12.0 - 15.9 g/dL Final    Hematocrit 08/17/2023 36.4  34.0 - 46.6 % Final    MCV 08/17/2023 83.5  79.0 - 97.0 fL Final    MCH 08/17/2023 28.7  26.6 - 33.0 pg Final    MCHC 08/17/2023 34.3  31.5 - 35.7 g/dL Final    RDW 08/17/2023 14.1  12.3 - 15.4 % Final    RDW-SD 08/17/2023 43.3  37.0 - 54.0 fl Final    MPV 08/17/2023 9.8  6.0 - 12.0 fL Final    Platelets 08/17/2023 310  140 - 450 10*3/mm3 Final    Neutrophil % 08/17/2023 50.9  42.7 - 76.0 % Final    Lymphocyte % 08/17/2023 40.0  19.6 - 45.3 % Final    Monocyte % 08/17/2023 6.5  5.0 - 12.0 % Final    Eosinophil % 08/17/2023 1.5  0.3 - 6.2 % Final    Basophil % 08/17/2023 0.8  0.0 - 1.5 % Final     Immature Grans % 08/17/2023 0.3  0.0 - 0.5 % Final    Neutrophils, Absolute 08/17/2023 3.30  1.70 - 7.00 10*3/mm3 Final    Lymphocytes, Absolute 08/17/2023 2.59  0.70 - 3.10 10*3/mm3 Final    Monocytes, Absolute 08/17/2023 0.42  0.10 - 0.90 10*3/mm3 Final    Eosinophils, Absolute 08/17/2023 0.10  0.00 - 0.40 10*3/mm3 Final    Basophils, Absolute 08/17/2023 0.05  0.00 - 0.20 10*3/mm3 Final    Immature Grans, Absolute 08/17/2023 0.02  0.00 - 0.05 10*3/mm3 Final    nRBC 08/17/2023 0.0  0.0 - 0.2 /100 WBC Final    Extra Tube 08/17/2023 Hold for add-ons.   Final    Auto resulted.   Lab on 07/26/2023   Component Date Value Ref Range Status    Hemoglobin A1C 07/26/2023 7.50 (H)  4.80 - 5.60 % Final    Total Cholesterol 07/26/2023 152  0 - 200 mg/dL Final    Triglycerides 07/26/2023 170 (H)  0 - 150 mg/dL Final    HDL Cholesterol 07/26/2023 40  40 - 60 mg/dL Final    LDL Cholesterol  07/26/2023 83  0 - 100 mg/dL Final    VLDL Cholesterol 07/26/2023 29  5 - 40 mg/dL Final    LDL/HDL Ratio 07/26/2023 1.95   Final    Microalbumin/Creatinine Ratio 07/26/2023    Final    Unable to calculate    Creatinine, Urine 07/26/2023 87.3  mg/dL Final    Microalbumin, Urine 07/26/2023 <1.2  mg/dL Final    TSH 07/26/2023 1.220  0.270 - 4.200 uIU/mL Final    Vitamin B-12 07/26/2023 1,581 (H)  211 - 946 pg/mL Final    Magnesium 07/26/2023 2.1  1.6 - 2.6 mg/dL Final    Glucose 07/26/2023 126 (H)  65 - 99 mg/dL Final    BUN 07/26/2023 8  6 - 20 mg/dL Final    Creatinine 07/26/2023 0.94  0.57 - 1.00 mg/dL Final    Sodium 07/26/2023 140  136 - 145 mmol/L Final    Potassium 07/26/2023 3.9  3.5 - 5.2 mmol/L Final    Chloride 07/26/2023 98  98 - 107 mmol/L Final    CO2 07/26/2023 27.0  22.0 - 29.0 mmol/L Final    Calcium 07/26/2023 10.5  8.6 - 10.5 mg/dL Final    Total Protein 07/26/2023 8.2  6.0 - 8.5 g/dL Final    Albumin 07/26/2023 4.5  3.5 - 5.2 g/dL Final    ALT (SGPT) 07/26/2023 22  1 - 33 U/L Final    AST (SGOT) 07/26/2023 19  1 - 32 U/L Final     Alkaline Phosphatase 07/26/2023 101  39 - 117 U/L Final    Total Bilirubin 07/26/2023 0.4  0.0 - 1.2 mg/dL Final    Globulin 07/26/2023 3.7  gm/dL Final    A/G Ratio 07/26/2023 1.2  g/dL Final    BUN/Creatinine Ratio 07/26/2023 8.5  7.0 - 25.0 Final    Anion Gap 07/26/2023 15.0  5.0 - 15.0 mmol/L Final    eGFR 07/26/2023 70.5  >60.0 mL/min/1.73 Final    WBC 07/26/2023 6.55  3.40 - 10.80 10*3/mm3 Final    RBC 07/26/2023 4.51  3.77 - 5.28 10*6/mm3 Final    Hemoglobin 07/26/2023 12.8  12.0 - 15.9 g/dL Final    Hematocrit 07/26/2023 37.7  34.0 - 46.6 % Final    MCV 07/26/2023 83.6  79.0 - 97.0 fL Final    MCH 07/26/2023 28.4  26.6 - 33.0 pg Final    MCHC 07/26/2023 34.0  31.5 - 35.7 g/dL Final    RDW 07/26/2023 13.9  12.3 - 15.4 % Final    RDW-SD 07/26/2023 42.1  37.0 - 54.0 fl Final    MPV 07/26/2023 10.0  6.0 - 12.0 fL Final    Platelets 07/26/2023 318  140 - 450 10*3/mm3 Final    Neutrophil % 07/26/2023 51.3  42.7 - 76.0 % Final    Lymphocyte % 07/26/2023 39.5  19.6 - 45.3 % Final    Monocyte % 07/26/2023 6.6  5.0 - 12.0 % Final    Eosinophil % 07/26/2023 1.4  0.3 - 6.2 % Final    Basophil % 07/26/2023 0.9  0.0 - 1.5 % Final    Immature Grans % 07/26/2023 0.3  0.0 - 0.5 % Final    Neutrophils, Absolute 07/26/2023 3.36  1.70 - 7.00 10*3/mm3 Final    Lymphocytes, Absolute 07/26/2023 2.59  0.70 - 3.10 10*3/mm3 Final    Monocytes, Absolute 07/26/2023 0.43  0.10 - 0.90 10*3/mm3 Final    Eosinophils, Absolute 07/26/2023 0.09  0.00 - 0.40 10*3/mm3 Final    Basophils, Absolute 07/26/2023 0.06  0.00 - 0.20 10*3/mm3 Final    Immature Grans, Absolute 07/26/2023 0.02  0.00 - 0.05 10*3/mm3 Final    nRBC 07/26/2023 0.0  0.0 - 0.2 /100 WBC Final    Phosphorus 07/26/2023 6.1 (H)  2.5 - 4.5 mg/dL Final   Office Visit on 07/05/2023   Component Date Value Ref Range Status    QT Interval 07/05/2023 386  ms Final    QTC Interval 07/05/2023 442  ms Final   Lab on 06/22/2023   Component Date Value Ref Range Status    Urine Culture  06/22/2023 <25,000 CFU/mL Mixed Summer Isolated   Final    Color, UA 06/22/2023 Yellow  Yellow, Straw, Dark Yellow, Lily Final    Appearance, UA 06/22/2023 Clear  Clear Final    pH, UA 06/22/2023 6.5  5.0 - 9.0 Final    Specific Gravity, UA 06/22/2023 1.023  1.003 - 1.030 Final    Glucose, UA 06/22/2023 >=1000 mg/dL (3+) (A)  Negative Final    Ketones, UA 06/22/2023 Negative  Negative Final    Bilirubin, UA 06/22/2023 Negative  Negative Final    Blood, UA 06/22/2023 Negative  Negative Final    Protein, UA 06/22/2023 Negative  Negative Final    Leuk Esterase, UA 06/22/2023 Negative  Negative Final    Nitrite, UA 06/22/2023 Negative  Negative Final    Urobilinogen, UA 06/22/2023 0.2 E.U./dL  0.2 - 1.0 E.U./dL Final    RBC, UA 06/22/2023 0-2 (A)  None Seen /HPF Final    WBC, UA 06/22/2023 0-2  None Seen, 0-2, 3-5 /HPF Final    Bacteria, UA 06/22/2023 None Seen  None Seen /HPF Final    Squamous Epithelial Cells, UA 06/22/2023 0-2  None Seen, 0-2 /HPF Final    Hyaline Casts, UA 06/22/2023 None Seen  None Seen /LPF Final    Methodology 06/22/2023 Automated Microscopy   Final   Lab on 03/06/2023   Component Date Value Ref Range Status    Glucose 03/06/2023 212 (H)  65 - 99 mg/dL Final    BUN 03/06/2023 6  6 - 20 mg/dL Final    Creatinine 03/06/2023 0.73  0.57 - 1.00 mg/dL Final    Sodium 03/06/2023 138  136 - 145 mmol/L Final    Potassium 03/06/2023 4.1  3.5 - 5.2 mmol/L Final    Chloride 03/06/2023 101  98 - 107 mmol/L Final    CO2 03/06/2023 25.0  22.0 - 29.0 mmol/L Final    Calcium 03/06/2023 7.3 (L)  8.6 - 10.5 mg/dL Final    Albumin 03/06/2023 4.0  3.5 - 5.2 g/dL Final    Phosphorus 03/06/2023 4.7 (H)  2.5 - 4.5 mg/dL Final    Anion Gap 03/06/2023 12.0  5.0 - 15.0 mmol/L Final    BUN/Creatinine Ratio 03/06/2023 8.2  7.0 - 25.0 Final    eGFR 03/06/2023 95.5  >60.0 mL/min/1.73 Final    Magnesium 03/06/2023 1.9  1.6 - 2.6 mg/dL Final    WBC 03/06/2023 6.41  3.40 - 10.80 10*3/mm3 Final    RBC 03/06/2023 4.02  3.77 - 5.28  10*6/mm3 Final    Hemoglobin 03/06/2023 11.6 (L)  12.0 - 15.9 g/dL Final    Hematocrit 03/06/2023 34.3  34.0 - 46.6 % Final    MCV 03/06/2023 85.3  79.0 - 97.0 fL Final    MCH 03/06/2023 28.9  26.6 - 33.0 pg Final    MCHC 03/06/2023 33.8  31.5 - 35.7 g/dL Final    RDW 03/06/2023 13.0  12.3 - 15.4 % Final    RDW-SD 03/06/2023 40.1  37.0 - 54.0 fl Final    MPV 03/06/2023 10.7  6.0 - 12.0 fL Final    Platelets 03/06/2023 284  140 - 450 10*3/mm3 Final    Neutrophil % 03/06/2023 38.9 (L)  42.7 - 76.0 % Final    Lymphocyte % 03/06/2023 49.0 (H)  19.6 - 45.3 % Final    Monocyte % 03/06/2023 7.5  5.0 - 12.0 % Final    Eosinophil % 03/06/2023 3.3  0.3 - 6.2 % Final    Basophil % 03/06/2023 0.8  0.0 - 1.5 % Final    Immature Grans % 03/06/2023 0.5  0.0 - 0.5 % Final    Neutrophils, Absolute 03/06/2023 2.50  1.70 - 7.00 10*3/mm3 Final    Lymphocytes, Absolute 03/06/2023 3.14 (H)  0.70 - 3.10 10*3/mm3 Final    Monocytes, Absolute 03/06/2023 0.48  0.10 - 0.90 10*3/mm3 Final    Eosinophils, Absolute 03/06/2023 0.21  0.00 - 0.40 10*3/mm3 Final    Basophils, Absolute 03/06/2023 0.05  0.00 - 0.20 10*3/mm3 Final    Immature Grans, Absolute 03/06/2023 0.03  0.00 - 0.05 10*3/mm3 Final    nRBC 03/06/2023 0.0  0.0 - 0.2 /100 WBC Final    PTH, Intact 03/06/2023 8.1 (L)  15.0 - 65.0 pg/mL Final    Calcium 03/06/2023 7.3 (L)  8.6 - 10.5 mg/dL Final    25 Hydroxy, Vitamin D 03/06/2023 43.4  30.0 - 100.0 ng/ml Final    Hemoglobin A1C 03/06/2023 9.30 (H)  4.80 - 5.60 % Final      CT Abdomen Pelvis With Contrast  Narrative: Indication:  Follow-up mesenteric lymph nodes with changes suggestive of mesenteric  panniculitis    TECHNIQUE:  Intravenous contrast was administered and axial images from the level of the  diaphragms through the pelvis were performed followed by 2-D multiplanar  reformats.    Comparison:  10/4/2022    FINDINGS:  Abdominal viscera are unremarkable, except for a fatty enlarged liver.  Appendix  is normal.  No ascites or free  air or bowel obstruction is seen.    Again seen is mesenteric lymphadenopathy with increased number of lymph nodes.  One lymph node again measures 1.6 x 1.3 cm on image 51 of series 2, unchanged  from before.  Multiple other enlarged lymph nodes are also seen.  One of these  on image 61 of series 2 measures 2.7 x 1.8 cm.  This is seen best on image 61 of  series 2 of the present exam.  Other enlarged lymph nodes are also seen.  Lymph  nodes on the previous exam were difficult to clearly visualize and outline due  to lack of contrast.  Mild stranding of mesenteric fat is again noted around the  lymph nodes.    Visualized lung bases and the bones show no significant abnormality.  Impression: 1.  Again seen is mesenteric lymphadenopathy with mild stranding of mesenteric  fat.  Changes can be from mesenteric panniculitis as mentioned on the previous  exam.  Given the significant enlargement of lymph nodes that is better seen on  the present exam obtained with IV contrast other etiologies such as lymphoma or  metastatic disease cannot be entirely excluded.  Recommend further evaluation  with PET/CT.  2.  Fatty enlarged liver.  3. This report is being added to an unexpected findings folder for prompt  notification of the referring healthcare provider.  CT Chest With Contrast Diagnostic  Narrative: INDICATION:  Mesenteric panniculitis on CT of abdomen and pelvis.    COMPARISON:  None    TECHNIQUE:  Intravenous contrast was administered and axial images from the thoracic inlet  through the levels of the diaphragms were performed followed by 2D multiplanar  reformats.    FINDINGS:  Lungs are clear, except for mild bilateral lower lobe atelectasis.  No  lymphadenopathy or pleural or pericardial effusion is seen. Visualized bones  show no significant abnormality.  Impression: No significant abnormality seen    [unfilled]  Immunization History   Administered Date(s) Administered    Pneumococcal Conjugate 20-Valent (PCV20)  "08/03/2023    Pneumococcal Polysaccharide (PPSV23) 03/27/2018    Tdap 03/27/2018       The following portions of the patient's history were reviewed and updated as appropriate: allergies, current medications, past family history, past medical history, past social history, past surgical history and problem list.        Physical Exam  /58 (BP Location: Right arm, Patient Position: Sitting, Cuff Size: Adult)   Temp 98 °F (36.7 °C)   Ht 152.4 cm (60\")   Wt 86 kg (189 lb 9.6 oz)   BMI 37.03 kg/m²     EKG: on 7/5/23. NSR no ST abnormality.      Physical Exam  Vitals and nursing note reviewed.   Constitutional:       Appearance: She is well-developed. She is not diaphoretic.   HENT:      Head: Normocephalic and atraumatic.      Jaw: Tenderness and pain on movement present. No swelling or malocclusion.      Comments: Pain on right TMJ joint. On palpation. No clicking sound      Right Ear: External ear normal. There is impacted cerumen.      Ears:      Comments: Cerumen impaction in right ear    Unable to visualize TM    Jaw pain on right mandible when she opens jaw.         Eyes:      Conjunctiva/sclera: Conjunctivae normal.      Pupils: Pupils are equal, round, and reactive to light.   Cardiovascular:      Rate and Rhythm: Normal rate and regular rhythm.      Heart sounds: Normal heart sounds. No murmur heard.  Pulmonary:      Effort: Pulmonary effort is normal. No respiratory distress.      Breath sounds: Normal breath sounds.   Abdominal:      General: Bowel sounds are normal. There is no distension.      Palpations: Abdomen is soft.      Tenderness: There is no abdominal tenderness.      Comments: Obese abdomen    Musculoskeletal:         General: Tenderness present. Normal range of motion.      Cervical back: Normal range of motion and neck supple.      Right knee: Swelling, deformity and bony tenderness present. Tenderness present over the medial joint line.   Skin:     General: Skin is warm.      " Coloration: Skin is not pale.      Findings: No erythema or rash.   Neurological:      Mental Status: She is alert and oriented to person, place, and time.      Cranial Nerves: No cranial nerve deficit.   Psychiatric:         Behavior: Behavior normal.       [unfilled]   Diagnosis Plan   1. Abnormal CT of the abdomen  CT Abdomen Pelvis With Contrast      2. Essential hypertension        3. Controlled type 2 diabetes mellitus with complication, without long-term current use of insulin        4. Vitamin D deficiency        5. Hypoparathyroidism, unspecified hypoparathyroidism type        6. Hypocalcemia        7. Postoperative hypothyroidism        8. S/P thyroidectomy        9. Mixed hyperlipidemia        10. Class 2 severe obesity due to excess calories with serious comorbidity and body mass index (BMI) of 37.0 to 37.9 in adult        11. Mesenteric panniculitis  CT Abdomen Pelvis With Contrast                 -went  over labwork   -recommend mammogram screening - schedule at imaging center   -pneumonia vaccination today.    -abnormal CT of abdomen. Abdominal lymphadenopathy/mesenteric adenopathy - Oncology/General Surgery following. Biopsy not recommended at this time. Pt was recommended repeat CT of abdomen in about 3 months  -right foot pain - Podiatry following. Pt has been cleared for surgery as long as she continues to take her BP medication. EKG showed NSR with no ST abnormality  Pt reports no chest pain no dizziness. Will fax last office note to Dr. Alcazar. Pt can proceed with surgery gut Dr. Alcazar will have final authorization for surgery                     -ear pain/cerumen impaction/right jaw pain.  Pt saw ENT and ear pain likely due to TMJ. Had cerumen impaction removed   -fatty liver/abdominal pain/GI following - has endoscopy scheduled on amitiza 8 mg PO BID    -hypocalcemia/hypoparathyroidism   - Endocrinology following on calcitriol 0.25 mcg daily on phoslo tablets 4 in am and 3 in pm    -history of colonic polyps - GI following next colonoscopy in 2026  -postoperative hypothyroidism - on synthroid 137 mcg 6 days a week. Endocrinolgoy following   -DM type 2-  on metformin 1000 mg PO BID, on jardiance 25 mg daily. on rybelsus 7 mg PO q daily. pt has upcoming appt soon.    -insomnia - on trazodone  100 mg PO Q daily.   -diabetic neuropathy - on neurontin 300 mg PO TID   -hypertension - on clonidine 0.3 mg PO BID on  lisionpril 40 mg daily. On toprol  mg daily.  On norvasc  5 mg daily. Cut back on norvasc from 5 to 2.5 mg daily.    -Obesity BMI >30 . - recommended weight loss information and DASH diet. Counseled weight loss >5 minuutes  BMI at 37.03  -hyperlipidemia - HDL low. REcommended high healthy fat diet on lipitor 20 mg PO qhs. Drug information provided   -high vitamin D - stop vitamin D supplement   -depression/grieving/ORION - on lexapro 20 mg daily. On abilify 300 mg daily  on wellbutrin  mg daily. on lamictal 200 mg PO q daily.  on Xanax 1 mg as needed   -iron deficiency anemia - on ferrous sulfate 325 mg PO TID. Will stop this due to high iron levls   -advised to go to ER or call 911 if symptoms or severe  -advised to be safe and call with questions and concerns   -advised to followup with specialist and referrals   -adivsed pt to be safe during COVID-19 pandemic   I spent 34 minutes caring for Joan on this date of service. This time includes time spent by me in the following activities: preparing for the visit, reviewing tests, obtaining and/or reviewing a separately obtained history, performing a medically appropriate examination and/or evaluation, counseling and educating the patient/family/caregiver, ordering medications, tests, or procedures, referring and communicating with other health care professionals, documenting information in the medical record, independently interpreting results and communicating that information with the patient/family/caregiver, and care  coordination   -recheck in 3 month recheck         This document has been electronically signed by Brad Arnold MD on September 5, 2023 11:33 CDT        Answers submitted by the patient for this visit:  Other (Submitted on 8/29/2023)  Please describe your symptoms.: Follow up  Have you had these symptoms before?: No  How long have you been having these symptoms?: 1-4 days  Primary Reason for Visit (Submitted on 8/29/2023)  What is the primary reason for your visit?: Other

## 2023-08-17 ENCOUNTER — TELEPHONE (OUTPATIENT)
Dept: ONCOLOGY | Facility: CLINIC | Age: 58
End: 2023-08-17

## 2023-08-17 ENCOUNTER — LAB (OUTPATIENT)
Dept: ONCOLOGY | Facility: HOSPITAL | Age: 58
End: 2023-08-17
Payer: MEDICARE

## 2023-08-17 ENCOUNTER — CONSULT (OUTPATIENT)
Dept: ONCOLOGY | Facility: CLINIC | Age: 58
End: 2023-08-17
Payer: MEDICARE

## 2023-08-17 VITALS
DIASTOLIC BLOOD PRESSURE: 80 MMHG | OXYGEN SATURATION: 97 % | WEIGHT: 188 LBS | BODY MASS INDEX: 36.91 KG/M2 | HEART RATE: 71 BPM | HEIGHT: 60 IN | SYSTOLIC BLOOD PRESSURE: 145 MMHG

## 2023-08-17 DIAGNOSIS — E61.1 IRON DEFICIENCY: ICD-10-CM

## 2023-08-17 DIAGNOSIS — R93.5 ABNORMAL CT OF THE ABDOMEN: Primary | ICD-10-CM

## 2023-08-17 DIAGNOSIS — R59.0 MESENTERIC LYMPHADENOPATHY: ICD-10-CM

## 2023-08-17 LAB
ALBUMIN SERPL-MCNC: 4.6 G/DL (ref 3.5–5.2)
ALBUMIN/GLOB SERPL: 1.2 G/DL
ALP SERPL-CCNC: 97 U/L (ref 39–117)
ALT SERPL W P-5'-P-CCNC: 18 U/L (ref 1–33)
ANION GAP SERPL CALCULATED.3IONS-SCNC: 13 MMOL/L (ref 5–15)
AST SERPL-CCNC: 16 U/L (ref 1–32)
BASOPHILS # BLD AUTO: 0.05 10*3/MM3 (ref 0–0.2)
BASOPHILS NFR BLD AUTO: 0.8 % (ref 0–1.5)
BILIRUB SERPL-MCNC: 0.3 MG/DL (ref 0–1.2)
BUN SERPL-MCNC: 8 MG/DL (ref 6–20)
BUN/CREAT SERPL: 7.5 (ref 7–25)
CALCIUM SPEC-SCNC: 9 MG/DL (ref 8.6–10.5)
CHLORIDE SERPL-SCNC: 101 MMOL/L (ref 98–107)
CO2 SERPL-SCNC: 27 MMOL/L (ref 22–29)
CREAT SERPL-MCNC: 1.07 MG/DL (ref 0.57–1)
DEPRECATED RDW RBC AUTO: 43.3 FL (ref 37–54)
EGFRCR SERPLBLD CKD-EPI 2021: 60.3 ML/MIN/1.73
EOSINOPHIL # BLD AUTO: 0.1 10*3/MM3 (ref 0–0.4)
EOSINOPHIL NFR BLD AUTO: 1.5 % (ref 0.3–6.2)
ERYTHROCYTE [DISTWIDTH] IN BLOOD BY AUTOMATED COUNT: 14.1 % (ref 12.3–15.4)
FERRITIN SERPL-MCNC: 182.6 NG/ML (ref 13–150)
GLOBULIN UR ELPH-MCNC: 3.7 GM/DL
GLUCOSE SERPL-MCNC: 131 MG/DL (ref 65–99)
HCT VFR BLD AUTO: 36.4 % (ref 34–46.6)
HGB BLD-MCNC: 12.5 G/DL (ref 12–15.9)
HOLD SPECIMEN: NORMAL
IMM GRANULOCYTES # BLD AUTO: 0.02 10*3/MM3 (ref 0–0.05)
IMM GRANULOCYTES NFR BLD AUTO: 0.3 % (ref 0–0.5)
IRON 24H UR-MRATE: 49 MCG/DL (ref 37–145)
IRON SATN MFR SERPL: 13 % (ref 20–50)
LDH SERPL-CCNC: 148 U/L (ref 135–214)
LYMPHOCYTES # BLD AUTO: 2.59 10*3/MM3 (ref 0.7–3.1)
LYMPHOCYTES NFR BLD AUTO: 40 % (ref 19.6–45.3)
MCH RBC QN AUTO: 28.7 PG (ref 26.6–33)
MCHC RBC AUTO-ENTMCNC: 34.3 G/DL (ref 31.5–35.7)
MCV RBC AUTO: 83.5 FL (ref 79–97)
MONOCYTES # BLD AUTO: 0.42 10*3/MM3 (ref 0.1–0.9)
MONOCYTES NFR BLD AUTO: 6.5 % (ref 5–12)
NEUTROPHILS NFR BLD AUTO: 3.3 10*3/MM3 (ref 1.7–7)
NEUTROPHILS NFR BLD AUTO: 50.9 % (ref 42.7–76)
NRBC BLD AUTO-RTO: 0 /100 WBC (ref 0–0.2)
PLATELET # BLD AUTO: 310 10*3/MM3 (ref 140–450)
PMV BLD AUTO: 9.8 FL (ref 6–12)
POTASSIUM SERPL-SCNC: 3.7 MMOL/L (ref 3.5–5.2)
PROT SERPL-MCNC: 8.3 G/DL (ref 6–8.5)
RBC # BLD AUTO: 4.36 10*6/MM3 (ref 3.77–5.28)
SODIUM SERPL-SCNC: 141 MMOL/L (ref 136–145)
TIBC SERPL-MCNC: 368 MCG/DL (ref 298–536)
TRANSFERRIN SERPL-MCNC: 247 MG/DL (ref 200–360)
WBC NRBC COR # BLD: 6.48 10*3/MM3 (ref 3.4–10.8)

## 2023-08-17 PROCEDURE — 84466 ASSAY OF TRANSFERRIN: CPT | Performed by: INTERNAL MEDICINE

## 2023-08-17 PROCEDURE — 82728 ASSAY OF FERRITIN: CPT | Performed by: INTERNAL MEDICINE

## 2023-08-17 PROCEDURE — 83615 LACTATE (LD) (LDH) ENZYME: CPT | Performed by: INTERNAL MEDICINE

## 2023-08-17 PROCEDURE — 80053 COMPREHEN METABOLIC PANEL: CPT | Performed by: INTERNAL MEDICINE

## 2023-08-17 PROCEDURE — 83540 ASSAY OF IRON: CPT | Performed by: INTERNAL MEDICINE

## 2023-08-17 PROCEDURE — 85025 COMPLETE CBC W/AUTO DIFF WBC: CPT | Performed by: INTERNAL MEDICINE

## 2023-08-17 NOTE — PROGRESS NOTES
DATE OF CONSULT: 8/17/2023    REQUESTING SOURCE:  Brad Arnold MD  [address]  [place] [address]       REASON FOR CONSULTATION: Abnormal CT scan of abdomen, mesenteric adenopathy, iron deficiency      HISTORY OF PRESENT ILLNESS:    57-year-old female with medical problem consisting of hypertension, hyperactive thyroid for which she had a thyroid removal done currently on Synthroid, history of parathyroid removal, history of nicotine addiction that she quit about 10 years ago, iron deficiency for which she is taking iron tablet p.o. daily has been referred to [facility] for further evaluation and recommendation regarding recent CT scan of abdomen and pelvis done on July 12, 2023 at [facility] showing mesenteric lymph node enlargement and iron deficiency.  Patient states her CT scan was done for her back pain to rule out any kidney stone.  Denies any drenching night sweats or any abnormal weight loss.  Denies any new lymph node enlargement.  Complains of chronic back pain due to disc problem.  Denies any new onset of neuropathy.  Denies any personal history of malignancy or DVT or pulmonary embolism.  Denies any fevers.                PAST MEDICAL HISTORY:    Past Medical History:   Diagnosis Date    Abdominal pain     suspect Kidney Stone       Acquired hypothyroidism     Acute bronchitis     Acute maxillary sinusitis     Acute pharyngitis     Allergic     Anxiety     Asthma     Axillary lymphadenopathy     Blood in urine     Bronchitis     with an asthmatic reaction       Colitis     Cough     Cyst of Bartholin's gland duct     History of     Degenerative joint disease involving multiple joints     Depression     Diabetes     Elevated cholesterol     Encounter for gynecological examination with abnormal finding     Essential (primary) hypertension     Essential hypertension     GERD (gastroesophageal reflux disease)     Goiter     Hemorrhoids     Hordeolum     right  lower eyelid       Injury of back     Low back pain     Lung nodule, solitary     Mild chronic obstructive pulmonary disease 2016    Multiple gestation 1982    Osteoporosis     Polyp of vagina     possible       Postsurgical hypoparathyroidism     Shortness of breath     Tobacco dependence syndrome     Past history    Urinary tract infectious disease     Vulvovaginitis        PAST SURGICAL HISTORY:  Past Surgical History:   Procedure Laterality Date     SECTION  01/14/1993    x 3, 84, 82,    COLONOSCOPY N/A 02/15/2023    Procedure: COLONOSCOPY;  Surgeon: Cyndi Grace MD;  Location: [facility] ENDOSCOPY;  Service: Gastroenterology;  Laterality: N/A;    EXPLORATORY LAPAROTOMY Left 1998    Left cystectomy. Partial resection of vthe left ovary. Left ovary- oversewn    HEMORRHOIDECTOMY      INJECTION OF MEDICATION  2014    Depo Medrol (Methylprednisone) (1)     INJECTION OF MEDICATION  2015    Kenalog (1)      LAPAROSCOPIC HYSTERECTOMY  1995    Surgical, lysis of adhesions.Laparoscopic Assisted vaginal Hysterectomy (Tyler/ Doderlein Technique) marsupialization of right Bartholin's Gland Cyst    LASER ABLATION OF THE CERVIX  1985    Laser vaporization, cervical cone    OVARIAN CYST REMOVAL      PAP SMEAR      SHOULDER SURGERY      THYROIDECTOMY  2006    with partial parathyroidectomy    TOOTH EXTRACTION         ALLERGIES:    Allergies   Allergen Reactions    Naproxen Nausea And Vomiting and Swelling    Other Anaphylaxis and Swelling     Blueberries       SOCIAL HISTORY:   Social History     Tobacco Use    Smoking status: Former     Packs/day: 1.50     Years: 5.00     Pack years: 7.50     Types: Cigarettes     Start date: 1995     Quit date: 2015     Years since quittin.1    Smokeless tobacco: Never   Vaping Use    Vaping Use: Never used   Substance Use Topics    Alcohol use: No    Drug use: No       CURRENT MEDICATIONS:    Current  Outpatient Medications   Medication Sig Dispense Refill    Alcohol Swabs (B-D SINGLE USE SWABS REGULAR) pads USE AS DIRECTED AS NEEDED 300 each 3    ALPRAZolam (XANAX) 1 MG tablet Take 1 tablet by mouth As Needed.      amLODIPine (Norvasc) 2.5 MG tablet Take 1 tablet by mouth Daily. 30 tablet 3    ARIPiprazole (ABILIFY) 30 MG tablet Take 1 tablet by mouth Daily As Needed.      atorvastatin (LIPITOR) 20 MG tablet Take 1 tablet by mouth Daily. 90 tablet 1    Blood Glucose Monitoring Suppl w/Device kit Use to check sugars daily for diabetes E11.8 1 each 0    buPROPion XL (WELLBUTRIN XL) 300 MG 24 hr tablet Take 1 tablet by mouth Daily As Needed.      calcitriol (ROCALTROL) 0.25 MCG capsule 1 TAB DAILY 30 capsule 11    calcium acetate (PHOS BINDER,) 667 MG capsule capsule Take 4 capsules am and 3 capsules pm 210 capsule 11    cloNIDine (CATAPRES) 0.3 MG tablet Take 1 tablet by mouth Every 12 (Twelve) Hours. 180 tablet 1    empagliflozin (Jardiance) 25 MG tablet tablet Take 1 tablet by mouth Daily. 90 tablet 3    escitalopram (LEXAPRO) 20 MG tablet Take 1 tablet by mouth Daily.      ferrous sulfate (FeroSul) 325 (65 FE) MG tablet Take 1 tablet by mouth 3 (Three) Times a Day With Meals. 270 tablet 1    furosemide (Lasix) 20 MG tablet Take 1 tablet by mouth 2 (Two) Times a Day. 60 tablet 3    glucose blood test strip Use to check sugars daily for diabetes E11.8 100 each 3    HYDROcodone-acetaminophen (NORCO)  MG per tablet Take 1 tablet by mouth Every 8 (Eight) Hours As Needed.      lamoTRIgine (LaMICtal) 200 MG tablet Take 0.5 tablets by mouth Daily As Needed.      Lancets misc Use to check sugars daily for diabetes E11.8 100 each 3    levothyroxine (Synthroid) 137 MCG tablet Take 1 tablet by mouth Daily. 30 tablet 3    lisinopril (PRINIVIL,ZESTRIL) 40 MG tablet Take 1 tablet by mouth Daily. 90 tablet 1    magnesium oxide 250 MG tablet Take 1 tablet by mouth Daily. 90 tablet 1    metoprolol succinate XL (TOPROL-XL)  100 MG 24 hr tablet Take 1 tablet by mouth Daily. 90 tablet 1    ondansetron (ZOFRAN) 8 MG tablet Take 1 tablet by mouth Every 8 (Eight) Hours As Needed for Nausea or Vomiting. 90 tablet 2    potassium chloride ER (K-TAB) 20 MEQ tablet controlled-release ER tablet Take 1 tablet by mouth Daily. 90 tablet 1    Semaglutide (Rybelsus) 14 MG tablet Take 1 tablet by mouth Daily. 30-60 minutes before bkfast with no more than 4 ounces of water 30 tablet 11    traZODone (DESYREL) 100 MG tablet Take 1 tablet by mouth Every Night. (Patient taking differently: Take 1 tablet by mouth At Night As Needed.) 90 tablet 0     No current facility-administered medications for this visit.        HOME MEDICATIONS:   Current Outpatient Medications on File Prior to Visit   Medication Sig Dispense Refill    Alcohol Swabs (B-D SINGLE USE SWABS REGULAR) pads USE AS DIRECTED AS NEEDED 300 each 3    ALPRAZolam (XANAX) 1 MG tablet Take 1 tablet by mouth As Needed.      amLODIPine (Norvasc) 2.5 MG tablet Take 1 tablet by mouth Daily. 30 tablet 3    ARIPiprazole (ABILIFY) 30 MG tablet Take 1 tablet by mouth Daily As Needed.      atorvastatin (LIPITOR) 20 MG tablet Take 1 tablet by mouth Daily. 90 tablet 1    Blood Glucose Monitoring Suppl w/Device kit Use to check sugars daily for diabetes E11.8 1 each 0    buPROPion XL (WELLBUTRIN XL) 300 MG 24 hr tablet Take 1 tablet by mouth Daily As Needed.      calcitriol (ROCALTROL) 0.25 MCG capsule 1 TAB DAILY 30 capsule 11    calcium acetate (PHOS BINDER,) 667 MG capsule capsule Take 4 capsules am and 3 capsules pm 210 capsule 11    cloNIDine (CATAPRES) 0.3 MG tablet Take 1 tablet by mouth Every 12 (Twelve) Hours. 180 tablet 1    empagliflozin (Jardiance) 25 MG tablet tablet Take 1 tablet by mouth Daily. 90 tablet 3    escitalopram (LEXAPRO) 20 MG tablet Take 1 tablet by mouth Daily.      ferrous sulfate (FeroSul) 325 (65 FE) MG tablet Take 1 tablet by mouth 3 (Three) Times a Day With Meals. 270 tablet 1     furosemide (Lasix) 20 MG tablet Take 1 tablet by mouth 2 (Two) Times a Day. 60 tablet 3    glucose blood test strip Use to check sugars daily for diabetes E11.8 100 each 3    HYDROcodone-acetaminophen (NORCO)  MG per tablet Take 1 tablet by mouth Every 8 (Eight) Hours As Needed.      lamoTRIgine (LaMICtal) 200 MG tablet Take 0.5 tablets by mouth Daily As Needed.      Lancets misc Use to check sugars daily for diabetes E11.8 100 each 3    levothyroxine (Synthroid) 137 MCG tablet Take 1 tablet by mouth Daily. 30 tablet 3    lisinopril (PRINIVIL,ZESTRIL) 40 MG tablet Take 1 tablet by mouth Daily. 90 tablet 1    magnesium oxide 250 MG tablet Take 1 tablet by mouth Daily. 90 tablet 1    metoprolol succinate XL (TOPROL-XL) 100 MG 24 hr tablet Take 1 tablet by mouth Daily. 90 tablet 1    ondansetron (ZOFRAN) 8 MG tablet Take 1 tablet by mouth Every 8 (Eight) Hours As Needed for Nausea or Vomiting. 90 tablet 2    potassium chloride ER (K-TAB) 20 MEQ tablet controlled-release ER tablet Take 1 tablet by mouth Daily. 90 tablet 1    Semaglutide (Rybelsus) 14 MG tablet Take 1 tablet by mouth Daily. 30-60 minutes before bkfast with no more than 4 ounces of water 30 tablet 11    traZODone (DESYREL) 100 MG tablet Take 1 tablet by mouth Every Night. (Patient taking differently: Take 1 tablet by mouth At Night As Needed.) 90 tablet 0     No current facility-administered medications on file prior to visit.       FAMILY HISTORY:    Family History   Problem Relation Age of Onset    Diabetes Mother     Hypertension Mother     Diabetes Father     Arthritis Father          Review of Systems   Constitutional:  Negative for fever and unexpected weight change.   Musculoskeletal:  Positive for arthralgias and back pain.   Hematological:  Negative for adenopathy.    A comprehensive 14 point review of systems was performed and was negative except as mentioned.      OBJECTIVEBEGIN@     Vitals:    08/17/23 1110   BP: 145/80   Pulse: 71  "  SpO2: 97%   Weight: 85.3 kg (188 lb)   Height: 152.4 cm (60\")   PainSc: 0-No pain          No data to display                Physical Exam  Pulmonary:      Breath sounds: Normal breath sounds.   Lymphadenopathy:      Cervical: No cervical adenopathy.   Neurological:      Mental Status: She is alert and oriented to person, place, and time.         DIAGNOSTIC DATA:    WBC   Date Value Ref Range Status   08/17/2023 6.48 3.40 - 10.80 10*3/mm3 Final   02/11/2018 6.90 4.5 - 11.0 10*3/uL Final     RBC   Date Value Ref Range Status   08/17/2023 4.36 3.77 - 5.28 10*6/mm3 Final   02/11/2018 4.17 4.0 - 5.2 10*6/uL Final     Hemoglobin   Date Value Ref Range Status   08/17/2023 12.5 12.0 - 15.9 g/dL Final   02/11/2018 11.8 (L) 12.0 - 16.0 g/dL Final     Hematocrit   Date Value Ref Range Status   08/17/2023 36.4 34.0 - 46.6 % Final   02/11/2018 35.9 (L) 36.0 - 46.0 % Final     MCV   Date Value Ref Range Status   08/17/2023 83.5 79.0 - 97.0 fL Final   02/11/2018 86.1 80.0 - 100.0 fL Final     MCH   Date Value Ref Range Status   08/17/2023 28.7 26.6 - 33.0 pg Final   02/11/2018 28.3 26.0 - 34.0 pg Final     MCHC   Date Value Ref Range Status   08/17/2023 34.3 31.5 - 35.7 g/dL Final   02/11/2018 32.9 31.0 - 37.0 g/dL Final     RDW   Date Value Ref Range Status   08/17/2023 14.1 12.3 - 15.4 % Final   02/11/2018 14.7 12.0 - 16.8 % Final     RDW-SD   Date Value Ref Range Status   08/17/2023 43.3 37.0 - 54.0 fl Final     MPV   Date Value Ref Range Status   08/17/2023 9.8 6.0 - 12.0 fL Final   02/11/2018 9.8 6.7 - 10.8 fL Final     Platelets   Date Value Ref Range Status   08/17/2023 310 140 - 450 10*3/mm3 Final   02/11/2018 321 140 - 440 10*3/uL Final     Neutrophil Rel %   Date Value Ref Range Status   02/11/2018 44.4 (L) 45 - 80 % Final     Neutrophil %   Date Value Ref Range Status   08/17/2023 50.9 42.7 - 76.0 % Final     Lymphocyte Rel %   Date Value Ref Range Status   02/11/2018 46.7 15 - 50 % Final     Lymphocyte %   Date " Value Ref Range Status   08/17/2023 40.0 19.6 - 45.3 % Final     Monocyte Rel %   Date Value Ref Range Status   02/11/2018 5.8 0 - 15 % Final     Monocyte %   Date Value Ref Range Status   08/17/2023 6.5 5.0 - 12.0 % Final     Eosinophil %   Date Value Ref Range Status   08/17/2023 1.5 0.3 - 6.2 % Final   02/11/2018 2.6 0 - 7 % Final     Basophil Rel %   Date Value Ref Range Status   02/11/2018 0.4 0 - 2 % Final     Basophil %   Date Value Ref Range Status   08/17/2023 0.8 0.0 - 1.5 % Final     Immature Grans %   Date Value Ref Range Status   08/17/2023 0.3 0.0 - 0.5 % Final   02/11/2018 0.1 (H) 0 % Final     Neutrophils Absolute   Date Value Ref Range Status   02/11/2018 3.06 2.0 - 8.8 10*3/uL Final     Neutrophils, Absolute   Date Value Ref Range Status   08/17/2023 3.30 1.70 - 7.00 10*3/mm3 Final     Lymphocytes Absolute   Date Value Ref Range Status   02/11/2018 3.22 0.7 - 5.5 10*3/uL Final     Lymphocytes, Absolute   Date Value Ref Range Status   08/17/2023 2.59 0.70 - 3.10 10*3/mm3 Final     Monocytes Absolute   Date Value Ref Range Status   02/11/2018 0.40 0.0 - 1.7 10*3/uL Final     Monocytes, Absolute   Date Value Ref Range Status   08/17/2023 0.42 0.10 - 0.90 10*3/mm3 Final     Eosinophils Absolute   Date Value Ref Range Status   02/11/2018 0.18 0.0 - 0.8 10*3/uL Final     Eosinophils, Absolute   Date Value Ref Range Status   08/17/2023 0.10 0.00 - 0.40 10*3/mm3 Final     Basophils Absolute   Date Value Ref Range Status   02/11/2018 0.03 0.0 - 0.2 10*3/uL Final     Basophils, Absolute   Date Value Ref Range Status   08/17/2023 0.05 0.00 - 0.20 10*3/mm3 Final     Immature Grans, Absolute   Date Value Ref Range Status   08/17/2023 0.02 0.00 - 0.05 10*3/mm3 Final   02/11/2018 0.01 <1 10*3/uL Final     nRBC   Date Value Ref Range Status   08/17/2023 0.0 0.0 - 0.2 /100 WBC Final     Glucose   Date Value Ref Range Status   08/17/2023 131 (H) 65 - 99 mg/dL Final     Sodium   Date Value Ref Range Status    08/17/2023 141 136 - 145 mmol/L Final   02/11/2018 144 137 - 145 mmol/L Final     Potassium   Date Value Ref Range Status   08/17/2023 3.7 3.5 - 5.2 mmol/L Final   02/11/2018 3.6 3.5 - 5.1 mmol/L Final     CO2   Date Value Ref Range Status   08/17/2023 27.0 22.0 - 29.0 mmol/L Final     Total CO2   Date Value Ref Range Status   02/11/2018 26 22 - 30 mmol/L Final     Chloride   Date Value Ref Range Status   08/17/2023 101 98 - 107 mmol/L Final   02/11/2018 102 98 - 107 mmol/L Final     Anion Gap   Date Value Ref Range Status   08/17/2023 13.0 5.0 - 15.0 mmol/L Final     Creatinine   Date Value Ref Range Status   08/17/2023 1.07 (H) 0.57 - 1.00 mg/dL Final   02/11/2018 0.7 0.7 - 1.5 mg/dL Final     BUN   Date Value Ref Range Status   08/17/2023 8 6 - 20 mg/dL Final   02/11/2018 9 7 - 20 mg/dL Final     BUN/Creatinine Ratio   Date Value Ref Range Status   08/17/2023 7.5 7.0 - 25.0 Final   02/11/2018 12.9 RATIO Final     Calcium   Date Value Ref Range Status   08/17/2023 9.0 8.6 - 10.5 mg/dL Final   02/11/2018 7.0 (L) 8.4 - 10.2 mg/dL Final     Alkaline Phosphatase   Date Value Ref Range Status   08/17/2023 97 39 - 117 U/L Final     Total Protein   Date Value Ref Range Status   08/17/2023 8.3 6.0 - 8.5 g/dL Final     ALT (SGPT)   Date Value Ref Range Status   08/17/2023 18 1 - 33 U/L Final     AST (SGOT)   Date Value Ref Range Status   08/17/2023 16 1 - 32 U/L Final     Total Bilirubin   Date Value Ref Range Status   08/17/2023 0.3 0.0 - 1.2 mg/dL Final     Albumin   Date Value Ref Range Status   08/17/2023 4.6 3.5 - 5.2 g/dL Final     Globulin   Date Value Ref Range Status   08/17/2023 3.7 gm/dL Final     Lab Results   Component Value Date    IRON 49 08/17/2023    TIBC 368 08/17/2023    LABIRON 13 (L) 08/17/2023    FERRITIN 182.60 (H) 08/17/2023    [id number] 1,581 (H) 07/26/2023     Lab Results   Component Value Date    REFLABREPO  02/15/2023     [facility]  [address] [place], [place]   "[address]  Phone: [phone] or [phone]  Fax: [phone]  Kurt Barajas M.D., Medical Director    PATIENT NAME                           LABORATORY NO.  TRU [facility].                [id number]  [id number]                         AGE              SEX  SSN           CLIENT REF #  [facility]           57      1965  F    [id number]   [id number]    [place]                       REQUESTING M.D.     ATTENDING [facility]     COPY TO.  [address] [place] [address]                 NIDA JAVIER DAVID  [address]  DATE COLLECTED      DATE RECEIVED      DATE REPORTED  02/15/2023          02/15/2023         2023    DIAGNOSIS:  TRANSVERSE COLON POLYP:  Tubular adenoma    JBS/morelia    CLINICAL HISTORY:  Encounter for screening for malignant neoplasm of colon    SPECIMENS RECEIVED:  TRANSVERSE COLON POLYP    MICROSCOPIC DESCRIPTION:  Tissue blocks are prepared and slides are examined microscopically on all  specimens. See diagnosis for details.    Professional interpretation rendered by Ayush Soriano M.D. at [facility], [address] [place] [address].    GROSS DESCRIPTION:  The specimen is received in 1 formalin filled container labeled \"large intestine,  transverse colon polyp biopsy\" and consists of multiple pieces of tan soft tissue  and possible vegetative matter measuring 1.7 x 1.1 x 0.2 cm in aggregate.  The  specimen is filtered and submitted entirely in 1 block.  GREG    REVIEWED, DIAGNOSED AND ELECTRONICALLY  SIGNED BY:    Ayush Soriano M.D.  CPT CODES:  [id number]             Radiology Data :  CT of abdomen and pelvis done at [facility] on 2023 showed:        No radiology results for the last 7 days    Pathology :  * Cannot find OR log *    Assessment and plan:    1.  Abnormal CT scan of abdomen showing mesenteric adenopathy:  - CT of abdomen and pelvis done at [facility] on July 20, 2023 was obtained and reviewed.  - CT scan shows enlarged lymph node involving mesenteric area with changes suggestive of panniculitis involving mesentery.  - Patient did have similar finding of mesenteric panniculitis and adenopathy last CT scan done in October 2022 at [facility] as well  - CBC done earlier today was within normal limit.  LDH done earlier today is within normal limit  - Recommend repeating CT of chest abdomen and pelvis with contrast to follow-up on mesenteric adenopathy or to rule out any mediastinal or axillary adenopathy.  - Patient will return to clinic after CT scan to go over the result and further recommendation.    2.  Iron deficiency:  - Anemia work-up done today shows iron saturation is 13% with ferritin of 185.  Recommend continue with iron tablet p.o. daily.    3.  Acute kidney injury:  - Creatinine is borderline elevated at 1.07.  Creatinine 2 weeks ago was normal at 0.94.  - Patient will be notified about elevated creatinine and need to increase hydration    4.  History of thyroid surgery currently on thyroid replacement    5.  Health maintenance: Patient does not smoke.  Had a mammogram in 2023.  Had a colonoscopy in February 2023 which was negative for malignancy.          PHQ-9 Total Score: 0   -Patient is not homicidal or suicidal.  No acute intervention required.       Joan Blackwell reports a pain score of 0.  Given her pain assessment as noted, treatment options were discussed and the following options were decided upon as a follow-up plan to address the patient's pain: continuation of current treatment plan for pain.             Thank you for this consultation.    Stewart Melchor MD  8/17/2023  12:37 CDT        Part of this note may be an electronic transcription/translation of spoken language to printed text using the Dragon Dictation System.

## 2023-08-17 NOTE — LETTER
August 17, 2023       No Recipients    Patient: Joan Blackwell   YOB: 1965   Date of Visit: 8/17/2023       Dear Brad Arnold MD:    Thank you for referring Joan Blackwell to me for evaluation. Below are the relevant portions of my assessment and plan of care.    Encounter Diagnosis and Orders:  Diagnoses and all orders for this visit:    1. Abnormal CT of the abdomen (Primary)  -     CBC & Differential  -     Comprehensive Metabolic Panel  -     Iron and TIBC  -     Ferritin  -     Lactate Dehydrogenase  -     CT chest w contrast; Future  -     CT abdomen pelvis w contrast; Future    2. Mesenteric lymphadenopathy  -     CBC & Differential  -     Comprehensive Metabolic Panel  -     Iron and TIBC  -     Ferritin  -     Lactate Dehydrogenase  -     CT chest w contrast; Future  -     CT abdomen pelvis w contrast; Future    3. Iron deficiency  -     CBC & Differential  -     Comprehensive Metabolic Panel  -     Iron and TIBC  -     Ferritin  -     Lactate Dehydrogenase  -     Extra Tubes        If you have questions, please do not hesitate to call me. I look forward to following Joan along with you.         Sincerely,        Stewart Melchor MD        CC:   No Recipients

## 2023-08-17 NOTE — TELEPHONE ENCOUNTER
----- Message from Stewart Melchor MD sent at 8/17/2023 12:49 PM CDT -----  Please let patient know, iron saturation is borderline low at 13% she needs to continue taking iron tablet p.o. daily.  CBC was within normal limit.  Creatinine is borderline elevated at 1.07.  Recommend increasing hydration.  I have ordered CT of chest abdomen and pelvis with contrast to be done next week.  Please make sure we schedule her CT scan.  Thank you

## 2023-08-18 ENCOUNTER — TELEPHONE (OUTPATIENT)
Dept: ONCOLOGY | Facility: CLINIC | Age: 58
End: 2023-08-18
Payer: MEDICARE

## 2023-08-18 NOTE — TELEPHONE ENCOUNTER
Called and spoke with pt regarding lab results and medication instructions as per Dr. Melchor. CT appt given to her along with instructions for test. V/u obtained.

## 2023-08-21 NOTE — PROGRESS NOTES
Chief Complaint   Patient presents with    Follow-up     Abnormal ct       Subjective    Joan Blackwell is a 57 y.o. female.    History of Present Illness  Patient presented to GI clinic for follow-up visit today.  Had a recent bout of back pain with CT abdomen pelvis with stone protocol consistent with mesenteric adenitis. Back  pain has improved.  Denied abdominal pain, nausea or vomiting.  Bowel movements regular.  Weight is stable.  CBC and CMP are normal.       The following portions of the patient's history were reviewed and updated as appropriate:   Past Medical History:   Diagnosis Date    Abdominal pain     suspect Kidney Stone       Acquired hypothyroidism     Acute bronchitis     Acute maxillary sinusitis     Acute pharyngitis     Allergic     Anxiety     Asthma     Axillary lymphadenopathy     Blood in urine     Bronchitis     with an asthmatic reaction       Colitis     Cough     Cyst of Bartholin's gland duct     History of     Degenerative joint disease involving multiple joints     Depression     Diabetes     Elevated cholesterol     Encounter for gynecological examination with abnormal finding     Essential (primary) hypertension     Essential hypertension     GERD (gastroesophageal reflux disease)     Goiter     Hemorrhoids     Hordeolum     right lower eyelid       Injury of back     Low back pain     Lung nodule, solitary     Mild chronic obstructive pulmonary disease 2016    Multiple gestation 1982    Osteoporosis     Polyp of vagina     possible       Postsurgical hypoparathyroidism     Shortness of breath     Tobacco dependence syndrome     Past history    Urinary tract infectious disease     Vulvovaginitis      Past Surgical History:   Procedure Laterality Date     SECTION  01/14/1993    x 3, 84, 82,    COLONOSCOPY N/A 02/15/2023    Procedure: COLONOSCOPY;  Surgeon: Cyndi Grace MD;  Location: [facility] ENDOSCOPY;  Service: Gastroenterology;  Laterality:  N/A;    EXPLORATORY LAPAROTOMY Left 01/02/1998    Left cystectomy. Partial resection of vthe left ovary. Left ovary- oversewn    HEMORRHOIDECTOMY  2006    INJECTION OF MEDICATION  03/06/2014    Depo Medrol (Methylprednisone) (1)     INJECTION OF MEDICATION  09/27/2015    Kenalog (1)      LAPAROSCOPIC HYSTERECTOMY  01/30/1995    Surgical, lysis of adhesions.Laparoscopic Assisted vaginal Hysterectomy (Tyler/ Doderlein Technique) marsupialization of right Bartholin's Gland Cyst    LASER ABLATION OF THE CERVIX  03/25/1985    Laser vaporization, cervical cone    OVARIAN CYST REMOVAL  1996    PAP SMEAR  2008    SHOULDER SURGERY      THYROIDECTOMY  02/28/2006    with partial parathyroidectomy    TOOTH EXTRACTION       Family History   Problem Relation Age of Onset    Diabetes Mother     Hypertension Mother     Diabetes Father     Arthritis Father      OB History    No obstetric history on file.       Prior to Admission medications    Medication Sig Start Date End Date Taking? Authorizing Provider   Alcohol Swabs (B-D SINGLE USE SWABS REGULAR) pads USE AS DIRECTED AS NEEDED 8/3/23  Yes Brad Arnold MD   ALPRAZolam (XANAX) 1 MG tablet Take 1 tablet by mouth As Needed. 2/20/20  Yes Trever Padilla MD   amLODIPine (Norvasc) 2.5 MG tablet Take 1 tablet by mouth Daily. 8/3/23  Yes Brad Arnold MD   ARIPiprazole (ABILIFY) 30 MG tablet Take 1 tablet by mouth Daily As Needed. 9/29/20  Yes Trever Padilla MD   atorvastatin (LIPITOR) 20 MG tablet Take 1 tablet by mouth Daily. 8/3/23  Yes Brad Arnold MD   Blood Glucose Monitoring Suppl w/Device kit Use to check sugars daily for diabetes E11.8 8/3/23  Yes Brad Arnold MD   buPROPion XL (WELLBUTRIN XL) 300 MG 24 hr tablet Take 1 tablet by mouth Daily As Needed. 9/29/20  Yes Trever Padilla MD   calcitriol (ROCALTROL) 0.25 MCG capsule 1 TAB DAILY 8/4/23  Yes Albert Khoury MD   calcium acetate (PHOS BINDER,) 667 MG capsule capsule Take 4  capsules am and 3 capsules pm 8/3/23  Yes Brad Arnold MD   cloNIDine (CATAPRES) 0.3 MG tablet Take 1 tablet by mouth Every 12 (Twelve) Hours. 8/3/23  Yes Brad Arnold MD   empagliflozin (Jardiance) 25 MG tablet tablet Take 1 tablet by mouth Daily. 8/3/23  Yes Brad Arnold MD   escitalopram (LEXAPRO) 20 MG tablet Take 1 tablet by mouth Daily.   Yes Trever Padilla MD   ferrous sulfate (FeroSul) 325 (65 FE) MG tablet Take 1 tablet by mouth 3 (Three) Times a Day With Meals. 8/3/23  Yes Brad Arnold MD   furosemide (Lasix) 20 MG tablet Take 1 tablet by mouth 2 (Two) Times a Day. 8/3/23  Yes Brad Arnold MD   glucose blood test strip Use to check sugars daily for diabetes E11.8 8/3/23  Yes Brad Arnold MD   HYDROcodone-acetaminophen (NORCO)  MG per tablet Take 1 tablet by mouth Every 8 (Eight) Hours As Needed. 8/24/21  Yes Trever Padilla MD   lamoTRIgine (LaMICtal) 200 MG tablet Take 0.5 tablets by mouth Daily As Needed. 9/29/20  Yes Trever Padilla MD   Lancets misc Use to check sugars daily for diabetes E11.8 8/3/23  Yes Brad Arnold MD   levothyroxine (Synthroid) 137 MCG tablet Take 1 tablet by mouth Daily. 8/3/23  Yes Brad Arnold MD   lisinopril (PRINIVIL,ZESTRIL) 40 MG tablet Take 1 tablet by mouth Daily. 8/3/23  Yes Brad Arnold MD   magnesium oxide 250 MG tablet Take 1 tablet by mouth Daily. 8/3/23  Yes Brad Arnold MD   metoprolol succinate XL (TOPROL-XL) 100 MG 24 hr tablet Take 1 tablet by mouth Daily. 8/3/23  Yes Brad Arnold MD   ondansetron (ZOFRAN) 8 MG tablet Take 1 tablet by mouth Every 8 (Eight) Hours As Needed for Nausea or Vomiting. 8/3/23  Yes Brad Arnold MD   potassium chloride ER (K-TAB) 20 MEQ tablet controlled-release ER tablet Take 1 tablet by mouth Daily. 8/3/23  Yes Brad Arnold MD   Semaglutide (Rybelsus) 14 MG tablet Take 1 tablet by mouth Daily. 30-60 minutes before bkfast with no more than 4 ounces of water  23  Yes Albert Khoury MD   traZODone (DESYREL) 100 MG tablet Take 1 tablet by mouth Every Night.  Patient taking differently: Take 1 tablet by mouth At Night As Needed. 22  Yes Brad Arnold MD     Allergies   Allergen Reactions    Naproxen Nausea And Vomiting and Swelling    Other Anaphylaxis and Swelling     Blueberries     Social History     Socioeconomic History    Marital status:    Tobacco Use    Smoking status: Former     Packs/day: 1.50     Years: 5.00     Pack years: 7.50     Types: Cigarettes     Start date: 1995     Quit date: 2015     Years since quittin.1    Smokeless tobacco: Never   Vaping Use    Vaping Use: Never used   Substance and Sexual Activity    Alcohol use: No    Drug use: No    Sexual activity: Not Currently     Partners: Male     Birth control/protection: Hysterectomy       Review of Systems  Review of Systems   Constitutional:  Negative for chills, fatigue, fever and unexpected weight change.   HENT:  Negative for congestion, ear discharge, hearing loss, nosebleeds and sore throat.    Eyes:  Negative for pain, discharge and redness.   Respiratory:  Negative for cough, chest tightness, shortness of breath and wheezing.    Cardiovascular:  Negative for chest pain and palpitations.   Gastrointestinal:  Negative for abdominal distention, abdominal pain, blood in stool, constipation, diarrhea, nausea and vomiting.   Endocrine: Negative for cold intolerance, polydipsia, polyphagia and polyuria.   Genitourinary:  Negative for dysuria, flank pain, frequency, hematuria and urgency.   Musculoskeletal:  Negative for arthralgias, back pain, joint swelling and myalgias.   Skin:  Negative for color change, pallor and rash.   Neurological:  Negative for tremors, seizures, syncope, weakness and headaches.   Hematological:  Negative for adenopathy. Does not bruise/bleed easily.   Psychiatric/Behavioral:  Negative for behavioral problems, confusion, dysphoric  "mood, hallucinations and suicidal ideas. The patient is not nervous/anxious.       /81 (BP Location: Right arm, Patient Position: Sitting, Cuff Size: Adult)   Pulse 80   Ht 152.4 cm (60\")   Wt 85.9 kg (189 lb 6.4 oz)   BMI 36.99 kg/mý     Objective    Physical Exam  Constitutional:       Appearance: She is well-developed.   HENT:      Head: Normocephalic and atraumatic.   Eyes:      Conjunctiva/sclera: Conjunctivae normal.      Pupils: Pupils are equal, round, and reactive to light.   Neck:      Thyroid: No thyromegaly.   Cardiovascular:      Rate and Rhythm: Normal rate and regular rhythm.      Heart sounds: Normal heart sounds. No murmur heard.  Pulmonary:      Effort: Pulmonary effort is normal.      Breath sounds: Normal breath sounds. No wheezing.   Abdominal:      General: Bowel sounds are normal. There is no distension.      Palpations: Abdomen is soft. There is no mass.      Tenderness: There is no abdominal tenderness.      Hernia: No hernia is present.   Genitourinary:     Comments: No lesions noted  Musculoskeletal:         General: No tenderness. Normal range of motion.      Cervical back: Normal range of motion and neck supple.   Lymphadenopathy:      Cervical: No cervical adenopathy.   Skin:     General: Skin is warm and dry.      Findings: No rash.   Neurological:      Mental Status: She is alert and oriented to person, place, and time.      Cranial Nerves: No cranial nerve deficit.   Psychiatric:         Thought Content: Thought content normal.     Lab on 07/26/2023   Component Date Value Ref Range Status    Hemoglobin A1C 07/26/2023 7.50 (H)  4.80 - 5.60 % Final    Total Cholesterol 07/26/2023 152  0 - 200 mg/dL Final    Triglycerides 07/26/2023 170 (H)  0 - 150 mg/dL Final    HDL Cholesterol 07/26/2023 40  40 - 60 mg/dL Final    LDL Cholesterol  07/26/2023 83  0 - 100 mg/dL Final    VLDL Cholesterol 07/26/2023 29  5 - 40 mg/dL Final    LDL/HDL Ratio 07/26/2023 1.95   Final    " Microalbumin/Creatinine Ratio 07/26/2023    Final    Unable to calculate    Creatinine, Urine 07/26/2023 87.3  mg/dL Final    Microalbumin, Urine 07/26/2023 <1.2  mg/dL Final    TSH 07/26/2023 1.220  0.270 - 4.200 uIU/mL Final    Vitamin B-12 07/26/2023 1,581 (H)  211 - 946 pg/mL Final    Magnesium 07/26/2023 2.1  1.6 - 2.6 mg/dL Final    Glucose 07/26/2023 126 (H)  65 - 99 mg/dL Final    BUN 07/26/2023 8  6 - 20 mg/dL Final    Creatinine 07/26/2023 0.94  0.57 - 1.00 mg/dL Final    Sodium 07/26/2023 140  136 - 145 mmol/L Final    Potassium 07/26/2023 3.9  3.5 - 5.2 mmol/L Final    Chloride 07/26/2023 98  98 - 107 mmol/L Final    CO2 07/26/2023 27.0  22.0 - 29.0 mmol/L Final    Calcium 07/26/2023 10.5  8.6 - 10.5 mg/dL Final    Total Protein 07/26/2023 8.2  6.0 - 8.5 g/dL Final    Albumin 07/26/2023 4.5  3.5 - 5.2 g/dL Final    ALT (SGPT) 07/26/2023 22  1 - 33 U/L Final    AST (SGOT) 07/26/2023 19  1 - 32 U/L Final    Alkaline Phosphatase 07/26/2023 101  39 - 117 U/L Final    Total Bilirubin 07/26/2023 0.4  0.0 - 1.2 mg/dL Final    Globulin 07/26/2023 3.7  gm/dL Final    A/G Ratio 07/26/2023 1.2  g/dL Final    BUN/Creatinine Ratio 07/26/2023 8.5  7.0 - 25.0 Final    Anion Gap 07/26/2023 15.0  5.0 - 15.0 mmol/L Final    eGFR 07/26/2023 70.5  >60.0 mL/min/1.73 Final    WBC 07/26/2023 6.55  3.40 - 10.80 10*3/mm3 Final    RBC 07/26/2023 4.51  3.77 - 5.28 10*6/mm3 Final    Hemoglobin 07/26/2023 12.8  12.0 - 15.9 g/dL Final    Hematocrit 07/26/2023 37.7  34.0 - 46.6 % Final    MCV 07/26/2023 83.6  79.0 - 97.0 fL Final    MCH 07/26/2023 28.4  26.6 - 33.0 pg Final    MCHC 07/26/2023 34.0  31.5 - 35.7 g/dL Final    RDW 07/26/2023 13.9  12.3 - 15.4 % Final    RDW-SD 07/26/2023 42.1  37.0 - 54.0 fl Final    MPV 07/26/2023 10.0  6.0 - 12.0 fL Final    Platelets 07/26/2023 318  140 - 450 10*3/mm3 Final    Neutrophil % 07/26/2023 51.3  42.7 - 76.0 % Final    Lymphocyte % 07/26/2023 39.5  19.6 - 45.3 % Final    Monocyte %  07/26/2023 6.6  5.0 - 12.0 % Final    Eosinophil % 07/26/2023 1.4  0.3 - 6.2 % Final    Basophil % 07/26/2023 0.9  0.0 - 1.5 % Final    Immature Grans % 07/26/2023 0.3  0.0 - 0.5 % Final    Neutrophils, Absolute 07/26/2023 3.36  1.70 - 7.00 10*3/mm3 Final    Lymphocytes, Absolute 07/26/2023 2.59  0.70 - 3.10 10*3/mm3 Final    Monocytes, Absolute 07/26/2023 0.43  0.10 - 0.90 10*3/mm3 Final    Eosinophils, Absolute 07/26/2023 0.09  0.00 - 0.40 10*3/mm3 Final    Basophils, Absolute 07/26/2023 0.06  0.00 - 0.20 10*3/mm3 Final    Immature Grans, Absolute 07/26/2023 0.02  0.00 - 0.05 10*3/mm3 Final    nRBC 07/26/2023 0.0  0.0 - 0.2 /100 WBC Final    Phosphorus 07/26/2023 6.1 (H)  2.5 - 4.5 mg/dL Final     Assessment & Plan    No diagnosis found..   1.  Back pain, resolved.  2.  Mesenteric adenitis, patient is asymptomatic.  Continue observation.  3.  Colon polyp, repeat colonoscopy in 3 years.  4.  Fatty liver disease with normal LFTs, recommend exercise and diet control.  5.  Obesity, recommend exercise and diet control.    Orders placed during this encounter include:  No orders of the defined types were placed in this encounter.      * Surgery not found *    Review and/or summary of lab tests, radiology, procedures, medications. Review and summary of old records and obtaining of history. The risks and benefits of my recommendations, as well as other treatment options were discussed with the patient today. Questions were answered.    No orders of the defined types were placed in this encounter.      Follow-up: No follow-ups on file.               Results for orders placed or performed in visit on 08/30/23   Cologuard - Stool, Per Rectum    Specimen: Per Rectum; Stool   Result Value Ref Range    Cologuard Negative Negative   Results for orders placed or performed in visit on 08/17/23   Gold Top - SST   Result Value Ref Range    Extra Tube Hold for add-ons.    CBC Auto Differential    Specimen: Arm, Right; Blood   Result  Value Ref Range    WBC 6.48 3.40 - 10.80 10*3/mm3    RBC 4.36 3.77 - 5.28 10*6/mm3    Hemoglobin 12.5 12.0 - 15.9 g/dL    Hematocrit 36.4 34.0 - 46.6 %    MCV 83.5 79.0 - 97.0 fL    MCH 28.7 26.6 - 33.0 pg    MCHC 34.3 31.5 - 35.7 g/dL    RDW 14.1 12.3 - 15.4 %    RDW-SD 43.3 37.0 - 54.0 fl    MPV 9.8 6.0 - 12.0 fL    Platelets 310 140 - 450 10*3/mm3    Neutrophil % 50.9 42.7 - 76.0 %    Lymphocyte % 40.0 19.6 - 45.3 %    Monocyte % 6.5 5.0 - 12.0 %    Eosinophil % 1.5 0.3 - 6.2 %    Basophil % 0.8 0.0 - 1.5 %    Immature Grans % 0.3 0.0 - 0.5 %    Neutrophils, Absolute 3.30 1.70 - 7.00 10*3/mm3    Lymphocytes, Absolute 2.59 0.70 - 3.10 10*3/mm3    Monocytes, Absolute 0.42 0.10 - 0.90 10*3/mm3    Eosinophils, Absolute 0.10 0.00 - 0.40 10*3/mm3    Basophils, Absolute 0.05 0.00 - 0.20 10*3/mm3    Immature Grans, Absolute 0.02 0.00 - 0.05 10*3/mm3    nRBC 0.0 0.0 - 0.2 /100 WBC   Iron and TIBC    Specimen: Blood   Result Value Ref Range    Iron 49 37 - 145 mcg/dL    Iron Saturation (TSAT) 13 (L) 20 - 50 %    Transferrin 247 200 - 360 mg/dL    TIBC 368 298 - 536 mcg/dL   Lactate Dehydrogenase    Specimen: Blood   Result Value Ref Range     135 - 214 U/L   Ferritin    Specimen: Blood   Result Value Ref Range    Ferritin 182.60 (H) 13.00 - 150.00 ng/mL   Comprehensive Metabolic Panel    Specimen: Blood   Result Value Ref Range    Glucose 131 (H) 65 - 99 mg/dL    BUN 8 6 - 20 mg/dL    Creatinine 1.07 (H) 0.57 - 1.00 mg/dL    Sodium 141 136 - 145 mmol/L    Potassium 3.7 3.5 - 5.2 mmol/L    Chloride 101 98 - 107 mmol/L    CO2 27.0 22.0 - 29.0 mmol/L    Calcium 9.0 8.6 - 10.5 mg/dL    Total Protein 8.3 6.0 - 8.5 g/dL    Albumin 4.6 3.5 - 5.2 g/dL    ALT (SGPT) 18 1 - 33 U/L    AST (SGOT) 16 1 - 32 U/L    Alkaline Phosphatase 97 39 - 117 U/L    Total Bilirubin 0.3 0.0 - 1.2 mg/dL    Globulin 3.7 gm/dL    A/G Ratio 1.2 g/dL    BUN/Creatinine Ratio 7.5 7.0 - 25.0    Anion Gap 13.0 5.0 - 15.0 mmol/L    eGFR 60.3 >60.0  mL/min/1.73   Results for orders placed or performed in visit on 07/26/23   CBC Auto Differential    Specimen: Blood   Result Value Ref Range    WBC 6.55 3.40 - 10.80 10*3/mm3    RBC 4.51 3.77 - 5.28 10*6/mm3    Hemoglobin 12.8 12.0 - 15.9 g/dL    Hematocrit 37.7 34.0 - 46.6 %    MCV 83.6 79.0 - 97.0 fL    MCH 28.4 26.6 - 33.0 pg    MCHC 34.0 31.5 - 35.7 g/dL    RDW 13.9 12.3 - 15.4 %    RDW-SD 42.1 37.0 - 54.0 fl    MPV 10.0 6.0 - 12.0 fL    Platelets 318 140 - 450 10*3/mm3    Neutrophil % 51.3 42.7 - 76.0 %    Lymphocyte % 39.5 19.6 - 45.3 %    Monocyte % 6.6 5.0 - 12.0 %    Eosinophil % 1.4 0.3 - 6.2 %    Basophil % 0.9 0.0 - 1.5 %    Immature Grans % 0.3 0.0 - 0.5 %    Neutrophils, Absolute 3.36 1.70 - 7.00 10*3/mm3    Lymphocytes, Absolute 2.59 0.70 - 3.10 10*3/mm3    Monocytes, Absolute 0.43 0.10 - 0.90 10*3/mm3    Eosinophils, Absolute 0.09 0.00 - 0.40 10*3/mm3    Basophils, Absolute 0.06 0.00 - 0.20 10*3/mm3    Immature Grans, Absolute 0.02 0.00 - 0.05 10*3/mm3    nRBC 0.0 0.0 - 0.2 /100 WBC   Microalbumin / Creatinine Urine Ratio - Urine, Clean Catch    Specimen: Urine, Clean Catch   Result Value Ref Range    Microalbumin/Creatinine Ratio      Creatinine, Urine 87.3 mg/dL    Microalbumin, Urine <1.2 mg/dL   TSH    Specimen: Blood   Result Value Ref Range    TSH 1.220 0.270 - 4.200 uIU/mL   Phosphorus    Specimen: Blood   Result Value Ref Range    Phosphorus 6.1 (H) 2.5 - 4.5 mg/dL   Magnesium    Specimen: Blood   Result Value Ref Range    Magnesium 2.1 1.6 - 2.6 mg/dL   Hemoglobin A1c    Specimen: Blood   Result Value Ref Range    Hemoglobin A1C 7.50 (H) 4.80 - 5.60 %   Vitamin B12    Specimen: Blood   Result Value Ref Range    Vitamin B-12 1,581 (H) 211 - 946 pg/mL   Lipid Panel    Specimen: Blood   Result Value Ref Range    Total Cholesterol 152 0 - 200 mg/dL    Triglycerides 170 (H) 0 - 150 mg/dL    HDL Cholesterol 40 40 - 60 mg/dL    LDL Cholesterol  83 0 - 100 mg/dL    VLDL Cholesterol 29 5 - 40 mg/dL     LDL/HDL Ratio 1.95    Comprehensive metabolic panel    Specimen: Blood   Result Value Ref Range    Glucose 126 (H) 65 - 99 mg/dL    BUN 8 6 - 20 mg/dL    Creatinine 0.94 0.57 - 1.00 mg/dL    Sodium 140 136 - 145 mmol/L    Potassium 3.9 3.5 - 5.2 mmol/L    Chloride 98 98 - 107 mmol/L    CO2 27.0 22.0 - 29.0 mmol/L    Calcium 10.5 8.6 - 10.5 mg/dL    Total Protein 8.2 6.0 - 8.5 g/dL    Albumin 4.5 3.5 - 5.2 g/dL    ALT (SGPT) 22 1 - 33 U/L    AST (SGOT) 19 1 - 32 U/L    Alkaline Phosphatase 101 39 - 117 U/L    Total Bilirubin 0.4 0.0 - 1.2 mg/dL    Globulin 3.7 gm/dL    A/G Ratio 1.2 g/dL    BUN/Creatinine Ratio 8.5 7.0 - 25.0    Anion Gap 15.0 5.0 - 15.0 mmol/L    eGFR 70.5 >60.0 mL/min/1.73     *Note: Due to a large number of results and/or encounters for the requested time period, some results have not been displayed. A complete set of results can be found in Results Review.         This document has been electronically signed by Cyndi Grace MD on August 20, 2023 22:08 CDT

## 2023-08-22 ENCOUNTER — HOSPITAL ENCOUNTER (OUTPATIENT)
Dept: CT IMAGING | Facility: HOSPITAL | Age: 58
Discharge: HOME | End: 2023-08-22
Admitting: INTERNAL MEDICINE
Payer: MEDICARE

## 2023-08-22 DIAGNOSIS — R93.5 ABNORMAL CT OF THE ABDOMEN: ICD-10-CM

## 2023-08-22 DIAGNOSIS — R59.0 MESENTERIC LYMPHADENOPATHY: ICD-10-CM

## 2023-08-22 PROCEDURE — 71260 CT THORAX DX C+: CPT

## 2023-08-22 PROCEDURE — 25510000001 IOPAMIDOL 61 % SOLUTION: Performed by: INTERNAL MEDICINE

## 2023-08-22 PROCEDURE — 74177 CT ABD & PELVIS W/CONTRAST: CPT

## 2023-08-22 RX ADMIN — IOPAMIDOL 90 ML: 612 INJECTION, SOLUTION INTRAVENOUS at 09:22

## 2023-08-23 NOTE — PROGRESS NOTES
"DATE OF VISIT: 8/24/2023      REASON FOR VISIT: Abnormal CT scan of abdomen, mesenteric adenopathy, iron deficiency      HISTORY OF PRESENT ILLNESS:   57-year-old female with medical problem consisting of hypertension, hyperactive thyroid for which patient had thyroidectomy done followed by Synthroid currently, history of parathyroid removal, history of nicotine addiction that she quit 10 years ago, iron deficiency for which patient is taking iron tablet p.o. daily was initially seen in consultation on August 17, 2023 for evaluation of abnormal CT scan of abdomen and pelvis showing lymph node enlargement involving the mesenteric area along with mesenteric panniculitis and iron deficiency.  Patient is here for follow-up appointment today to discuss results of blood work as well as CT scan that was done for reevaluation of mesenteric adenopathy.  Complains of chronic back pain due to disc problem.  Denies any new onset of neuropathy.  Denies any fevers.              Past Medical History, Past Surgical History, Social History, Family History have been reviewed and are without significant changes except as mentioned.    Review of Systems   A comprehensive 14 point review of systems was performed and was negative except as mentioned in HPI.    Medications:  The current medication list was reviewed in the EMR    ALLERGIES:    Allergies   Allergen Reactions    Naproxen Nausea And Vomiting and Swelling    Other Anaphylaxis and Swelling     Blueberries       Objective      Vitals:    08/24/23 0904   BP: 140/81   Pulse: 70   SpO2: 97%   Weight: 86.3 kg (190 lb 3.2 oz)   Height: 152.4 cm (60\")   PainSc: 0-No pain          No data to display                Physical Exam  Pulmonary:      Breath sounds: Normal breath sounds.   Neurological:      Mental Status: She is alert and oriented to person, place, and time.         RECENT LABS:  Glucose   Date Value Ref Range Status   08/17/2023 131 (H) 65 - 99 mg/dL Final     Sodium   Date " Value Ref Range Status   08/17/2023 141 136 - 145 mmol/L Final   02/11/2018 144 137 - 145 mmol/L Final     Potassium   Date Value Ref Range Status   08/17/2023 3.7 3.5 - 5.2 mmol/L Final   02/11/2018 3.6 3.5 - 5.1 mmol/L Final     CO2   Date Value Ref Range Status   08/17/2023 27.0 22.0 - 29.0 mmol/L Final     Total CO2   Date Value Ref Range Status   02/11/2018 26 22 - 30 mmol/L Final     Chloride   Date Value Ref Range Status   08/17/2023 101 98 - 107 mmol/L Final   02/11/2018 102 98 - 107 mmol/L Final     Anion Gap   Date Value Ref Range Status   08/17/2023 13.0 5.0 - 15.0 mmol/L Final     Creatinine   Date Value Ref Range Status   08/17/2023 1.07 (H) 0.57 - 1.00 mg/dL Final   02/11/2018 0.7 0.7 - 1.5 mg/dL Final     BUN   Date Value Ref Range Status   08/17/2023 8 6 - 20 mg/dL Final   02/11/2018 9 7 - 20 mg/dL Final     BUN/Creatinine Ratio   Date Value Ref Range Status   08/17/2023 7.5 7.0 - 25.0 Final   02/11/2018 12.9 RATIO Final     Calcium   Date Value Ref Range Status   08/17/2023 9.0 8.6 - 10.5 mg/dL Final   02/11/2018 7.0 (L) 8.4 - 10.2 mg/dL Final     Alkaline Phosphatase   Date Value Ref Range Status   08/17/2023 97 39 - 117 U/L Final     Total Protein   Date Value Ref Range Status   08/17/2023 8.3 6.0 - 8.5 g/dL Final     ALT (SGPT)   Date Value Ref Range Status   08/17/2023 18 1 - 33 U/L Final     AST (SGOT)   Date Value Ref Range Status   08/17/2023 16 1 - 32 U/L Final     Total Bilirubin   Date Value Ref Range Status   08/17/2023 0.3 0.0 - 1.2 mg/dL Final     Albumin   Date Value Ref Range Status   08/17/2023 4.6 3.5 - 5.2 g/dL Final     Globulin   Date Value Ref Range Status   08/17/2023 3.7 gm/dL Final     Lab Results   Component Value Date    WBC 6.48 08/17/2023    HGB 12.5 08/17/2023    HCT 36.4 08/17/2023    MCV 83.5 08/17/2023     08/17/2023     Lab Results   Component Value Date    NEUTROABS 3.30 08/17/2023    IRON 49 08/17/2023    IRON 67 08/29/2022    IRON 68 08/25/2021    TIBC 368  "2023    TIBC 308 2022    TIBC 344 2021    LABIRON 13 (L) 2023    LABIRON 22 2022    LABIRON 20 2021    FERRITIN 182.60 (H) 2023    FERRITIN 254.00 (H) 2022    FERRITIN 145.00 2021    VTSXWRVF82 1,581 (H) 2023    UMLFRGGQ63 316 2022    WWVKNNJE02 333 2021     Lab Results   Component Value Date    REFLABREPO  02/15/2023     [facility]  [address] [place] [address]  Phone: [phone] or [phone]  Fax: [phone]  Kurt Barajas M.D., Medical Director    PATIENT NAME                           LABORATORY NO.  TRU MONTES.                [id number]  [id number]                         AGE              SEX  SSN           CLIENT REF #  [facility]           57      1965  F    [id number]   [id number]    [place]                       REQUESTING M.D.     ATTENDING M.D.     COPY TO.  [address] [place] [address]                 [address]  DATE COLLECTED      DATE RECEIVED      DATE REPORTED  02/15/2023          02/15/2023         2023    DIAGNOSIS:  TRANSVERSE COLON POLYP:  Tubular adenoma    JBS/sm    CLINICAL HISTORY:  Encounter for screening for malignant neoplasm of colon    SPECIMENS RECEIVED:  TRANSVERSE COLON POLYP    MICROSCOPIC DESCRIPTION:  Tissue blocks are prepared and slides are examined microscopically on all  specimens. See diagnosis for details.    Professional interpretation rendered by Ayush Soriano M.D. at [facility] [employer],  [facility], [address] [place] [address].    GROSS DESCRIPTION:  The specimen is received in 1 formalin filled container labeled \"large intestine,  transverse colon polyp biopsy\" and consists of multiple pieces of tan soft tissue  and possible vegetative matter measuring 1.7 x 1.1 x 0.2 cm in aggregate.  The  specimen is filtered and submitted entirely in 1 " murtaza GARZA    REVIEWED, DIAGNOSED AND ELECTRONICALLY  SIGNED BY:    Ayush Soriano M.D.  CPT CODES:  41401           PATHOLOGY:  * Cannot find OR log *         RADIOLOGY DATA :  CT Chest With Contrast Diagnostic    Result Date: 8/22/2023  No significant abnormality seen    CT Abdomen Pelvis With Contrast    Result Date: 8/22/2023  1.  Again seen is mesenteric lymphadenopathy with mild stranding of mesenteric fat.  Changes can be from mesenteric panniculitis as mentioned on the previous exam.  Given the significant enlargement of lymph nodes that is better seen on the present exam obtained with IV contrast other etiologies such as lymphoma or metastatic disease cannot be entirely excluded.  Recommend further evaluation with PET/CT. 2.  Fatty enlarged liver. 3. This report is being added to an unexpected findings folder for prompt notification of the referring healthcare provider.         Assessment & Plan     1.  Abnormal CT scan of abdomen showing some mesenteric adenopathy  - Recent CT of chest abdomen and pelvis with contrast on August 22, 2023 was reviewed with radiologist and discussed with patient.  - CT of chest does not show any significant adenopathy.  - CT of abdomen and pelvis shows mesenteric adenopathy some of them from lymph node are around 3  cm which is pathologically enlarged.  As compared to CT scan in October 2022 lymph nodes have increased in size as well as number.  - Result of CT scan were discussed with patient today.  - Recommend general surgery consult for possible biopsy as CT-guided biopsy is not possibilities due to location of lymph node enlargement after review with radiologist.  - We will have patient return to clinic  in 3 weeks after biopsy to go over the results and further recommendation.  Referral has been placed.  Surgery team today.    2.  Iron deficiency:  - Remains on iron tablet p.o. daily  - We will repeat anemia work-up around November 2023    3.  History of thyroid  surgery currently on thyroid replacement    4.  Health maintenance: Patient does not smoke.  Had a mammogram in 2023.  Had a colonoscopy in February 2023 which was negative for malignancy.                     PHQ-9 Total Score: 0   -Patient is not homicidal or suicidal.  No acute intervention required.     Joan Blackwell reports a pain score of 0.  Given her pain assessment as noted, treatment options were discussed and the following options were decided upon as a follow-up plan to address the patient's pain: continuation of current treatment plan for pain.         Stewart Melchor MD  8/24/2023  11:53 CDT        Part of this note may be an electronic transcription/translation of spoken language to printed text using the Dragon Dictation System.          CC:

## 2023-08-24 ENCOUNTER — OFFICE VISIT (OUTPATIENT)
Dept: ONCOLOGY | Facility: CLINIC | Age: 58
End: 2023-08-24
Payer: MEDICARE

## 2023-08-24 VITALS
SYSTOLIC BLOOD PRESSURE: 140 MMHG | HEIGHT: 60 IN | OXYGEN SATURATION: 97 % | WEIGHT: 190.2 LBS | BODY MASS INDEX: 37.34 KG/M2 | HEART RATE: 70 BPM | DIASTOLIC BLOOD PRESSURE: 81 MMHG

## 2023-08-24 DIAGNOSIS — R59.0 MESENTERIC LYMPHADENOPATHY: ICD-10-CM

## 2023-08-24 DIAGNOSIS — E61.1 IRON DEFICIENCY: ICD-10-CM

## 2023-08-24 DIAGNOSIS — R93.5 ABNORMAL CT OF THE ABDOMEN: Primary | ICD-10-CM

## 2023-08-25 ENCOUNTER — OFFICE VISIT (OUTPATIENT)
Dept: SURGERY | Facility: CLINIC | Age: 58
End: 2023-08-25
Payer: MEDICARE

## 2023-08-25 VITALS
DIASTOLIC BLOOD PRESSURE: 64 MMHG | TEMPERATURE: 97.1 F | SYSTOLIC BLOOD PRESSURE: 112 MMHG | BODY MASS INDEX: 37.3 KG/M2 | HEART RATE: 81 BPM | HEIGHT: 60 IN | WEIGHT: 190 LBS

## 2023-08-25 DIAGNOSIS — R59.0 ABDOMINAL LYMPHADENOPATHY: Primary | ICD-10-CM

## 2023-08-25 PROCEDURE — 1159F MED LIST DOCD IN RCRD: CPT | Performed by: SURGERY

## 2023-08-25 PROCEDURE — 3074F SYST BP LT 130 MM HG: CPT | Performed by: SURGERY

## 2023-08-25 PROCEDURE — 99214 OFFICE O/P EST MOD 30 MIN: CPT | Performed by: SURGERY

## 2023-08-25 PROCEDURE — 1160F RVW MEDS BY RX/DR IN RCRD: CPT | Performed by: SURGERY

## 2023-08-25 PROCEDURE — 3078F DIAST BP <80 MM HG: CPT | Performed by: SURGERY

## 2023-08-25 NOTE — PROGRESS NOTES
58-year-old female referred for consideration of biopsy of mesenteric lymph nodes.  Patient had a CT scan done in [place] few months ago and was noted to have some mildly enlarged lymph nodes and then recently Dr. Melchor from medical oncology who repeated the CT scan appear.  Was unremarkable for any obvious acute findings but demonstrated the largest lymph node described was 2.7 cm in size the rest were smaller.  Patient denies any B cells or anything to suggest any type of lymphoma.  She has undergone EGD and colonoscopy by Dr. Burnett from gastroenterology.  No obvious weight gain or weight loss.  No abdominal pain.  No history of diarrhea    Vitals:    08/25/23 1502   BP: 112/64   Pulse: 81   Temp: 97.1 øF (36.2 øC)       Allergies:   Allergies   Allergen Reactions    Naproxen Nausea And Vomiting and Swelling    Other Anaphylaxis and Swelling     Blueberries       Home Medications:  Prior to Admission medications    Medication Sig Start Date End Date Taking? Authorizing Provider   Alcohol Swabs (B-D SINGLE USE SWABS REGULAR) pads USE AS DIRECTED AS NEEDED 8/3/23  Yes Brad Arnold MD   ALPRAZolam (XANAX) 1 MG tablet Take 1 tablet by mouth As Needed. 2/20/20  Yes Trever Padilla MD   amLODIPine (Norvasc) 2.5 MG tablet Take 1 tablet by mouth Daily. 8/3/23  Yes Brad Arnold MD   ARIPiprazole (ABILIFY) 30 MG tablet Take 1 tablet by mouth Daily As Needed. 9/29/20  Yes Trever Padilla MD   atorvastatin (LIPITOR) 20 MG tablet Take 1 tablet by mouth Daily. 8/3/23  Yes rBad Arnold MD   Blood Glucose Monitoring Suppl w/Device kit Use to check sugars daily for diabetes E11.8 8/3/23  Yes Brad Arnold MD   buPROPion XL (WELLBUTRIN XL) 300 MG 24 hr tablet Take 1 tablet by mouth Daily As Needed. 9/29/20  Yes Trever Padilla MD   calcitriol (ROCALTROL) 0.25 MCG capsule 1 TAB DAILY 8/4/23  Yes Albert Khoury MD   calcium acetate (PHOS BINDER,) 667 MG capsule capsule Take 4 capsules  am and 3 capsules pm 8/3/23  Yes Brad Arnold MD   cloNIDine (CATAPRES) 0.3 MG tablet Take 1 tablet by mouth Every 12 (Twelve) Hours. 8/3/23  Yes Brad Arnold MD   empagliflozin (Jardiance) 25 MG tablet tablet Take 1 tablet by mouth Daily. 8/3/23  Yes Brad Arnold MD   escitalopram (LEXAPRO) 20 MG tablet Take 1 tablet by mouth Daily.   Yes Trever Padilla MD   ferrous sulfate (FeroSul) 325 (65 FE) MG tablet Take 1 tablet by mouth 3 (Three) Times a Day With Meals. 8/3/23  Yes Brad Arnold MD   furosemide (Lasix) 20 MG tablet Take 1 tablet by mouth 2 (Two) Times a Day. 8/3/23  Yes Brad Arnold MD   glucose blood test strip Use to check sugars daily for diabetes E11.8 8/3/23  Yes Brad Arnold MD   HYDROcodone-acetaminophen (NORCO)  MG per tablet Take 1 tablet by mouth Every 8 (Eight) Hours As Needed. 8/24/21  Yes Trever Padilla MD   lamoTRIgine (LaMICtal) 200 MG tablet Take 0.5 tablets by mouth Daily As Needed. 9/29/20  Yes Trever Padilla MD   Lancets misc Use to check sugars daily for diabetes E11.8 8/3/23  Yes Brad Arnold MD   levothyroxine (Synthroid) 137 MCG tablet Take 1 tablet by mouth Daily. 8/3/23  Yes Brad Arnold MD   lisinopril (PRINIVIL,ZESTRIL) 40 MG tablet Take 1 tablet by mouth Daily. 8/3/23  Yes Brad Arnold MD   magnesium oxide 250 MG tablet Take 1 tablet by mouth Daily. 8/3/23  Yes Brad Arnold MD   metoprolol succinate XL (TOPROL-XL) 100 MG 24 hr tablet Take 1 tablet by mouth Daily. 8/3/23  Yes Brad Arnold MD   ondansetron (ZOFRAN) 8 MG tablet Take 1 tablet by mouth Every 8 (Eight) Hours As Needed for Nausea or Vomiting. 8/3/23  Yes Brad Arnold MD   potassium chloride ER (K-TAB) 20 MEQ tablet controlled-release ER tablet Take 1 tablet by mouth Daily. 8/3/23  Yes Brad Arnold MD   Semaglutide (Rybelsus) 14 MG tablet Take 1 tablet by mouth Daily. 30-60 minutes before bkfast with no more than 4 ounces of water 8/4/23  Yes  Albert Khoury MD   traZODone (DESYREL) 100 MG tablet Take 1 tablet by mouth Every Night.  Patient taking differently: Take 1 tablet by mouth At Night As Needed. 22  Yes Brad Arnold MD       Social History     Socioeconomic History    Marital status:    Tobacco Use    Smoking status: Former     Packs/day: 1.50     Years: 5.00     Pack years: 7.50     Types: Cigarettes     Start date: 1995     Quit date: 2015     Years since quittin.2    Smokeless tobacco: Never   Vaping Use    Vaping Use: Never used   Substance and Sexual Activity    Alcohol use: No    Drug use: No    Sexual activity: Not Currently     Partners: Male     Birth control/protection: Hysterectomy       Past Medical History:   Diagnosis Date    Abdominal pain     suspect Kidney Stone       Acquired hypothyroidism     Acute bronchitis     Acute maxillary sinusitis     Acute pharyngitis     Allergic     Anxiety     Asthma     Axillary lymphadenopathy     Blood in urine     Bronchitis     with an asthmatic reaction       Colitis     Cough     Cyst of Bartholin's gland duct     History of     Degenerative joint disease involving multiple joints     Depression     Diabetes     Elevated cholesterol     Encounter for gynecological examination with abnormal finding     Essential (primary) hypertension     Essential hypertension     GERD (gastroesophageal reflux disease)     Goiter     Hemorrhoids     Hordeolum     right lower eyelid       Injury of back     Low back pain     Lung nodule, solitary     Mild chronic obstructive pulmonary disease 2016    Multiple gestation 1982    Osteoporosis     Polyp of vagina     possible       Postsurgical hypoparathyroidism     Shortness of breath     Tobacco dependence syndrome     Past history    Urinary tract infectious disease     Vulvovaginitis        Family History   Problem Relation Age of Onset    Diabetes Mother     Hypertension Mother     Diabetes Father     Arthritis  Father        Past Surgical History:   Procedure Laterality Date     SECTION  01/14/1993    x 3, 84, 82,    COLONOSCOPY N/A 02/15/2023    Procedure: COLONOSCOPY;  Surgeon: Cyndi Grace MD;  Location[facility] ENDOSCOPY;  Service: Gastroenterology;  Laterality: N/A;    EXPLORATORY LAPAROTOMY Left 1998    Left cystectomy. Partial resection of vthe left ovary. Left ovary- oversewn    HEMORRHOIDECTOMY      INJECTION OF MEDICATION  2014    Depo Medrol (Methylprednisone) (1)     INJECTION OF MEDICATION  2015    Kenalog (1)      LAPAROSCOPIC HYSTERECTOMY  1995    Surgical, lysis of adhesions.Laparoscopic Assisted vaginal Hysterectomy (Tyler/ Doderlein Technique) marsupialization of right Bartholin's Gland Cyst    LASER ABLATION OF THE CERVIX  1985    Laser vaporization, cervical cone    OVARIAN CYST REMOVAL      PAP SMEAR      SHOULDER SURGERY      THYROIDECTOMY  2006    with partial parathyroidectomy    TOOTH EXTRACTION     Review of systems  Denies chest pain  Denies shortness of breath  Denies palpitations  Denies abdominal pain  Denies nausea and vomiting  Denies any urinary complaints  Denies fever chills  Does not take any anticoagulants  Denies any bleeding issues   Denies history of DVT  No history of asthma  No history of seizures  Physical Exam  Constitutional:       General: She is not in acute distress.     Appearance: She is well-developed. She is not ill-appearing or toxic-appearing.   HENT:      Head: Normocephalic and atraumatic.      Nose: Nose normal.   Eyes:      General: No scleral icterus.     Conjunctiva/sclera: Conjunctivae normal.    .   Cardiovascular:      Rate and Rhythm: Normal rate and regular rhythm.      Heart sounds: Normal heart sounds. No murmur heard.    No friction rub. No gallop.   Pulmonary:      Effort: Pulmonary effort is normal. No respiratory distress.      Breath sounds: Normal breath sounds. No stridor. No  wheezing or rales.   Chest:      Chest wall: No tenderness.   Abdominal:      General: Bowel sounds are normal. There is no distension.      Palpations: Abdomen is soft. There is no mass.      Tenderness: There is no abdominal tenderness. There is no guarding or rebound.      Hernia: No hernia is present.   Musculoskeletal:         General: No deformity. Normal range of motion.      Cervical back: Normal range of motion and neck supple.   Lymphadenopathy:      Cervical: No cervical adenopathy.   Skin:     General: Skin is warm and dry.      Coloration: Skin is not pale.      Findings: No erythema or rash.      Nails: There is no clubbing.   Neurological:      Mental Status: She is alert and oriented to person, place, and time.   Psychiatric:         Behavior: Behavior normal.         Thought Content: Thought content normal.     Reviewed CT scan and spoke with Dr. Melchor for medical oncology    Assessment and plan  Patient is asymptomatic currently.  She has 1 mildly enlarged lymph node by the study.  We very difficult to biopsy and be sure that the lymph node was removed.  I went over all this with the patient at this point.  Dr. Melchor says the patient was very nervous and was wanting to have a biopsy done.  I told her I would not recommend biopsy at this time I think the risk-benefit ratio is not in favor of doing a biopsy and we talked about that in depth.  I think its not unreasonable to repeat her CT scan in 3 months get some feeling for what is going on.  I think her lymph nodes would have to become obviously more involved and significant larger for me to be concerned about any type of lymphoma.  Patient will follow-up with us after the CT scan of Dr. Melchor feels she is a candidate to have this biopsied.  We will be glad to reevaluate her at that time.

## 2023-09-05 ENCOUNTER — LAB (OUTPATIENT)
Dept: LAB | Facility: HOSPITAL | Age: 58
End: 2023-09-05
Payer: MEDICARE

## 2023-09-05 ENCOUNTER — OFFICE VISIT (OUTPATIENT)
Dept: FAMILY MEDICINE CLINIC | Facility: CLINIC | Age: 58
End: 2023-09-05
Payer: MEDICARE

## 2023-09-05 VITALS
SYSTOLIC BLOOD PRESSURE: 106 MMHG | DIASTOLIC BLOOD PRESSURE: 58 MMHG | TEMPERATURE: 98 F | HEIGHT: 60 IN | BODY MASS INDEX: 37.22 KG/M2 | WEIGHT: 189.6 LBS

## 2023-09-05 DIAGNOSIS — K65.4 MESENTERIC PANNICULITIS: ICD-10-CM

## 2023-09-05 DIAGNOSIS — E20.9 HYPOPARATHYROIDISM, UNSPECIFIED HYPOPARATHYROIDISM TYPE: ICD-10-CM

## 2023-09-05 DIAGNOSIS — R93.5 ABNORMAL CT OF THE ABDOMEN: Primary | ICD-10-CM

## 2023-09-05 DIAGNOSIS — E78.2 MIXED HYPERLIPIDEMIA: ICD-10-CM

## 2023-09-05 DIAGNOSIS — E83.51 HYPOCALCEMIA: ICD-10-CM

## 2023-09-05 DIAGNOSIS — E89.0 S/P THYROIDECTOMY: ICD-10-CM

## 2023-09-05 DIAGNOSIS — E89.0 POSTOPERATIVE HYPOTHYROIDISM: ICD-10-CM

## 2023-09-05 DIAGNOSIS — E20.8 OTHER HYPOPARATHYROIDISM: ICD-10-CM

## 2023-09-05 DIAGNOSIS — E55.9 VITAMIN D DEFICIENCY: ICD-10-CM

## 2023-09-05 DIAGNOSIS — E66.01 CLASS 2 SEVERE OBESITY DUE TO EXCESS CALORIES WITH SERIOUS COMORBIDITY AND BODY MASS INDEX (BMI) OF 37.0 TO 37.9 IN ADULT: ICD-10-CM

## 2023-09-05 DIAGNOSIS — E11.8 CONTROLLED TYPE 2 DIABETES MELLITUS WITH COMPLICATION, WITHOUT LONG-TERM CURRENT USE OF INSULIN: ICD-10-CM

## 2023-09-05 DIAGNOSIS — I10 ESSENTIAL HYPERTENSION: ICD-10-CM

## 2023-09-05 PROCEDURE — 3074F SYST BP LT 130 MM HG: CPT | Performed by: FAMILY MEDICINE

## 2023-09-05 PROCEDURE — 80069 RENAL FUNCTION PANEL: CPT

## 2023-09-05 PROCEDURE — 3051F HG A1C>EQUAL 7.0%<8.0%: CPT | Performed by: FAMILY MEDICINE

## 2023-09-05 PROCEDURE — 3078F DIAST BP <80 MM HG: CPT | Performed by: FAMILY MEDICINE

## 2023-09-05 PROCEDURE — 83735 ASSAY OF MAGNESIUM: CPT

## 2023-09-05 PROCEDURE — 99214 OFFICE O/P EST MOD 30 MIN: CPT | Performed by: FAMILY MEDICINE

## 2023-09-06 ENCOUNTER — OFFICE VISIT (OUTPATIENT)
Dept: PODIATRY | Facility: CLINIC | Age: 58
End: 2023-09-06
Payer: MEDICARE

## 2023-09-06 VITALS
SYSTOLIC BLOOD PRESSURE: 122 MMHG | OXYGEN SATURATION: 95 % | HEIGHT: 60 IN | HEART RATE: 98 BPM | WEIGHT: 189 LBS | BODY MASS INDEX: 37.11 KG/M2 | DIASTOLIC BLOOD PRESSURE: 80 MMHG

## 2023-09-06 DIAGNOSIS — M79.671 RIGHT FOOT PAIN: Primary | ICD-10-CM

## 2023-09-06 DIAGNOSIS — M72.2 PLANTAR FASCIAL FIBROMATOSIS OF RIGHT FOOT: ICD-10-CM

## 2023-09-06 LAB
ALBUMIN SERPL-MCNC: 4.3 G/DL (ref 3.5–5.2)
ANION GAP SERPL CALCULATED.3IONS-SCNC: 13 MMOL/L (ref 5–15)
BUN SERPL-MCNC: 10 MG/DL (ref 6–20)
BUN/CREAT SERPL: 11.6 (ref 7–25)
CALCIUM SPEC-SCNC: 8.7 MG/DL (ref 8.6–10.5)
CHLORIDE SERPL-SCNC: 100 MMOL/L (ref 98–107)
CO2 SERPL-SCNC: 27 MMOL/L (ref 22–29)
CREAT SERPL-MCNC: 0.86 MG/DL (ref 0.57–1)
EGFRCR SERPLBLD CKD-EPI 2021: 77.9 ML/MIN/1.73
GLUCOSE SERPL-MCNC: 129 MG/DL (ref 65–99)
MAGNESIUM SERPL-MCNC: 2.2 MG/DL (ref 1.6–2.6)
PHOSPHATE SERPL-MCNC: 5.8 MG/DL (ref 2.5–4.5)
POTASSIUM SERPL-SCNC: 3.9 MMOL/L (ref 3.5–5.2)
SODIUM SERPL-SCNC: 140 MMOL/L (ref 136–145)

## 2023-09-06 PROCEDURE — 3074F SYST BP LT 130 MM HG: CPT | Performed by: PODIATRIST

## 2023-09-06 PROCEDURE — 99204 OFFICE O/P NEW MOD 45 MIN: CPT | Performed by: PODIATRIST

## 2023-09-06 PROCEDURE — 1159F MED LIST DOCD IN RCRD: CPT | Performed by: PODIATRIST

## 2023-09-06 PROCEDURE — 1160F RVW MEDS BY RX/DR IN RCRD: CPT | Performed by: PODIATRIST

## 2023-09-06 PROCEDURE — 3079F DIAST BP 80-89 MM HG: CPT | Performed by: PODIATRIST

## 2023-09-06 NOTE — PROGRESS NOTES
Joan Blackwell  1965  58 y.o. female      2023    Chief Complaint   Patient presents with    Right Foot - Pain       History of Present Illness    Joan Blackwell is a 58 y.o.female who presents to [facility] for surgery consult for a right foot plantar fibroma. Patient was supposed to have surgery with DR Burt, but they never called her.       Past Medical History:   Diagnosis Date    Abdominal pain     suspect Kidney Stone       Acquired hypothyroidism     Acute bronchitis     Acute maxillary sinusitis     Acute pharyngitis     Allergic     Anxiety     Asthma     Axillary lymphadenopathy     Blood in urine     Bronchitis     with an asthmatic reaction       Colitis     Cough     Cyst of Bartholin's gland duct     History of     Degenerative joint disease involving multiple joints     Depression     Diabetes     Elevated cholesterol     Encounter for gynecological examination with abnormal finding     Essential (primary) hypertension     Essential hypertension     GERD (gastroesophageal reflux disease)     Goiter     Hemorrhoids     Hordeolum     right lower eyelid       Injury of back     Low back pain     Lung nodule, solitary     Mild chronic obstructive pulmonary disease 2016    Multiple gestation 1982    Osteoporosis     Polyp of vagina     possible       Postsurgical hypoparathyroidism     Shortness of breath     Tobacco dependence syndrome     Past history    Urinary tract infectious disease     Vulvovaginitis          Past Surgical History:   Procedure Laterality Date     SECTION  01/14/1993    x 3, 84, 82,    COLONOSCOPY N/A 02/15/2023    Procedure: COLONOSCOPY;  Surgeon: Cyndi Grace MD;  Location: [facility] ENDOSCOPY;  Service: Gastroenterology;  Laterality: N/A;    EXPLORATORY LAPAROTOMY Left 1998    Left cystectomy. Partial resection of vthe left ovary. Left ovary- oversewn    HEMORRHOIDECTOMY      INJECTION OF MEDICATION  2014     Depo Medrol (Methylprednisone) (1)     INJECTION OF MEDICATION  2015    Kenalog (1)      LAPAROSCOPIC HYSTERECTOMY  1995    Surgical, lysis of adhesions.Laparoscopic Assisted vaginal Hysterectomy (Tyler/ Doderlein Technique) marsupialization of right Bartholin's Gland Cyst    LASER ABLATION OF THE CERVIX  1985    Laser vaporization, cervical cone    OVARIAN CYST REMOVAL      PAP SMEAR      SHOULDER SURGERY      THYROIDECTOMY  2006    with partial parathyroidectomy    TOOTH EXTRACTION           Family History   Problem Relation Age of Onset    Diabetes Mother     Hypertension Mother     Diabetes Father     Arthritis Father        Allergies   Allergen Reactions    Naproxen Nausea And Vomiting and Swelling    Other Anaphylaxis and Swelling     Blueberries       Social History     Socioeconomic History    Marital status:    Tobacco Use    Smoking status: Former     Packs/day: 1.50     Years: 5.00     Pack years: 7.50     Types: Cigarettes     Start date: 1995     Quit date: 2015     Years since quittin.2    Smokeless tobacco: Never   Vaping Use    Vaping Use: Never used   Substance and Sexual Activity    Alcohol use: No    Drug use: No    Sexual activity: Not Currently     Partners: Male     Birth control/protection: Hysterectomy         Current Outpatient Medications   Medication Sig Dispense Refill    Alcohol Swabs (B-D SINGLE USE SWABS REGULAR) pads USE AS DIRECTED AS NEEDED 300 each 3    ALPRAZolam (XANAX) 1 MG tablet Take 1 tablet by mouth As Needed.      amLODIPine (Norvasc) 2.5 MG tablet Take 1 tablet by mouth Daily. 30 tablet 3    ARIPiprazole (ABILIFY) 30 MG tablet Take 1 tablet by mouth Daily As Needed.      atorvastatin (LIPITOR) 20 MG tablet Take 1 tablet by mouth Daily. 90 tablet 1    Blood Glucose Monitoring Suppl w/Device kit Use to check sugars daily for diabetes E11.8 1 each 0    buPROPion XL (WELLBUTRIN XL) 300 MG 24 hr tablet Take 1 tablet by mouth  Daily As Needed.      calcitriol (ROCALTROL) 0.25 MCG capsule 1 TAB DAILY 30 capsule 11    calcium acetate (PHOS BINDER,) 667 MG capsule capsule Take 4 capsules am and 3 capsules pm 210 capsule 11    cloNIDine (CATAPRES) 0.3 MG tablet Take 1 tablet by mouth Every 12 (Twelve) Hours. 180 tablet 1    empagliflozin (Jardiance) 25 MG tablet tablet Take 1 tablet by mouth Daily. 90 tablet 3    escitalopram (LEXAPRO) 20 MG tablet Take 1 tablet by mouth Daily.      ferrous sulfate (FeroSul) 325 (65 FE) MG tablet Take 1 tablet by mouth 3 (Three) Times a Day With Meals. 270 tablet 1    furosemide (Lasix) 20 MG tablet Take 1 tablet by mouth 2 (Two) Times a Day. 60 tablet 3    glucose blood test strip Use to check sugars daily for diabetes E11.8 100 each 3    HYDROcodone-acetaminophen (NORCO)  MG per tablet Take 1 tablet by mouth Every 8 (Eight) Hours As Needed.      lamoTRIgine (LaMICtal) 200 MG tablet Take 0.5 tablets by mouth Daily As Needed.      Lancets misc Use to check sugars daily for diabetes E11.8 100 each 3    levothyroxine (Synthroid) 137 MCG tablet Take 1 tablet by mouth Daily. 30 tablet 3    lisinopril (PRINIVIL,ZESTRIL) 40 MG tablet Take 1 tablet by mouth Daily. 90 tablet 1    magnesium oxide 250 MG tablet Take 1 tablet by mouth Daily. 90 tablet 1    metoprolol succinate XL (TOPROL-XL) 100 MG 24 hr tablet Take 1 tablet by mouth Daily. 90 tablet 1    ondansetron (ZOFRAN) 8 MG tablet Take 1 tablet by mouth Every 8 (Eight) Hours As Needed for Nausea or Vomiting. 90 tablet 2    potassium chloride ER (K-TAB) 20 MEQ tablet controlled-release ER tablet Take 1 tablet by mouth Daily. 90 tablet 1    Semaglutide (Rybelsus) 14 MG tablet Take 1 tablet by mouth Daily. 30-60 minutes before bkfast with no more than 4 ounces of water 30 tablet 11    traZODone (DESYREL) 100 MG tablet Take 1 tablet by mouth Every Night. (Patient taking differently: Take 1 tablet by mouth At Night As Needed.) 90 tablet 0     No current  "facility-administered medications for this visit.       Review of Systems   Musculoskeletal:         Foot pain        OBJECTIVE    /80   Pulse 98   Ht 152.4 cm (60\")   Wt 85.7 kg (189 lb)   SpO2 95%   BMI 36.91 kg/m²     Physical Exam  Vitals reviewed.   Constitutional:       General: She is not in acute distress.     Appearance: She is well-developed.   Cardiovascular:      Pulses:           Dorsalis pedis pulses are 2+ on the right side.        Posterior tibial pulses are 2+ on the right side.   Pulmonary:      Effort: Pulmonary effort is normal.   Musculoskeletal:      Right lower leg: No edema.      Left lower leg: No edema.        Feet:    Feet:      Right foot:      Skin integrity: Skin integrity normal.   Skin:     General: Skin is warm and dry.      Capillary Refill: Capillary refill takes less than 2 seconds.   Neurological:      Mental Status: She is alert and oriented to person, place, and time.      Gait: Gait is intact. Gait normal.   Psychiatric:         Behavior: Behavior normal. Behavior is cooperative.         Thought Content: Thought content normal. Thought content is not delusional.            Procedures        ASSESSMENT AND PLAN    Diagnoses and all orders for this visit:    1. Right foot pain (Primary)  -     Cancel: XR Foot 3+ View Right  -     XR Foot 3+ View Right    2. Plantar fascial fibromatosis of right foot  -     Case Request; Standing  -     ceFAZolin (ANCEF) 2 g in sodium chloride 0.9 % 100 mL IVPB  -     Case Request    Other orders  -     Follow Anesthesia Guidelines / Protocol; Future  -     Follow Anesthesia Guidelines / Protocol; Standing  -     Verify NPO Status; Standing  -     Obtain informed consent (if not collected inpatient or PAT); Standing  -     Notify Physician - Standard; Standing        -Patient examined and evaluated  -Imaging reviewed  -Etiology and treatment both conservative and surgical were discussed.  Patient wishes to proceed with surgical " excision  -Surgical plan is excision plantar fascial fibroma right foot and all indicated procedures.  -Risks and benefits of the procedure including but not limited to postoperative infection, incisional dehiscence, numbness, swelling, residual pain and postoperative blood clot discussed in detail.  No guarantees were given or implied.  -All questions were answered  -Recheck following surgery           This document has been electronically signed by Rishi Dean DPM on September 6, 2023 14:42 CDT     9/6/2023  14:42 CDT

## 2023-09-14 ENCOUNTER — OFFICE VISIT (OUTPATIENT)
Dept: ONCOLOGY | Facility: CLINIC | Age: 58
End: 2023-09-14
Payer: MEDICARE

## 2023-09-14 VITALS
RESPIRATION RATE: 18 BRPM | DIASTOLIC BLOOD PRESSURE: 70 MMHG | WEIGHT: 189.2 LBS | BODY MASS INDEX: 36.95 KG/M2 | SYSTOLIC BLOOD PRESSURE: 142 MMHG | TEMPERATURE: 96.2 F

## 2023-09-14 DIAGNOSIS — R59.0 MESENTERIC LYMPHADENOPATHY: ICD-10-CM

## 2023-09-14 DIAGNOSIS — R93.5 ABNORMAL CT OF THE ABDOMEN: Primary | ICD-10-CM

## 2023-09-14 DIAGNOSIS — E61.1 IRON DEFICIENCY: ICD-10-CM

## 2023-09-14 NOTE — PROGRESS NOTES
DATE OF VISIT: 9/14/2023      REASON FOR VISIT: Mesenteric adenopathy, abnormal CT scan of abdomen, iron deficiency      HISTORY OF PRESENT ILLNESS:   58-year-old female with medical problem consisting of hypertension, hyperactive thyroid for which patient had a thyroidectomy done followed by Synthroid replacement currently, history of parathyroid removal, history of nicotine addiction that she quit around 2013 iron deficiency for which patient is currently on iron tablet p.o. daily was initially seen in consultation on August 17, 2023 for evaluation of abnormal CT scan of abdomen and pelvis showing mesenteric adenopathy, panniculitis as well as iron deficiency.  Patient is here for follow-up appointment today.  Patient has been evaluated by Dr. Bhatt since last clinic visit.  Complains of chronic back pain.  Denies any fevers.  Denies any abnormal bleeding.              Past Medical History, Past Surgical History, Social History, Family History have been reviewed and are without significant changes except as mentioned.    Review of Systems   A comprehensive 14 point review of systems was performed and was negative except as mentioned in HPI.    Medications:  The current medication list was reviewed in the EMR    ALLERGIES:    Allergies   Allergen Reactions    Naproxen Nausea And Vomiting and Swelling    Other Anaphylaxis and Swelling     Blueberries       Objective      Vitals:    09/14/23 0852   BP: 142/70   Resp: 18   Temp: 96.2 °F (35.7 °C)   Weight: 85.8 kg (189 lb 3.2 oz)   PainSc: 4  Comment: back          No data to display                Physical Exam  Neurological:      Mental Status: She is alert and oriented to person, place, and time.         RECENT LABS:  Glucose   Date Value Ref Range Status   09/05/2023 129 (H) 65 - 99 mg/dL Final     Sodium   Date Value Ref Range Status   09/05/2023 140 136 - 145 mmol/L Final   02/11/2018 144 137 - 145 mmol/L Final     Potassium   Date Value Ref Range Status    09/05/2023 3.9 3.5 - 5.2 mmol/L Final   02/11/2018 3.6 3.5 - 5.1 mmol/L Final     CO2   Date Value Ref Range Status   09/05/2023 27.0 22.0 - 29.0 mmol/L Final     Total CO2   Date Value Ref Range Status   02/11/2018 26 22 - 30 mmol/L Final     Chloride   Date Value Ref Range Status   09/05/2023 100 98 - 107 mmol/L Final   02/11/2018 102 98 - 107 mmol/L Final     Anion Gap   Date Value Ref Range Status   09/05/2023 13.0 5.0 - 15.0 mmol/L Final     Creatinine   Date Value Ref Range Status   09/05/2023 0.86 0.57 - 1.00 mg/dL Final   02/11/2018 0.7 0.7 - 1.5 mg/dL Final     BUN   Date Value Ref Range Status   09/05/2023 10 6 - 20 mg/dL Final   02/11/2018 9 7 - 20 mg/dL Final     BUN/Creatinine Ratio   Date Value Ref Range Status   09/05/2023 11.6 7.0 - 25.0 Final   02/11/2018 12.9 RATIO Final     Calcium   Date Value Ref Range Status   09/05/2023 8.7 8.6 - 10.5 mg/dL Final   02/11/2018 7.0 (L) 8.4 - 10.2 mg/dL Final     Alkaline Phosphatase   Date Value Ref Range Status   08/17/2023 97 39 - 117 U/L Final     Total Protein   Date Value Ref Range Status   08/17/2023 8.3 6.0 - 8.5 g/dL Final     ALT (SGPT)   Date Value Ref Range Status   08/17/2023 18 1 - 33 U/L Final     AST (SGOT)   Date Value Ref Range Status   08/17/2023 16 1 - 32 U/L Final     Total Bilirubin   Date Value Ref Range Status   08/17/2023 0.3 0.0 - 1.2 mg/dL Final     Albumin   Date Value Ref Range Status   09/05/2023 4.3 3.5 - 5.2 g/dL Final     Globulin   Date Value Ref Range Status   08/17/2023 3.7 gm/dL Final     Lab Results   Component Value Date    WBC 6.48 08/17/2023    HGB 12.5 08/17/2023    HCT 36.4 08/17/2023    MCV 83.5 08/17/2023     08/17/2023     Lab Results   Component Value Date    NEUTROABS 3.30 08/17/2023    IRON 49 08/17/2023    IRON 67 08/29/2022    IRON 68 08/25/2021    TIBC 368 08/17/2023    TIBC 308 08/29/2022    TIBC 344 08/25/2021    LABIRON 13 (L) 08/17/2023    LABIRON 22 08/29/2022    LABIRON 20 08/25/2021    FERRITIN  "182.60 (H) 2023    FERRITIN 254.00 (H) 2022    FERRITIN 145.00 2021    YZMPFCPT13 1,581 (H) 2023    WQAUOAGR97 316 2022    [id number] 333 2021     Lab Results   Component Value Date    REFLABREPO  02/15/2023     [facility]  [address] [place] [address]  Phone: [phone] or [phone]  Fax: [phone]  Kurt Barajas M.D., Medical Director    PATIENT NAME                           LABORATORY NO.  TRU [facility]ONT[facility].                [id number]  [id number]                         AGE              SEX  SSN           CLIENT REF #  [facility]           57      1965  F    [id number]   [id number]    [place]                       REQUESTING M.D.     ATTENDING M.D.     COPY TO.  [address] [place] [address]                 [address]  DATE COLLECTED      DATE RECEIVED      DATE REPORTED  02/15/2023          02/15/2023         2023    DIAGNOSIS:  TRANSVERSE COLON POLYP:  Tubular adenoma    JBS/sm    CLINICAL HISTORY:  Encounter for screening for malignant neoplasm of colon    SPECIMENS RECEIVED:  TRANSVERSE COLON POLYP    MICROSCOPIC DESCRIPTION:  Tissue blocks are prepared and slides are examined microscopically on all  specimens. See diagnosis for details.    Professional interpretation rendered by Ayush Soriano M.D. at [facility],  [facility], [address] [place] [address].    GROSS DESCRIPTION:  The specimen is received in 1 formalin filled container labeled \"large intestine,  transverse colon polyp biopsy\" and consists of multiple pieces of tan soft tissue  and possible vegetative matter measuring 1.7 x 1.1 x 0.2 cm in aggregate.  The  specimen is filtered and submitted entirely in 1 block.  GREG    REVIEWED, DIAGNOSED AND ELECTRONICALLY  SIGNED BY:    Ayush Soriano M.D.  CPT CODES:  00381           PATHOLOGY:  * Cannot find OR " log *         RADIOLOGY DATA :  No radiology results for the last 7 days        Assessment & Plan     1.  Abnormal CT scan of abdomen showing mesenteric adenopathy  - CT of chest abdomen and pelvis on August 22, 2023 shows mesenteric adenopathy which is increased in size as compared to October 2022  - Patient was referred to Dr. Bhatt and was evaluated by Dr. Bhatt on August 25, 2023.  Due to size and location of lymph node as per Dr. Bhatt it will be very difficult to biopsy that lymph node.  Case was discussed with Dr. Bhatt  - At this point since biopsy is not a possibility recommend 3-month follow-up CT of abdomen and pelvis with contrast to be done prior to next clinic visit  - We will repeat CBC, CMP, LDH, iron studies, ferritin, B12, folate and CT of abdomen and pelvis with contrast prior to next clinic visit in 3 months.    2.  Iron deficiency  - Currently on iron tablet p.o. daily    3.  History of thyroid surgery currently on thyroid replacement    4.  Health maintenance: Patient does not smoke.  Had a mammogram in 2023.  Had a colonoscopy in February 2023.                     PHQ-9 Total Score: 0   -Patient is not homicidal or suicidal.  No acute intervention required.     Joan Blackwell reports a pain score of 4.  Given her pain assessment as noted, treatment options were discussed and the following options were decided upon as a follow-up plan to address the patient's pain: continuation of current treatment plan for pain.         Stewart Melchor MD  9/14/2023  09:27 CDT        Part of this note may be an electronic transcription/translation of spoken language to printed text using the Dragon Dictation System.          CC:

## 2023-09-22 ENCOUNTER — HOSPITAL ENCOUNTER (OUTPATIENT)
Facility: HOSPITAL | Age: 58
Setting detail: HOSPITAL OUTPATIENT SURGERY
End: 2023-09-22
Attending: PODIATRIST | Admitting: PODIATRIST
Payer: MEDICARE

## 2023-09-22 PROBLEM — M72.2 PLANTAR FASCIAL FIBROMATOSIS OF RIGHT FOOT: Status: ACTIVE | Noted: 2023-09-22

## 2023-09-29 RX ORDER — FLUCONAZOLE 150 MG/1
TABLET ORAL
Qty: 2 TABLET | Refills: 0 | OUTPATIENT
Start: 2023-09-29

## 2023-10-01 RX ORDER — LEVOTHYROXINE SODIUM 137 UG/1
137 TABLET ORAL DAILY
Qty: 30 TABLET | Refills: 3 | Status: SHIPPED | OUTPATIENT
Start: 2023-10-01

## 2023-10-01 RX ORDER — FLUCONAZOLE 150 MG/1
TABLET ORAL
Qty: 2 TABLET | Refills: 0 | Status: SHIPPED | OUTPATIENT
Start: 2023-10-01

## 2023-10-01 RX ORDER — ONDANSETRON HYDROCHLORIDE 8 MG/1
8 TABLET, FILM COATED ORAL EVERY 8 HOURS PRN
Qty: 90 TABLET | Refills: 2 | Status: SHIPPED | OUTPATIENT
Start: 2023-10-01

## 2023-10-09 ENCOUNTER — TELEPHONE (OUTPATIENT)
Dept: PODIATRY | Facility: CLINIC | Age: 58
End: 2023-10-09
Payer: MEDICARE

## 2023-10-09 NOTE — TELEPHONE ENCOUNTER
LUCIANA WITH [facility] STATES THAT THEY ARE OKAY WITH DR ANDERSON PRESCRIBING POST OP PAIN MEDICATION FOR THE PATIENT, SHE WILL PUT PAIN MANAGEMENT MEDICATION ON HOLD TO TAKE WHAT MONICA PRESCRIBES.

## (undated) DEVICE — Device: Brand: DISPOSABLE ELECTROSURGICAL SNARE

## (undated) DEVICE — CANN SMPL SOFTECH BIFLO ETCO2 A/M 7FT

## (undated) DEVICE — TRAP SXN POLYP QUICKCATCH LF